# Patient Record
Sex: MALE | Race: WHITE | NOT HISPANIC OR LATINO | Employment: OTHER | URBAN - METROPOLITAN AREA
[De-identification: names, ages, dates, MRNs, and addresses within clinical notes are randomized per-mention and may not be internally consistent; named-entity substitution may affect disease eponyms.]

---

## 2017-01-05 ENCOUNTER — OFFICE VISIT - RIVER FALLS (OUTPATIENT)
Dept: FAMILY MEDICINE | Facility: CLINIC | Age: 77
End: 2017-01-05

## 2017-01-17 ENCOUNTER — OFFICE VISIT - RIVER FALLS (OUTPATIENT)
Dept: FAMILY MEDICINE | Facility: CLINIC | Age: 77
End: 2017-01-17

## 2017-01-17 ASSESSMENT — MIFFLIN-ST. JEOR: SCORE: 1717.31

## 2017-09-26 ENCOUNTER — AMBULATORY - RIVER FALLS (OUTPATIENT)
Dept: FAMILY MEDICINE | Facility: CLINIC | Age: 77
End: 2017-09-26

## 2017-11-02 ENCOUNTER — OFFICE VISIT - RIVER FALLS (OUTPATIENT)
Dept: FAMILY MEDICINE | Facility: CLINIC | Age: 77
End: 2017-11-02

## 2017-11-02 ASSESSMENT — MIFFLIN-ST. JEOR: SCORE: 1726.38

## 2017-11-03 LAB
CREAT SERPL-MCNC: 1.03 MG/DL (ref 0.7–1.18)
GLUCOSE BLD-MCNC: 114 MG/DL (ref 65–99)

## 2018-02-01 ENCOUNTER — AMBULATORY - RIVER FALLS (OUTPATIENT)
Dept: FAMILY MEDICINE | Facility: CLINIC | Age: 78
End: 2018-02-01

## 2018-02-02 LAB
CHOLEST SERPL-MCNC: 145 MG/DL
CHOLEST/HDLC SERPL: 3.4 {RATIO}
CREAT SERPL-MCNC: 0.88 MG/DL (ref 0.7–1.18)
GLUCOSE BLD-MCNC: 118 MG/DL (ref 65–99)
HBA1C MFR BLD: 5.7 %
HDLC SERPL-MCNC: 43 MG/DL
LDLC SERPL CALC-MCNC: 76 MG/DL
NONHDLC SERPL-MCNC: 102 MG/DL
PSA SERPL-MCNC: 3.1 NG/ML
TRIGL SERPL-MCNC: 158 MG/DL

## 2018-02-12 ENCOUNTER — OFFICE VISIT - RIVER FALLS (OUTPATIENT)
Dept: FAMILY MEDICINE | Facility: CLINIC | Age: 78
End: 2018-02-12

## 2018-02-12 ASSESSMENT — MIFFLIN-ST. JEOR: SCORE: 1690.09

## 2018-02-19 ENCOUNTER — AMBULATORY - RIVER FALLS (OUTPATIENT)
Dept: FAMILY MEDICINE | Facility: CLINIC | Age: 78
End: 2018-02-19

## 2018-02-22 ENCOUNTER — AMBULATORY - RIVER FALLS (OUTPATIENT)
Dept: FAMILY MEDICINE | Facility: CLINIC | Age: 78
End: 2018-02-22

## 2018-02-23 ENCOUNTER — AMBULATORY - RIVER FALLS (OUTPATIENT)
Dept: FAMILY MEDICINE | Facility: CLINIC | Age: 78
End: 2018-02-23

## 2018-06-11 ENCOUNTER — OFFICE VISIT - RIVER FALLS (OUTPATIENT)
Dept: FAMILY MEDICINE | Facility: CLINIC | Age: 78
End: 2018-06-11

## 2018-06-11 ASSESSMENT — MIFFLIN-ST. JEOR: SCORE: 1708.23

## 2018-06-22 ENCOUNTER — OFFICE VISIT - RIVER FALLS (OUTPATIENT)
Dept: FAMILY MEDICINE | Facility: CLINIC | Age: 78
End: 2018-06-22

## 2018-08-30 ENCOUNTER — OFFICE VISIT - RIVER FALLS (OUTPATIENT)
Dept: FAMILY MEDICINE | Facility: CLINIC | Age: 78
End: 2018-08-30

## 2018-08-30 ASSESSMENT — MIFFLIN-ST. JEOR: SCORE: 1726.38

## 2018-10-11 ENCOUNTER — AMBULATORY - RIVER FALLS (OUTPATIENT)
Dept: FAMILY MEDICINE | Facility: CLINIC | Age: 78
End: 2018-10-11

## 2019-03-06 ENCOUNTER — AMBULATORY - RIVER FALLS (OUTPATIENT)
Dept: FAMILY MEDICINE | Facility: CLINIC | Age: 79
End: 2019-03-06

## 2019-03-07 LAB
BUN SERPL-MCNC: 24 MG/DL (ref 7–25)
BUN/CREAT RATIO - HISTORICAL: ABNORMAL (ref 6–22)
CALCIUM SERPL-MCNC: 9.7 MG/DL (ref 8.6–10.3)
CHLORIDE BLD-SCNC: 103 MMOL/L (ref 98–110)
CHOLEST SERPL-MCNC: 145 MG/DL
CHOLEST/HDLC SERPL: 3.5 {RATIO}
CO2 SERPL-SCNC: 27 MMOL/L (ref 20–32)
CREAT SERPL-MCNC: 0.75 MG/DL (ref 0.7–1.11)
CREAT UR-MCNC: 109 MG/DL (ref 20–320)
EGFRCR SERPLBLD CKD-EPI 2021: 87 ML/MIN/1.73M2
ERYTHROCYTE [DISTWIDTH] IN BLOOD BY AUTOMATED COUNT: 12.3 % (ref 11–15)
GLUCOSE BLD-MCNC: 132 MG/DL (ref 65–99)
HBA1C MFR BLD: 6.1 %
HCT VFR BLD AUTO: 40.5 % (ref 38.5–50)
HDLC SERPL-MCNC: 42 MG/DL
HGB BLD-MCNC: 14.3 GM/DL (ref 13.2–17.1)
LDLC SERPL CALC-MCNC: 76 MG/DL
MCH RBC QN AUTO: 33.6 PG (ref 27–33)
MCHC RBC AUTO-ENTMCNC: 35.3 GM/DL (ref 32–36)
MCV RBC AUTO: 95.3 FL (ref 80–100)
MICROALBUMIN UR-MCNC: 73.9 MG/DL
MICROALBUMIN/CREAT UR: 678 MG/G{CREAT}
NONHDLC SERPL-MCNC: 103 MG/DL
PLATELET # BLD AUTO: 218 10*3/UL (ref 140–400)
PMV BLD: 10.6 FL (ref 7.5–12.5)
POTASSIUM BLD-SCNC: 4.4 MMOL/L (ref 3.5–5.3)
RBC # BLD AUTO: 4.25 10*6/UL (ref 4.2–5.8)
SODIUM SERPL-SCNC: 138 MMOL/L (ref 135–146)
TRIGL SERPL-MCNC: 167 MG/DL
WBC # BLD AUTO: 7 10*3/UL (ref 3.8–10.8)

## 2019-03-18 ENCOUNTER — OFFICE VISIT - RIVER FALLS (OUTPATIENT)
Dept: FAMILY MEDICINE | Facility: CLINIC | Age: 79
End: 2019-03-18

## 2019-03-18 ASSESSMENT — MIFFLIN-ST. JEOR: SCORE: 1739.99

## 2019-06-24 ENCOUNTER — COMMUNICATION - RIVER FALLS (OUTPATIENT)
Dept: FAMILY MEDICINE | Facility: CLINIC | Age: 79
End: 2019-06-24

## 2019-07-01 ENCOUNTER — OFFICE VISIT - RIVER FALLS (OUTPATIENT)
Dept: FAMILY MEDICINE | Facility: CLINIC | Age: 79
End: 2019-07-01

## 2019-07-01 ENCOUNTER — AMBULATORY - RIVER FALLS (OUTPATIENT)
Dept: FAMILY MEDICINE | Facility: CLINIC | Age: 79
End: 2019-07-01

## 2019-07-01 ASSESSMENT — MIFFLIN-ST. JEOR: SCORE: 1735.45

## 2019-07-02 ENCOUNTER — COMMUNICATION - RIVER FALLS (OUTPATIENT)
Dept: FAMILY MEDICINE | Facility: CLINIC | Age: 79
End: 2019-07-02

## 2019-07-02 LAB
CREAT UR-MCNC: 173 MG/DL (ref 20–320)
HBA1C MFR BLD: 5.9 %
MICROALBUMIN UR-MCNC: 63.3 MG/DL
MICROALBUMIN/CREAT UR: 366 MG/G{CREAT}

## 2019-07-09 ENCOUNTER — OFFICE VISIT - RIVER FALLS (OUTPATIENT)
Dept: FAMILY MEDICINE | Facility: CLINIC | Age: 79
End: 2019-07-09

## 2019-07-09 ASSESSMENT — MIFFLIN-ST. JEOR: SCORE: 1728.19

## 2019-07-10 ENCOUNTER — COMMUNICATION - RIVER FALLS (OUTPATIENT)
Dept: FAMILY MEDICINE | Facility: CLINIC | Age: 79
End: 2019-07-10

## 2019-07-10 LAB
BUN SERPL-MCNC: 36 MG/DL (ref 7–25)
BUN/CREAT RATIO - HISTORICAL: 39 (ref 6–22)
CALCIUM SERPL-MCNC: 9.6 MG/DL (ref 8.6–10.3)
CHLORIDE BLD-SCNC: 103 MMOL/L (ref 98–110)
CO2 SERPL-SCNC: 27 MMOL/L (ref 20–32)
CREAT SERPL-MCNC: 0.92 MG/DL (ref 0.7–1.11)
EGFRCR SERPLBLD CKD-EPI 2021: 78 ML/MIN/1.73M2
GLUCOSE BLD-MCNC: 95 MG/DL (ref 65–99)
POTASSIUM BLD-SCNC: 4.3 MMOL/L (ref 3.5–5.3)
SODIUM SERPL-SCNC: 138 MMOL/L (ref 135–146)

## 2019-07-24 ENCOUNTER — OFFICE VISIT - RIVER FALLS (OUTPATIENT)
Dept: FAMILY MEDICINE | Facility: CLINIC | Age: 79
End: 2019-07-24

## 2019-09-12 ENCOUNTER — AMBULATORY - RIVER FALLS (OUTPATIENT)
Dept: FAMILY MEDICINE | Facility: CLINIC | Age: 79
End: 2019-09-12

## 2020-03-10 ENCOUNTER — AMBULATORY - RIVER FALLS (OUTPATIENT)
Dept: FAMILY MEDICINE | Facility: CLINIC | Age: 80
End: 2020-03-10

## 2020-03-11 LAB
BUN SERPL-MCNC: 28 MG/DL (ref 7–25)
BUN/CREAT RATIO - HISTORICAL: 34 (ref 6–22)
CALCIUM SERPL-MCNC: 9.5 MG/DL (ref 8.6–10.3)
CHLORIDE BLD-SCNC: 102 MMOL/L (ref 98–110)
CHOLEST SERPL-MCNC: 153 MG/DL
CHOLEST/HDLC SERPL: 3.4 {RATIO}
CO2 SERPL-SCNC: 25 MMOL/L (ref 20–32)
CREAT SERPL-MCNC: 0.83 MG/DL (ref 0.7–1.11)
CREAT UR-MCNC: 48 MG/DL (ref 20–320)
EGFRCR SERPLBLD CKD-EPI 2021: 83 ML/MIN/1.73M2
GLUCOSE BLD-MCNC: 115 MG/DL (ref 65–99)
HBA1C MFR BLD: 6.1 %
HDLC SERPL-MCNC: 45 MG/DL
LDLC SERPL CALC-MCNC: 83 MG/DL
MICROALBUMIN UR-MCNC: 42 MG/DL
MICROALBUMIN/CREAT UR: 875 MG/G{CREAT}
NONHDLC SERPL-MCNC: 108 MG/DL
POTASSIUM BLD-SCNC: 4.2 MMOL/L (ref 3.5–5.3)
SODIUM SERPL-SCNC: 137 MMOL/L (ref 135–146)
TRIGL SERPL-MCNC: 157 MG/DL

## 2020-03-15 ENCOUNTER — COMMUNICATION - RIVER FALLS (OUTPATIENT)
Dept: FAMILY MEDICINE | Facility: CLINIC | Age: 80
End: 2020-03-15

## 2020-03-24 ENCOUNTER — COMMUNICATION - RIVER FALLS (OUTPATIENT)
Dept: FAMILY MEDICINE | Facility: CLINIC | Age: 80
End: 2020-03-24

## 2020-04-20 ENCOUNTER — COMMUNICATION - RIVER FALLS (OUTPATIENT)
Dept: FAMILY MEDICINE | Facility: CLINIC | Age: 80
End: 2020-04-20

## 2020-05-05 ENCOUNTER — OFFICE VISIT - RIVER FALLS (OUTPATIENT)
Dept: FAMILY MEDICINE | Facility: CLINIC | Age: 80
End: 2020-05-05

## 2020-05-06 ENCOUNTER — AMBULATORY - RIVER FALLS (OUTPATIENT)
Dept: FAMILY MEDICINE | Facility: CLINIC | Age: 80
End: 2020-05-06

## 2020-05-07 ENCOUNTER — AMBULATORY - RIVER FALLS (OUTPATIENT)
Dept: FAMILY MEDICINE | Facility: CLINIC | Age: 80
End: 2020-05-07

## 2020-05-12 ENCOUNTER — OFFICE VISIT - RIVER FALLS (OUTPATIENT)
Dept: FAMILY MEDICINE | Facility: CLINIC | Age: 80
End: 2020-05-12

## 2020-07-17 ENCOUNTER — OFFICE VISIT - RIVER FALLS (OUTPATIENT)
Dept: FAMILY MEDICINE | Facility: CLINIC | Age: 80
End: 2020-07-17

## 2020-07-17 ASSESSMENT — MIFFLIN-ST. JEOR: SCORE: 1694.63

## 2020-07-18 LAB
BUN SERPL-MCNC: 38 MG/DL (ref 7–25)
BUN/CREAT RATIO - HISTORICAL: 40 (ref 6–22)
CALCIUM SERPL-MCNC: 10 MG/DL (ref 8.6–10.3)
CHLORIDE BLD-SCNC: 98 MMOL/L (ref 98–110)
CO2 SERPL-SCNC: 26 MMOL/L (ref 20–32)
CREAT SERPL-MCNC: 0.96 MG/DL (ref 0.7–1.11)
EGFRCR SERPLBLD CKD-EPI 2021: 74 ML/MIN/1.73M2
GLUCOSE BLD-MCNC: 117 MG/DL (ref 65–99)
POTASSIUM BLD-SCNC: 4.6 MMOL/L (ref 3.5–5.3)
SODIUM SERPL-SCNC: 135 MMOL/L (ref 135–146)

## 2020-07-20 ENCOUNTER — COMMUNICATION - RIVER FALLS (OUTPATIENT)
Dept: FAMILY MEDICINE | Facility: CLINIC | Age: 80
End: 2020-07-20

## 2020-09-28 ENCOUNTER — OFFICE VISIT - RIVER FALLS (OUTPATIENT)
Dept: FAMILY MEDICINE | Facility: CLINIC | Age: 80
End: 2020-09-28

## 2020-11-02 ENCOUNTER — OFFICE VISIT - RIVER FALLS (OUTPATIENT)
Dept: FAMILY MEDICINE | Facility: CLINIC | Age: 80
End: 2020-11-02

## 2020-11-02 ASSESSMENT — MIFFLIN-ST. JEOR: SCORE: 1612.98

## 2020-12-14 ENCOUNTER — AMBULATORY - RIVER FALLS (OUTPATIENT)
Dept: FAMILY MEDICINE | Facility: CLINIC | Age: 80
End: 2020-12-14

## 2020-12-15 ENCOUNTER — COMMUNICATION - RIVER FALLS (OUTPATIENT)
Dept: FAMILY MEDICINE | Facility: CLINIC | Age: 80
End: 2020-12-15

## 2020-12-15 LAB
BUN SERPL-MCNC: 38 MG/DL (ref 7–25)
BUN/CREAT RATIO - HISTORICAL: 43 (ref 6–22)
CALCIUM SERPL-MCNC: 10.2 MG/DL (ref 8.6–10.3)
CHLORIDE BLD-SCNC: 102 MMOL/L (ref 98–110)
CO2 SERPL-SCNC: 29 MMOL/L (ref 20–32)
CREAT SERPL-MCNC: 0.89 MG/DL (ref 0.7–1.11)
CREAT UR-MCNC: 98 MG/DL (ref 20–320)
EGFRCR SERPLBLD CKD-EPI 2021: 80 ML/MIN/1.73M2
ERYTHROCYTE [DISTWIDTH] IN BLOOD BY AUTOMATED COUNT: 11.6 % (ref 11–15)
GLUCOSE BLD-MCNC: 114 MG/DL (ref 65–99)
HBA1C MFR BLD: 5.7 %
HCT VFR BLD AUTO: 39.7 % (ref 38.5–50)
HGB BLD-MCNC: 13.5 GM/DL (ref 13.2–17.1)
MCH RBC QN AUTO: 33.6 PG (ref 27–33)
MCHC RBC AUTO-ENTMCNC: 34 GM/DL (ref 32–36)
MCV RBC AUTO: 98.8 FL (ref 80–100)
MICROALBUMIN UR-MCNC: 14.2 MG/DL
MICROALBUMIN/CREAT UR: 145 MG/G{CREAT}
PLATELET # BLD AUTO: 244 10*3/UL (ref 140–400)
PMV BLD: 10.7 FL (ref 7.5–12.5)
POTASSIUM BLD-SCNC: 4.9 MMOL/L (ref 3.5–5.3)
RBC # BLD AUTO: 4.02 10*6/UL (ref 4.2–5.8)
SODIUM SERPL-SCNC: 141 MMOL/L (ref 135–146)
TSH SERPL DL<=0.005 MIU/L-ACNC: 1.32 MIU/L (ref 0.4–4.5)
WBC # BLD AUTO: 7.1 10*3/UL (ref 3.8–10.8)

## 2020-12-21 ENCOUNTER — OFFICE VISIT - RIVER FALLS (OUTPATIENT)
Dept: FAMILY MEDICINE | Facility: CLINIC | Age: 80
End: 2020-12-21

## 2021-01-25 ENCOUNTER — COMMUNICATION - RIVER FALLS (OUTPATIENT)
Dept: FAMILY MEDICINE | Facility: CLINIC | Age: 81
End: 2021-01-25

## 2021-02-05 ENCOUNTER — COMMUNICATION - RIVER FALLS (OUTPATIENT)
Dept: FAMILY MEDICINE | Facility: CLINIC | Age: 81
End: 2021-02-05

## 2021-02-08 ENCOUNTER — AMBULATORY - RIVER FALLS (OUTPATIENT)
Dept: FAMILY MEDICINE | Facility: CLINIC | Age: 81
End: 2021-02-08

## 2022-02-11 VITALS
OXYGEN SATURATION: 86 % | HEART RATE: 58 BPM | SYSTOLIC BLOOD PRESSURE: 134 MMHG | HEIGHT: 72 IN | WEIGHT: 221 LBS | HEART RATE: 58 BPM | BODY MASS INDEX: 29.72 KG/M2 | DIASTOLIC BLOOD PRESSURE: 70 MMHG | WEIGHT: 219.4 LBS | SYSTOLIC BLOOD PRESSURE: 128 MMHG | TEMPERATURE: 97.8 F | HEIGHT: 72 IN | TEMPERATURE: 97.5 F | BODY MASS INDEX: 29.93 KG/M2 | DIASTOLIC BLOOD PRESSURE: 48 MMHG

## 2022-02-11 VITALS
BODY MASS INDEX: 29.39 KG/M2 | HEART RATE: 59 BPM | WEIGHT: 217 LBS | HEIGHT: 72 IN | SYSTOLIC BLOOD PRESSURE: 154 MMHG | DIASTOLIC BLOOD PRESSURE: 60 MMHG

## 2022-02-11 VITALS
DIASTOLIC BLOOD PRESSURE: 50 MMHG | SYSTOLIC BLOOD PRESSURE: 140 MMHG | BODY MASS INDEX: 29.12 KG/M2 | HEART RATE: 64 BPM | HEART RATE: 59 BPM | DIASTOLIC BLOOD PRESSURE: 69 MMHG | BODY MASS INDEX: 29.62 KG/M2 | HEIGHT: 72 IN | TEMPERATURE: 98.2 F | WEIGHT: 218.4 LBS | SYSTOLIC BLOOD PRESSURE: 153 MMHG | WEIGHT: 215 LBS

## 2022-02-11 VITALS
HEART RATE: 50 BPM | SYSTOLIC BLOOD PRESSURE: 144 MMHG | HEIGHT: 72 IN | BODY MASS INDEX: 29.66 KG/M2 | DIASTOLIC BLOOD PRESSURE: 50 MMHG | WEIGHT: 219 LBS

## 2022-02-11 VITALS — SYSTOLIC BLOOD PRESSURE: 102 MMHG | HEART RATE: 62 BPM | DIASTOLIC BLOOD PRESSURE: 58 MMHG

## 2022-02-11 VITALS
DIASTOLIC BLOOD PRESSURE: 75 MMHG | HEART RATE: 54 BPM | HEIGHT: 72 IN | TEMPERATURE: 97.8 F | DIASTOLIC BLOOD PRESSURE: 71 MMHG | SYSTOLIC BLOOD PRESSURE: 150 MMHG | SYSTOLIC BLOOD PRESSURE: 131 MMHG | HEART RATE: 62 BPM | BODY MASS INDEX: 28.58 KG/M2 | WEIGHT: 211 LBS

## 2022-02-11 VITALS
BODY MASS INDEX: 28.71 KG/M2 | SYSTOLIC BLOOD PRESSURE: 102 MMHG | TEMPERATURE: 97.6 F | DIASTOLIC BLOOD PRESSURE: 60 MMHG | HEART RATE: 66 BPM | WEIGHT: 212 LBS | HEIGHT: 72 IN

## 2022-02-11 VITALS
HEART RATE: 74 BPM | WEIGHT: 194 LBS | SYSTOLIC BLOOD PRESSURE: 112 MMHG | BODY MASS INDEX: 26.28 KG/M2 | HEIGHT: 72 IN | HEIGHT: 72 IN | BODY MASS INDEX: 28.75 KG/M2 | TEMPERATURE: 98 F | DIASTOLIC BLOOD PRESSURE: 52 MMHG | OXYGEN SATURATION: 98 %

## 2022-02-11 VITALS
WEIGHT: 222 LBS | TEMPERATURE: 98.3 F | DIASTOLIC BLOOD PRESSURE: 50 MMHG | HEART RATE: 65 BPM | DIASTOLIC BLOOD PRESSURE: 60 MMHG | SYSTOLIC BLOOD PRESSURE: 120 MMHG | BODY MASS INDEX: 30.07 KG/M2 | HEIGHT: 72 IN | SYSTOLIC BLOOD PRESSURE: 130 MMHG

## 2022-02-11 VITALS
WEIGHT: 219 LBS | SYSTOLIC BLOOD PRESSURE: 148 MMHG | DIASTOLIC BLOOD PRESSURE: 58 MMHG | HEART RATE: 56 BPM | BODY MASS INDEX: 29.66 KG/M2 | HEIGHT: 72 IN

## 2022-02-11 VITALS — SYSTOLIC BLOOD PRESSURE: 122 MMHG | DIASTOLIC BLOOD PRESSURE: 60 MMHG

## 2022-02-11 VITALS — HEART RATE: 57 BPM | DIASTOLIC BLOOD PRESSURE: 52 MMHG | SYSTOLIC BLOOD PRESSURE: 128 MMHG

## 2022-02-16 NOTE — NURSING NOTE
Quick Intake Entered On:  3/6/2019 12:04 PM CST    Performed On:  3/6/2019 12:03 PM CST by Dolly Parish RN               Summary   Chief Complaint :   BP   Systolic Blood Pressure :   148 mmHg (HI)    Diastolic Blood Pressure :   72 mmHg   Mean Arterial Pressure :   97 mmHg   BP Site :   Right arm   BP Method :   Manual   Dolly Parish RN - 3/6/2019 12:03 PM CST   More Vitals   Systolic Blood Pressure Repeat :   130 mmHg   Diastolic Blood Pressure Repeat :   50 mmHg   BP Site Repeat :   Right arm   Dolly Parish RN - 3/6/2019 12:03 PM CST  
[FreeTextEntry1] : - MRI brain with TBI protocol and SWI sequence to r/o structural changes\par - May use Motrin 200 mg PRN for headaches\par - Advised to call if headache frequency is increasing\par - Follow up in 2 months

## 2022-02-16 NOTE — PROGRESS NOTES
Patient:   SONIA WHITE            MRN: [id number]            FIN: [id number]               Age:   76 years     Sex:  Male     :  1940   Associated Diagnoses:   HTN (hypertension); Prediabetes; Obesity; Dyslipidemia; Lumbar spinal stenosis; First degree atrioventricular block; Mild asthma; Sinus bradycardia   Author:   Pj Peterson MD      Visit Information   Visit type:  Preoperative.    Accompanied by:  No one.    Source of history:  Self.    History limitation:  None.       Chief Complaint   2017 3:35 PM CDT    pre op - 11/10/17 back surgery - out patient procedure        History of Present Illness   Scheduled for noninvasive spine procedure with MAC for lumbar spinal stenosis.  Feels well. No complaints. No H/O cardiopulmonary disease, surgical bleeding or anesthetic complications. Excellent functional capacity. Occasional  snoring, but no daytime sleepiness, chronic snoring or H/O sleep apnea.       Review of Systems   Constitutional:  No weakness, No fatigue, No decreased activity.    Eye:  No recent visual problem.    Respiratory:  No shortness of breath, No cough.    Cardiovascular:  No chest pain, No palpitations, No peripheral edema.    Gastrointestinal:  No nausea, No vomiting, No diarrhea.    Genitourinary:  Erectile dysfunction, No dysuria, No urinary frequency, No urinary hesitancy, No urinary retention.    Hematology/Lymphatics:  No bruising tendency, No bleeding tendency.    Endocrine:  Negative.    Musculoskeletal:  Negative except as documented in history of present illness.    Integumentary:  No rash.    Neurologic:  Negative except as documented in history of present illness.       Health Status   Allergies:    Allergic Reactions (Selected)  Severity Not Documented  Iodine (Asthma)  Shellfish (No reactions were documented)   Medications:  (Selected)   Prescriptions  Prescribed  Lipitor 10 mg oral tablet: 1 tab(s), po, Daily, # 90 tab(s), 3 Refill(s), Type: Maintenance,  Pharmacy: [id number], 1 tab(s) po daily,x90 day(s)  albuterol 90 mcg/inh inhalation aerosol: 2 puff(s), inh, qid, PRN: as needed for wheezing, # 2 EA, 6 Refill(s), Type: Maintenance, Pharmacy: [facility], 2 puff(s) inh qid,PRN:as needed for wheezing  hydroCHLOROthiazide 12.5 mg oral capsule: 1 cap(s) ( 12.5 mg ), po, daily, # 90 cap(s), 3 Refill(s), Pharmacy: [facility], 1 cap(s) po daily  lisinopril 40 mg oral tablet: 1 tab(s) ( 40 mg ), PO, Daily, # 90 tab(s), 3 Refill(s), Type: Maintenance, Pharmacy: [facility], 1 tab(s) po daily  Documented Medications  Documented  Aspir 81: ( 81 mg ), PO, Daily, 0   Problem list:    All Problems  Lumbar spinal stenosis / SNOMED CT 15182790 / Confirmed  Microalbuminuria / SNOMED CT 38846240 / Confirmed  Prediabetes / SNOMED CT 3819476742 / Confirmed  Obesity / SNOMED CT 8909457872 / Confirmed  Mild asthma / SNOMED CT 8005835975 / Confirmed  HTN (hypertension) / SNOMED CT 3597710873 / Confirmed  Dyslipidemia / SNOMED CT 8749438113 / Confirmed  Carcinoma of colon, [facility] B2 / SNOMED CT 635320064 / Confirmed  BPPV (benign paroxysmal positional vertigo) / SNOMED CT 974876579 / Confirmed  Pulmonary asbestosis / SNOMED CT 09431704 / Confirmed  Resolved: ED (erectile dysfunction) / SNOMED CT 4948104026  Resolved: History of chicken pox / SNOMED CT 660981120  Canceled: Flu vaccine need / SNOMED CT 620882878  Canceled: Metabolic syndrome / SNOMED CT 8938780256  Canceled: H/O asbestosis / SNOMED CT 370312323      Histories   Past Medical History:    Active  Carcinoma of colon, [facility] B2 (297926977): Onset on 9/1/2000 at 59 years.  Mild asthma (9845467780)  BPPV (benign paroxysmal positional vertigo) (546325119)  HTN (hypertension) (8364725526)  Prediabetes (8629892537)  Obesity (4888046433)  Resolved  ED (erectile dysfunction) (1814521106):  Resolved.  History of chicken pox (872318688):  Resolved.   Family History:     Melanoma  Mother ()  CA - Carcinoma of prostate  Father ()  PUD - Peptic ulcer disease  Father ()     Procedure history:    Colonoscopy (45.23) on 2014 at 73 Years.  Comments:  2014 2:17 PM - Sophie Calles RN  Sedation: fentanyl, midazolam  Indication: personal history colon cancer  Normal  Repeat in 5 years  Colonoscopy (548938462) on 2009 at 68 Years.  Comments:  2010 9:19 AM - Giselle Gong  recheck 2014  Colonoscopy (247371060) on 6/3/2005 at 64 Years.  Colonoscopy (341302619) on 2002 at 61 Years.  Colonoscopy (046556951) on 2001 at 60 Years.  Comments:  2010 9:18 AM - Giselle Gong  normal  Right colon resection on 8/15/2000 at 59 Years.  Colonoscopy (787559124) on 2000 at 59 Years.  Polypectomy (948468586) on 2000 at 59 Years.  Tonsillectomy (564388340).   Social History:        Alcohol Assessment: Current            Current, 4-6 times per week, 2 drinks/episode average.  2 drinks/episode maximum.      Tobacco Assessment: Denies Tobacco Use            Never smoker      Substance Abuse Assessment: Denies Substance Abuse            Never      Employment and Education Assessment            Employed, Work/School description: sales.      Home and Environment Assessment            Marital status: .  Spouse/Partner name: Riya Galdamez.      Exercise and Physical Activity Assessment: Regular exercise            Exercise frequency: 2-3 times/week.        Physical Examination   Vital Signs   2017 3:55 PM CDT Systolic Blood Pressure 148 mmHg  HI    Diastolic Blood Pressure 58 mmHg  LOW    Mean Arterial Pressure 88 mmHg    BP Site Right arm    BP Method Manual   2017 3:35 PM CDT Peripheral Pulse Rate 56 bpm  LOW    Systolic Blood Pressure 171 mmHg  HI    Diastolic Blood Pressure 68 mmHg    Mean Arterial Pressure 102 mmHg    BP Site Right arm      Measurements from flowsheet : Measurements   2017 3:35 PM CDT Height Measured  - Standard 72 in    Weight Measured - Standard 219 lb    BSA 2.24 m2    Body Mass Index 29.7 kg/m2      General:  Alert and oriented, No acute distress.    Eye:  Normal conjunctiva.    Airway:       Mallampati classification: IV (hard palate only).    HENT:  Normocephalic, Normal hearing, Oral mucosa is moist, No pharyngeal erythema.    Neck:  Supple, Non-tender, No carotid bruit, No lymphadenopathy, No thyromegaly.    Respiratory:  Lungs are clear to auscultation, Respirations are non-labored.    Cardiovascular:  Normal rate, Regular rhythm, No murmur, No gallop, Normal peripheral perfusion, No edema.    Gastrointestinal:  Soft, Non-tender, No organomegaly, midline scar.         Abdomen: Obese.    Musculoskeletal:  Normal gait.    Integumentary:  No pallor.    Neurologic:  No focal deficits.    Cognition and Speech:  Speech clear and coherent, Functional cognition intact.    Psychiatric:  Cooperative, Appropriate mood & affect.       Review / Management   Results review   ECG interpretation:  Time  11/2/2017 4:06:00 PM, Rate  54  beats per minute, Within normal limits, First degree AVB.       Impression and Plan   Diagnosis     HTN (hypertension) (KKH27-GQ I10).     Prediabetes (NFY20-AU R73.09).     Obesity (DHY65-AP E66.9).     Dyslipidemia (TYT16-IU E78.5).     Lumbar spinal stenosis (JDW15-AW M48.06).     First degree atrioventricular block (YOU75-EL I44.0).     Mild asthma (SUP75-WW J45.998).     Sinus bradycardia (DWQ64-HX R00.1).     Course:  Unchanged.    Summary:  Stable for MAC.    Orders     Orders (Selected)   Outpatient Orders  Ordered (Dispatched)  Basic Metabolic Panel* ([facility]): Specimen Type: Serum, Collection Date: 11/02/17 15:52:00 CDT.

## 2022-02-16 NOTE — LETTER
(Inserted Image. Unable to display)     January 22, 2021      SONIA WHITE  [address] [place], [place] [id number]          Dear SONIA,      Thank you for selecting [facility] (previously [facility] for your healthcare needs.    Our records indicate you are due for the following services:    Medicare Annual Wellness Visit.    Fasting Lab Tests ~ Please do not eat or drink anything 10 hours prior to your scheduled appointment time.  (Water and any medications that you may need are allowed unless directed otherwise.)    If you had your labs done at another facility or with Direct Access Lab Testing at [facility], please bring in a copy of the results to your next visit, mail a copy, or drop off a copy of your results to your Healthcare Provider.    (FYI   Regarding office visits: In some instances, a video visit or telephone visit may be offered as an option.)    You are due for lab work and an office visit; please schedule the lab appointment 1 week before the office visit.  This will assure all results are available to discuss with your Healthcare Provider during your visit.    **It is very helpful if you bring your medication bottles to your appointment.  This assures we have all of your current medications, including strength and dosing information, documented accurately in your medical record.    To schedule an appointment or if you have further questions, please contact your clinic at [phone].      Powered by [employer]    Sincerely,    Pj Peterson MD, FACP

## 2022-02-16 NOTE — TELEPHONE ENCOUNTER
---------------------  From: Denita Landry (JDL Message Pool ([id number]))   To: Reji Chand MD;     Sent: 4/28/2020 2:53:22 PM CDT  Subject: General Message     Phone Message    PCP: Mark    Time of Call: 1447    Phone Number: [phone] [facility]    Note: Patient called and asked for me directly. Patient states that he has been using his home BP monitor and sometimes the BP monitor says he has a heart irregularity. Patient wondering if I saw anything in his chart that he has anything like this. Per chart, told pt the only thing I see listed is sinus bradycardia. Patient states that he has not been feeling different or off, he was just wondering why his BP machine says this. Patient wondering if JDL could give him a call back when he is back in clinic in regards to this.     Please advise if ok to wait until JDL is back or if pt should be seen.---------------------  From: Reji Chand MD   To: JDL Message Pool ([id number]_[place] [facility] [place]);     Sent: 4/28/2020 3:12:34 PM CDT  Subject: RE: General Message     ok for jdl---------------------  From: Denita Landry ([facility] Message Pool ([id number] [facility] [place]))   To: Pj Peterson MD;     Sent: 4/28/2020 3:33:25 PM CDT  Subject: FW: General Message

## 2022-02-16 NOTE — TELEPHONE ENCOUNTER
---------------------  From: Alyssa Armstrong CMA (Phone Messages Pool ([id number]))   To: JDL Message Pool ([id number]_[place] [facility] [place]);     Sent: 3/4/2020 11:59:36 AM CST  Subject: General Message-lab order     Phone Message    PCP:   RAHUL      Time of Call:  1155       Person Calling:  self  Phone number:  [phone]    Note:   Pt calling to set up for labs for JDL, he is due for AWV/chronic disease visit.  Please advise on labs needed and pt will schedule for both appt.      Last office visit and reason:  7/9 preop---------------------  From: Christina/Denita RICE (DL Message Pool ([id number]_[place] [facility] [place]))   To: Pj Peterson MD;     Sent: 3/4/2020 12:22:08 PM CST  Subject: FW: General Message-lab order---------------------  From: Pj Peterson MD   To: JDL Message Pool ([id number] [facility] [place]);     Sent: 3/5/2020 8:01:49 AM CST  Subject: RE: General Message-lab order     Lipids, KATYA, BMP, Hgb [id number].

## 2022-02-16 NOTE — LETTER
(Inserted Image. Unable to display)   December 15, 2020      SONIA WHITE      [address] [place], [place] [phone]        Dear SONIA,    Thank you for selecting [facility] ([facility] for your healthcare needs.  Below you will find the results of your recent tests done at our clinic.     Results are acceptable. Urine microalbumin improved.      Result Name Current Result Previous Result Reference Range   Sodium Level (mmol/L)  141 12/14/2020  135 7/17/2020 135 - 146   Potassium Level (mmol/L)  4.9 12/14/2020  4.6 7/17/2020 3.5 - 5.3   Chloride Level (mmol/L)  102 12/14/2020  98 7/17/2020 98 - 110   CO2 Level (mmol/L)  29 12/14/2020  26 7/17/2020 20 - 32   Glucose Level (mg/dL) ((H)) 114 12/14/2020 ((H)) 117 7/17/2020 65 - 99   BUN (mg/dL) ((H)) 38 12/14/2020  7 - 25   Creatinine Level (mg/dL)  0.89 12/14/2020  0.96 7/17/2020 0.70 - 1.11   eGFR (mL/min/1.73m2)  80 12/14/2020  74 7/17/2020 > OR = 60 -    Calcium Level (mg/dL)  10.2 12/14/2020  10.0 7/17/2020 8.6 - 10.3   Hgb A1c ((H)) 5.7 12/14/2020 ((H)) 6.1 3/10/2020  - <5.7   TSH (mIU/L)  1.32 12/14/2020  0.40 - 4.50   U Creatinine (mg/dL)  98 12/14/2020  20 - 320   WBC  7.1 12/14/2020  7.0 3/6/2019 3.8 - 10.8   Hgb (gm/dL)  13.5 12/14/2020  14.3 3/6/2019 13.2 - 17.1   Platelet  244 12/14/2020  140 - 400       Please contact me or my assistant at [phone] if you have any questions.     Sincerely,        Pj Peterson MD

## 2022-02-16 NOTE — NURSING NOTE
Comprehensive Intake Entered On:  11/2/2020 2:00 PM CST    Performed On:  11/2/2020 1:50 PM CST by Denita Landry               Summary   Chief Complaint :   c/o weight loss- wondering if weight loss could be due to metformin. c/o something on R elbow. Wanting some labs done.    Weight Measured :   194 lb(Converted to: 194 lb 0 oz, 87.997 kg)    Height Measured :   72 in(Converted to: 6 ft 0 in, 182.88 cm)    Body Mass Index :   26.31 kg/m2 (HI)    Body Surface Area :   2.11 m2   Systolic Blood Pressure :   112 mmHg   Diastolic Blood Pressure :   52 mmHg (LOW)    Mean Arterial Pressure :   72 mmHg   Peripheral Pulse Rate :   74 bpm   BP Site :   Right arm   BP Method :   Electronic   HR Method :   Electronic   Temperature Tympanic :   98.0 DegF(Converted to: 36.7 DegC)    Oxygen Saturation :   98 %   Denita Landry - 11/2/2020 1:50 PM CST   Health Status   Allergies Verified? :   Yes   Medication History Verified? :   Yes   Medical History Verified? :   Yes   Pre-Visit Planning Status :   Completed   Tobacco Use? :   Never smoker   Denita Landry - 11/2/2020 1:50 PM CST   Consents   Consent for Immunization Exchange :   Consent Granted   Consent for Immunizations to Providers :   Consent Granted   Denita Landry - 11/2/2020 1:50 PM CST   Meds / Allergies   (As Of: 11/2/2020 2:00:31 PM CST)   Allergies (Active)   iodine  Estimated Onset Date:   Unspecified ; Reactions:   Asthma ; Created By:   Giselle Gong; Reaction Status:   Active ; Category:   Drug ; Substance:   iodine ; Type:   Allergy ; Updated By:   Giselle Gong; Source:   Paper Chart ; Reviewed Date:   11/2/2020 1:57 PM CST      shellfish  Estimated Onset Date:   Unspecified ; Created By:   Giselle Gong; Reaction Status:   Active ; Category:   Food ; Substance:   shellfish ; Type:   Allergy ; Updated By:   Giselle Gong; Source:   Paper Chart ; Reviewed Date:   11/2/2020 1:57 PM CST        Medication List   (As Of: 11/2/2020  2:00:31 PM CST)   Prescription/Discharge Order    albuterol  :   albuterol ; Status:   Prescribed ; Ordered As Mnemonic:   albuterol 90 mcg/inh inhalation aerosol ; Simple Display Line:   2 puff(s), NEB, qid, PRN: AS NEEDED FOR WHEEZING, 9 gm, 2 Refill(s) ; Ordering Provider:   Pj Peterson MD; Catalog Code:   albuterol ; Order Dt/Tm:   3/26/2020 2:45:21 PM CDT          amLODIPine  :   amLODIPine ; Status:   Prescribed ; Ordered As Mnemonic:   amLODIPine 5 mg oral tablet ; Simple Display Line:   1 tab(s), Oral, daily, due for appt next month, 90 tab(s), 3 Refill(s) ; Ordering Provider:   Pj Peterson MD; Catalog Code:   amLODIPine ; Order Dt/Tm:   7/17/2020 3:17:39 PM CDT          atorvastatin  :   atorvastatin ; Status:   Prescribed ; Ordered As Mnemonic:   atorvastatin 10 mg oral tablet ; Simple Display Line:   1 tab(s), Oral, daily, due for appt next month, 90 tab(s), 3 Refill(s) ; Ordering Provider:   Pj Peterson MD; Catalog Code:   atorvastatin ; Order Dt/Tm:   7/17/2020 3:17:49 PM CDT          chlorthalidone  :   chlorthalidone ; Status:   Prescribed ; Ordered As Mnemonic:   chlorthalidone 25 mg oral tablet ; Simple Display Line:   25 mg, 1 tab(s), Oral, daily, 90 tab(s), 3 Refill(s) ; Ordering Provider:   Pj Peterson MD; Catalog Code:   chlorthalidone ; Order Dt/Tm:   7/17/2020 3:18:17 PM CDT          lisinopril  :   lisinopril ; Status:   Prescribed ; Ordered As Mnemonic:   lisinopril 40 mg oral tablet ; Simple Display Line:   1 tab(s), Oral, daily, due for appt next month, 90 tab(s), 3 Refill(s) ; Ordering Provider:   Pj Peterson MD; Catalog Code:   lisinopril ; Order Dt/Tm:   7/17/2020 3:17:59 PM CDT          metFORMIN  :   metFORMIN ; Status:   Prescribed ; Ordered As Mnemonic:   metFORMIN 500 mg oral tablet, extended release ; Simple Display Line:   2,000 mg, 4 tab(s), PO, Daily, One daily with supper. Increase by one tablet each week to four tabs daily, 360 tab(s), 3 Refill(s) ;  Ordering Provider:   Pj Peterson MD; Catalog Code:   metFORMIN ; Order Dt/Tm:   7/17/2020 3:18:09 PM CDT            ID Risk Screen   Recent Travel History :   No recent travel   Family Member Travel History :   No recent travel   Other Exposure to Infectious Disease :   Unknown   Denita Landry M - 11/2/2020 1:50 PM CST

## 2022-02-16 NOTE — NURSING NOTE
Comprehensive Intake Entered On:  7/17/2020 3:02 PM CDT    Performed On:  7/17/2020 2:53 PM CDT by Inessa Andino CMA               Summary   Chief Complaint :   Follow up DM/HTN medications   Weight Measured :   212 lb(Converted to: 212 lb 0 oz, 96.16 kg)    Height Measured :   72 in(Converted to: 6 ft 0 in, 182.88 cm)    Body Mass Index :   28.75 kg/m2 (HI)    Body Surface Area :   2.21 m2   Systolic Blood Pressure :   102 mmHg   Diastolic Blood Pressure :   60 mmHg   Mean Arterial Pressure :   74 mmHg   Peripheral Pulse Rate :   66 bpm   BP Site :   Right arm   Pulse Site :   Radial artery   BP Method :   Manual   HR Method :   Manual   Temperature Tympanic :   97.6 DegF(Converted to: 36.4 DegC)  (LOW)    Inessa Andino CMA - 7/17/2020 2:53 PM CDT   Health Status   Allergies Verified? :   Yes   Medication History Verified? :   Yes   Medical History Verified? :   No   Pre-Visit Planning Status :   Completed   Tobacco Use? :   Never smoker   Inessa Andino CMA - 7/17/2020 2:53 PM CDT   Consents   Consent for Immunization Exchange :   Consent Granted   Consent for Immunizations to Providers :   Consent Granted   Inessa Andino CMA - 7/17/2020 2:53 PM CDT   Meds / Allergies   (As Of: 7/17/2020 3:02:57 PM CDT)   Allergies (Active)   iodine  Estimated Onset Date:   Unspecified ; Reactions:   Asthma ; Created By:   Giselle Gong; Reaction Status:   Active ; Category:   Drug ; Substance:   iodine ; Type:   Allergy ; Updated By:   Giselle Gong; Source:   Paper Chart ; Reviewed Date:   7/17/2020 2:57 PM CDT      shellfish  Estimated Onset Date:   Unspecified ; Created By:   Giselle Gong; Reaction Status:   Active ; Category:   Food ; Substance:   shellfish ; Type:   Allergy ; Updated By:   Giselle Gong; Source:   Paper Chart ; Reviewed Date:   7/17/2020 2:57 PM CDT        Medication List   (As Of: 7/17/2020 3:02:57 PM CDT)   Prescription/Discharge Order    albuterol  :   albuterol ; Status:   Prescribed ;  Ordered As Mnemonic:   albuterol 90 mcg/inh inhalation aerosol ; Simple Display Line:   2 puff(s), NEB, qid, PRN: AS NEEDED FOR WHEEZING, 9 gm, 2 Refill(s) ; Ordering Provider:   Pj Peterson MD; Catalog Code:   albuterol ; Order Dt/Tm:   3/26/2020 2:45:21 PM CDT          amLODIPine  :   amLODIPine ; Status:   Prescribed ; Ordered As Mnemonic:   amLODIPine 5 mg oral tablet ; Simple Display Line:   1 tab(s), Oral, daily, due for appt next month, 30 tab(s), 0 Refill(s) ; Ordering Provider:   Pj Peterson MD; Catalog Code:   amLODIPine ; Order Dt/Tm:   6/19/2020 12:27:15 PM CDT          atorvastatin  :   atorvastatin ; Status:   Prescribed ; Ordered As Mnemonic:   atorvastatin 10 mg oral tablet ; Simple Display Line:   1 tab(s), Oral, daily, due for appt next month, 30 tab(s), 0 Refill(s) ; Ordering Provider:   Pj Peterson MD; Catalog Code:   atorvastatin ; Order Dt/Tm:   6/19/2020 12:27:41 PM CDT          chlorthalidone  :   chlorthalidone ; Status:   Prescribed ; Ordered As Mnemonic:   chlorthalidone 25 mg oral tablet ; Simple Display Line:   25 mg, 1 tab(s), Oral, daily, 90 tab(s), 3 Refill(s) ; Ordering Provider:   Pj Peterson MD; Catalog Code:   chlorthalidone ; Order Dt/Tm:   5/12/2020 3:10:24 PM CDT          lisinopril  :   lisinopril ; Status:   Prescribed ; Ordered As Mnemonic:   lisinopril 40 mg oral tablet ; Simple Display Line:   1 tab(s), Oral, daily, due for appt next month, 30 tab(s), 0 Refill(s) ; Ordering Provider:   Pj Peterson MD; Catalog Code:   lisinopril ; Order Dt/Tm:   6/19/2020 12:28:03 PM CDT          metFORMIN  :   metFORMIN ; Status:   Prescribed ; Ordered As Mnemonic:   metFORMIN 500 mg oral tablet, extended release ; Simple Display Line:   2,000 mg, 4 tab(s), PO, Daily, One daily with supper. Increase by one tablet each week to four tabs daily, 360 tab(s), 3 Refill(s) ; Ordering Provider:   Pj Peterson MD; Catalog Code:   metFORMIN ; Order Dt/Tm:   3/18/2019 3:28:39  PM CDT            Home Meds    gabapentin  :   gabapentin ; Status:   Documented ; Ordered As Mnemonic:   gabapentin 300 mg oral capsule ; Simple Display Line:   300 mg, 1 cap(s), Oral, tid, 90 cap(s), 0 Refill(s) ; Catalog Code:   gabapentin ; Order Dt/Tm:   5/5/2020 3:24:02 PM CDT            ID Risk Screen   Recent Travel History :   No recent travel   Family Member Travel History :   No recent travel   Other Exposure to Infectious Disease :   Unknown   Inessa Andino CMA - 7/17/2020 2:53 PM CDT

## 2022-02-16 NOTE — NURSING NOTE
Quick Intake Entered On:  3/10/2020 11:16 AM CDT    Performed On:  3/10/2020 11:11 AM CDT by Dolly Parish RN               Summary   Chief Complaint :   BP   Systolic Blood Pressure :   122 mmHg   Diastolic Blood Pressure :   60 mmHg   Mean Arterial Pressure :   81 mmHg   BP Site :   Right arm   BP Method :   Manual   Dolly Parish RN - 3/10/2020 11:16 AM CDT

## 2022-02-16 NOTE — NURSING NOTE
Quick Intake Entered On:  3/18/2019 3:17 PM CDT    Performed On:  3/18/2019 3:17 PM CDT by Arina Joe MA               More Vitals   Systolic Blood Pressure Repeat :   120 mmHg   Diastolic Blood Pressure Repeat :   60 mmHg   Heath RICE, Arina - 3/18/2019 3:17 PM CDT

## 2022-02-16 NOTE — TELEPHONE ENCOUNTER
SONIA WHITEArmando : 1940 MRN: [id number]  PHONE NOTE: The patient is an 80-year-old with history of colon cancer a number of years ago, prediabetes, and hypertension.  He called with complaint of 25 pound weight loss over five months.  When I last saw him we decreased metformin because of diarrhea and that resolved.  He made significant dietary changes due to his prediabetes as well.  He is worried about cancer.    P: We will get CT scan and will stop metformin for now.     D:  2021  T:  2021 Pj Peterson MD, FACP/sq

## 2022-02-16 NOTE — NURSING NOTE
Comprehensive Intake Entered On:  7/1/2019 3:10 PM CDT    Performed On:  7/1/2019 2:57 PM CDT by Inessa Andino CMA               Summary   Chief Complaint :   c/o lower back pain, past couple of days worse on left side   Weight Measured :   221 lb(Converted to: 221 lb 0 oz, 100.24 kg)    Height Measured :   72 in(Converted to: 6 ft 0 in, 182.88 cm)    Body Mass Index :   29.97 kg/m2 (HI)    Body Surface Area :   2.25 m2   Systolic Blood Pressure :   128 mmHg   Diastolic Blood Pressure :   70 mmHg   Mean Arterial Pressure :   89 mmHg   Peripheral Pulse Rate :   58 bpm (LOW)    BP Site :   Right arm   Pulse Site :   Radial artery   BP Method :   Manual   HR Method :   Manual   Temperature Tympanic :   97.8 DegF(Converted to: 36.6 DegC)  (LOW)    Inessa Andino CMA - 7/1/2019 2:57 PM CDT   Health Status   Allergies Verified? :   Yes   Medication History Verified? :   Yes   Medical History Verified? :   No   Pre-Visit Planning Status :   Not completed   Tobacco Use? :   Never smoker   Inessa Andino CMA - 7/1/2019 2:57 PM CDT   Problems   (As Of: 7/1/2019 3:10:32 PM CDT)   Problems(Active)    BPPV (benign paroxysmal positional vertigo) (SNOMED CT  :668063057 )  Name of Problem:   BPPV (benign paroxysmal positional vertigo) ; Recorder:   Giselle Gong; Confirmation:   Confirmed ; Classification:   Medical ; Code:   [id number] ; Contributor System:   Pain[employer] ; Last Updated:   9/2/2014 1:46 PM CDT ; Life Cycle Date:   8/11/2010 ; Life Cycle Status:   Active ; Vocabulary:   SNOMED CT        Dyslipidemia (SNOMED CT  :1518155840 )  Name of Problem:   Dyslipidemia ; Recorder:   Pj Peterson MD; Confirmation:   Confirmed ; Classification:   Medical ; Code:   [id number] ; Contributor System:   PowerChart ; Last Updated:   11/2/2017 3:51 PM CDT ; Life Cycle Date:   11/2/2017 ; Life Cycle Status:   Active ; Responsible Provider:   Pj Peterson MD; Vocabulary:   SNOMED CT        First degree atrioventricular block  (SNOMED CT  :457553448 )  Name of Problem:   First degree atrioventricular block ; Recorder:   Pj Peterson MD; Confirmation:   Confirmed ; Classification:   Medical ; Code:   [id number] ; Contributor System:   PowerChart ; Last Updated:   11/2/2017 4:19 PM CDT ; Life Cycle Date:   11/2/2017 ; Life Cycle Status:   Active ; Responsible Provider:   Pj Peterson MD; Vocabulary:   SNOMED CT        History of colon cancer (SNOMED CT  :5441872356 )  Name of Problem:   History of colon cancer ; Recorder:   Love Staples; Confirmation:   Confirmed ; Classification:   Medical ; Code:   [id number] ; Contributor System:   PowerChart ; Last Updated:   9/5/2018 10:24 AM CDT ; Life Cycle Date:   9/5/2018 ; Life Cycle Status:   Active ; Vocabulary:   SNOMED CT        Lumbar spinal stenosis (SNOMED CT  :66143535 )  Name of Problem:   Lumbar spinal stenosis ; Recorder:   Pj Peterson MD; Confirmation:   Confirmed ; Classification:   Medical ; Code:   [id number] ; Contributor System:   PowerChart ; Last Updated:   11/2/2017 3:51 PM CDT ; Life Cycle Date:   11/2/2017 ; Life Cycle Status:   Active ; Responsible Provider:   Pj Peterson MD; Vocabulary:   SNOMED CT        Microalbuminuria (SNOMED CT  :85572680 )  Name of Problem:   Microalbuminuria ; Recorder:   Pj Peterson MD; Confirmation:   Confirmed ; Classification:   Medical ; Code:   07704072 ; Contributor System:   PowerChart ; Last Updated:   9/2/2014 1:41 PM CDT ; Life Cycle Date:   7/1/2014 ; Life Cycle Status:   Active ; Responsible Provider:   Pj Peterson MD; Vocabulary:   SNOMED CT        Mild asthma (SNOMED CT  :3896834368 )  Name of Problem:   Mild asthma ; Recorder:   Giselle Gong; Confirmation:   Confirmed ; Classification:   Medical ; Code:   [id number] ; Contributor System:   PowerChart ; Last Updated:   1/18/2017 12:08 PM CST ; Life Cycle Date:   8/11/2010 ; Life Cycle Status:   Active ; Vocabulary:   SNOMED CT        Obesity (SNOMED  CT  :0095304434 )  Name of Problem:   Obesity ; Recorder:   Pj Peterson MD; Confirmation:   Confirmed ; Classification:   Medical ; Code:   5848703550 ; Contributor System:   PowerChart ; Last Updated:   1/18/2017 12:08 PM CST ; Life Cycle Date:   8/12/2010 ; Life Cycle Status:   Active ; Responsible Provider:   Pj Peterson MD; Vocabulary:   SNOMED CT        Prediabetes (SNOMED CT  :7622581571 )  Name of Problem:   Prediabetes ; Recorder:   Giselle Gong; Confirmation:   Confirmed ; Classification:   Medical ; Code:   7788599434 ; Contributor System:   PowerChart ; Last Updated:   1/18/2017 12:11 PM CST ; Life Cycle Date:   8/11/2010 ; Life Cycle Status:   Active ; Vocabulary:   SNOMED CT        Pulmonary asbestosis (SNOMED CT  :18030923 )  Name of Problem:   Pulmonary asbestosis ; Onset Date:   2000 ; Recorder:   Pj Peterson MD; Confirmation:   Confirmed ; Classification:   Medical ; Code:   34313820 ; Contributor System:   PowerChart ; Last Updated:   1/8/2015 4:06 PM CST ; Life Cycle Date:   1/8/2015 ; Life Cycle Status:   Active ; Responsible Provider:   Pj Peterson MD; Vocabulary:   SNOMED CT   ; Comments:        1/8/2015 4:05 PM - Pj Peterson MD  Pleural plaques incidentally on CT      Sinus bradycardia (SNOMED CT  :41978979 )  Name of Problem:   Sinus bradycardia ; Recorder:   Pj Peterson MD; Confirmation:   Confirmed ; Classification:   Medical ; Code:   53332858 ; Contributor System:   PowerChart ; Last Updated:   11/2/2017 4:19 PM CDT ; Life Cycle Date:   11/2/2017 ; Life Cycle Status:   Active ; Responsible Provider:   Pj Peterson MD; Vocabulary:   SNOMED CT        White coat syndrome with hypertension (SNOMED CT  :5874880724 )  Name of Problem:   White coat syndrome with hypertension ; Recorder:   Giselle Gong; Confirmation:   Confirmed ; Classification:   Medical ; Code:   1309515050 ; Contributor System:   PowerChart ; Last Updated:   8/30/2018 2:44 PM CDT ; Life  Cycle Status:   Active ; Vocabulary:   SNOMED CT          Meds / Allergies   (As Of: 7/1/2019 3:10:32 PM CDT)   Allergies (Active)   iodine  Estimated Onset Date:   Unspecified ; Reactions:   Asthma ; Created By:   Giselle Gong; Reaction Status:   Active ; Category:   Drug ; Substance:   iodine ; Type:   Allergy ; Updated By:   Giselle Gong; Source:   Paper Chart ; Reviewed Date:   7/1/2019 3:02 PM CDT      shellfish  Estimated Onset Date:   Unspecified ; Created By:   Giselle Gong; Reaction Status:   Active ; Category:   Food ; Substance:   shellfish ; Type:   Allergy ; Updated By:   Giselle Gong; Source:   Paper Chart ; Reviewed Date:   7/1/2019 3:02 PM CDT        Medication List   (As Of: 7/1/2019 3:10:32 PM CDT)   Prescription/Discharge Order    albuterol  :   albuterol ; Status:   Prescribed ; Ordered As Mnemonic:   albuterol 90 mcg/inh inhalation aerosol ; Simple Display Line:   2 puff(s), inh, qid, PRN: as needed for wheezing, 1 EA, 11 Refill(s) ; Ordering Provider:   Pj Peterson MD; Catalog Code:   albuterol ; Order Dt/Tm:   3/18/2019 3:27:58 PM          amLODIPine  :   amLODIPine ; Status:   Prescribed ; Ordered As Mnemonic:   amLODIPine 5 mg oral tablet ; Simple Display Line:   5 mg, 1 tab(s), po, daily, 90 tab(s), 3 Refill(s) ; Ordering Provider:   Pj Peterson MD; Catalog Code:   amLODIPine ; Order Dt/Tm:   3/18/2019 3:27:48 PM          atorvastatin  :   atorvastatin ; Status:   Prescribed ; Ordered As Mnemonic:   atorvastatin 10 mg oral tablet ; Simple Display Line:   1 tab(s), Oral, daily, 90 tab(s), 3 Refill(s) ; Ordering Provider:   Pj Peterson MD; Catalog Code:   atorvastatin ; Order Dt/Tm:   3/18/2019 3:27:22 PM          hydroCHLOROthiazide  :   hydroCHLOROthiazide ; Status:   Prescribed ; Ordered As Mnemonic:   hydroCHLOROthiazide 12.5 mg oral capsule ; Simple Display Line:   1 cap(s), Oral, daily, 90 cap(s), 3 Refill(s) ; Ordering Provider:   Pj Peterson MD; Catalog  Code:   hydroCHLOROthiazide ; Order Dt/Tm:   3/18/2019 3:27:11 PM          lisinopril  :   lisinopril ; Status:   Prescribed ; Ordered As Mnemonic:   lisinopril 40 mg oral tablet ; Simple Display Line:   1 tab(s), Oral, daily, 90 tab(s), 3 Refill(s) ; Ordering Provider:   Pj Peterson MD; Catalog Code:   lisinopril ; Order Dt/Tm:   3/18/2019 3:26:52 PM          metFORMIN  :   metFORMIN ; Status:   Prescribed ; Ordered As Mnemonic:   metFORMIN 500 mg oral tablet, extended release ; Simple Display Line:   2,000 mg, 4 tab(s), PO, Daily, One daily with supper. Increase by one tablet each week to four tabs daily, 360 tab(s), 3 Refill(s) ; Ordering Provider:   Pj Peterson MD; Catalog Code:   metFORMIN ; Order Dt/Tm:   3/18/2019 3:28:39 PM            Home Meds    aspirin  :   aspirin ; Status:   Documented ; Ordered As Mnemonic:   aspirin 325 mg oral delayed release tablet ; Simple Display Line:   325 mg, 1 tab(s), Oral, daily, 0 Refill(s) ; Catalog Code:   aspirin ; Order Dt/Tm:   8/30/2018 2:10:26 PM

## 2022-02-16 NOTE — LETTER
(Inserted Image. Unable to display)   September 10, 2019        SONIA WHITE  [address] [place], [place] [id number]        Dear SONIA,      Thank you for [facility] [place].    Our records indicate you are due for the following services:     Annual Wellness Visit    To schedule an appointment or if you have further questions, please contact your primary clinic:   [facility]       [phone]   [facility]       [phone]              [facility]     [phone]      Powered by [employer]    Sincerely,    Pj Peterson M.D., FACP

## 2022-02-16 NOTE — TELEPHONE ENCOUNTER
---------------------  From: Christina/Denita RICE (Phone Mess[employer] ([id number] [facility] [employer])   To: Pj Peterson MD;     Sent: 10/6/2020 2:28:32 PM CDT  Subject: Med question      Phone Message    PCP: RAHUL     Time of Call: 1422    Phone Number: [phone]    Returned call at: _    Note: Patient called stating that he went to the pharmacy to  Rx's and a new one was there for him- HCTZ. Patient was wondering if there is a reason why he got prescribed this as he has not had this medication. Please advise.     Pharmacy: [facility]     Last office visit and reason: 9/28/20 review dx     Transferred to: RAHUL---------------------  From: Pj Peterson MD   To: [employer] ([id number]);     Sent: 10/6/2020 2:58:52 PM CDT  Subject: RE: Med question    Actions: Notify the pharmacy with prescription(s)      He is on Chlorthalidone, not Hydrochlorothiazide. Does not need hydrochlorothiazidePatient notified.

## 2022-02-16 NOTE — NURSING NOTE
Comprehensive Intake Entered On:  2020 2:44 PM CST    Performed On:  2020 2:38 PM CST by Ina Wadsworth   Chief Complaint :   Verbal consent granted for telephone visit. Follow up on labs.   Anatnh Ina - 2020 2:38 PM CST   Health Status   Allergies Verified? :   Yes   Medication History Verified? :   Yes   Tobacco Use? :   Never smoker   Cherrie Wadsworthzabeth - 2020 2:38 PM CST   Demographics   Last Name :   Rudolph   Address :   [address]   First Name :   Cyril Mendoza Initial :   R   Responsible Party Date of Birth () :   1940 CST   City :   [place]   State :   [place]   Zip Code :   [address]   Contact Relationship to Patient (other) :   Patient   Ananth Ina - 2020 2:38 PM CST   Providers Grid   Provider Name :    [facility]   [facility]           Comments :    New Viral BALDWIN                Ina Wadsworth - 2020 2:38 PM CST  Ina Wadsworth - 2020 2:38 PM CST        Consents   Consent for Immunization Exchange :   Consent Granted   Consent for Immunizations to Providers :   Consent Granted   Ina Wadsworth - 2020 2:38 PM CST   Meds / Allergies   (As Of: 2020 2:44:42 PM CST)   Allergies (Active)   iodine  Estimated Onset Date:   Unspecified ; Reactions:   Asthma ; Created By:   Giselle Gong; Reaction Status:   Active ; Category:   Drug ; Substance:   iodine ; Type:   Allergy ; Updated By:   Giselle Gong; Source:   Paper Chart ; Reviewed Date:   2020 2:40 PM CST      shellfish  Estimated Onset Date:   Unspecified ; Created By:   Giselle Gong; Reaction Status:   Active ; Category:   Food ; Substance:   shellfish ; Type:   Allergy ; Updated By:   Giselle Gong; Source:   Paper Chart ; Reviewed Date:   2020 2:40 PM CST        Medication List   (As Of: 2020 2:44:42 PM CST)   Prescription/Discharge Order    albuterol  :   albuterol ; Status:   Prescribed ; Ordered As  Mnemonic:   albuterol 90 mcg/inh inhalation aerosol ; Simple Display Line:   2 puff(s), NEB, qid, PRN: AS NEEDED FOR WHEEZING, 9 gm, 2 Refill(s) ; Ordering Provider:   Pj Peterson MD; Catalog Code:   albuterol ; Order Dt/Tm:   3/26/2020 2:45:21 PM CDT          amLODIPine  :   amLODIPine ; Status:   Prescribed ; Ordered As Mnemonic:   amLODIPine 5 mg oral tablet ; Simple Display Line:   1 tab(s), Oral, daily, due for appt next month, 90 tab(s), 3 Refill(s) ; Ordering Provider:   Pj Peterson MD; Catalog Code:   amLODIPine ; Order Dt/Tm:   7/17/2020 3:17:39 PM CDT          atorvastatin  :   atorvastatin ; Status:   Prescribed ; Ordered As Mnemonic:   atorvastatin 10 mg oral tablet ; Simple Display Line:   1 tab(s), Oral, daily, due for appt next month, 90 tab(s), 3 Refill(s) ; Ordering Provider:   Pj Peterson MD; Catalog Code:   atorvastatin ; Order Dt/Tm:   7/17/2020 3:17:49 PM CDT          chlorthalidone  :   chlorthalidone ; Status:   Prescribed ; Ordered As Mnemonic:   chlorthalidone 25 mg oral tablet ; Simple Display Line:   25 mg, 1 tab(s), Oral, daily, 90 tab(s), 3 Refill(s) ; Ordering Provider:   Pj Peterson MD; Catalog Code:   chlorthalidone ; Order Dt/Tm:   7/17/2020 3:18:17 PM CDT          lisinopril  :   lisinopril ; Status:   Prescribed ; Ordered As Mnemonic:   lisinopril 40 mg oral tablet ; Simple Display Line:   1 tab(s), Oral, daily, due for appt next month, 90 tab(s), 3 Refill(s) ; Ordering Provider:   Pj Peterson MD; Catalog Code:   lisinopril ; Order Dt/Tm:   7/17/2020 3:17:59 PM CDT          metFORMIN  :   metFORMIN ; Status:   Prescribed ; Ordered As Mnemonic:   metFORMIN 500 mg oral tablet, extended release ; Simple Display Line:   2,000 mg, 4 tab(s), PO, Daily, One daily with supper. Increase by one tablet each week to four tabs daily, 360 tab(s), 3 Refill(s) ; Ordering Provider:   Pj Peterson MD; Catalog Code:   metFORMIN ; Order Dt/Tm:   7/17/2020 3:18:09 PM CDT             ID Risk Screen   Recent Travel History :   No recent travel   Family Member Travel History :   No recent travel   Other Exposure to Infectious Disease :   Unknown   Ina Wadsworth - 12/21/2020 2:38 PM CST   Social History   Social History   (As Of: 12/21/2020 2:44:43 PM CST)   Alcohol:  Current      Current, 4-6 times per week, 2 drinks/episode average.  2 drinks/episode maximum.   (Last Updated: 1/18/2017 12:19:31 PM CST by Anastacia Guzman)          Tobacco:  Denies Tobacco Use      Never smoker   (Last Updated: 1/18/2017 12:18:21 PM CST by Anastacia Guzman)   Never (less than 100 in lifetime)   (Last Updated: 12/21/2020 2:39:18 PM CST by Ina Wadsworth)          Electronic Cigarette/Vaping:        Electronic Cigarette Use: Never.   (Last Updated: 12/21/2020 2:39:22 PM CST by Ina Wadsworth)          Substance Abuse:  Denies Substance Abuse      Never   (Last Updated: 1/18/2017 12:19:43 PM CST by Anastacia Guzman)          Employment/School:        Employed, Work/School description: sales.   (Last Updated: 8/12/2010 3:31:34 PM CDT by Pj Peterson MD)          Home/Environment:        Marital status: .  Spouse/Partner name: Riya Galdamez.   (Last Updated: 1/18/2017 12:15:52 PM CST by Anastacia Guzman)          Exercise:  Regular exercise      Exercise frequency: 2-3 times/week.   (Last Updated: 1/18/2017 12:20:42 PM CST by Anastacia Guzman)

## 2022-02-16 NOTE — PROGRESS NOTES
Chief Complaint    Follow up DM/HTN medications  History of Present Illness      Patient notices a significant improvement in blood pressure readings since changing from hydrochlorothiazide to chlorthalidone.  No complaints today.  Tolerating metformin.  Review of Systems      No visual complaints, headache, chest pain, dyspnea, edema, lightheadedness, falls, diarrhea, nausea or vomiting.  Physical Exam   Vitals & Measurements    T: 97.6   F (Tympanic)  HR: 66(Peripheral)  BP: 102/60     HT: 72 in  WT: 212 lb  BMI: 28.75       Patient appears comfortable and in no acute distress.  Alert and oriented.  Chest clear.  Cardiac exam is regular no edema.  Assessment/Plan       Dyslipidemia (E78.5)         On statin.         Ordered:          52465 office outpatient visit 25 minutes (Charge), Quantity: 1, Prediabetes  White coat syndrome with hypertension  History of colon cancer  Microalbuminuria  Dyslipidemia                History of colon cancer (Z85.038)         We have elected to stop screening after colonoscopy in last year         Ordered:          92035 office outpatient visit 25 minutes (Charge), Quantity: 1, Prediabetes  White coat syndrome with hypertension  History of colon cancer  Microalbuminuria  Dyslipidemia                Microalbuminuria (R80.9)         Repeat in 6 months.         Ordered:          67868 office outpatient visit 25 minutes (Charge), Quantity: 1, Prediabetes  White coat syndrome with hypertension  History of colon cancer  Microalbuminuria  Dyslipidemia                Prediabetes (R73.09)         A1c in 6 months.  Tolerating metformin.         Ordered:          60423 office outpatient visit 25 minutes (Charge), Quantity: 1, Prediabetes  White coat syndrome with hypertension  History of colon cancer  Microalbuminuria  Dyslipidemia                White coat syndrome with hypertension (I10)         Well-controlled.  Repeat BMP today.         Ordered:          12189 office  outpatient visit 25 minutes (Charge), Quantity: 1, Prediabetes  White coat syndrome with hypertension  History of colon cancer  Microalbuminuria  Dyslipidemia          Ambulatory BP monitor (Request), White coat syndrome with hypertension          Ambulatory BP monitor (Request), White coat syndrome with hypertension          Basic Metabolic Panel* ([facility]), Specimen Type: Serum, Collection Date: 07/17/20 15:17:00 CDT                Orders:         amLODIPine, = 1 tab(s), Oral, daily, Instructions: due for appt next month, # 90 tab(s), 3 Refill(s), Type: Maintenance, Pharmacy: [facility], 1 tab(s) Oral daily,Instr:due for appt next month, 72, in, 07/17/20 14:53:00 CDT, Height Measured, Weight..., (Ordered)         atorvastatin, = 1 tab(s), Oral, daily, Instructions: due for appt next month, # 90 tab(s), 3 Refill(s), Type: Maintenance, Pharmacy: [facility], 1 tab(s) Oral daily,Instr:due for appt next month, 72, in, 07/17/20 14:53:00 CDT, Height Measured, Weight..., (Ordered)         chlorthalidone, = 1 tab(s) ( 25 mg ), Oral, daily, # 90 tab(s), 3 Refill(s), Type: Maintenance, Pharmacy: [facility], D/C HCTZ, 1 tab(s) Oral daily, 72, in, 07/17/20 14:53:00 CDT, Height Measured, 212, lb, 07/17/20 14:53:00 CDT, Weight Measured, (Ordered)         lisinopril, = 1 tab(s), Oral, daily, Instructions: due for appt next month, # 90 tab(s), 3 Refill(s), Type: Maintenance, Pharmacy: [facility], 1 tab(s) Oral daily,Instr:due for appt next month, 72, in, 07/17/20 14:53:00 CDT, Height Measured, Weight..., (Ordered)         metFORMIN, = 4 tab(s) ( 2,000 mg ), PO, Daily, Instructions: One daily with supper. Increase by one tablet each week to four tabs daily, # 360 tab(s), 3 Refill(s), Type: Maintenance, Pharmacy: [id number], 4 tab(s) Oral daily,Instr:One daily with coffey..., (Ordered)         Return to Clinic (Request), RFV: Lab only BMP Hgb a1c KATYA Lipids, Return  in Now         Return to Clinic (Request), RFV: In person for for HTN with BMP, Return in 8 weeks         Return to Clinic (Request), RFV: Hgb A1c, KATYA, BMP q12. Hgb A1c q6., Return in 6 months         Return to Clinic (Request), RFV: Annual Wellness, Return in 6 months, Instructions: lab before visit         Return to Office (Request), RFV: Colonoscopy in 2019  Patient Information     Name:SONIA WHITE      Address:      [address] [place] [id number]     Sex:Male     YOB: 1940     [phone]     Emergency Contact:JAKE WHITE     MRN:[id number]     FIN:[id number]     Location:[facility]     Date of Service:07/17/2020      Primary Care Physician:       Pj Peterson MD, [phone]      Attending Physician:       Pj Peterson MD, [phone]  Problem List/Past Medical History    Ongoing     BPPV (benign paroxysmal positional vertigo)     Dyslipidemia     First degree atrioventricular block     History of colon cancer     Lumbar spinal stenosis     Microalbuminuria     Mild asthma     Obesity     Prediabetes     Pulmonary asbestosis       Comments: Pleural plaques incidentally on CT     Sinus bradycardia     White coat syndrome with hypertension    Historical     Carcinoma of colon, [facility] B2     ED (erectile dysfunction)     History of chicken pox  Procedure/Surgical History     Colonoscopy (07/24/2019)      Comments: Indication: Colon cancer 2000      Sedation: MAC      Findings: Normal to ileocolonic anastamosis      Rec: No further colonoscopy.     Colonoscopy (07/18/2014)      Comments: Sedation: fentanyl, midazolam      Indication: personal history colon cancer      Normal      Repeat in 5 years.     Colonoscopy (08/05/2009)      Comments: recheck 2014.     Colonoscopy (06/03/2005)     Colonoscopy (05/31/2002)     Colonoscopy (05/11/2001)      Comments: normal.     Right colon resection (08/15/2000)     Colonoscopy (08/04/2000)      Polypectomy (08/04/2000)     Tonsillectomy  Medications    albuterol 90 mcg/inh inhalation aerosol, 2 puff(s), NEB, qid, PRN, 2 refills    amLODIPine 5 mg oral tablet, 1 tab(s), Oral, daily, 3 refills    atorvastatin 10 mg oral tablet, 1 tab(s), Oral, daily, 3 refills    chlorthalidone 25 mg oral tablet, 25 mg= 1 tab(s), Oral, daily, 3 refills    lisinopril 40 mg oral tablet, 1 tab(s), Oral, daily, 3 refills    metFORMIN 500 mg oral tablet, extended release, 2000 mg= 4 tab(s), Oral, daily, 3 refills  Allergies    iodine (Asthma)    shellfish  Social History    Smoking Status - 07/17/2020     Never smoker     Alcohol - Current, 08/11/2010      Current, 4-6 times per week, 2 drinks/episode average. 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 Spouse/Partner name: Riya Galdamez., 01/18/2017     Substance Abuse - Denies Substance Abuse, 01/18/2017      Never, 01/18/2017     Tobacco - Denies Tobacco Use, 08/11/2010      Never smoker, 01/18/2017  Family History    CA - Carcinoma of prostate: Father.    Melanoma: Mother.    PUD - Peptic ulcer disease: Father.    Sister: History is negative    Brother: History is negative    Daughter: History is negative    Son: History is negative  Immunizations      Vaccine Date Status          zoster vaccine, inactivated 10/15/2019 Recorded              Comments : [10/22/2019] Received at [place], [place]          influenza virus vaccine, inactivated 09/12/2019 Given          zoster vaccine, inactivated 08/07/2019 Recorded              Comments : [8/8/2019] Received at [facility] [place], [place]          influenza virus vaccine, inactivated 10/11/2018 Given          influenza virus vaccine, inactivated 09/26/2017 Given          influenza virus vaccine,  inactivated 10/12/2016 Given          influenza virus vaccine, inactivated 10/26/2015 Given          pneumococcal (PCV13) 07/07/2015 Given          influenza virus vaccine, inactivated 10/09/2014 Recorded              Comments : [10/13/2014] Received at [facility], [place], [place]          DTaP 12/01/2012 Recorded          influenza virus vaccine, inactivated 09/01/2012 Recorded          influenza virus vaccine, inactivated 09/16/2011 Given          pneumococcal (PPSV23) 11/19/2007 Recorded          influenza 11/01/2007 Recorded          Td 08/01/2000 Recorded

## 2022-02-16 NOTE — NURSING NOTE
Comprehensive Intake Entered On:  5/12/2020 2:55 PM CDT    Performed On:  5/12/2020 2:51 PM CDT by Denita Ladnry               Summary   Chief Complaint :   f/u abulatory BP monitor results. Question about Metoprolol. Verbal consent given for telephone visit.    Denita Landry - 5/12/2020 2:51 PM CDT   Health Status   Allergies Verified? :   Yes   Medication History Verified? :   Yes   Medical History Verified? :   Yes   Pre-Visit Planning Status :   N/A   Tobacco Use? :   Never smoker   Denita Landry - 5/12/2020 2:51 PM CDT   Consents   Consent for Immunization Exchange :   Consent Granted   Consent for Immunizations to Providers :   Consent Granted   Denita Landry - 5/12/2020 2:51 PM CDT   Meds / Allergies   (As Of: 5/12/2020 2:55:11 PM CDT)   Allergies (Active)   iodine  Estimated Onset Date:   Unspecified ; Reactions:   Asthma ; Created By:   Giselle Gong; Reaction Status:   Active ; Category:   Drug ; Substance:   iodine ; Type:   Allergy ; Updated By:   Giselle Gong; Source:   Paper Chart ; Reviewed Date:   5/5/2020 3:22 PM CDT      shellfish  Estimated Onset Date:   Unspecified ; Created By:   Giselle Gong; Reaction Status:   Active ; Category:   Food ; Substance:   shellfish ; Type:   Allergy ; Updated By:   Giselle Gong; Source:   Paper Chart ; Reviewed Date:   5/5/2020 3:22 PM CDT        Medication List   (As Of: 5/12/2020 2:55:11 PM CDT)   Prescription/Discharge Order    albuterol  :   albuterol ; Status:   Prescribed ; Ordered As Mnemonic:   albuterol 90 mcg/inh inhalation aerosol ; Simple Display Line:   2 puff(s), NEB, qid, PRN: AS NEEDED FOR WHEEZING, 9 gm, 2 Refill(s) ; Ordering Provider:   Pj Peterson MD; Catalog Code:   albuterol ; Order Dt/Tm:   3/26/2020 2:45:21 PM CDT          atorvastatin  :   atorvastatin ; Status:   Prescribed ; Ordered As Mnemonic:   atorvastatin 10 mg oral tablet ; Simple Display Line:   1 tab(s), Oral, daily, 90 tab(s), 0 Refill(s) ;  Ordering Provider:   Pj Peterson MD; Catalog Code:   atorvastatin ; Order Dt/Tm:   3/18/2020 3:33:06 PM CDT          hydroCHLOROthiazide  :   hydroCHLOROthiazide ; Status:   Prescribed ; Ordered As Mnemonic:   hydroCHLOROthiazide 12.5 mg oral capsule ; Simple Display Line:   1 cap(s), Oral, daily, 90 cap(s), 0 Refill(s) ; Ordering Provider:   Pj Peterson MD; Catalog Code:   hydroCHLOROthiazide ; Order Dt/Tm:   3/18/2020 3:33:26 PM CDT          lisinopril  :   lisinopril ; Status:   Prescribed ; Ordered As Mnemonic:   lisinopril 40 mg oral tablet ; Simple Display Line:   1 tab(s), Oral, daily, 90 tab(s), 0 Refill(s) ; Ordering Provider:   Pj Peterson MD; Catalog Code:   lisinopril ; Order Dt/Tm:   3/18/2020 3:33:55 PM CDT          amLODIPine  :   amLODIPine ; Status:   Prescribed ; Ordered As Mnemonic:   amLODIPine 5 mg oral tablet ; Simple Display Line:   5 mg, 1 tab(s), po, daily, 90 tab(s), 0 Refill(s) ; Ordering Provider:   Pj Peterson MD; Catalog Code:   amLODIPine ; Order Dt/Tm:   3/18/2020 3:29:50 PM CDT          metFORMIN  :   metFORMIN ; Status:   Prescribed ; Ordered As Mnemonic:   metFORMIN 500 mg oral tablet, extended release ; Simple Display Line:   2,000 mg, 4 tab(s), PO, Daily, One daily with supper. Increase by one tablet each week to four tabs daily, 360 tab(s), 3 Refill(s) ; Ordering Provider:   Pj Peterson MD; Catalog Code:   metFORMIN ; Order Dt/Tm:   3/18/2019 3:28:39 PM CDT            Home Meds    gabapentin  :   gabapentin ; Status:   Documented ; Ordered As Mnemonic:   gabapentin 300 mg oral capsule ; Simple Display Line:   300 mg, 1 cap(s), Oral, tid, 90 cap(s), 0 Refill(s) ; Catalog Code:   gabapentin ; Order Dt/Tm:   5/5/2020 3:24:02 PM CDT            ID Risk Screen   Recent Travel History :   No recent travel   Family Member Travel History :   No recent travel   Other Exposure to Infectious Disease :   Unknown   Christina/Denita RICE - 5/12/2020 2:51 PM CDT

## 2022-02-16 NOTE — PROGRESS NOTES
Chief Complaint  f/u labs, refill meds.  has been keeping track of BP.  History of Present Illness  Patient has been monitoring his blood pressure at home and it has been controlled.  We discussed the need to lose weight.  He needs his medications refilled.  Review of Systems  No headache, chest pain, dyspnea, edema.  Physical Exam     Vitals & Measurements    T: 98.3   F (Tympanic)  HR: 65(Peripheral)  BP: 132/75     HT: 72 in  WT: 222 lb  BMI: 30.11   Patient is a healthy-appearing man in no distress.  Alert and oriented.  H&T exam unremarkable.  Neck supple no adenopathy or thyromegaly.  Chest clear.  Heart exam regular.  No edema.  Assessment/Plan  Dyslipidemia (E78.5)     Triglycerides slightly high but LDL 76.  Discussed the need for weight loss and a low hydrate diet.  History of colon cancer (Z85.038)     Due for colonoscopy.  Microalbuminuria (R80.9)    Blood pressure appears to be well controlled and is on an ACE.  Hemoglobin A1c improved.  Recheck in 3-6 months.   Prediabetes (R73.09)     A1c is risen a little bit.  Recheck in 3-6 months.  White coat syndrome with hypertension (I10)     Controlled.  Patient Information  Name:  SONIA WHITE  Address:   [address] [place] [id number]  Sex: Male  YOB: 1940  Phone: [phone]  Emergency Contact: JAKE WHITE  MRN: [id number]  FIN: [id number]  Location: [facility]  Date of Service: 03/18/2019  Primary Care Physician:   Pj Peterson MD, [phone]  Attending Physician:   Pj Peterson MD, [phone]  Problem List/Past Medical History  Ongoing   BPPV (benign paroxysmal positional vertigo)   Dyslipidemia   First degree atrioventricular block   History of colon cancer   Lumbar spinal stenosis   Microalbuminuria   Mild asthma   Obesity   Prediabetes   Pulmonary asbestosis    Comments: Pleural plaques incidentally on CT   Sinus bradycardia   White coat syndrome with hypertension  Historical     Carcinoma of colon, [facility] B2    ED (erectile dysfunction)    History of chicken pox  Procedure/Surgical History    Colonoscopy (07/18/2014)      Comments: Sedation: fentanyl, midazolam    Indication: personal history colon cancer    Normal    Repeat in 5 years.    Colonoscopy (08/05/2009)      Comments: recheck 2014.    Colonoscopy (06/03/2005)          Colonoscopy (05/31/2002)          Colonoscopy (05/11/2001)      Comments: normal.    Right colon resection (08/15/2000)          Colonoscopy (08/04/2000)          Polypectomy (08/04/2000)          Tonsillectomy        Medications    aspirin 325 mg oral delayed release tablet: 325 mg, 1 tab(s), Oral, daily, 0 Refill(s).    lisinopril 40 mg oral tablet: 1 tab(s), Oral, daily, 90 tab(s), 3 Refill(s).    albuterol 90 mcg/inh inhalation aerosol: 2 puff(s), inh, qid, PRN: as needed for wheezing, 1 EA, 11 Refill(s).    metFORMIN 500 mg oral tablet, extended release: 2,000 mg, 4 tab(s), PO, Daily, One daily with supper. Increase by one tablet each week to four tabs daily, 360 tab(s), 3 Refill(s).    hydroCHLOROthiazide 12.5 mg oral capsule: 1 cap(s), Oral, daily, 90 cap(s), 3 Refill(s).    atorvastatin 10 mg oral tablet: 1 tab(s), Oral, daily, 90 tab(s), 3 Refill(s).    amLODIPine 5 mg oral tablet: 5 mg, 1 tab(s), po, daily, 90 tab(s), 3 Refill(s).     Allergies  iodine (Asthma)  shellfish  Social History   Smoking Status - 03/18/2019    Never smoker  Alcohol - Current, 08/11/2010   Current, 4-6 times per week, 2 drinks/episode average. 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 Spouse/Partner name: Riya Galdamez., 01/18/2017  Substance Abuse - Denies Substance Abuse, 01/18/2017   Never, 01/18/2017  Tobacco - Denies Tobacco Use,  08/11/2010   Never smoker, 01/18/2017  Family History   CA - Carcinoma of prostate: Father.   Melanoma: Mother.   PUD - Peptic ulcer disease: Father.    Sister: History is negative    Brother: History is negative    Daughter: History is negative    Son: History is negative  Immunizations       Vaccine       Date       Status       Comments       influenza virus vaccine, inactivated      10/11/2018      Given       influenza virus vaccine, inactivated      09/26/2017      Given       influenza virus vaccine, inactivated      10/12/2016      Given       influenza virus vaccine, inactivated      10/26/2015      Given       pneumococcal (PCV13)      07/07/2015      Given       influenza virus vaccine, inactivated      10/09/2014      Recorded      [10/13/2014] Received at [place], [place]       DTaP      12/01/2012      Recorded       influenza virus vaccine, inactivated      09/01/2012      Recorded       influenza virus vaccine, inactivated      09/16/2011      Given       pneumococcal (PPSV23)      11/19/2007      Recorded       influenza      11/01/2007      Recorded       Td      08/01/2000      Recorded  Lab Results      Lab Results (Last 4 results within 90 days)       Sodium Level: 138 mmol/L (03/06/19 11:22:00)      Potassium Level: 4.4 mmol/L (03/06/19 11:22:00)      Chloride Level: 103 mmol/L (03/06/19 11:22:00)      CO2 Level: 27 mmol/L (03/06/19 11:22:00)      Glucose Level: 132 mg/dL High (03/06/19 11:22:00)      BUN: 24 mg/dL (03/06/19 11:22:00)      Creatinine Level: 0.75 mg/dL (03/06/19 11:22:00)      BUN/Creat Ratio: NOT APPLICABLE (03/06/19 11:22:00)      eGFR: 87 mL/min/1.73m2 (03/06/19 11:22:00)      eGFR African American: 100 mL/min/1.73m2 (03/06/19 11:22:00)      Calcium Level: 9.7 mg/dL (03/06/19 11:22:00)      Hgb A1c: 6.1 High (03/06/19 11:22:00)      Cholesterol: 145 mg/dL (03/06/19 11:22:00)      Non-HDL Cholesterol: 103 (03/06/19 11:22:00)      HDL: 42 mg/dL (03/06/19  11:22:00)      Cholesterol/HDL Ratio: 3.5 (03/06/19 11:22:00)      LDL: 76 (03/06/19 11:22:00)      Triglyceride: 167 mg/dL High (03/06/19 11:22:00)      U Microalbumin: 73.9 mg/dL (03/06/19 11:22:00)      Ur Creatinine: 109 mg/dL (03/06/19 11:22:00)      Ur Microalbumin/Creatinine Ratio: 678 High (03/06/19 11:22:00)      WBC: 7 (03/06/19 11:22:00)      RBC: 4.25 (03/06/19 11:22:00)      Hgb: 14.3 gm/dL (03/06/19 11:22:00)      Hct: 40.5 % (03/06/19 11:22:00)      MCV: 95.3 fL (03/06/19 11:22:00)      MCH: 33.6 pg High (03/06/19 11:22:00)      MCHC: 35.3 gm/dL (03/06/19 11:22:00)      RDW: 12.3 % (03/06/19 11:22:00)      Platelet: 218 (03/06/19 11:22:00)      MPV: 10.6 fL (03/06/19 11:22:00)

## 2022-02-16 NOTE — TELEPHONE ENCOUNTER
---------------------  From: Alyson Wills LPN (Phone Messages Pool ([id number]_[facility]))   To: [facility] Message Pool ([id number]_[place] [facility] [place]);     Sent: 4/20/2020 1:31:07 PM CDT  Subject: General Message     FYI      Phone Message Template      PCP:   RAHUL      Time of Call:  1315       Person Calling:  pt  Phone number:  [phone]    Returned call at: _    Note:  Patient given updates on blood pressures have been 120s and having pulse in the 60's.     Last office visit and reason:  pre-op for colonoscopy, 07/09/2019---------------------  From: Ghazala Rios CMA ([facility] [employer] Message Pool ([id number]_[place] [facility] [place]))   To: Pj Peterson MD;     Sent: 4/20/2020 3:39:27 PM CDT  Subject: FW: General Message---------------------  From: Pj Peterson MD   To: [facility] Message Pool ([id number]_[place] [facility] [place]);     Sent: 4/20/2020 3:59:40 PM CDT  Subject: RE: General Message     great  
---------------------  From: Kathe Partida RN   Sent: 5/5/2020 2:56:02 PM CDT  Subject: Phone Message     Phone Message    PCP:   RAHUL      Time of Call:  1413       Person Calling:  Pt  Phone number:  [phone]    Returned call at: [phone]    Note:   Pt called with questions regarding COVID testing and places to have this done and what to do if symptoms develop. Returned call to patient and discuss testing process. He expressed understanding. He also stated that he had several questions regarding labs and appointment that he was supposed to have 03/2020. Advised follow up telemed appointment with RAHUL to discuss questions. He agreed and transferred to schedule.     Last office visit and reason:  _  
---------------------  From: Pj Peterson MD   To: Appointment Pool ([id number]_[place] [facility] [place]);     Sent: 5/11/2020 5:00:01 PM CDT  Subject: General Message   Actions: Notify the patient for appointment      Please schedule Televisit to reviow Ambulatory BP monitor data.left voicemail for pt to call back  
scoliosis

## 2022-02-16 NOTE — RESULTS
Patient:   SONIA WHITE            MRN: [id number]            FIN: [id number]               Age:   78 years     Sex:  Male     :  1940   Associated Diagnoses:   First degree atrioventricular block; Sinus bradycardia   Author:   Pj Peterson MD      Procedure   EKG procedure   Date/ Time:  2019 4:23:00 PM.     Supervised by: Pj Peterson MD.     Indication: Preop.     Position: supine.     EKG findings   Interpretation: Pj Peterson MD.     Rhythm: heart rate  53  beats/min, sinus bradycardia.     Axis: normal axis, normal configuration.     Intervals: OR prolonged unchanged since  2016, QRS normal, QT normal, RR normal.     P waves: normal.     QRS complex: normal.     ST-T-U complex: normal.     Interpretation: borderline.        Impression and Plan   Diagnosis     First degree atrioventricular block (GPJ79-DG I44.0).     Sinus bradycardia (JJH57-UM R00.1).

## 2022-02-16 NOTE — LETTER
(Inserted Image. Unable to display)     December 01, 2020      SONIA WHITE  [address] [place], [place] [id number]          Dear SONIA,      Thank you for selecting [facility] ([facility]viously [facility]) for your healthcare needs.    Our records indicate you are due for the following services:    Urine Labs ~ Please be prepared to leave a urine specimen for evaluation.  Non-Fasting Labs.    If you had your labs done at another facility or with [facility] at [facility], please bring in a copy of the results to your next visit, mail a copy, or drop off a copy of your results to your Healthcare Provider.    (FYI   Regarding office visits: In some instances, a video visit or telephone visit may be offered as an option.)      To schedule an appointment or if you have further questions, please contact your primary clinic:   [facility]       [phone]   [facility]       [phone]              [facility]     [phone]      Powered by [employer]    Sincerely,    Pj Peterson MD, FACP

## 2022-02-16 NOTE — TELEPHONE ENCOUNTER
---------------------  From: Timothy REEVES Ana   To: [facility] Message [facility] ([id number] - [place]);     Sent: 10/20/2020 3:10:15 PM CDT  Subject: Lab clarification     PCP:   RAHUL      Time of Call:  1425       Person Calling:  Patient  Phone number:  [phone]    Note:   Patient called asking about labs. What is he due for and should be fasting? Please clarify if RAHUL wants all labs done in Jan as RTC requests or if patient should wait until March to hit one year layne on most? Due for A1C and visit in Jan. Also states his weight is #194    Last office visit and reason:  9/28/20 Dx w/ RAHUL---------------------  From: Denita Landry ([facility] Message [facility] ([id number] - [place]))   To: Pj Peterson MD;     Sent: 10/22/2020 8:34:52 AM CDT  Subject: FW: Lab clarification---------------------  From: Pj Peterson MD   To: [facility] Message Pool ([id number] - [place]);     Sent: 10/23/2020 8:17:11 AM CDT  Subject: RE: Lab clarification     everything can wait until March.Patient notified. Patient stated that he is concerned about his weight. Last time he was in it was 212. Patient stated that at home he now weighs 194. Patient concerned about this- wondering if metformin could be why hes loosing weight. . Patient also wanting to get BMP done as he does not want to wait until March. Patient wanting to make sure kidneys are working like they should---------------------  From: Denita Landry ([facility] Message [facility] ([id number]))   To: Pj Peterson MD;     Sent: 10/23/2020 11:36:58 AM CDT  Subject: FW: Lab clarification---------------------  From: Pj Peterson MD   To: [facility] Message Pool ([id number]);     Sent: 10/25/2020 3:23:47 PM CDT  Subject: RE: Lab clarification     BMP, In person visit for weight loss.LVMTCB.

## 2022-02-16 NOTE — TELEPHONE ENCOUNTER
---------------------  From: Denita Landry ([facility] ([address] [place] [address]))   To: Pj Peterson MD;     Sent: 2/11/2021 2:35:39 PM CST  Subject: General Message     Pt was suppose to have appt with [facility] @ 2:20pm today, however, [facility] does not accept pt's insurance anymore. I spoke to pt and he has some concerns with his medications. Patient stated over the last 5 months he has lost about 30 pounds. Patient was wondering if [facility] could call him to discuss his questions until pt can figure out what to do.---------------------  From: Pj Peterson MD   To: [facility] ([address] [place] [address]);     Cc: [facility] ([address] [place] [address]);      Sent: 2/11/2021 4:33:12 PM CST  Subject: RE: General Message   Actions: Notify the patient for appointment      Please schedule CT Abdomen, chest, pelvis with contrast with [facility] or someone who takes [employer] with me as ordering physician.---------------------  From: Denita Landry ([facility] ([address] [place] [address]))   To: Pj Peterson MD;     Sent: 2/12/2021 7:13:45 AM CST  Subject: FW: General Message     Dx?---------------------  From: Bianca Tesfaye ([facility] ([address] [place] [address]))   To: [facility] ([address] [place] [address]) (Denita Vasquez MA); Pj Peterson MD;     Sent: 2/12/2021 7:38:52 AM CST  Subject: RE: General Message     We can send order to [facility] but patient will not be able to follow up with you in clinic.---------------------  From: Pj Peterson MD   To: [facility] ([address] [place] [address]);     Sent: 2/12/2021 7:46:54 AM CST  Subject: RE: General Message     Understood

## 2022-02-16 NOTE — PROGRESS NOTES
Chief Complaint    c/o lower back pain, past couple of days worse on left side  History of Present Illness      Has Colonoscopy in 3-4 weeks for follow up. Has lumbar spinal stenosis. Causes pain but able to stand up and walk with a cane. Had pain across lower back last week but now in past couple of days on left lower back. Bending over more now because of the pain. Has been to orthopedics and pain clinics in the past. Has had MRI of spine at other facilities.      Had Vertiflex procedure 2017  Review of Systems      No fevers      No vomiting      No leg weakness      No numbness or tingling      No incontinence  Physical Exam   Vitals & Measurements    T: 97.8   F (Tympanic)  HR: 58(Peripheral)  BP: 128/70     HT: 72 in  WT: 221 lb  BMI: 29.97       General: No acute distress      Musculoskeletal: Gets up and moves slowly.  Able to stand fully extended but prefers to be slightly flexed.  Good strength and range of motion both lower extremities. No tenderness to palpation      Neurologic: Deep tendon reflexes symmetric.  Straight leg raise is negative.      Skin: No bruising or swelling. Scars over lumbar spine      No alarm symptoms or signs  Assessment/Plan      Back pain: Most likely musculoskeletal.  For now recommend focus on Tylenol and Aleve.  Warned of side effects.  Follow-up if not improving.  Patient Information     Name:SONIA WHITE      Address:      [address] [place] [id number]     Sex:Male     YOB: 1940     [phone]     Emergency Contact:JAKE WHITE     MRN:[id number]     FIN:[id number]     Location:[facility]     Date of Service:07/01/2019      Primary Care Physician:       Pj Peterson MD, [phone]      Attending Physician:       Tavon Sanchez MD, [phone]  Problem List/Past Medical History    Ongoing     BPPV (benign paroxysmal positional vertigo)     Dyslipidemia     First degree atrioventricular block      History of colon cancer     Lumbar spinal stenosis     Microalbuminuria     Mild asthma     Obesity     Prediabetes     Pulmonary asbestosis       Comments: Pleural plaques incidentally on CT     Sinus bradycardia     White coat syndrome with hypertension    Historical     Carcinoma of colon, [facility] B2     ED (erectile dysfunction)     History of chicken pox  Medications        aspirin 325 mg oral delayed release tablet: 325 mg, 1 tab(s), Oral, daily, 0 Refill(s).        lisinopril 40 mg oral tablet: 1 tab(s), Oral, daily, 90 tab(s), 3 Refill(s).        albuterol 90 mcg/inh inhalation aerosol: 2 puff(s), inh, qid, PRN: as needed for wheezing, 1 EA, 11 Refill(s).        metFORMIN 500 mg oral tablet, extended release: 2,000 mg, 4 tab(s), PO, Daily, One daily with supper. Increase by one tablet each week to four tabs daily, 360 tab(s), 3 Refill(s).        hydroCHLOROthiazide 12.5 mg oral capsule: 1 cap(s), Oral, daily, 90 cap(s), 3 Refill(s).        atorvastatin 10 mg oral tablet: 1 tab(s), Oral, daily, 90 tab(s), 3 Refill(s).        amLODIPine 5 mg oral tablet: 5 mg, 1 tab(s), po, daily, 90 tab(s), 3 Refill(s).         Allergies    iodine (Asthma)    shellfish

## 2022-02-16 NOTE — PROGRESS NOTES
Chief Complaint    review labs from 2/1/18, annual check up. also discuss procedure done on back in November. has been going to be PT, which is now done. would like more PT on back.  History of Present Illness      Patient has some paperwork from physical therapist that he like to have signed and faxed back.  He has been working with physical therapy since spinal procedure in May 2017.  Feels like he is improved.  He is concerned about his elevated urine microalbumin creatinine ratio.  He is lost a little bit of weight.  He has blood pressure readings from home appear to show good control.  Review of Systems      No leg weakness, bowel or bladder complaints.  No chest pain, dyspnea, edema, headache.  Physical Exam   Vitals & Measurements    T: 97.8(Tympanic)  HR: 62(Peripheral)  BP: 150/75     HT: 72 in  WT: 211 lb  BMI: 28.61       Patient appears comfortable and in no distress.  Alert and oriented.  HEENT exam is unremarkable.  Neck supple and at the thyromegaly.  Chest clear to auscultation percussion no edema.  Gait is normal.  Assessment/Plan       Dyslipidemia         Triglycerides bit elevated otherwise at goal.  Discussed appropriate diet weight loss and exercise.         Ordered:          [id number] office outpatient visit 25 minutes (Charge), Quantity: 1, HTN (hypertension)  Microalbuminuria  Prediabetes  Lumbar spinal stenosis  Dyslipidemia                HTN (hypertension), White coat syndrome with hypertension         Home readings appear to be controlled but in clinic readings high.  Has microalbuminuria.  We will get a 24-hour ambulatory blood pressure monitor.         Ordered:          [id number] office outpatient visit 25 minutes (Charge), Quantity: 1, HTN (hypertension)  Microalbuminuria  Prediabetes  Lumbar spinal stenosis  Dyslipidemia          Ambulatory BP monitor (Request), HTN (hypertension)                Lumbar spinal stenosis         PT orders signed.         Ordered:          [id number] office  outpatient visit 25 minutes (Charge), Quantity: 1, HTN (hypertension)  Microalbuminuria  Prediabetes  Lumbar spinal stenosis  Dyslipidemia                Microalbuminuria         See the above discussion.         Ordered:          [id number] office outpatient visit 25 minutes (Charge), Quantity: 1, HTN (hypertension)  Microalbuminuria  Prediabetes  Lumbar spinal stenosis  Dyslipidemia                Prediabetes         Stable.         Ordered:          [id number] office outpatient visit 25 minutes (Charge), Quantity: 1, HTN (hypertension)  Microalbuminuria  Prediabetes  Lumbar spinal stenosis  Dyslipidemia                Orders:         albuterol, 2 puff(s), inh, qid, PRN: as needed for wheezing, # 2 EA, 6 Refill(s), Type: Maintenance, Pharmacy: [facility], 2 puff(s) inh qid,PRN:as needed for wheezing         albuterol, 2 puff(s), inh, qid, PRN: as needed for wheezing, # 2 EA, 6 Refill(s), Type: Hard Stop, Pharmacy: [facility]         atorvastatin, 1 tab(s), po, Daily, # 90 tab(s), 3 Refill(s), Type: Maintenance, Pharmacy: [facility], 1 tab(s) po daily,x90 day(s)         atorvastatin, 1 tab(s), po, Daily, x 90 day(s), # 90 tab(s), 3 Refill(s), Type: Hard Stop, Pharmacy: [facility]         hydroCHLOROthiazide, 1 cap(s) ( 12.5 mg ), po, daily, # 90 cap(s), 3 Refill(s), Type: Hard Stop, Pharmacy: [facility]         hydroCHLOROthiazide, 1 cap(s) ( 12.5 mg ), po, daily, # 90 cap(s), 3 Refill(s), Pharmacy: [facility], 1 cap(s) po daily         lisinopril, 1 tab(s) ( 40 mg ), PO, Daily, # 90 tab(s), 3 Refill(s), Type: Hard Stop, Pharmacy: [facility]         lisinopril, 1 tab(s) ( 40 mg ), PO, Daily, # 90 tab(s), 3 Refill(s), Type: Maintenance, Pharmacy: [id number], 1 tab(s) po daily  Patient Information     Name:SONIA WHITE      Address:      [address] [place] [id number]      Sex:Male     YOB: 1940     [phone]     Emergency Contact:JAKE WHITE     MRN:[id number]     FIN:[id number]     Location:[facility]     Date of Service:02/12/2018      Primary Care Physician:       Pj Peterson MD, [phone]  Problem List/Past Medical History    Ongoing     BPPV (benign paroxysmal positional vertigo)     Carcinoma of colon, [facility] B2     Dyslipidemia     First degree atrioventricular block     HTN (hypertension)     Lumbar spinal stenosis     Microalbuminuria     Mild asthma     Obesity     Prediabetes     Pulmonary asbestosis      Comments: Pleural plaques incidentally on CT     Sinus bradycardia    Historical     ED (erectile dysfunction)     History of chicken pox  Procedure/Surgical History     Colonoscopy (07/18/2014)     Colonoscopy (08/05/2009)     Colonoscopy (06/03/2005)     Colonoscopy (05/31/2002)     Colonoscopy (05/11/2001)     Right colon resection (08/15/2000)     Colonoscopy (08/04/2000)     Polypectomy (08/04/2000)     Tonsillectomy  Medications        albuterol 90 mcg/inh inhalation aerosol: 2 puff(s), inh, qid, PRN: as needed for wheezing, 2 EA, 6 Refill(s).        Aspir 81: 81 mg, PO, Daily.        Lipitor 10 mg oral tablet: 1 tab(s), po, Daily, for 90 day(s), 90 tab(s), 3 Refill(s).        hydroCHLOROthiazide 12.5 mg oral capsule: 12.5 mg, 1 cap(s), po, daily, 90 cap(s), 3 Refill(s).        lisinopril 40 mg oral tablet: 40 mg, 1 tab(s), PO, Daily, 90 tab(s), 3 Refill(s).                Allergies    iodine (Asthma)    shellfish  Social History    Smoking Status - 02/12/2018     Never smoker     Alcohol - Current, 01/18/2017      Current, 4-6 times per week, 2 drinks/episode average. 2 drinks/episode maximum.     Employment and Education - 01/18/2017      Employed, Work/School description: sales.     Exercise and Physical Activity - Regular exercise, 01/18/2017      Exercise frequency: 2-3 times/week.     Home and Environment -  01/18/2017      Marital status: . Spouse/Partner name: Riya Galdamez.     Substance Abuse - Denies Substance Abuse, 01/18/2017      Never     Tobacco - Denies Tobacco Use, 01/18/2017      Never smoker  Family History    CA - Carcinoma of prostate: Father.    Melanoma: Mother.    PUD - Peptic ulcer disease: Father.  Immunizations      Vaccine Date Status Comments      influenza virus vaccine, inactivated 09/26/2017 Given      influenza virus vaccine, inactivated 10/12/2016 Given      influenza virus vaccine, inactivated 10/26/2015 Given      pneumococcal (PCV13) 07/07/2015 Given      influenza virus vaccine, inactivated 10/09/2014 Recorded [10/13/2014] Received at [place], [place]      DTaP 12/01/2012 Recorded      influenza virus vaccine, inactivated 09/01/2012 Recorded      influenza virus vaccine, inactivated 09/16/2011 Given      pneumococcal (PPSV23) 11/19/2007 Recorded      influenza 11/01/2007 Recorded      Td 08/01/2000 Recorded  Lab Results      Results (Last 90 days)                Laboratory                     Chemistry                          General Chemistry                               BUN:      H 26 mg/dL (Range 7 - 25)  (02/01/18 11:03 AM CST)                                                                                                                                          BUN/Creat Ratio:      H 30  (Range 6 - 22)  (02/01/18 11:03 AM CST)                                                                                                                                          Basic Metabolic Profile:         (02/01/18 11:03 AM CST)                                                                                                                                          CO2 Level:      29 mmol/L  (02/01/18 11:03 AM CST)                                                                                                                                          Calcium Level:       10.0 mg/dL  (02/01/18 11:03 AM CST)                                                                                                                                          Chloride Level:      101 mmol/L  (02/01/18 11:03 AM CST)                                                                                                                                          Creatinine Level:      0.88 mg/dL  (02/01/18 11:03 AM CST)                                                                                                                                          Glucose Level:      H 118 mg/dL (Range 65 - 99)  (02/01/18 11:03 AM CST)                                                                                                                                          Hgb A1c                                        (02/01/18 11:03 AM CST)                                                                                                                                                H 5.7  (Range  - <5.7)  (02/01/18 11:03 AM CST)                                                                                                                                          Potassium Level:      4.9 mmol/L  (02/01/18 11:03 AM CST)                                                                                                                                          Sodium Level:      138 mmol/L  (02/01/18 11:03 AM CST)                                                                                                                                          eGFR:      82 mL/min/1.73m2  (02/01/18 11:03 AM CST)                                                                                                                                          eGFR African American:      95 mL/min/1.73m2  (02/01/18 11:03 AM CST)                                                                                                                                      Lipids                               Cholesterol:      145 mg/dL  (02/01/18 11:03 AM CST)                                                                                                                                          Cholesterol/HDL Ratio:      3.4   (02/01/18 11:03 AM CST)                                                                                                                                          HDL:      43 mg/dL  (02/01/18 11:03 AM CST)                                                                                                                                          LDL:      76   (02/01/18 11:03 AM CST)                                                                                                                                          Lipid Profile:         (02/01/18 11:03 AM CST)                                                                                                                                          Non-HDL Cholesterol:      102   (02/01/18 11:03 AM CST)                                                                                                                                          Triglyceride:      H 158 mg/dL (Range  - <150)  (02/01/18 11:03 AM CST)                                                                                                                                     Other Chemistry                               PSA                                        (02/01/18 11:03 AM CST)                                                                                                                                                3.1 ng/mL  (02/01/18 11:03 AM CST)                                                                                                                                     Random Urine Chem                               U Microalbumin:      18.1 mg/dL  (02/01/18 11:03 AM CST)                                                                                                                                           U Microalbumin/Creatinine:         (02/01/18 11:03 AM CST)                                                                                                                                     Timed Urine Chem                               Ur Creatinine:      115 mg/dL  (02/01/18 11:03 AM CST)                                                                                                                                          Ur Microalbumin/Creatinine Ratio:      H 157  (Range  - <30)  (02/01/18 11:03 AM CST)

## 2022-02-16 NOTE — PROGRESS NOTES
Patient:   SONIA WHITE            MRN: [id number]            FIN: [id number]               Age:   77 years     Sex:  Male     :  1940   Associated Diagnoses:   Infected sebaceous cyst   Author:   Paco Thorpe MD      Visit Information      Date of Service: 2018 02:54 pm  Performing Location: [facility]  Encounter#: [id number]      Primary Care Provider (PCP):  Pj Peterson MD    NPI# [id number]      Referring Provider:  Paco Thorpe MD    NPI# [id number]      Chief Complaint            Additional Information:No additional information recorded during visit.   Chief complaint and symptoms as noted above and confirmed with patient.  Recent lab and diagnostic studies reviewed with patient      History of Present Illness   2018: Presents with one-week history of swelling and bulge left inguinal region.  No associated pain.  No drainage.  No fever chills.         Review of Systems   Constitutional:  No fever, No chills.    Eye:  Negative except as documented in history of present illness.    Ear/Nose/Mouth/Throat:  Negative except as documented in history of present illness.    Respiratory   Cardiovascular:  No palpitations, No peripheral edema.    Gastrointestinal:  No nausea, No vomiting, No abdominal pain.    Genitourinary:  No dysuria, No hematuria.    Hematology/Lymphatics:  Swollen lymph glands.    Endocrine:  No excessive thirst, No polyuria.    Immunologic:  No recurrent fevers.    Musculoskeletal:  No joint pain, No muscle pain.    Neurologic:  Alert and oriented X4.       Health Status   Allergies:    Allergic Reactions (Selected)  Severity Not Documented  Iodine (Asthma)  Shellfish (No reactions were documented)   Medications:  (Selected)   Prescriptions  Prescribed  Lipitor 10 mg oral tablet: 1 tab(s), po, Daily, # 90 tab(s), 3 Refill(s), Type: Maintenance, Pharmacy: [id number], 1 tab(s) po daily,x90 day(s)  albuterol 90 mcg/inh inhalation aerosol: 2  puff(s), inh, qid, PRN: as needed for wheezing, # 2 EA, 6 Refill(s), Type: Maintenance, Pharmacy: [facility], 2 puff(s) inh qid,PRN:as needed for wheezing  amLODIPine 5 mg oral tablet: 1 tab(s) ( 5 mg ), po, daily, # 60 tab(s), 0 Refill(s), Type: Maintenance, Pharmacy: [facility], needs appt for futher refills, 1 tab(s) po daily  hydroCHLOROthiazide 12.5 mg oral capsule: 1 cap(s) ( 12.5 mg ), po, daily, # 90 cap(s), 3 Refill(s), Pharmacy: [facility], 1 cap(s) po daily  lisinopril 40 mg oral tablet: 1 tab(s) ( 40 mg ), PO, Daily, # 90 tab(s), 3 Refill(s), Type: Maintenance, Pharmacy: [facility], 1 tab(s) po daily  Documented Medications  Documented  Aspir 81: ( 81 mg ), PO, Daily, 0   Problem list:    All Problems  Pulmonary asbestosis / SNOMED CT 49400970 / Confirmed  BPPV (benign paroxysmal positional vertigo) / SNOMED CT 178745904 / Confirmed  Carcinoma of colon, [facility]'s B2 / SNOMED CT 170136321 / Confirmed  Dyslipidemia / SNOMED CT 9711575407 / Confirmed  First degree atrioventricular block / SNOMED CT 802842386 / Confirmed  HTN (hypertension) / SNOMED CT 2081679937 / Confirmed  Mild asthma / SNOMED CT 2638407559 / Confirmed  Obesity / SNOMED CT 7888242808 / Confirmed  Prediabetes / SNOMED CT 5234400589 / Confirmed  Microalbuminuria / SNOMED CT 78910126 / Confirmed  Sinus bradycardia / SNOMED CT 10469813 / Confirmed  Lumbar spinal stenosis / SNOMED CT 24319407 / Confirmed  Resolved: History of chicken pox / SNOMED CT 601349619  Resolved: ED (erectile dysfunction) / SNOMED CT 3202035537  Canceled: H/O asbestosis / SNOMED CT 847345107  Canceled: Metabolic syndrome / SNOMED CT 2634287899  Canceled: Flu vaccine need / SNOMED CT 702082843      Histories   Past Medical History:    Active  Carcinoma of colon, [facility]'s B2 (257691454): Onset on 9/1/2000 at 59 years.  Mild asthma (3024420585)  BPPV (benign paroxysmal positional vertigo) (676149011)  HTN (hypertension)  (5300087124)  Prediabetes (8303765533)  Obesity (6512467033)  Resolved  ED (erectile dysfunction) (2068363975):  Resolved.  History of chicken pox (034579676):  Resolved.   Family History:    Melanoma  Mother ()  CA - Carcinoma of prostate  Father ()  PUD - Peptic ulcer disease  Father ()     Procedure history:    Colonoscopy (45.23) on 2014 at 73 Years.  Comments:  2014 2:17 PM - Sophie Calles RN  Sedation: fentanyl, midazolam  Indication: personal history colon cancer  Normal  Repeat in 5 years  Colonoscopy (512563369) on 2009 at 68 Years.  Comments:  2010 9:19 AM - Giselle Gong   Colonoscopy (216802835) on 6/3/2005 at 64 Years.  Colonoscopy (090303533) on 2002 at 61 Years.  Colonoscopy (463606021) on 2001 at 60 Years.  Comments:  2010 9:18 AM - Giselle Gong  normal  Right colon resection on 8/15/2000 at 59 Years.  Colonoscopy (405005081) on 2000 at 59 Years.  Polypectomy (476715326) on 2000 at 59 Years.  Tonsillectomy (659277552).   Social History:        Alcohol Assessment: Current            Current, 4-6 times per week, 2 drinks/episode average.  2 drinks/episode maximum.      Tobacco Assessment: Denies Tobacco Use            Never smoker      Substance Abuse Assessment: Denies Substance Abuse            Never      Employment and Education Assessment            Employed, Work/School description: sales.      Home and Environment Assessment            Marital status: .  Spouse/Partner name: Riya Galdamez.      Exercise and Physical Activity Assessment: Regular exercise            Exercise frequency: 2-3 times/week.        Physical Examination   vital signs stable, as noted above   General:  Alert and oriented, No acute distress.    Respiratory:  Respirations are non-labored.    Cardiovascular:  Normal rate, No murmur, No edema.    Gastrointestinal:  Soft, Non-tender, Non-distended.    Genitourinary:  left inguinal  adenopathy with bulging mass with fluctuance in inguinal fold.    Musculoskeletal:  Normal range of motion.    Neurologic:  Alert, Oriented.    Cognition and Speech:  Oriented, Speech clear and coherent.    Psychiatric:  Appropriate mood & affect.       Impression and Plan   Diagnosis     Infected sebaceous cyst (IJD57-ST L72.3).       Procedure: incision and drainage of infected inguinal sebaceous cyst  Performer: Dr. Thorpe  Indications: suspected abscess  Anesthesia: 2% lidocaine, subcutaneous  EBL: minimal    Pt was prepped and draped in sterile fashion.   Purulent fluid expressed prior to procedure. Local anesthesia achived with 3cc of lidocaine with epinephrine.  Using a scalpal type, a small 2cm incision made at center of  fluctuance.  ~5-10cc of purulent, proteinaceous fluid drained.  Area soft with no tension after procedure.  Patient tolerated the procedure well    Well contained appearing infection.  No need for antibiotics.  Advised 2x/day Sitz baths with antibacterial soap for next ~5 days  - monitor for now.  If reaccumulation of fluid/swelling, may need further drainage.

## 2022-02-16 NOTE — TELEPHONE ENCOUNTER
---------------------  From: Pj Peterson MD   To: Idania Lal CMA;     Sent: 3/22/2019 10:58:19 AM CDT  Subject: FW: General Message     Help!      ---------------------  From: Yessica Perez   To: Pj Peterson MD;     Sent: 3/22/2019 9:08:29 AM CDT  Subject: General Message       I know you stated that the dictation for 03/18/2019 was corrected but I still see that it is under the encounter date of 3/21/19. Could you please fix this so that it is from the correct date of service?    thanks---------------------  From: Idania Lal CMA   To: Pj Peterson MD; Yessica Perez;     Sent: 3/25/2019 8:36:18 AM CDT  Subject: RE: General Message     fixed---------------------  From: Yessica Perez   To: Idania Lal CMA;     Sent: 3/25/2019 10:38:48 AM CDT  Subject: RE: General Message     thanks

## 2022-02-16 NOTE — LETTER
(Inserted Image. Unable to display)   February 13, 2019        SONIA WHITE  [address], [place] [id number]        Dear SONIA,      Thank you for selecting [facility] (previously [place]) for your healthcare needs.    Our records indicate you are due for the following services:    Urine labs ~ Please be prepared to leave a urine specimen for evaluation  Fasting Lab Tests ~ Please do not eat or drink anything 10 hours prior to your scheduled appointment time.  (Water and any medications that you may need are allowed unless directed otherwise.)    If you had your labs done at another facility or with Direct Access Lab Testing at [facility], please bring in a copy of the results to your next visit, mail a copy, or drop off a copy of your results to your Healthcare Provider.  Hypertension check ~ please remember to bring your at-home blood pressure readings with you to your appointment.     You are due for lab work and an office visit, please schedule the lab appointment 1 week before the office visit.  This will assure all results are available to discuss with your provider during your visit.    **It is very helpful if you bring your medication bottles to your appointment.  This assures we have all of your current medications, including strength and dosing information, documented accurately in your medical record.    To schedule an appointment or if you have further questions, please contact your primary clinic:   [facility]       [phone]   [facility]       [phone]              [facility]     [phone]      Powered by [employer]    Sincerely,    Pj Peterson M.D., FACP

## 2022-02-16 NOTE — NURSING NOTE
Quick Intake Entered On:  7/9/2019 4:03 PM CDT    Performed On:  7/9/2019 4:02 PM CDT by Pj Peterson MD               Summary   Systolic Blood Pressure :   134 mmHg (HI)    Diastolic Blood Pressure :   48 mmHg (LOW)    Mean Arterial Pressure :   77 mmHg   BP Site :   Right arm   BP Method :   Manual   Pj Peterson MD - 7/9/2019 4:02 PM CDT

## 2022-02-16 NOTE — NURSING NOTE
Vital Signs Entered On:  12/14/2020 1:20 PM CST    Performed On:  12/14/2020 1:20 PM CST by Kathe Partida RN               Vital Signs   Systolic Blood Pressure :   102 mmHg   Diastolic Blood Pressure :   58 mmHg (LOW)    Mean Arterial Pressure :   73 mmHg   BP Site :   Right arm   Peripheral Pulse Rate :   62 bpm   Kathe Partida RN - 12/14/2020 1:20 PM CST

## 2022-02-16 NOTE — LETTER
(Inserted Image. Unable to display)   June 19, 2019        SONIA WHITE  [address], [place] [id number]        Dear SONIA,      Thank you for selecting [facility] ([facility] [place] for [place] [facility] needs.    Our records indicate you are due for the following services:    Annual Physical  Urine labs ~ Please be prepared to leave a urine specimen for evaluation  Non-Fasting Labs    If you had your labs done at another facility or with Direct Access Lab Testing at [facility], please bring in a copy of the results to your next visit, mail a copy, or drop off a copy of your results to your Healthcare Provider.    You are due for lab work and an office visit, please schedule the lab appointment 1 week before the office visit.  This will assure all results are available to discuss with your provider during your visit.    **It is very helpful if you bring your medication bottles to your appointment.  This assures we have all of your current medications, including strength and dosing information, documented accurately in your medical record.    To schedule an appointment or if you have further questions, please contact your primary clinic:   [facility]       [phone]   [facility]       [phone]              [facility]     [phone]      Powered by [employer]    Sincerely,    Pj Peterson M.D., [facility]

## 2022-02-16 NOTE — TELEPHONE ENCOUNTER
Entered by Alyssa Armstrong CMA on June 19, 2020 12:28:21 PM CDT  ---------------------  From: Alyssa Armstrong CMA   To: [facility]    Sent: 6/19/2020 12:28:20 PM CDT  Subject: Medication Management     ** Submitted: **  Order:lisinopril (lisinopril 40 mg oral tablet)  1 tab(s)  Oral  daily  due for appt next month  Qty:  30 tab(s)        Refills:  0          Substitutions Allowed     Route To Pharmacy - [facility]    Signed by Alyssa Armstrong CMA  6/19/2020 5:28:00 PM    ** Submitted: **  Complete:lisinopril (lisinopril 40 mg oral tablet)   Signed by Alyssa Armstrong CMA  6/19/2020 5:28:00 PM    ** Not Approved:  **  lisinopril (Lisinopril 40 MG Oral Tablet)  TAKE 1 TABLET BY MOUTH ONCE DAILY ,  PATIENT  IS  DUE  FOR  APPOINTMENT  FOR  FURTHER  REFILLS.  Qty:  90 tab(s)        Refills:  0          Substitutions Allowed     Details:  90 tab(s), TAKE 1 TABLET BY MOUTH ONCE DAILY ,  PATIENT  IS  DUE  FOR  APPOINTMENT  FOR  FURTHER  REFILLS., Route to Pharmacy Electronically [facility], 3/18/2020, 3/19/2020, 90, Pj Peterson MD      Route To Pharmacy - [facility]   Signed by Alyssa Armstrong CMA            ** Submitted: **  Order:atorvastatin (atorvastatin 10 mg oral tablet)  1 tab(s)  Oral  daily  due for appt next month  Qty:  30 tab(s)        Refills:  0          Substitutions Allowed     Route To Pharmacy - [facility]    Signed by Alyssa Armstrong CMA  6/19/2020 5:27:00 PM    ** Submitted: **  Complete:atorvastatin (atorvastatin 10 mg oral tablet)   Signed by Alyssa Armstrong CMA  6/19/2020 5:27:00 PM    ** Not Approved:  **  atorvastatin (Atorvastatin Calcium 10 MG Oral Tablet)  TAKE 1 TABLET BY MOUTH ONCE DAILY .  DUE  FOR  AN  APPT  PRIOR  TO  FURTHER  REFILLS.  Qty:  90 tab(s)        Refills:  0          Substitutions Allowed     Details:  90 tab(s), TAKE 1 TABLET BY MOUTH ONCE DAILY .  DUE  FOR  AN  APPT  PRIOR  TO  FURTHER  REFILLS., Route to Pharmacy  Electronically [facility], 3/18/2020, 3/19/2020, 90, Pj Peterson MD      Route To Pharmacy - [place] [address]   Signed by Alyssa Armstrong CMA            ** Submitted: **  Order:amLODIPine (amLODIPine 5 mg oral tablet)  1 tab(s)  Oral  daily  due for appt next month  Qty:  30 tab(s)        Refills:  0          Substitutions Allowed     Route To Pharmacy - [place] [address]    Signed by Alyssa Armstrong CMA  6/19/2020 5:27:00 PM    ** Submitted: **  Complete:amLODIPine (amLODIPine 5 mg oral tablet)   Signed by Alyssa Armstrong CMA  6/19/2020 5:27:00 PM    ** Not Approved:  **  amLODIPine (amLODIPine Besylate 5 MG Oral Tablet)  TAKE 1 TABLET BY MOUTH ONCE DAILY . APPOINTMENT REQUIRED FOR FUTURE REFILLS  Qty:  90 tab(s)        Refills:  0          Substitutions Allowed     Details:  90 tab(s), TAKE 1 TABLET BY MOUTH ONCE DAILY . APPOINTMENT REQUIRED FOR FUTURE REFILLS, Route to Pharmacy Electronically [place] [address], 3/18/2020, 3/18/2020, 90, Pj Peterson MD      Route To [facility] [place] [address]   Signed by Alyssa Armstrong CMA          had a telemed visit 5/12 and advised to f/u in 8wks in clinic (due next month  refilling x 1mo      ------------------------------------------  From: [place] [id number]  To: Pj Peterson MD  Sent: June 18, 2020 3:25:22 PM CDT  Subject: Medication Management  Due: June 17, 2020 4:18:41 PM CDT     ** On Hold Pending Signature **     Drug: amLODIPine (amLODIPine 5 mg oral tablet), TAKE 1 TABLET BY MOUTH ONCE DAILY . APPOINTMENT REQUIRED FOR FUTURE REFILLS  Quantity: 90 EA  Days Supply: 90  Refills: 0  Substitutions Allowed  Notes from Pharmacy:     Dispensed Drug: amLODIPine (amLODIPine 5 mg oral tablet), TAKE 1 TABLET BY MOUTH ONCE DAILY . APPOINTMENT REQUIRED FOR FUTURE REFILLS  Quantity: 90 tab(s)  Days Supply: 90  Refills: 0  Substitutions Allowed  Notes from Pharmacy:     ** On Hold Pending Signature **     Drug: lisinopril  (lisinopril 40 mg oral tablet), TAKE 1 TABLET BY MOUTH ONCE DAILY , PATIENT IS DUE FOR APPOINTMENT FOR FURTHER REFILLS.  Quantity: 90 EA  Days Supply: 90  Refills: 0  Substitutions Allowed  Notes from Pharmacy:     Dispensed Drug: lisinopril (lisinopril 40 mg oral tablet), TAKE 1 TABLET BY MOUTH ONCE DAILY , PATIENT IS DUE FOR APPOINTMENT FOR FURTHER REFILLS.  Quantity: 90 tab(s)  Days Supply: 90  Refills: 0  Substitutions Allowed  Notes from Pharmacy:     ** On Hold Pending Signature **     Drug: atorvastatin (atorvastatin 10 mg oral tablet), TAKE 1 TABLET BY MOUTH ONCE DAILY . DUE FOR AN APPT PRIOR TO FURTHER REFILLS.  Quantity: 90 EA  Days Supply: 90  Refills: 0  Substitutions Allowed  Notes from Pharmacy:     Dispensed Drug: atorvastatin (atorvastatin 10 mg oral tablet), TAKE 1 TABLET BY MOUTH ONCE DAILY . DUE FOR AN APPT PRIOR TO FURTHER REFILLS.  Quantity: 90 tab(s)  Days Supply: 90  Refills: 0  Substitutions Allowed  Notes from Pharmacy:  ------------------------------------------

## 2022-02-16 NOTE — TELEPHONE ENCOUNTER
---------------------  From: Dolly Parish RN   Sent: 11/30/2020 12:30:03 PM CST  Subject: What if...     Pt LVM at 1121 asking what hospital he would go to IF he ever got COVID.    I called pt at 1226 and let him know it depends on who would be covered under his insurance.

## 2022-02-16 NOTE — PROGRESS NOTES
Chief Complaint    past week has had a little swelling behind toes and around ankle  History of Present Illness      Patient complains of ankle and foot edema.  He was started on amlodipine 5 mg daily a few months ago.  No PND, orthopnea, dyspnea, angina, leg pain, or claudication.  Home blood pressure readings are in the 1 teens systolic over 60s.  Review of Systems      No headache, myalgia, change in weight.  Physical Exam   Vitals & Measurements    HR: 59(Peripheral)  BP: 153/69       Appears comfortable in no distress.  Alert and oriented.  Chest is clear.  Cardiac exam is regular no JVD.  1+ edema at the top of his sock.  Pulses palpable in the feet.  Sensation intact in the feet.  Assessment/Plan       Ankle edema(M25.473)        Due to amlodipine.  Is not bothersome to him several we will monitor it for now.         Orders:          [id number] office outpatient visit 25 minutes (Charge), Quantity: 1, Ankle edema  HTN (hypertension)  Microalbuminuria       HTN (hypertension)(I10)        Well-controlled by home readings.         Orders:          [id number] office outpatient visit 25 minutes (Charge), Quantity: 1, Ankle edema  HTN (hypertension)  Microalbuminuria          Return to Clinic (Request), RFV: CSS only BP Check, Return in now       Microalbuminuria(R80.9)        Hopefully will improve with controlled blood pressure.         Orders:          [id number] office outpatient visit 25 minutes (Charge), Quantity: 1, Ankle edema  HTN (hypertension)  Microalbuminuria       Orders:         Return to Clinic (Request), Return in 3 months  Patient Information     Name:SONIA WHITE      Address:      [address] [place] [address]      Sex:Male      YOB: 1940      [phone]      Emergency Contact:JAKE WHITE     MRN:[id number]     FIN:[id number]     Location:[facility]     Date of Service:06/11/2018      Primary Care Physician:       Pj Peterson MD, [phone]      Attending  Physician:       Pj Peterson MD, [phone]  Problem List/Past Medical History    Ongoing     BPPV (benign paroxysmal positional vertigo)     Carcinoma of colon, [facility] B2     Dyslipidemia     First degree atrioventricular block     HTN (hypertension)     Lumbar spinal stenosis     Microalbuminuria     Mild asthma     Obesity     Prediabetes     Pulmonary asbestosis       Comments: Pleural plaques incidentally on CT     Sinus bradycardia    Historical     ED (erectile dysfunction)     History of chicken pox  Procedure/Surgical History     Colonoscopy (07/18/2014)     Colonoscopy (08/05/2009)     Colonoscopy (06/03/2005)     Colonoscopy (05/31/2002)     Colonoscopy (05/11/2001)     Right colon resection (08/15/2000)     Colonoscopy (08/04/2000)     Polypectomy (08/04/2000)     Tonsillectomy  Medications        Aspir 81: 81 mg, PO, Daily.        albuterol 90 mcg/inh inhalation aerosol: 2 puff(s), inh, qid, PRN: as needed for wheezing, 2 EA, 6 Refill(s).        Lipitor 10 mg oral tablet: 1 tab(s), po, Daily, for 90 day(s), 90 tab(s), 3 Refill(s).        lisinopril 40 mg oral tablet: 40 mg, 1 tab(s), PO, Daily, 90 tab(s), 3 Refill(s).        hydroCHLOROthiazide 12.5 mg oral capsule: 12.5 mg, 1 cap(s), po, daily, 90 cap(s), 3 Refill(s).        amLODIPine 5 mg oral tablet: 5 mg, 1 tab(s), po, daily, 30 tab(s), 0 Refill(s).                Allergies    iodine (Asthma)    shellfish  Social History    Smoking Status - 06/11/2018     Never smoker     Alcohol - Current, 08/11/2010      Current, 4-6 times per week, 2 drinks/episode average. 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 Spouse/Partner name: Riya Galdamez., 01/18/2017     Substance Abuse - Denies Substance  Abuse, 01/18/2017      Never, 01/18/2017     Tobacco - Denies Tobacco Use, 08/11/2010      Never smoker, 01/18/2017  Family History    CA - Carcinoma of prostate: Father.    Melanoma: Mother.    PUD - Peptic ulcer disease: Father.    Sister: History is negative    Daughter: History is negative    Brother: History is negative    Son: History is negative  Immunizations      Vaccine Date Status Comments      influenza virus vaccine, inactivated 09/26/2017 Given      influenza virus vaccine, inactivated 10/12/2016 Given      influenza virus vaccine, inactivated 10/26/2015 Given      pneumococcal (PCV13) 07/07/2015 Given      influenza virus vaccine, inactivated 10/09/2014 Recorded [10/13/2014] Received at [place], [place]      DTaP 12/01/2012 Recorded      influenza virus vaccine, inactivated 09/01/2012 Recorded      influenza virus vaccine, inactivated 09/16/2011 Given      pneumococcal (PPSV23) 11/19/2007 Recorded      influenza 11/01/2007 Recorded      Td 08/01/2000 Recorded

## 2022-02-16 NOTE — PROGRESS NOTES
Chief Complaint    Verbal consent granted for telephone visit. Follow up on labs.  History of Present Illness       Today's visit was conducted via telephone due to the COVID-19 pandemic.  Patient's consent to telephone visit was obtained and documented.       Call Start Time:  1300       Call End Time:   1305       Patient has been well wanted to discuss his lab work.  Has a history of prediabetes, hypertension, microalbuminuria, dyslipidemia.  Review of Systems       No fever, chills, cough, dyspnea, headache, myalgia, chest pain, edema.  Assessment/Plan       Dyslipidemia (E78.5)          Previously well controlled.         Ordered:          34336 physician telephone evaluation 11-20 min (Charge), Quantity: 1, Prediabetes  White coat syndrome with hypertension  Dyslipidemia  Microalbuminuria                Microalbuminuria (R80.9)          Improved with better blood sugar and blood pressure control.         Ordered:          40907 physician telephone evaluation 11-20 min (Charge), Quantity: 1, Prediabetes  White coat syndrome with hypertension  Dyslipidemia  Microalbuminuria                Prediabetes (R73.09)          Improved with Metformin and weight loss.         Ordered:          11832 physician telephone evaluation 11-20 min (Charge), Quantity: 1, Prediabetes  White coat syndrome with hypertension  Dyslipidemia  Microalbuminuria                White coat syndrome with hypertension (I10)          Well-controlled.         Ordered:          63177 physician telephone evaluation 11-20 min (Charge), Quantity: 1, Prediabetes  White coat syndrome with hypertension  Dyslipidemia  Microalbuminuria           Patient Information     Name:SONIA WHITE      Address:      [address] [place] [id number]     Sex:Male     YOB: 1940     [phone]     Emergency Contact:JAKE WHITE     MRN:[id number]     FIN:[id number]     Location:[facility]      Date of Service:12/21/2020      Primary Care Physician:       Pj Peterson MD, [phone]      Attending Physician:       Pj Peterson MD, [phone]  Problem List/Past Medical History    Ongoing     BPPV (benign paroxysmal positional vertigo)     Dyslipidemia     First degree atrioventricular block     History of colon cancer     Lumbar spinal stenosis     Microalbuminuria     Mild asthma     Prediabetes     Pulmonary asbestosis       Comments: Pleural plaques incidentally on CT     Sinus bradycardia     White coat syndrome with hypertension    Historical     Carcinoma of colon, [facility] B2     ED (erectile dysfunction)     History of chicken pox     Obesity  Procedure/Surgical History     Colonoscopy (07/24/2019)      Comments: Indication: Colon cancer 2000      Sedation: MAC      Findings: Normal to ileocolonic anastamosis      Rec: No further colonoscopy.     Colonoscopy (07/18/2014)      Comments: Sedation: fentanyl, midazolam      Indication: personal history colon cancer      Normal      Repeat in 5 years.     Colonoscopy (08/05/2009)      Comments: recheck 2014.     Colonoscopy (06/03/2005)     Colonoscopy (05/31/2002)     Colonoscopy (05/11/2001)      Comments: normal.     Right colon resection (08/15/2000)     Colonoscopy (08/04/2000)     Polypectomy (08/04/2000)     Tonsillectomy  Medications    albuterol 90 mcg/inh inhalation aerosol, 2 puff(s), NEB, qid, PRN, 2 refills    amLODIPine 5 mg oral tablet, 1 tab(s), Oral, daily, 3 refills    atorvastatin 10 mg oral tablet, 1 tab(s), Oral, daily, 3 refills    chlorthalidone 25 mg oral tablet, 25 mg= 1 tab(s), Oral, daily, 3 refills    lisinopril 40 mg oral tablet, 1 tab(s), Oral, daily, 3 refills    metFORMIN 500 mg oral tablet, extended release, 2000 mg= 4 tab(s), Oral, daily, 3 refills  Allergies    iodine (Asthma)    shellfish  Social History    Smoking Status     Never smoker     Alcohol - Current      Current, 4-6 times per week, 2  drinks/episode average. 2 drinks/episode maximum.     Electronic Cigarette/Vaping      Electronic Cigarette Use: Never.     Employment/School      Employed, Work/School description: sales.     Exercise - Regular exercise      Exercise frequency: 2-3 times/week.     Home/Environment      Marital status: . Spouse/Partner name: Riya Galdamez.     Substance Abuse - Denies Substance Abuse      Never     Tobacco - Denies Tobacco Use      Never (less than 100 in lifetime)  Family History    CA - Carcinoma of prostate: Father.    Melanoma: Mother.    PUD - Peptic ulcer disease: Father.    Sister: History is negative    Brother: History is negative    Daughter: History is negative    Son: History is negative  Immunizations      Vaccine Date Status          influenza virus vaccine, inactivated 09/15/2020 Recorded          zoster vaccine, inactivated 10/15/2019 Recorded              Comments : [10/22/2019] Received at [place], [place]          influenza virus vaccine, inactivated 09/12/2019 Given          zoster vaccine, inactivated 08/07/2019 Recorded              Comments : [8/8/2019] Received at [facility] [place], [place]          influenza virus vaccine, inactivated 10/11/2018 Given          influenza virus vaccine, inactivated 09/26/2017 Given          influenza virus vaccine, inactivated 10/12/2016 Given          influenza virus vaccine, inactivated 10/26/2015 Given          pneumococcal (PCV13) 07/07/2015 Given          influenza virus vaccine, inactivated 10/09/2014 Recorded              Comments : [10/13/2014] Received at [place], [place]          DTaP 12/01/2012 Recorded          influenza virus vaccine, inactivated 09/01/2012 Recorded          influenza virus vaccine, inactivated 09/16/2011 Given          pneumococcal (PPSV23) 11/19/2007 Recorded          influenza 11/01/2007 Recorded          Td 08/01/2000 Recorded  Lab Results          Lab Results (Last 4 results within 90 days)            Sodium Level: 141 mmol/L [135 mmol/L - 146 mmol/L] (12/14/20 11:10:00)          Potassium Level: 4.9 mmol/L [3.5 mmol/L - 5.3 mmol/L] (12/14/20 11:10:00)          Chloride Level: 102 mmol/L [98 mmol/L - 110 mmol/L] (12/14/20 11:10:00)          CO2 Level: 29 mmol/L [20 mmol/L - 32 mmol/L] (12/14/20 11:10:00)          Glucose Level: 114 mg/dL High [65 mg/dL - 99 mg/dL] (12/14/20 11:10:00)          BUN: 38 mg/dL High [7 mg/dL - 25 mg/dL] (12/14/20 11:10:00)          Creatinine Level: 0.89 mg/dL [0.7 mg/dL - 1.11 mg/dL] (12/14/20 11:10:00)          BUN/Creat Ratio: 43 High [6  - 22] (12/14/20 11:10:00)          eGFR: 80 mL/min/1.73m2 (12/14/20 11:10:00)          eGFR African American: 92 mL/min/1.73m2 (12/14/20 11:10:00)          Calcium Level: 10.2 mg/dL [8.6 mg/dL - 10.3 mg/dL] (12/14/20 11:10:00)          Hgb A1c: 5.7 High (12/14/20 11:10:00)          TSH: 1.32 mIU/L [0.4 mIU/L - 4.5 mIU/L] (12/14/20 11:10:00)          U Creatinine: 98 mg/dL [20 mg/dL - 320 mg/dL] (12/14/20 11:10:00)          U Microalbumin: 14.2 mg/dL (12/14/20 11:10:00)          Ur Microalbumin/Creatinine Ratio: 145 High (12/14/20 11:10:00)          WBC: 7.1 [3.8  - 10.8] (12/14/20 11:10:00)          RBC: 4.02 Low [4.2  - 5.8] (12/14/20 11:10:00)          Hgb: 13.5 gm/dL [13.2 gm/dL - 17.1 gm/dL] (12/14/20 11:10:00)          Hct: 39.7 % [38.5 % - 50 %] (12/14/20 11:10:00)          MCV: 98.8 fL [80 fL - 100 fL] (12/14/20 11:10:00)          MCH: 33.6 pg High [27 pg - 33 pg] (12/14/20 11:10:00)          MCHC: 34 gm/dL [32 gm/dL - 36 gm/dL] (12/14/20 11:10:00)          RDW: 11.6 % [11 % - 15 %] (12/14/20 11:10:00)          Platelet: 244 [140  - 400] (12/14/20 11:10:00)          MPV: 10.7 fL [7.5 fL - 12.5 fL] (12/14/20 11:10:00)

## 2022-02-16 NOTE — TELEPHONE ENCOUNTER
---------------------  From: Christina/Denita RICE (Phone Messages Pool ([id number] [facility] [place]))   To: Pj Peterson MD;     Sent: 9/24/2020 11:06:16 AM CDT  Subject: FYI     Phone Message    PCP: RAHUL    Time of Call: 1041    Phone Number: [phone]    Returned call at:     Note: Patient called stating that he is wanting to go over the last visit summary he got. He stated that there are some Dx's that he was unsure of and unaware of certain health issues.    Spoke to pt and he stated he would like to set up a visit with JDL. Patient stated he is confused as to why he has some of the Dx's and is wondering where they came from. Patient transferred to scheduling.

## 2022-02-16 NOTE — NURSING NOTE
Comprehensive Intake Entered On:  3/18/2019 2:57 PM CDT    Performed On:  3/18/2019 2:50 PM CDT by Heath RICE, Arina               Summary   Chief Complaint :   f/u labs, refill meds.  has been keeping track of BP.   Weight Measured :   222 lb(Converted to: 222 lb 0 oz, 100.70 kg)    Height Measured :   72 in(Converted to: 6 ft 0 in, 182.88 cm)    Body Mass Index :   30.11 kg/m2 (HI)    Body Surface Area :   2.26 m2   Systolic Blood Pressure :   132 mmHg (HI)    Diastolic Blood Pressure :   75 mmHg   Mean Arterial Pressure :   94 mmHg   Peripheral Pulse Rate :   65 bpm   BP Site :   Right arm   Pulse Site :   Radial artery   BP Method :   Manual   HR Method :   Manual   Temperature Tympanic :   98.3 DegF(Converted to: 36.8 DegC)    Arina Joe MA - 3/18/2019 2:50 PM CDT   Health Status   Allergies Verified? :   Yes   Medication History Verified? :   Yes   Medical History Verified? :   Yes   Pre-Visit Planning Status :   Not completed   Tobacco Use? :   Never smoker   Arina Joe MA - 3/18/2019 2:50 PM CDT   Consents   Consent for Immunization Exchange :   Consent Granted   Consent for Immunizations to Providers :   Consent Granted   Arina Joe MA - 3/18/2019 2:50 PM CDT   Meds / Allergies   (As Of: 3/18/2019 2:57:41 PM CDT)   Allergies (Active)   iodine  Estimated Onset Date:   Unspecified ; Reactions:   Asthma ; Created By:   Giselle Gong; Reaction Status:   Active ; Category:   Drug ; Substance:   iodine ; Type:   Allergy ; Updated By:   Giselle Gong; Source:   Paper Chart ; Reviewed Date:   1/8/2015 3:02 PM CST      shellfish  Estimated Onset Date:   Unspecified ; Created By:   Giselle Gong; Reaction Status:   Active ; Category:   Food ; Substance:   shellfish ; Type:   Allergy ; Updated By:   Giselle Gong; Source:   Paper Chart ; Reviewed Date:   1/8/2015 3:02 PM CST        Medication List   (As Of: 3/18/2019 2:57:41 PM CDT)   Prescription/Discharge Order    albuterol  :   albuterol  ; Status:   Prescribed ; Ordered As Mnemonic:   albuterol 90 mcg/inh inhalation aerosol ; Simple Display Line:   2 puff(s), inh, qid, PRN: as needed for wheezing, 2 EA, 6 Refill(s) ; Ordering Provider:   Pj Peterson MD; Catalog Code:   albuterol ; Order Dt/Tm:   2/12/2018 3:51:06 PM          amLODIPine  :   amLODIPine ; Status:   Prescribed ; Ordered As Mnemonic:   amLODIPine 5 mg oral tablet ; Simple Display Line:   5 mg, 1 tab(s), po, daily, 90 tab(s), 1 Refill(s) ; Ordering Provider:   Pj Peterson MD; Catalog Code:   amLODIPine ; Order Dt/Tm:   9/20/2018 11:49:22 AM          atorvastatin  :   atorvastatin ; Status:   Prescribed ; Ordered As Mnemonic:   atorvastatin 10 mg oral tablet ; Simple Display Line:   1 tab(s), Oral, daily, 14 tab(s), 0 Refill(s) ; Ordering Provider:   Pj Peterson MD; Catalog Code:   atorvastatin ; Order Dt/Tm:   3/6/2019 11:03:02 AM          hydroCHLOROthiazide  :   hydroCHLOROthiazide ; Status:   Prescribed ; Ordered As Mnemonic:   hydroCHLOROthiazide 12.5 mg oral capsule ; Simple Display Line:   1 cap(s), Oral, daily, for 30 day(s), 30 cap(s), 0 Refill(s) ; Ordering Provider:   Pj Peterson MD; Catalog Code:   hydroCHLOROthiazide ; Order Dt/Tm:   2/5/2019 11:13:39 AM          lisinopril  :   lisinopril ; Status:   Prescribed ; Ordered As Mnemonic:   lisinopril 40 mg oral tablet ; Simple Display Line:   1 tab(s), Oral, daily, for 30 day(s), 30 tab(s), 0 Refill(s) ; Ordering Provider:   Pj Peterson MD; Catalog Code:   lisinopril ; Order Dt/Tm:   2/5/2019 11:13:13 AM            Home Meds    aspirin  :   aspirin ; Status:   Documented ; Ordered As Mnemonic:   aspirin 325 mg oral delayed release tablet ; Simple Display Line:   325 mg, 1 tab(s), Oral, daily, 0 Refill(s) ; Catalog Code:   aspirin ; Order Dt/Tm:   8/30/2018 2:10:26 PM            Social History   Social History   (As Of: 3/18/2019 2:57:41 PM CDT)   Alcohol:  Current      Current, 4-6 times per week, 2  drinks/episode average.  2 drinks/episode maximum.   (Last Updated: 1/18/2017 12:19:31 PM CST by Anastacia Guzman)          Tobacco:  Denies Tobacco Use      Never smoker   (Last Updated: 1/18/2017 12:18:21 PM CST by Anastacia Guzman)          Substance Abuse:  Denies Substance Abuse      Never   (Last Updated: 1/18/2017 12:19:43 PM CST by Anastacia Guzman)          Employment and Education:        Employed, Work/School description: sales.   (Last Updated: 8/12/2010 3:31:34 PM CDT by Pj Peterson MD)          Home and Environment:        Marital status: .  Spouse/Partner name: Riya Galdamez.   (Last Updated: 1/18/2017 12:15:52 PM CST by Anastacia Guzman)          Exercise and Physical Activity:  Regular exercise      Exercise frequency: 2-3 times/week.   (Last Updated: 1/18/2017 12:20:42 PM CST by Anastacia Guzman)

## 2022-02-16 NOTE — TELEPHONE ENCOUNTER
---------------------  From: Cecile Alcala (Phone Messages Pool ([id number]))   To: [employer] Message Pool ([address] [place] [address]);     Sent: 1/25/2021 4:02:45 PM CST  Subject: General Message       PCP:   Kristen      Time of Call:  1415       Person Calling:  pt  Phone number:  [phone]    Returned call at: _    Note:   Pt called asking about the covid vaccine. 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 Please advise???    Cecile RN    Last office visit and reason:  _---------------------  From: Rani oRsen CMA ([employer] Message Pool ([facility]))   To: Pj Peterson MD;     Sent: 1/25/2021 4:04:59 PM CST  Subject: FW: General Message---------------------  From: Pj Peterson MD   To: [employer] Message Pool ([id number]);     Sent: 1/26/2021 8:04:46 AM CST  Subject: RE: General Message     Shellfish allergy is not a contraindication. He should have it.LM on pt's VM that shellfish allergy is not a contradiction and he should get the vaccine.

## 2022-02-16 NOTE — PROGRESS NOTES
Chief Complaint    f/u labs, c/o BP machine saying that he has an irregular heart beat. Bp's have been running 130/80. Verbal consent given for telephone visit.  History of Present Illness      Patient wants to review his labs which we did.  Generally is feeling well.  Blood pressure by his home monitor is generally well controlled.  Occasionally the monitor notes an irregular pulse.  Patient has no palpitations.  Review of Systems      No chest pain, dyspnea, edema, visual complaints.  No headache, cough or myalgia.  Assessment/Plan       Dyslipidemia (E78.5)         Controlled. Discussed the need to lose weight given the mild triglyceride elevation.         Ordered:          [id number] physician telephone evaluation 21-30 min (Charge), Quantity: 1, White coat syndrome with hypertension  Microalbuminuria  Prediabetes  Dyslipidemia                Microalbuminuria (R80.9)         Discussed the need for improved control of prediabetes and hypertension.         Ordered:          16197 physician telephone evaluation 21-30 min (Charge), Quantity: 1, White coat syndrome with hypertension  Microalbuminuria  Prediabetes  Dyslipidemia                Prediabetes (R73.09)         Encouraged weight loss, low-carb diet, and regular physical activity.         Ordered:          98763 physician telephone evaluation 21-30 min (Charge), Quantity: 1, White coat syndrome with hypertension  Microalbuminuria  Prediabetes  Dyslipidemia                White coat syndrome with hypertension (I10)         Appears to be controlled.  We will get a 24-hour ambulatory blood pressure monitor.  Consider Holter monitoring for a irregularity noted on his monitor.         Ordered:          13728 physician telephone evaluation 21-30 min (Charge), Quantity: 1, White coat syndrome with hypertension  Microalbuminuria  Prediabetes  Dyslipidemia          Ambulatory BP monitor (Request), White coat syndrome with hypertension           Patient  Information     Name:SONIA WHITE      Address:      [address] [place] [id number]     Sex:Male     YOB: 1940     [phone]     Emergency Contact:JAKE WHITE     MRN:[id number]     FIN:[id number]     Location:[facility]     Date of Service:05/05/2020      Primary Care Physician:       Pj Peterson MD, [phone]      Attending Physician:       Pj Peterson MD, [phone]  Problem List/Past Medical History    Ongoing     BPPV (benign paroxysmal positional vertigo)     Dyslipidemia     First degree atrioventricular block     History of colon cancer     Lumbar spinal stenosis     Microalbuminuria     Mild asthma     Obesity     Prediabetes     Pulmonary asbestosis       Comments: Pleural plaques incidentally on CT     Sinus bradycardia     White coat syndrome with hypertension    Historical     Carcinoma of colon, [facility] B2     ED (erectile dysfunction)     History of chicken pox  Procedure/Surgical History     Colonoscopy (07/24/2019)      Comments: Indication: Colon cancer 2000      Sedation: MAC      Findings: Normal to ileocolonic anastamosis      Rec: No further colonoscopy.     Colonoscopy (07/18/2014)      Comments: Sedation: fentanyl, midazolam      Indication: personal history colon cancer      Normal      Repeat in 5 years.     Colonoscopy (08/05/2009)      Comments: recheck 2014.     Colonoscopy (06/03/2005)     Colonoscopy (05/31/2002)     Colonoscopy (05/11/2001)      Comments: normal.     Right colon resection (08/15/2000)     Colonoscopy (08/04/2000)     Polypectomy (08/04/2000)     Tonsillectomy  Medications    albuterol 90 mcg/inh inhalation aerosol, 2 puff(s), NEB, qid, PRN, 2 refills    amLODIPine 5 mg oral tablet, 5 mg= 1 tab(s), Oral, daily    atorvastatin 10 mg oral tablet, 1 tab(s), Oral, daily    gabapentin 300 mg oral capsule, 300 mg= 1 cap(s), Oral, tid    hydroCHLOROthiazide 12.5 mg oral capsule, 1 cap(s),  Oral, daily    lisinopril 40 mg oral tablet, 1 tab(s), Oral, daily    metFORMIN 500 mg oral tablet, extended release, 2000 mg= 4 tab(s), Oral, daily, 3 refills  Allergies    iodine (Asthma)    shellfish  Social History    Smoking Status - 05/05/2020     Never smoker     Alcohol - Current, 08/11/2010      Current, 4-6 times per week, 2 drinks/episode average. 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 Spouse/Partner name: Riya Galdamez., 01/18/2017     Substance Abuse - Denies Substance Abuse, 01/18/2017      Never, 01/18/2017     Tobacco - Denies Tobacco Use, 08/11/2010      Never smoker, 01/18/2017  Family History    CA - Carcinoma of prostate: Father.    Melanoma: Mother.    PUD - Peptic ulcer disease: Father.    Sister: History is negative    Brother: History is negative    Daughter: History is negative    Son: History is negative  Immunizations      Vaccine Date Status          zoster vaccine, inactivated 10/15/2019 Recorded              Comments : [10/22/2019] Received at [place], [place]          influenza virus vaccine, inactivated 09/12/2019 Given          zoster vaccine, inactivated 08/07/2019 Recorded              Comments : [8/8/2019] Received at [facility] [place], [place]          influenza virus vaccine, inactivated 10/11/2018 Given          influenza virus vaccine, inactivated 09/26/2017 Given          influenza virus vaccine, inactivated 10/12/2016 Given          influenza virus vaccine, inactivated 10/26/2015 Given          pneumococcal (PCV13) 07/07/2015 Given          influenza virus vaccine, inactivated 10/09/2014 Recorded              Comments : [10/13/2014] Received at [place], [place]          DTaP 12/01/2012 Recorded          influenza virus  vaccine, inactivated 09/01/2012 Recorded          influenza virus vaccine, inactivated 09/16/2011 Given          pneumococcal (PPSV23) 11/19/2007 Recorded          influenza 11/01/2007 Recorded          Td 08/01/2000 Recorded  Lab Results          Lab Results (Last 4 results within 90 days)           Sodium Level: 137 mmol/L [135 mmol/L - 146 mmol/L] (03/10/20 10:49:00)          Potassium Level: 4.2 mmol/L [3.5 mmol/L - 5.3 mmol/L] (03/10/20 10:49:00)          Chloride Level: 102 mmol/L [98 mmol/L - 110 mmol/L] (03/10/20 10:49:00)          CO2 Level: 25 mmol/L [20 mmol/L - 32 mmol/L] (03/10/20 10:49:00)          Glucose Level: 115 mg/dL High [65 mg/dL - 99 mg/dL] (03/10/20 10:49:00)          BUN: 28 mg/dL High [7 mg/dL - 25 mg/dL] (03/10/20 10:49:00)          Creatinine Level: 0.83 mg/dL [0.7 mg/dL - 1.11 mg/dL] (03/10/20 10:49:00)          BUN/Creat Ratio: 34 High [6  - 22] (03/10/20 10:49:00)          eGFR: 83 mL/min/1.73m2 (03/10/20 10:49:00)          eGFR : 96 mL/min/1.73m2 (03/10/20 10:49:00)          Calcium Level: 9.5 mg/dL [8.6 mg/dL - 10.3 mg/dL] (03/10/20 10:49:00)          Hgb A1c: 6.1 High (03/10/20 10:49:00)          Cholesterol: 153 mg/dL (03/10/20 10:49:00)          Non-HDL Cholesterol: 108 (03/10/20 10:49:00)          HDL: 45 mg/dL (03/10/20 10:49:00)          Cholesterol/HDL Ratio: 3.4 (03/10/20 10:49:00)          LDL: 83 (03/10/20 10:49:00)          Triglyceride: 157 mg/dL High (03/10/20 10:49:00)          U Microalbumin: 42 mg/dL (03/10/20 10:49:00)          Ur Creatinine: 48 mg/dL [20 mg/dL - 320 mg/dL] (03/10/20 10:49:00)          Ur Microalbumin/Creatinine Ratio: 875 High (03/10/20 10:49:00)  Today's visit was conducted via telephone due to the COVID-19 pandemic.  Patient's consent to telephone visit was obtained and documented.  Call Start Time:  1540  Call End Time:   1605

## 2022-02-16 NOTE — LETTER
(Inserted Image. Unable to display)   March 15, 2020      SONIA WHITE      [address] [place], [place] [id number]        Dear SONIA,    Thank you for selecting [facility] ([facility] for your healthcare needs.  Below you will find the results of your recent tests done at our clinic.     Urine microalbumin high. Please schedule an appointment.      Result Name Current Result Previous Result Reference Range   Sodium Level (mmol/L)  137 3/10/2020  138 7/9/2019 135 - 146   Potassium Level (mmol/L)  4.2 3/10/2020  4.3 7/9/2019 3.5 - 5.3   Chloride Level (mmol/L)  102 3/10/2020  103 7/9/2019 98 - 110   CO2 Level (mmol/L)  25 3/10/2020  27 7/9/2019 20 - 32   Glucose Level (mg/dL) ((H)) 115 3/10/2020  95 7/9/2019 65 - 99   Creatinine Level (mg/dL)  0.83 3/10/2020  0.92 7/9/2019 0.70 - 1.11   eGFR (mL/min/1.73m2)  83 3/10/2020  78 7/9/2019 > OR = 60 -    eGFR  (mL/min/1.73m2)  96 3/10/2020  91 7/9/2019 > OR = 60 -    Calcium Level (mg/dL)  9.5 3/10/2020  9.6 7/9/2019 8.6 - 10.3   Hgb A1c ((H)) 6.1 3/10/2020 ((H)) 5.9 7/1/2019  - <5.7   Cholesterol (mg/dL)  153 3/10/2020  145 3/6/2019  - <200   Non-HDL Cholesterol  108 3/10/2020  103 3/6/2019  - <130   HDL (mg/dL)  45 3/10/2020  42 3/6/2019 > OR = 40 -    Cholesterol/HDL Ratio  3.4 3/10/2020  3.5 3/6/2019  - <5.0   LDL  83 3/10/2020  76 3/6/2019    Triglyceride (mg/dL) ((H)) 157 3/10/2020 ((H)) 167 3/6/2019  - <150   Ur Microalbumin/Creatinine Ratio ((H)) 875 3/10/2020 ((H)) 366 7/1/2019  - <30       Please contact me or my assistant at [phone] if you have any questions.     Sincerely,        Pj Peterson MD

## 2022-02-16 NOTE — TELEPHONE ENCOUNTER
Entered by Serena Paez MA on February 05, 2019 11:14:31 AM CST  ---------------------  From: Serena Paez MA   To: [place] [address]    Sent: 2/5/2019 11:14:31 AM CST  Subject: Medication Management     ** Submitted: **  Order:atorvastatin (atorvastatin 10 mg oral tablet)  1 tab(s)  Oral  daily  Qty:  30 tab(s)        Duration:  30 day(s)        Refills:  0          Substitutions Allowed     Route To [place] [address]    Signed by Serena Paez MA  2/5/2019 11:14:00 AM    ** Submitted: **  Complete:atorvastatin (Lipitor 10 mg oral tablet)   Signed by Serena Paez MA  2/5/2019 11:14:00 AM    ** Not Approved:  **  atorvastatin (ATORVASTATIN 10MG   TAB)  TAKE 1 TABLET BY MOUTH ONCE DAILY  Qty:  90 tab(s)        Days Supply:  90        Refills:  3          JULIAN     Route To [place] [address]   Signed by Serena Paez MA            ** Submitted: **  Order:hydroCHLOROthiazide (hydroCHLOROthiazide 12.5 mg oral capsule)  1 cap(s)  Oral  daily  Qty:  30 cap(s)        Duration:  30 day(s)        Refills:  0          Substitutions Allowed     Route To [place] [address]    Signed by Serena Paez MA  2/5/2019 11:13:00 AM    ** Submitted: **  Complete:hydroCHLOROthiazide (hydroCHLOROthiazide 12.5 mg oral capsule)   Signed by Serena Paez MA  2/5/2019 11:14:00 AM    ** Not Approved:  **  hydroCHLOROthiazide (HYDROCHLOROTHIAZIDE 12.5MG CAP)  TAKE 1 CAPSULE BY MOUTH ONCE DAILY  Qty:  90 cap(s)        Days Supply:  90        Refills:  3          JULIAN     Route To [place] [address]   Signed by Serena Paez MA            ** Submitted: **  Order:lisinopril (lisinopril 40 mg oral tablet)  1 tab(s)  Oral  daily  Qty:  30 tab(s)        Duration:  30 day(s)        Refills:  0          Substitutions Allowed     Route To [facility]    Signed by Serena Paez MA  2/5/2019  11:13:00 AM    ** Submitted: **  Complete:lisinopril (lisinopril 40 mg oral tablet)   Signed by Serena Paez MA  2/5/2019 11:13:00 AM    ** Not Approved:  **  lisinopril (LISINOPRIL 40MG     TAB)  TAKE 1 TABLET BY MOUTH ONCE DAILY  Qty:  90 tab(s)        Days Supply:  90        Refills:  3          JULIAN     Route To Pharmacy - [facility]   Signed by Serena Paez MA            ------------------------------------------  From: [facility]  To: Pj Peterson MD  Sent: February 4, 2019 2:04:19 PM CST  Subject: Medication Management  Due: February 5, 2019 2:04:19 PM CST    ** On Hold Pending Signature **  Drug: lisinopril (lisinopril 40 mg oral tablet)  TAKE 1 TABLET BY MOUTH ONCE DAILY  Quantity: 90 tab(s)     Days Supply: 0         Refills: 0  Substitutions Allowed  Notes from Pharmacy:     Dispensed Drug: lisinopril (lisinopril 40 mg oral tablet)  TAKE 1 TABLET BY MOUTH ONCE DAILY  Quantity: 90 tab(s)     Days Supply: 90        Refills: 3  Substitutions Allowed  Notes from Pharmacy:     ** On Hold Pending Signature **  Drug: hydroCHLOROthiazide (hydroCHLOROthiazide 12.5 mg oral capsule)  TAKE 1 CAPSULE BY MOUTH ONCE DAILY  Quantity: 90 cap(s)     Days Supply: 0         Refills: 0  Substitutions Allowed  Notes from Pharmacy:     Dispensed Drug: hydroCHLOROthiazide (hydroCHLOROthiazide 12.5 mg oral capsule)  TAKE 1 CAPSULE BY MOUTH ONCE DAILY  Quantity: 90 cap(s)     Days Supply: 90        Refills: 3  Substitutions Allowed  Notes from Pharmacy:     ** On Hold Pending Signature **  Drug: atorvastatin (atorvastatin 10 mg oral tablet)  TAKE 1 TABLET BY MOUTH ONCE DAILY  Quantity: 90 tab(s)     Days Supply: 0         Refills: 0  Substitutions Allowed  Notes from Pharmacy:     Dispensed Drug: atorvastatin (atorvastatin 10 mg oral tablet)  TAKE 1 TABLET BY MOUTH ONCE DAILY  Quantity: 90 tab(s)     Days Supply: 90        Refills: 3  Substitutions Allowed  Notes from Pharmacy:    ------------------------------------------All 3 meds LF: 2-12-18 with 90 tabs and 3 refills     LV: 8-30-18 for HTN    He is due for AWV this month.     Sent in 30 day supply to get him through until he comes in for a visit, placed note to pharm that he needs to be seen prior to further refills. Also RTC has been placed.

## 2022-02-16 NOTE — TELEPHONE ENCOUNTER
---------------------  From: Charmaine Frias CMA (Phone Messages Pool ([id number]))   To: [facility] Message Pool ([address] [place] [address]);     Sent: 3/24/2020 1:47:29 PM CDT  Subject: General Message     PCP:   RAHUL      Time of Call:  1345       Person Calling:  Cyril  Phone number:  [phone]    Returned call at: n/a    Note:   Patient called stating he recently had labs done and had a high kidney function. He is wondering if taking Aleve or Tylenol could affect this reading? He does take this occasionally to monitor his lower back pain. Please advise.---------------------  From: Denita Landry ([facility] Message Pool ([id number]))   To: Pj Peterson MD;     Sent: 3/24/2020 1:53:33 PM CDT  Subject: FW: General Message---------------------  From: Pj Peterson MD   To: [facility] Message Pool ([id number]);     Sent: 3/24/2020 1:56:38 PM CDT  Subject: RE: General Message     Aleve can increase, Tylenol not.Patient notified and verbalized understanding.

## 2022-02-16 NOTE — TELEPHONE ENCOUNTER
---------------------  From: Lacie Nascimento LPN (Phone Messages Pool ([id number]_[place] [facility] [place]   To: Pj Peterson MD;     Sent: 3/18/2020 2:55:02 PM CDT  Subject: amlodipine/appt     Phone Message    PCP:   RAHUL      Time of Call:  2:12pm       Person Calling:  pt  Phone number:  [phone] OK to LM     Returned call at: 2:45pm    Note:   Pt LM stating he will be needing a refill of amlodipine- only has 6 days left. Pt says he is suppose to have an appt with JDL next Thursday but is unsure if it will be cancelled.    Returned call- pt wondering about lab results. Told him JDL wanted to see him due to high microalbumin.   Do you want pt to keep appt on 3/26 or just refill meds for 3 months and schedule then? Telemed visit?    Last office visit and reason:  7/9/19 precolonoscopy---------------------  From: Pj Peterson MD   To: Phone Messages Pool ([id number] [facility] [place]     Sent: 3/18/2020 3:19:55 PM CDT  Subject: RE: amlodipine/appt     Can refill med and wait 3 months. BP results if monitoring, if not he should start.Pt informed. He says he does not monitor BP regularly- he says he will start and report back with readings.   Pt asked that amlodipine, atorvastatin, hydrochlorothiazide and lisinopril be sent in for 90 days.---------------------  From: Lacie Nascimento LPN ([facility]   To: Appointment Pool ([id number] [facility] [place]);     Sent: 3/18/2020 3:35:52 PM CDT  Subject: FW: amlodipine/appt     Please cancel pt's appt with JDL on 3/26---------------------  From: Brianna Okeefe (Appointment Pool ([id number]_[facility]))   [facility]     Sent: 3/18/2020 5:11:04 PM CDT  Subject: RE: amlodipine/appt     This has been cancelled.

## 2022-02-16 NOTE — TELEPHONE ENCOUNTER
---------------------  From: Lacie Nascimento LPN (Phone Messages Pool ([address] [place] [address]))   To: [facility] Message Pool ([address] [place] [address]);     Sent: 4/10/2020 1:39:34 PM CDT  Subject: lab result question     Phone Message    PCP:   RAHUL      Time of Call:  12:36pm       Person Calling:  pt  Phone number:  [phone] OK to LM     Returned call at: 1:35pm    Note:   Pt LM calling regarding his elevated Ur Microalbumin/Creatinine Ratio. Pt wondering what he should do about it- he asks if there are any foods/beverages he should be avoiding. Pt says he has been checking his BP. Readings have been between 125-130/58-63.    Returned call and informed pt RAHUL out of clinic until Tuesday and message will be forwarded.    Last office visit and reason:  _---------------------  From: Denita Landry ([employer] Message Pool ([address] [place] [address]))   To: Pj Peterson MD;     Sent: 4/13/2020 8:28:16 AM CDT  Subject: FW: lab result question---------------------  From: Pj Peterson MD   To: [employer] [facility] Message Pool ([address] [place] [address]);     Sent: 4/14/2020 10:44:24 AM CDT  Subject: RE: lab result question     Check BP at home 3 readings daily on 5 successive days as follows. Sit in a quiet room for 5 minutes then check BP. Repeat 2 more times at 1-2 minute intervals for a total of 3 reading.Should pt do anything about elevated Ur Microalbumin/Creatinine Ratio? Please advise.---------------------  From: Denita Landry ([employer] Message Pool ([address] [place] [address]))   To: Pj Peterson MD;     Sent: 4/14/2020 1:01:16 PM CDT  Subject: FW: lab result question---------------------  From: Pj Peterson MD   To: RAHUL Message Pool ([id number]_[place] [facility] [place]);     Sent: 4/14/2020 2:49:18 PM CDT  Subject: RE: lab result question     Weight loss and DASH diet.Patient notified.

## 2022-02-16 NOTE — NURSING NOTE
Comprehensive Intake Entered On:  7/9/2019 3:52 PM CDT    Performed On:  7/9/2019 3:42 PM CDT by Denita Landry M               Summary   Chief Complaint :   Pre Op- colonoscopy DOS- 7/24/19 @ [facility] by JDL   Weight Measured :   219.4 lb(Converted to: 219 lb 6 oz, 99.52 kg)    Height Measured :   72 in(Converted to: 6 ft 0 in, 182.88 cm)    Body Mass Index :   29.75 kg/m2 (HI)    Body Surface Area :   2.25 m2   Systolic Blood Pressure :   142 mmHg (HI)    Diastolic Blood Pressure :   60 mmHg   Mean Arterial Pressure :   87 mmHg   Peripheral Pulse Rate :   58 bpm (LOW)    BP Site :   Right arm   BP Method :   Manual   HR Method :   Electronic   Temperature Tympanic :   97.5 DegF(Converted to: 36.4 DegC)  (LOW)    Oxygen Saturation :   86 % (LOW)    Denita Landry - 7/9/2019 3:42 PM CDT   Health Status   Allergies Verified? :   Yes   Medication History Verified? :   Yes   Medical History Verified? :   Yes   Pre-Visit Planning Status :   Completed   Tobacco Use? :   Never smoker   Denita Landry - 7/9/2019 3:42 PM CDT   Consents   Consent for Immunization Exchange :   Consent Granted   Consent for Immunizations to Providers :   Consent Granted   Denita Landry - 7/9/2019 3:42 PM CDT   Meds / Allergies   (As Of: 7/9/2019 3:52:03 PM CDT)   Allergies (Active)   iodine  Estimated Onset Date:   Unspecified ; Reactions:   Asthma ; Created By:   Giselle Gong; Reaction Status:   Active ; Category:   Drug ; Substance:   iodine ; Type:   Allergy ; Updated By:   Giselle Gong; Source:   Paper Chart ; Reviewed Date:   7/9/2019 3:44 PM CDT      shellfish  Estimated Onset Date:   Unspecified ; Created By:   Giselle Gong; Reaction Status:   Active ; Category:   Food ; Substance:   shellfish ; Type:   Allergy ; Updated By:   Giselle Gong; Source:   Paper Chart ; Reviewed Date:   7/9/2019 3:44 PM CDT        Medication List   (As Of: 7/9/2019 3:52:03 PM CDT)   Prescription/Discharge Order    albuterol  :    albuterol ; Status:   Prescribed ; Ordered As Mnemonic:   albuterol 90 mcg/inh inhalation aerosol ; Simple Display Line:   2 puff(s), inh, qid, PRN: as needed for wheezing, 1 EA, 11 Refill(s) ; Ordering Provider:   Pj Peterson MD; Catalog Code:   albuterol ; Order Dt/Tm:   3/18/2019 3:27:58 PM          amLODIPine  :   amLODIPine ; Status:   Prescribed ; Ordered As Mnemonic:   amLODIPine 5 mg oral tablet ; Simple Display Line:   5 mg, 1 tab(s), po, daily, 90 tab(s), 3 Refill(s) ; Ordering Provider:   Pj Peterson MD; Catalog Code:   amLODIPine ; Order Dt/Tm:   3/18/2019 3:27:48 PM          atorvastatin  :   atorvastatin ; Status:   Prescribed ; Ordered As Mnemonic:   atorvastatin 10 mg oral tablet ; Simple Display Line:   1 tab(s), Oral, daily, 90 tab(s), 3 Refill(s) ; Ordering Provider:   Pj Peterson MD; Catalog Code:   atorvastatin ; Order Dt/Tm:   3/18/2019 3:27:22 PM          hydroCHLOROthiazide  :   hydroCHLOROthiazide ; Status:   Prescribed ; Ordered As Mnemonic:   hydroCHLOROthiazide 12.5 mg oral capsule ; Simple Display Line:   1 cap(s), Oral, daily, 90 cap(s), 3 Refill(s) ; Ordering Provider:   Pj Peterson MD; Catalog Code:   hydroCHLOROthiazide ; Order Dt/Tm:   3/18/2019 3:27:11 PM          lisinopril  :   lisinopril ; Status:   Prescribed ; Ordered As Mnemonic:   lisinopril 40 mg oral tablet ; Simple Display Line:   1 tab(s), Oral, daily, 90 tab(s), 3 Refill(s) ; Ordering Provider:   Pj Peterson MD; Catalog Code:   lisinopril ; Order Dt/Tm:   3/18/2019 3:26:52 PM          metFORMIN  :   metFORMIN ; Status:   Prescribed ; Ordered As Mnemonic:   metFORMIN 500 mg oral tablet, extended release ; Simple Display Line:   2,000 mg, 4 tab(s), PO, Daily, One daily with supper. Increase by one tablet each week to four tabs daily, 360 tab(s), 3 Refill(s) ; Ordering Provider:   Pj Peterson MD; Catalog Code:   metFORMIN ; Order Dt/Tm:   3/18/2019 3:28:39 PM            Home Meds    aspirin  :    aspirin ; Status:   Documented ; Ordered As Mnemonic:   aspirin 325 mg oral delayed release tablet ; Simple Display Line:   325 mg, 1 tab(s), Oral, daily, 0 Refill(s) ; Catalog Code:   aspirin ; Order Dt/Tm:   8/30/2018 2:10:26 PM

## 2022-02-16 NOTE — NURSING NOTE
Comprehensive Intake Entered On:  9/28/2020 10:20 AM CDT    Performed On:  9/28/2020 10:15 AM CDT by Denita Landry               Summary   Chief Complaint :   wanitng to go over dx's on visit summary, patient is confused about what they are. Verbal consent given for telephone visit    Height Measured :   72 in(Converted to: 6 ft 0 in, 182.88 cm)    Denita Landry - 9/28/2020 10:15 AM CDT   Health Status   Allergies Verified? :   Yes   Medication History Verified? :   Yes   Medical History Verified? :   Yes   Pre-Visit Planning Status :   Completed   Tobacco Use? :   Never smoker   Denita Landry - 9/28/2020 10:15 AM CDT   Consents   Consent for Immunization Exchange :   Consent Granted   Consent for Immunizations to Providers :   Consent Granted   Denita Landry - 9/28/2020 10:15 AM CDT   Problems   (As Of: 9/28/2020 10:20:38 AM CDT)   Problems(Active)    BPPV (benign paroxysmal positional vertigo) (SNOMED CT  :810016871 )  Name of Problem:   BPPV (benign paroxysmal positional vertigo) ; Recorder:   Giselle Gong; Confirmation:   Confirmed ; Classification:   Medical ; Code:   238006591 ; Contributor System:   PowerChart ; Last Updated:   9/2/2014 1:46 PM CDT ; Life Cycle Date:   8/11/2010 ; Life Cycle Status:   Active ; Vocabulary:   SNOMED CT        Dyslipidemia (SNOMED CT  :7726143744 )  Name of Problem:   Dyslipidemia ; Recorder:   Pj Peterson MD; Confirmation:   Confirmed ; Classification:   Medical ; Code:   [id number] ; Contributor System:   PowerChart ; Last Updated:   11/2/2017 3:51 PM CDT ; Life Cycle Date:   11/2/2017 ; Life Cycle Status:   Active ; Responsible Provider:   Pj Peterson MD; Vocabulary:   SNOMED CT        First degree atrioventricular block (SNOMED CT  :830164960 )  Name of Problem:   First degree atrioventricular block ; Recorder:   Pj Peterson MD; Confirmation:   Confirmed ; Classification:   Medical ; Code:   786925816 ; Contributor System:   PowerChart ;  Last Updated:   11/2/2017 4:19 PM CDT ; Life Cycle Date:   11/2/2017 ; Life Cycle Status:   Active ; Responsible Provider:   Pj Peterson MD; Vocabulary:   SNOMED CT        History of colon cancer (SNOMED CT  :2568394730 )  Name of Problem:   History of colon cancer ; Recorder:   [employer]; Confirmation:   Confirmed ; Classification:   Medical ; Code:   [id number] ; Contributor System:   PowerChart ; Last Updated:   9/5/2018 10:24 AM CDT ; Life Cycle Date:   9/5/2018 ; Life Cycle Status:   Active ; Vocabulary:   SNOMED CT        Lumbar spinal stenosis (SNOMED CT  :42853763 )  Name of Problem:   Lumbar spinal stenosis ; Recorder:   Pj Peterson MD; Confirmation:   Confirmed ; Classification:   Medical ; Code:   93145055 ; Contributor System:   PowerChart ; Last Updated:   11/2/2017 3:51 PM CDT ; Life Cycle Date:   11/2/2017 ; Life Cycle Status:   Active ; Responsible Provider:   Pj Peterson MD; Vocabulary:   SNOMED CT        Microalbuminuria (SNOMED CT  :86399031 )  Name of Problem:   Microalbuminuria ; Recorder:   Pj Peterson MD; Confirmation:   Confirmed ; Classification:   Medical ; Code:   38306142 ; Contributor System:   PowerChart ; Last Updated:   9/2/2014 1:41 PM CDT ; Life Cycle Date:   7/1/2014 ; Life Cycle Status:   Active ; Responsible Provider:   Pj Peterson MD; Vocabulary:   SNOMED CT        Mild asthma (SNOMED CT  :2143762527 )  Name of Problem:   Mild asthma ; Recorder:   Giselle Gong; Confirmation:   Confirmed ; Classification:   Medical ; Code:   9659462362 ; Contributor System:   PowerChart ; Last Updated:   1/18/2017 12:08 PM CST ; Life Cycle Date:   8/11/2010 ; Life Cycle Status:   Active ; Vocabulary:   SNOMED CT        Obesity (SNOMED CT  :7278037538 )  Name of Problem:   Obesity ; Recorder:   Pj Peterson MD; Confirmation:   Confirmed ; Classification:   Medical ; Code:   4472361545 ; Contributor System:   PowerChart ; Last Updated:   1/18/2017 12:08 PM CST ;  Life Cycle Date:   8/12/2010 ; Life Cycle Status:   Active ; Responsible Provider:   Pj Peterson MD; Vocabulary:   SNOMED CT        Prediabetes (SNOMED CT  :2563515095 )  Name of Problem:   Prediabetes ; Recorder:   Giselle Gong; Confirmation:   Confirmed ; Classification:   Medical ; Code:   4686844862 ; Contributor System:   PowerChart ; Last Updated:   1/18/2017 12:11 PM CST ; Life Cycle Date:   8/11/2010 ; Life Cycle Status:   Active ; Vocabulary:   SNOMED CT        Pulmonary asbestosis (SNOMED CT  :71637002 )  Name of Problem:   Pulmonary asbestosis ; Onset Date:   2000 ; Recorder:   Pj Peterson MD; Confirmation:   Confirmed ; Classification:   Medical ; Code:   69575732 ; Contributor System:   [employer] ; Last Updated:   1/8/2015 4:06 PM CST ; Life Cycle Date:   1/8/2015 ; Life Cycle Status:   Active ; Responsible Provider:   Pj Peterson MD; Vocabulary:   SNOMED CT   ; Comments:        1/8/2015 4:05 PM - Pj Peterson MD  Pleural plaques incidentally on CT      Sinus bradycardia (SNOMED CT  :17379574 )  Name of Problem:   Sinus bradycardia ; Recorder:   Pj Peterson MD; Confirmation:   Confirmed ; Classification:   Medical ; Code:   41785375 ; Contributor System:   [employer] ; Last Updated:   11/2/2017 4:19 PM CDT ; Life Cycle Date:   11/2/2017 ; Life Cycle Status:   Active ; Responsible Provider:   Pj Peterson MD; Vocabulary:   SNOMED CT        White coat syndrome with hypertension (SNOMED CT  :6925028150 )  Name of Problem:   White coat syndrome with hypertension ; Recorder:   Giselle Gong; Confirmation:   Confirmed ; Classification:   Medical ; Code:   6416714458 ; Contributor System:   PowerChart ; Last Updated:   8/30/2018 2:44 PM CDT ; Life Cycle Status:   Active ; Vocabulary:   SNOMED CT          Meds / Allergies   (As Of: 9/28/2020 10:20:38 AM CDT)   Allergies (Active)   iodine  Estimated Onset Date:   Unspecified ; Reactions:   Asthma ; Created By:   Giselle Gong;  Reaction Status:   Active ; Category:   Drug ; Substance:   iodine ; Type:   Allergy ; Updated By:   Giselle Gong; Source:   Paper Chart ; Reviewed Date:   9/28/2020 10:18 AM CDT      shellfish  Estimated Onset Date:   Unspecified ; Created By:   Giselle Gong; Reaction Status:   Active ; Category:   Food ; Substance:   shellfish ; Type:   Allergy ; Updated By:   Giselle Gong; Source:   Paper Chart ; Reviewed Date:   9/28/2020 10:18 AM CDT        Medication List   (As Of: 9/28/2020 10:20:38 AM CDT)   Prescription/Discharge Order    chlorthalidone  :   chlorthalidone ; Status:   Prescribed ; Ordered As Mnemonic:   chlorthalidone 25 mg oral tablet ; Simple Display Line:   25 mg, 1 tab(s), Oral, daily, 90 tab(s), 3 Refill(s) ; Ordering Provider:   Pj Peterson MD; Catalog Code:   chlorthalidone ; Order Dt/Tm:   7/17/2020 3:18:17 PM CDT          metFORMIN  :   metFORMIN ; Status:   Prescribed ; Ordered As Mnemonic:   metFORMIN 500 mg oral tablet, extended release ; Simple Display Line:   2,000 mg, 4 tab(s), PO, Daily, One daily with supper. Increase by one tablet each week to four tabs daily, 360 tab(s), 3 Refill(s) ; Ordering Provider:   Pj Peterson MD; Catalog Code:   metFORMIN ; Order Dt/Tm:   7/17/2020 3:18:09 PM CDT          amLODIPine  :   amLODIPine ; Status:   Prescribed ; Ordered As Mnemonic:   amLODIPine 5 mg oral tablet ; Simple Display Line:   1 tab(s), Oral, daily, due for appt next month, 90 tab(s), 3 Refill(s) ; Ordering Provider:   Pj Peterson MD; Catalog Code:   amLODIPine ; Order Dt/Tm:   7/17/2020 3:17:39 PM CDT          atorvastatin  :   atorvastatin ; Status:   Prescribed ; Ordered As Mnemonic:   atorvastatin 10 mg oral tablet ; Simple Display Line:   1 tab(s), Oral, daily, due for appt next month, 90 tab(s), 3 Refill(s) ; Ordering Provider:   jP Peterson MD; Catalog Code:   atorvastatin ; Order Dt/Tm:   7/17/2020 3:17:49 PM CDT          lisinopril  :   lisinopril ; Status:    Prescribed ; Ordered As Mnemonic:   lisinopril 40 mg oral tablet ; Simple Display Line:   1 tab(s), Oral, daily, due for appt next month, 90 tab(s), 3 Refill(s) ; Ordering Provider:   Pj Peterson MD; Catalog Code:   lisinopril ; Order Dt/Tm:   7/17/2020 3:17:59 PM CDT          albuterol  :   albuterol ; Status:   Prescribed ; Ordered As Mnemonic:   albuterol 90 mcg/inh inhalation aerosol ; Simple Display Line:   2 puff(s), NEB, qid, PRN: AS NEEDED FOR WHEEZING, 9 gm, 2 Refill(s) ; Ordering Provider:   Pj Peterson MD; Catalog Code:   albuterol ; Order Dt/Tm:   3/26/2020 2:45:21 PM CDT            ID Risk Screen   Recent Travel History :   No recent travel   Family Member Travel History :   No recent travel   Other Exposure to Infectious Disease :   Unknown   Christina/Denita RICE - 9/28/2020 10:15 AM CDT

## 2022-02-16 NOTE — NURSING NOTE
Quick Intake Entered On:  3/18/2019 3:16 PM CDT    Performed On:  3/18/2019 3:16 PM CDT by Arina Joe MA               More Vitals   Systolic Blood Pressure Repeat :   124 mmHg   Diastolic Blood Pressure Repeat :   51 mmHg   BP Site Repeat :   Right arm   Arina Joe MA - 3/18/2019 3:16 PM CDT

## 2022-02-16 NOTE — TELEPHONE ENCOUNTER
---------------------  From: Patti REEVES Alyssa   Sent: 3/6/2020 4:06:14 PM CST  Subject: General Message-med refills     Phone Message    PCP:   MIGDALIA      Time of Call:  1555       Person Calling:  [facility]  Phone number:  [phone]    Returned call at: 1604    Note:   pharm calling to get refills of amlodipine, atorvastatin, HCTZ, lisinopril  called and discussed with pt and he is not needing the amlodipine.  Has labs scheduled for 3/10 and will f/u with JDL afterwards.  He doesn't think he's taking the metformin.  okay for 30 days to [facility] [employer].    Scripts sent

## 2022-02-16 NOTE — TELEPHONE ENCOUNTER
---------------------  From: Ana Aguirre CMA (eRx Pool ([id number]_[facility]))   To: [facility] Message Pool ([id number]_[place] [facility] [place]);     Sent: 3/18/2020 11:48:24 AM CDT  Subject: FW: Medication Management   Due Date/Time: 3/19/2020 11:21:00 AM CDT     Appt tc/ RAHUL 3/26/20. Fill at that time to avoid protocol        ------------------------------------------  From: [id number]  To: Pj Peterson MD  Sent: March 18, 2020 11:21:12 AM CDT  Subject: Medication Management  Due: March 19, 2020 11:21:12 AM CDT    ** On Hold Pending Signature **  Drug: amLODIPine (amLODIPine 5 mg oral tablet)  TAKE 1 TABLET BY MOUTH ONCE DAILY  Quantity: 90 EA  Days Supply: 90  Refills: 0  Substitutions Allowed  Notes from Pharmacy:     Dispensed Drug: amLODIPine (amLODIPine 5 mg oral tablet)  Take 1 tablet by mouth once daily  Quantity: 90 EA  Days Supply: 90  Refills: 0  Substitutions Allowed  Notes from Pharmacy:   ------------------------------------------

## 2022-02-16 NOTE — PROGRESS NOTES
Chief Complaint    c/o weight loss- wondering if weight loss could be due to metformin. c/o something on R elbow. Wanting some labs done.  History of Present Illness       Patient complains of diarrhea and weight loss since starting metformin in June.  He is made changes to his diet with regard to his prediabetes and is eliminated sugar and decrease carbs.  He tells me that the diarrhea has now resolved has been careful about taking the Metformin with his largest meal.  No polyuria, polydipsia, cough, fever, chest pain, dyspnea, headache, or myalgia.  Review of Systems       No night sweats, headache, rash, joint pain.  He does complain of swelling of the right elbow which began about 3 months after an injury.  Physical Exam   Vitals & Measurements    T: 98.0  F (Tympanic)  HR: 74 (Peripheral)  BP: 112/52  SpO2: 98%     HT: 72 in  WT: 194 lb  BMI: 26.31        Patient is a healthy-appearing man in no distress.  Alert and oriented.  Eyes normal.  HEENT exam is masked.  Neck is supple no nodes or thyromegaly trachea pulsations normal.  Chest clear to auscultation and percussion.  Cardiac exam is regular no murmur.  No edema.  Abdomen nontender.  He has an enlarged right olecranon bursa without tenderness warmth erythema.  Assessment/Plan       Microalbuminuria (R80.9)         Hypertension controlled.  On ACE inhibitor.  Metformin started for prediabetes.  Lab work in a month.         Ordered:          95532 office outpatient visit 25 minutes (Charge), Quantity: 1, Prediabetes  Weight loss  Olecranon bursitis of right elbow  Microalbuminuria  White coat syndrome with hypertension                Olecranon bursitis of right elbow (M70.21)          Not not septic.  Avoid pressure return if not improving.         Ordered:          64408 office outpatient visit 25 minutes (Charge), Quantity: 1, Prediabetes  Weight loss  Olecranon bursitis of right elbow  Microalbuminuria  White coat syndrome with hypertension                 Prediabetes (R73.09)          Discussed that he should take the maximum tolerable dose of Metformin was without 1-3 or 4 tablets daily.  Repeat lab work in a month.         Ordered:          32761 office outpatient visit 25 minutes (Charge), Quantity: 1, Prediabetes  Weight loss  Olecranon bursitis of right elbow  Microalbuminuria  White coat syndrome with hypertension                Weight loss (R63.4)          Appears to be due to dietary changes and Metformin associated diarrhea.  No symptoms to suggest chronic infection or metastatic cancer.  Patient will call if he continues to lose weight.         Ordered:          19861 office outpatient visit 25 minutes (Charge), Quantity: 1, Prediabetes  Weight loss  Olecranon bursitis of right elbow  Microalbuminuria  White coat syndrome with hypertension                White coat syndrome with hypertension (I10)          Controlled.         Ordered:          15352 office outpatient visit 25 minutes (Charge), Quantity: 1, Prediabetes  Weight loss  Olecranon bursitis of right elbow  Microalbuminuria  White coat syndrome with hypertension                Orders:         Return to Clinic (Request), RFV: BMP, KATYA, Hgb A1c, CBC, TSH, Return in 4 weeks  Patient Information     Name:SONIA WHITE      Address:      [address] [place] [id number]     Sex:Male     YOB: 1940     [phone]     Emergency Contact:JAKE WHITE     MRN:[id number]     FIN:[id number]     Location:[facility]     Date of Service:11/02/2020      Primary Care Physician:       Pj Peterson MD, [phone]      Attending Physician:       Pj Peterson MD, [phone]  Problem List/Past Medical History    Ongoing     BPPV (benign paroxysmal positional vertigo)     Dyslipidemia     First degree atrioventricular block     History of colon cancer     Lumbar spinal stenosis     Microalbuminuria     Mild asthma      Prediabetes     Pulmonary asbestosis       Comments: Pleural plaques incidentally on CT     Sinus bradycardia     White coat syndrome with hypertension    Historical     Carcinoma of colon, [facility] B2     ED (erectile dysfunction)     History of chicken pox     Obesity  Procedure/Surgical History     Colonoscopy (07/24/2019)      Comments: Indication: Colon cancer 2000      Sedation: MAC      Findings: Normal to ileocolonic anastamosis      Rec: No further colonoscopy.     Colonoscopy (07/18/2014)      Comments: Sedation: fentanyl, midazolam      Indication: personal history colon cancer      Normal      Repeat in 5 years.     Colonoscopy (08/05/2009)      Comments: recheck 2014.     Colonoscopy (06/03/2005)     Colonoscopy (05/31/2002)     Colonoscopy (05/11/2001)      Comments: normal.     Right colon resection (08/15/2000)     Colonoscopy (08/04/2000)     Polypectomy (08/04/2000)     Tonsillectomy  Medications    albuterol 90 mcg/inh inhalation aerosol, 2 puff(s), NEB, qid, PRN, 2 refills    amLODIPine 5 mg oral tablet, 1 tab(s), Oral, daily, 3 refills    atorvastatin 10 mg oral tablet, 1 tab(s), Oral, daily, 3 refills    chlorthalidone 25 mg oral tablet, 25 mg= 1 tab(s), Oral, daily, 3 refills    lisinopril 40 mg oral tablet, 1 tab(s), Oral, daily, 3 refills    metFORMIN 500 mg oral tablet, extended release, 2000 mg= 4 tab(s), Oral, daily, 3 refills  Allergies    iodine (Asthma)    shellfish  Social History    Smoking Status     Never smoker     Alcohol - Current      Current, 4-6 times per week, 2 drinks/episode average. 2 drinks/episode maximum.     Employment/School      Employed, Work/School description: sales.     Exercise - Regular exercise      Exercise frequency: 2-3 times/week.     Home/Environment      Marital status: . Spouse/Partner name: Riya Galdamez.     Substance Abuse - Denies Substance Abuse      Never     Tobacco - Denies Tobacco Use      Never smoker  Family History    CA - Carcinoma  of prostate: Father.    Melanoma: Mother.    PUD - Peptic ulcer disease: Father.    Sister: History is negative    Brother: History is negative    Daughter: History is negative    Son: History is negative  Immunizations      Vaccine Date Status          influenza virus vaccine, inactivated 09/15/2020 Recorded          zoster vaccine, inactivated 10/15/2019 Recorded              Comments : [10/22/2019] Received at [place], [place]          influenza virus vaccine, inactivated 09/12/2019 Given          zoster vaccine, inactivated 08/07/2019 Recorded              Comments : [8/8/2019] Received at [place], [place]          influenza virus vaccine, inactivated 10/11/2018 Given          influenza virus vaccine, inactivated 09/26/2017 Given          influenza virus vaccine, inactivated 10/12/2016 Given          influenza virus vaccine, inactivated 10/26/2015 Given          pneumococcal (PCV13) 07/07/2015 Given          influenza virus vaccine, inactivated 10/09/2014 Recorded              Comments : [10/13/2014] Received at [place], [place]          DTaP 12/01/2012 Recorded          influenza virus vaccine, inactivated 09/01/2012 Recorded          influenza virus vaccine, inactivated 09/16/2011 Given          pneumococcal (PPSV23) 11/19/2007 Recorded          influenza 11/01/2007 Recorded          Td 08/01/2000 Recorded

## 2022-02-16 NOTE — TELEPHONE ENCOUNTER
---------------------  From: Марина BREAUX, Dolly LO (Phone Messages Pool ([id number]_[facility]))   Sent: 6/24/2019 3:29:28 PM CDT  Subject: Labs/Pre Op/Annual Physical     Phone message    PCP:   RAHUL      Time of Call:  1501       Person Calling:  Pt  Phone number:  [phone]    Returned call at: [phone]    Note:   Patient states he got a reminder letter in the mail saying he needs nonfasting labs and annual physical. He already has a pre op scheduled for 7/9/19 and is wondering how he can get all of this done with as few visits as possible.    I called pt and informed him to make a nonfasting lab appointment and then we can check to see if his appt on 7/9/19 can be extended so his pre op and annual physicals can be done at the same time. Patient agreed with this and was transferred to scheduling.    Last office visit and reason:  Medical f/u, 3/18/19

## 2022-02-16 NOTE — TELEPHONE ENCOUNTER
Entered by Kelli Coppola on March 26, 2020 2:45:44 PM CDT  ---------------------  From: Kelli Coppola   To: [facility]    Sent: 3/26/2020 2:45:44 PM CDT  Subject: Medication Management     ** Submitted: **  Order:albuterol (albuterol 90 mcg/inh inhalation aerosol)  2 puff(s)  NEB  qid  Qty:  9 gm        Days Supply:  25        Refills:  2          Substitutions Allowed     PRN  AS NEEDED FOR WHEEZING      Route To Pharmacy - [facility]    Signed by Kelli Coppola  3/26/2020 7:45:00 PM    ** Submitted: **  Complete:albuterol (albuterol 90 mcg/inh inhalation aerosol)   Signed by Kelli Coppola  3/26/2020 7:45:00 PM    ** Not Approved:  **  albuterol (Albuterol Sulfate  (90 Base) MCG/ACT Inhalation Aerosol Solution)  INHALE 2 PUFFS 4 TIMES DAILY AS NEEDED FOR WHEEZING  Qty:  9 gm        Days Supply:  25        Refills:  0          Substitutions Allowed     Route To Pharmacy - [facility]   Signed by Kelli Coppola          pt to follow up in 3 mo per JDL d/t COVID. will fill as last prescribed per protocol.      ------------------------------------------  From: [facility]  To: Pj Peterson MD  Sent: March 25, 2020 3:14:36 PM CDT  Subject: Medication Management  Due: March 26, 2020 3:14:36 PM CDT    ** On Hold Pending Signature **  Drug: albuterol (albuterol 90 mcg/inh inhalation aerosol)  INHALE 2 PUFFS 4 TIMES DAILY AS NEEDED FOR WHEEZING  Quantity: 9 gm  Days Supply: 25  Refills: 0  Substitutions Allowed  Notes from Pharmacy:     Dispensed Drug: albuterol (albuterol 90 mcg/inh inhalation aerosol)  INHALE 2 PUFFS 4 TIMES DAILY AS NEEDED FOR WHEEZING  Quantity: 9 gm  Days Supply: 25  Refills: 0  Substitutions Allowed  Notes from Pharmacy:   ------------------------------------------

## 2022-02-16 NOTE — PROGRESS NOTES
(Inserted Image. Unable to display)          (Inserted Image. Unable to display)     [address], [place] [address]  Phone [phone]  Fax [phone]      AMBULATORY BLOOD PRESSURE MONITOR    Patient Name:  SONIA WHITE : 1940          MRN #: [id number]  Date of Exam:   2020   Ordering HCP:  Pj Peterson MD, FACP      INDICATION:  Hypertension with white-coat hypertension.     BLOOD PRESSURE FINDINGS:  Average 24-hour blood pressure is 127/62 with heart rate of 58.  Dipping is not preserved.  Nocturnal average of 123/57 and heart rate of 50.      IMPRESSION:  Borderline controlled hypertension.                                                                             Pj Peterson MD, FACP   Interpreting HCP     RAHUL/vinh                  D:  2020                                                                          T:  2020      AMBULATORY BLOOD PRESSURE MONITOR REPORT

## 2022-02-16 NOTE — NURSING NOTE
Comprehensive Intake Entered On:  5/5/2020 3:24 PM CDT    Performed On:  5/5/2020 3:18 PM CDT by Denita Landry               Summary   Chief Complaint :   f/u labs, c/o BP machine saying that he has an irregular heart beat. Bp's have been running 130/80. Verbal consent given for telephone visit.   Denita Landry - 5/5/2020 3:18 PM CDT   Health Status   Allergies Verified? :   Yes   Medication History Verified? :   Yes   Medical History Verified? :   Yes   Pre-Visit Planning Status :   N/A   Tobacco Use? :   Never smoker   Denita Landry - 5/5/2020 3:18 PM CDT   Consents   Consent for Immunization Exchange :   Consent Granted   Consent for Immunizations to Providers :   Consent Granted   Denita Landry - 5/5/2020 3:18 PM CDT   Meds / Allergies   (As Of: 5/5/2020 3:24:53 PM CDT)   Allergies (Active)   iodine  Estimated Onset Date:   Unspecified ; Reactions:   Asthma ; Created By:   Giselle Gong; Reaction Status:   Active ; Category:   Drug ; Substance:   iodine ; Type:   Allergy ; Updated By:   Giselle Gong; Source:   Paper Chart ; Reviewed Date:   5/5/2020 3:22 PM CDT      shellfish  Estimated Onset Date:   Unspecified ; Created By:   Giselle Gong; Reaction Status:   Active ; Category:   Food ; Substance:   shellfish ; Type:   Allergy ; Updated By:   Giselle Gong; Source:   Paper Chart ; Reviewed Date:   5/5/2020 3:22 PM CDT        Medication List   (As Of: 5/5/2020 3:24:53 PM CDT)   Prescription/Discharge Order    albuterol  :   albuterol ; Status:   Prescribed ; Ordered As Mnemonic:   albuterol 90 mcg/inh inhalation aerosol ; Simple Display Line:   2 puff(s), NEB, qid, PRN: AS NEEDED FOR WHEEZING, 9 gm, 2 Refill(s) ; Ordering Provider:   Pj Peterson MD; Catalog Code:   albuterol ; Order Dt/Tm:   3/26/2020 2:45:21 PM CDT          atorvastatin  :   atorvastatin ; Status:   Prescribed ; Ordered As Mnemonic:   atorvastatin 10 mg oral tablet ; Simple Display Line:   1 tab(s), Oral,  daily, 90 tab(s), 0 Refill(s) ; Ordering Provider:   Pj Peterson MD; Catalog Code:   atorvastatin ; Order Dt/Tm:   3/18/2020 3:33:06 PM CDT          hydroCHLOROthiazide  :   hydroCHLOROthiazide ; Status:   Prescribed ; Ordered As Mnemonic:   hydroCHLOROthiazide 12.5 mg oral capsule ; Simple Display Line:   1 cap(s), Oral, daily, 90 cap(s), 0 Refill(s) ; Ordering Provider:   Pj Peterson MD; Catalog Code:   hydroCHLOROthiazide ; Order Dt/Tm:   3/18/2020 3:33:26 PM CDT          lisinopril  :   lisinopril ; Status:   Prescribed ; Ordered As Mnemonic:   lisinopril 40 mg oral tablet ; Simple Display Line:   1 tab(s), Oral, daily, 90 tab(s), 0 Refill(s) ; Ordering Provider:   Pj Peterson MD; Catalog Code:   lisinopril ; Order Dt/Tm:   3/18/2020 3:33:55 PM CDT          amLODIPine  :   amLODIPine ; Status:   Prescribed ; Ordered As Mnemonic:   amLODIPine 5 mg oral tablet ; Simple Display Line:   5 mg, 1 tab(s), po, daily, 90 tab(s), 0 Refill(s) ; Ordering Provider:   Pj Peterson MD; Catalog Code:   amLODIPine ; Order Dt/Tm:   3/18/2020 3:29:50 PM CDT          metFORMIN  :   metFORMIN ; Status:   Prescribed ; Ordered As Mnemonic:   metFORMIN 500 mg oral tablet, extended release ; Simple Display Line:   2,000 mg, 4 tab(s), PO, Daily, One daily with supper. Increase by one tablet each week to four tabs daily, 360 tab(s), 3 Refill(s) ; Ordering Provider:   Pj Peterson MD; Catalog Code:   metFORMIN ; Order Dt/Tm:   3/18/2019 3:28:39 PM CDT            Home Meds    gabapentin  :   gabapentin ; Status:   Documented ; Ordered As Mnemonic:   gabapentin 300 mg oral capsule ; Simple Display Line:   300 mg, 1 cap(s), Oral, tid, 90 cap(s), 0 Refill(s) ; Catalog Code:   gabapentin ; Order Dt/Tm:   5/5/2020 3:24:02 PM CDT          aspirin  :   aspirin ; Status:   Completed ; Ordered As Mnemonic:   aspirin 325 mg oral delayed release tablet ; Simple Display Line:   325 mg, 1 tab(s), Oral, daily, 0 Refill(s) ; Catalog  Code:   aspirin ; Order Dt/Tm:   8/30/2018 2:10:26 PM CDT            ID Risk Screen   Recent Travel History :   No recent travel   Family Member Travel History :   No recent travel   Other Exposure to Infectious Disease :   Unknown   Denita Landry - 5/5/2020 3:18 PM CDT

## 2022-02-16 NOTE — TELEPHONE ENCOUNTER
"---------------------  From: Kathe Partida RN (Phone Messages Pool ([id number]))   To: J Message Pool ([address] [place] [address]);     Sent: 2/5/2021 2:12:11 PM CST  Subject: Phone Message     Phone Message    PCP:   RAHUL      Time of Call:  1338       Person Calling:  Pt  Phone number:  [phone]    Returned call at: 1407    Note:   Patient called regarding feeling hot in his face from time to time. He wonders if his medications could contribute to this. Discussed that there are many things that can cause your body temperature to rise. He wanted to make JSAMPSON aware of this.  States he has taken Tylenol from time to time and this improves the \"warm feeling\".     Last office visit and reason:  _---------------------  From: Ina Wadsworth (J Message Pool ([id number]))   To: Pj Peterson MD;     Sent: 2/5/2021 3:13:39 PM CST  Subject: FW: Phone Message---------------------  From: Pj Peterson MD   To: J Message Pool ([id number]);     Sent: 2/5/2021 3:28:06 PM CST  Subject: RE: Phone Message     Most likely from Amlodipine. Nothing too concerning, If it is bothersome he should have a visit.Called patient and gave him information below. Pt stated he has a visit with RAHUL next week, so will bring it up at his appt if it continues to bother him.  "

## 2022-02-16 NOTE — PROGRESS NOTES
Chief Complaint    wanitng to go over dx's on visit summary, patient is confused about what they are. Verbal consent given for telephone visit  History of Present Illness      Today's visit was conducted via telephone due to the COVID-19 pandemic.  Patient's consent to telephone visit was obtained and documented.      Call Start Time:  1030      Call End Time:   1045      Patient wanted to review several of his medical diagnoses after reviewing the visit summary from recent visit.  We reviewed the medical diagnoses below and their implications.  He has been feeling well.  His blood pressure readings have been well controlled he read off a number of them to me.  Review of Systems      No headache, chest pain, dyspnea, edema, visual symptoms, nausea, vomiting, GI distress.  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     Date of Service:09/28/2020      Primary Care Physician:       Pj Peterson MD, [phone]      Attending Physician:       Pj Peterson MD, [phone]  Problem List/Past Medical History    Ongoing     BPPV (benign paroxysmal positional vertigo)     Dyslipidemia     First degree atrioventricular block     History of colon cancer     Lumbar spinal stenosis     Microalbuminuria     Mild asthma      Obesity     Prediabetes     Pulmonary asbestosis       Comments: Pleural plaques incidentally on CT     Sinus bradycardia     White coat syndrome with hypertension    Historical     Carcinoma of colon, [facility] B2     ED (erectile dysfunction)     History of chicken pox  Procedure/Surgical History     Colonoscopy (07/24/2019)      Comments: Indication: Colon cancer 2000      Sedation: MAC      Findings: Normal to ileocolonic anastamosis      Rec: No further colonoscopy.     Colonoscopy (07/18/2014)      Comments: Sedation: fentanyl, midazolam      Indication: personal history colon cancer      Normal      Repeat in 5 years.     Colonoscopy (08/05/2009)      Comments: recheck 2014.     Colonoscopy (06/03/2005)     Colonoscopy (05/31/2002)     Colonoscopy (05/11/2001)      Comments: normal.     Right colon resection (08/15/2000)     Colonoscopy (08/04/2000)     Polypectomy (08/04/2000)     Tonsillectomy  Medications    albuterol 90 mcg/inh inhalation aerosol, 2 puff(s), NEB, qid, PRN, 2 refills    amLODIPine 5 mg oral tablet, 1 tab(s), Oral, daily, 3 refills    atorvastatin 10 mg oral tablet, 1 tab(s), Oral, daily, 3 refills    chlorthalidone 25 mg oral tablet, 25 mg= 1 tab(s), Oral, daily, 3 refills    lisinopril 40 mg oral tablet, 1 tab(s), Oral, daily, 3 refills    metFORMIN 500 mg oral tablet, extended release, 2000 mg= 4 tab(s), Oral, daily, 3 refills  Allergies    iodine (Asthma)    shellfish  Social History    Smoking Status     Never smoker     Alcohol - Current      Current, 4-6 times per week, 2 drinks/episode average. 2 drinks/episode maximum.     Employment/School      Employed, Work/School description: sales.     Exercise - Regular exercise      Exercise frequency: 2-3 times/week.     Home/Environment      Marital status: . Spouse/Partner name: Riya Galdamez.     Substance Abuse - Denies Substance Abuse      Never     Tobacco - Denies Tobacco Use      Never smoker  Family History    CA - Carcinoma  of prostate: Father.    Melanoma: Mother.    PUD - Peptic ulcer disease: Father.    Sister: History is negative    Brother: History is negative    Daughter: History is negative    Son: History is negative  Immunizations      Vaccine Date Status          influenza virus vaccine, inactivated 09/15/2020 Recorded          zoster vaccine, inactivated 10/15/2019 Recorded              Comments : [10/22/2019] Received at [place], [place]          influenza virus vaccine, inactivated 09/12/2019 Given          zoster vaccine, inactivated 08/07/2019 Recorded              Comments : [8/8/2019] Received at [place], [place]          influenza virus vaccine, inactivated 10/11/2018 Given          influenza virus vaccine, inactivated 09/26/2017 Given          influenza virus vaccine, inactivated 10/12/2016 Given          influenza virus vaccine, inactivated 10/26/2015 Given          pneumococcal (PCV13) 07/07/2015 Given          influenza virus vaccine, inactivated 10/09/2014 Recorded              Comments : [10/13/2014] Received at [place], [place]          DTaP 12/01/2012 Recorded          influenza virus vaccine, inactivated 09/01/2012 Recorded          influenza virus vaccine, inactivated 09/16/2011 Given          pneumococcal (PPSV23) 11/19/2007 Recorded          influenza 11/01/2007 Recorded          Td 08/01/2000 Recorded  Lab Results          Lab Results (Last 4 results within 90 days)           Sodium Level: 135 mmol/L [135 mmol/L - 146 mmol/L] (07/17/20 15:35:00)          Potassium Level: 4.6 mmol/L [3.5 mmol/L - 5.3 mmol/L] (07/17/20 15:35:00)          Chloride Level: 98 mmol/L [98 mmol/L - 110 mmol/L] (07/17/20 15:35:00)          CO2 Level: 26 mmol/L [20 mmol/L - 32 mmol/L] (07/17/20 15:35:00)          Glucose Level: 117 mg/dL High [65 mg/dL - 99 mg/dL] (07/17/20 15:35:00)          BUN: 38 mg/dL High [7 mg/dL - 25 mg/dL] (07/17/20 15:35:00)          Creatinine Level: 0.96 mg/dL [0.7  mg/dL - 1.11 mg/dL] (07/17/20 15:35:00)          BUN/Creat Ratio: 40 High [6  - 22] (07/17/20 15:35:00)          eGFR: 74 mL/min/1.73m2 (07/17/20 15:35:00)          eGFR African American: 86 mL/min/1.73m2 (07/17/20 15:35:00)          Calcium Level: 10 mg/dL [8.6 mg/dL - 10.3 mg/dL] (07/17/20 15:35:00)

## 2022-02-16 NOTE — RESULTS
(Inserted Image. Unable to display)          (Inserted Image. Unable to display)     [address] [place], [place] [address]  Phone [phone]  Fax [phone]      AMBULATORY BLOOD PRESSURE MONITOR    SONIA WHITE   : 1940  Date of Exam:  2018   MRN: [id number]  Ordering HCP: Pj Peterson MD      INDICATION: White coat hypertension.    BLOOD PRESSURE FINDINGS:  24 hour average blood pressure was 151/69.  Dipping was preserved.    IMPRESSION:  Stage 2 hypertension.                                                                           Pj Peterson MD  Interpreting HCP                     RAHUL/deloris  D:  2018                                                                          T:  2018    AMBULATORY BLOOD PRESSURE MONITOR REPORT

## 2022-02-16 NOTE — LETTER
(Inserted Image. Unable to display)   July 02, 2019      SONIA WHITE      [address] [place], [place] [id number]        Dear SONIA,    Thank you for selecting [facility] (previously [facility]) for your healthcare needs.  Below you will find the results of your recent tests done at our clinic.     Diabetes controlled and protein in urine improved.      Result Name Current Result Previous Result Reference Range   Hgb A1c ((H)) 5.9 7/1/2019 ((H)) 6.1 3/6/2019  - <5.7   Ur Microalbumin/Creatinine Ratio ((H)) 366 7/1/2019 ((H)) 678 3/6/2019  - <30       Please contact me or my assistant at [phone] if you have any questions.     Sincerely,        Pj Peterson MD

## 2022-02-16 NOTE — NURSING NOTE
Quick Intake Entered On:  3/18/2019 3:17 PM CDT    Performed On:  3/18/2019 3:16 PM CDT by Arina Joe MA               More Vitals   Systolic Blood Pressure Repeat :   125 mmHg   Diastolic Blood Pressure Repeat :   63 mmHg   Heath RICE, Arina - 3/18/2019 3:16 PM CDT

## 2022-02-16 NOTE — TELEPHONE ENCOUNTER
Referral, notes and demographic info brought to [facility], they will contact patient to schedule.

## 2022-02-16 NOTE — PROGRESS NOTES
Patient:   SONIA WHITE            MRN: [id number]            FIN: [id number]               Age:   76 years     Sex:  Male     :  1940   Associated Diagnoses:   Ed (Erectile Dysfunction); HTN (Hypertension); Mild Asthma; Obesity; Prediabetes; Microalbuminuria; Metabolic Syndrome; Medicare annual wellness visit, subsequent; Actinic keratoses   Author:   Pj Peterson MD      Visit Information   Visit type:  Annual exam.    Accompanied by:  No one.    Source of history:  Self.    History limitation:  None.       Chief Complaint   2017 1:49 PM CST    AWV      History of Present Illness    The patient is a 76-year-old here for a wellness visit.  He is now retiring.  When he monitors his blood pressure at home it has been good but he has not checked it just lately.     He has hypertension, dyslipidemia, erectile dysfunction, mild intermittent asthma, glucose intolerance, and proteinuria.     On the Health Risk Assessment form there are some home safety issues, which we reviewed.  He gets some hip and low back pain and is followed by orthopedics.     On complete review of systems he feels like he has lost a little weight though the chart does not confirm this.  He has asthma, has had chickenpox disease and vaccination.  He gets back and muscle pain at times, but not chronically.    PHQ-9 score is 0.  He drinks two alcoholic beverages four to six times per week.  CAGE and PHQ-9 reviewed with the patient.           Review of Systems   See HPI       Health Status   Allergies:    Allergic Reactions (Selected)  Severity Not Documented  Iodine (Asthma)  Shellfish (No reactions were documented)   Medications:  (Selected)   Prescriptions  Prescribed  Lipitor 10 mg oral tablet: 1 tab(s), po, Daily, # 90 tab(s), 3 Refill(s), Type: Maintenance, Pharmacy: [facility], 1 tab(s) po daily,x90 day(s)  albuterol 90 mcg/inh inhalation aerosol: 2 puff(s), inh, qid, PRN: as needed for wheezing, # 2 EA, 6  Refill(s), Type: Maintenance, Pharmacy: [facility], 2 puff(s) inh qid,PRN:as needed for wheezing  hydroCHLOROthiazide 12.5 mg oral capsule: 1 cap(s) ( 12.5 mg ), po, daily, # 90 cap(s), 3 Refill(s), Pharmacy: [id number], 1 cap(s) po daily  lisinopril 40 mg oral tablet: 1 tab(s) ( 40 mg ), PO, Daily, # 90 tab(s), 3 Refill(s), Type: Maintenance, Pharmacy: [id number], 1 tab(s) po daily  Documented Medications  Documented  Aspir 81: ( 81 mg ), PO, Daily, 0   Problem list:    All Problems  Pulmonary asbestosis / SNOMED CT 31499858 / Confirmed  B2 Colon cancer / Confirmed  BPPV (benign paroxysmal positional vertigo) / SNOMED CT 524080816 / Confirmed  Ed (Erectile Dysfunction) / ICD-9-.84 / Confirmed  H/O asbestosis / SNOMED CT 929611332 / Confirmed  HTN (Hypertension) / ICD-9-.9 / Confirmed  Metabolic Syndrome / ICD-9-.7 / Confirmed  Mild Asthma / ICD-9-.90 / Confirmed  Obesity / ICD-9-.00 / Confirmed  Prediabetes / ICD-9-.29 / Confirmed  Microalbuminuria / SNOMED CT 71895390 / Confirmed      Histories   Past Medical History:    Active  B2 Colon cancer: Onset on 2000 at 59 years.  H/O asbestosis (511737666)  Mild Asthma (493.90)  BPPV (benign paroxysmal positional vertigo) (403050517)  Metabolic Syndrome (277.7)  HTN (Hypertension) (401.9)  Ed (Erectile Dysfunction) (607.84)  Prediabetes (790.29)  Obesity (278.00)   Family History:    Melanoma  Mother ()  CA - Carcinoma of prostate  Father ()  PUD - Peptic ulcer disease  Father ()     Procedure history:    Colonoscopy (45.23) on 2014 at 73 Years.  Comments:  2014 2:17 PM - Sophie Calles RN  Sedation: fentanyl, midazolam  Indication: personal history colon cancer  Normal  Repeat in 5 years  Colonoscopy ([id number]) on 2009 at 68 Years.  Comments:  2010 9:19 AM - Giselle Gong 2014  Colonoscopy ([id number]) on 6/3/2005 at 64  Years.  Colonoscopy ([id number]) on 5/31/2002 at 61 Years.  Colonoscopy ([id number]) on 5/11/2001 at 60 Years.  Comments:  8/11/2010 9:18 AM - Giselle Gong  normal  Right colon resection on 8/15/2000 at 59 Years.  Colonoscopy ([id number]) on 8/4/2000 at 59 Years.  Polypectomy (404244508) on 8/4/2000 at 59 Years.  Tonsillectomy (613360340).   Social History:        Alcohol Assessment: Current                     Comments:                      01/22/2013 - So Thornton                     occasional      Tobacco Assessment: Denies Tobacco Use      Employment and Education Assessment            Employed, Work/School description: sales.      Exercise and Physical Activity Assessment                     Comments:                      01/22/2013 - So Thornton                     walks        Physical Examination   Vital Signs   1/17/2017 2:06 PM CST Systolic Blood Pressure 154 mmHg  HI    Diastolic Blood Pressure 60 mmHg    Mean Arterial Pressure 91 mmHg    BP Site Right arm    BP Method Manual   1/17/2017 1:49 PM CST Peripheral Pulse Rate 59 bpm  LOW    Systolic Blood Pressure 168 mmHg  HI    Diastolic Blood Pressure 54 mmHg  LOW    Mean Arterial Pressure 92 mmHg    BP Site Right arm      Measurements from flowsheet : Measurements   1/17/2017 1:49 PM CST Height Measured - Standard 72 in    Weight Measured - Standard 217 lb    BSA 2.23 m2    Body Mass Index 29.43 kg/m2      General:  Alert and oriented, No acute distress.    Eye:  Normal conjunctiva.    HENT:  Normocephalic, Normal hearing, Oral mucosa is moist, No pharyngeal erythema.    Neck:  Supple, Non-tender, No carotid bruit, No lymphadenopathy, No thyromegaly.    Respiratory:  Lungs are clear to auscultation, Respirations are non-labored.    Cardiovascular:  Normal rate, Regular rhythm, No murmur, No gallop, Good pulses equal in all extremities, Normal peripheral perfusion, No edema.    Gastrointestinal:  Soft, Non-tender, Non-distended, No organomegaly.     Genitourinary:       Prostate: Symmetric, Enlarged  3 /4, Not nodular.    Lymphatics:  No lymphadenopathy neck, axilla, groin.    Musculoskeletal:  No deformity, Normal gait.    Integumentary:  6 asks on scalp. Lesions treated with liquid nitrogen freeze-thaw-freeze technique 2 mm borders after a discussion of treatment options. .    Neurologic:  Normal motor function, No focal deficits, Cranial Nerves II-XII are grossly intact.    Cognition and Speech:  Speech clear and coherent, Functional cognition intact.    Psychiatric:  Cooperative, Appropriate mood & affect.       Review / Management   Results review:  Lab results   1/5/2017 1:37 PM CST Sodium Level 140 mmol/L    Potassium Level 5.2 mmol/L    Chloride Level 103 mmol/L    CO2 Level 28 mmol/L    Glucose Level 118 mg/dL  HI    BUN 29 mg/dL  HI    Creatinine Level 0.90 mg/dL    BUN/Creat Ratio 32  HI    eGFR 82 mL/min/1.73m2    eGFR African American 94 mL/min/1.73m2    Calcium Level 10.0 mg/dL    Hgb A1c 5.9  HI    Cholesterol 154 mg/dL    Non-HDL Cholesterol 109    HDL 45 mg/dL    Cholesterol/HDL Ratio 3.4    LDL 75    Triglyceride 170 mg/dL  HI    PSA 2.0 ng/mL    U Microalbumin 17.4 mg/dL    Ur Creatinine 135 mg/dL    Ur Microalbumin/Creatinine Ratio 129  HI   .       Impression and Plan   Diagnosis     Ed (Erectile Dysfunction) (ADA28-JA N52.9).     HTN (Hypertension) (VCR43-EY I10).     Mild Asthma (QDJ96-FH J45.998).     Obesity (OLB43-PK E66.9).     Prediabetes (COP51-EO R73.09).     Microalbuminuria (RXA30-CE R80.9).     Metabolic Syndrome (BVS80-VC E88.81).     Medicare annual wellness visit, subsequent (OKN03-WY Z00.00).     Patient Instructions:       Counseled: Patient, Diet, Activity, Verbalized understanding, weight loss.    Summary:  BP not at goal today. Increased microalbumin suggests need for intensification of therapy.    Orders     Orders (Selected)   Outpatient Orders  Ordered  Diabetic Educator Consult (Request): Referred to: Lyubov Mccabe,  Reason: Pre-DM, Prediabetes  Return to Clinic (Request): RFV: Annual Wellness exam, Return in 12 months  Return to Office (Request): RFV: Colonoscopy in 2019  Return to Office (Request): RFV: annual Hgb A1c, BMP, lipids, PSA, KATYA  Prescriptions  Prescribed  Lipitor 10 mg oral tablet: 1 tab(s), po, Daily, # 90 tab(s), 3 Refill(s), Type: Maintenance, Pharmacy: [facility], 1 tab(s) po daily,x90 day(s)  albuterol 90 mcg/inh inhalation aerosol: 2 puff(s), inh, qid, PRN: as needed for wheezing, # 2 EA, 6 Refill(s), Type: Maintenance, Pharmacy: [facility], 2 puff(s) inh qid,PRN:as needed for wheezing  hydroCHLOROthiazide 12.5 mg oral capsule: 1 cap(s) ( 12.5 mg ), po, daily, # 90 cap(s), 3 Refill(s), Pharmacy: [facility], 1 cap(s) po daily  lisinopril 40 mg oral tablet: 1 tab(s) ( 40 mg ), PO, Daily, # 90 tab(s), 3 Refill(s), Type: Maintenance, Pharmacy: [facility], 1 tab(s) po daily.     Home BP readings.     Diagnosis     Actinic keratoses (PVY00-JB L57.0).     Course:  Treated with cryo.    Orders     Return if not better.

## 2022-02-16 NOTE — TELEPHONE ENCOUNTER
---------------------  From: Lacie Nascimento LPN (Phone Messages Pool ([id number]))   To: [employer] Message Pool ([address] [place] [address]);     Sent: 7/19/2019 12:00:33 PM CDT  Subject: colonoscopy question     Phone Message    PCP:   RAHUL      Time of Call:  11:57am       Person Calling:  pt  Phone number:  [phone]    Note:   Pt LM stating he has a colonoscopy scheduled for next Wednesday. Pt says he knows he will be getting a phone call 2 days before.    Pt says he takes tylenol and ibuprofen and wonders if that has to be stopped more than 2 days before procedure.    Last office visit and reason:  7/9/19 Precolonoscopy---------------------  From: Christina/Denita RICE ([facility] Message Pool ([id number]))   To: Pj Peterson MD;     Sent: 7/19/2019 12:03:25 PM CDT  Subject: FW: colonoscopy question---------------------  From: Pj Peterson MD   To: [facility] Message Pool ([id number]);     Sent: 7/19/2019 12:49:50 PM CDT  Subject: RE: colonoscopy question     Stop Ibuprofen but not Tylenol.Noted.

## 2022-02-16 NOTE — NURSING NOTE
Ambulatory Blood pressure monitor applied per [facility] for a diagnosis of White coat HTN . Patient is given verbal instructions and written instructions for all aspects of monitor use. Patient has all questions answered and will return to clinic in 24 hours for removal of unit.  Over 15 minutes was spent with patient for application and instruction .Monitor removed and diary obtained at 1500.  Printed and put in Unit 2 outbox to route to [facility]

## 2022-02-16 NOTE — PROGRESS NOTES
Chief Complaint    bp check, questions about labs  History of Present Illness      Patient brings home blood pressure readings with him which revealed good control of hypertension reviewed the lab work from last visit.  He is feeling well.  Review of Systems      No headache, chest pain, dyspnea.  Edema at the end of the day which is gone in the morning.  Physical Exam   Vitals & Measurements    HR: 50(Peripheral)  BP: 144/50       Patient appears comfortable.  Alert and oriented notes and in no distress.  Chest clear.  Heart exam regular.  1+ edema at the top of the sock.  Assessment/Plan       Carcinoma of colon, [facility] B2(C18.9)        Stable.         Orders:          [id number] office outpatient visit 25 minutes (Charge), Quantity: 1, HTN (hypertension)  Microalbuminuria  Dyslipidemia  Prediabetes  Obesity  Carcinoma of colon, [facility] B2       Dyslipidemia(E78.5)        Doing well on treatment.         Orders:          [id number] office outpatient visit 25 minutes (Charge), Quantity: 1, HTN (hypertension)  Microalbuminuria  Dyslipidemia  Prediabetes  Obesity  Carcinoma of colon, [facility] B2       Microalbuminuria(R80.9)        Hopefully will have been improved that we have his blood pressure under control.         Orders:          [id number] office outpatient visit 25 minutes (Charge), Quantity: 1, HTN (hypertension)  Microalbuminuria  Dyslipidemia  Prediabetes  Obesity  Carcinoma of colon, [facility] B2       Obesity(E66.9)        Encouraged weight loss ideally of 40 pounds.         Orders:          [id number] office outpatient visit 25 minutes (Charge), Quantity: 1, HTN (hypertension)  Microalbuminuria  Dyslipidemia  Prediabetes  Obesity  Carcinoma of colon, [facility] B2       Prediabetes(R73.09)        Stable.         Orders:          [id number] office outpatient visit 25 minutes (Charge), Quantity: 1, HTN (hypertension)  Microalbuminuria  Dyslipidemia  Prediabetes  Obesity  Carcinoma of colon, [facility] B2       White  coat syndrome with hypertension(I10)        Blood pressure controlled by home readings.         Orders:          [id number] office outpatient visit 25 minutes (Charge), Quantity: 1, HTN (hypertension)  Microalbuminuria  Dyslipidemia  Prediabetes  Obesity  Carcinoma of colon, [facility] B2       Orders:         Return to Clinic (Request), Return in 3 months  Patient Information     Name:SONIA WHITE      Address:      [address] [place] [address]      Sex:Male      YOB: 1940      [phone]      Emergency Contact:JAKE WHITE     MRN:[id number]     FIN:[id number]     Location:[facility]     Date of Service:08/30/2018      Primary Care Physician:       Pj Peterson MD, [phone]      Attending Physician:       Pj Peterson MD, [phone]  Problem List/Past Medical History    Ongoing     BPPV (benign paroxysmal positional vertigo)     Carcinoma of colon, [facility] B2     Dyslipidemia     First degree atrioventricular block     Lumbar spinal stenosis     Microalbuminuria     Mild asthma     Obesity     Prediabetes     Pulmonary asbestosis       Comments: Pleural plaques incidentally on CT     Sinus bradycardia     White coat syndrome with hypertension    Historical     ED (erectile dysfunction)     History of chicken pox  Procedure/Surgical History     Colonoscopy (07/18/2014)     Colonoscopy (08/05/2009)     Colonoscopy (06/03/2005)     Colonoscopy (05/31/2002)     Colonoscopy (05/11/2001)     Right colon resection (08/15/2000)     Colonoscopy (08/04/2000)     Polypectomy (08/04/2000)     Tonsillectomy  Medications        albuterol 90 mcg/inh inhalation aerosol: 2 puff(s), inh, qid, PRN: as needed for wheezing, 2 EA, 6 Refill(s).        Lipitor 10 mg oral tablet: 1 tab(s), po, Daily, for 90 day(s), 90 tab(s), 3 Refill(s).        lisinopril 40 mg oral tablet: 40 mg, 1 tab(s), PO, Daily, 90 tab(s), 3 Refill(s).        hydroCHLOROthiazide 12.5 mg oral capsule: 12.5 mg, 1  cap(s), po, daily, 90 cap(s), 3 Refill(s).        amLODIPine 5 mg oral tablet: 5 mg, 1 tab(s), po, daily, 60 tab(s), 0 Refill(s).        aspirin 325 mg oral delayed release tablet: 325 mg, 1 tab(s), Oral, daily, 0 Refill(s).                Allergies    iodine (Asthma)    shellfish  Social History    Smoking Status - 08/30/2018     Never smoker     Alcohol - Current, 08/11/2010      Current, 4-6 times per week, 2 drinks/episode average. 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 Spouse/Partner name: Riya Galdamez., 01/18/2017     Substance Abuse - Denies Substance Abuse, 01/18/2017      Never, 01/18/2017     Tobacco - Denies Tobacco Use, 08/11/2010      Never smoker, 01/18/2017  Family History    CA - Carcinoma of prostate: Father.    Melanoma: Mother.    PUD - Peptic ulcer disease: Father.    Sister: History is negative    Brother: History is negative    Daughter: History is negative    Son: History is negative  Immunizations      Vaccine Date Status Comments      influenza virus vaccine, inactivated 09/26/2017 Given      influenza virus vaccine, inactivated 10/12/2016 Given      influenza virus vaccine, inactivated 10/26/2015 Given      pneumococcal (PCV13) 07/07/2015 Given      influenza virus vaccine, inactivated 10/09/2014 Recorded [10/13/2014] Received at [place], [place]      DTaP 12/01/2012 Recorded      influenza virus vaccine, inactivated 09/01/2012 Recorded      influenza virus vaccine, inactivated 09/16/2011 Given      pneumococcal (PPSV23) 11/19/2007 Recorded      influenza 11/01/2007 Recorded      Td 08/01/2000 Recorded

## 2022-02-16 NOTE — LETTER
(Inserted Image. Unable to display)   March 07, 2019      SONIA WHITE      [address] [place], [place] [id number]        Dear SONIA,    Thank you for selecting [facility] (previously [facility]) for your healthcare needs.  Below you will find the results of your recent tests done at our clinic.    Microalbumin elevated. Please schedule an appointment.      Result Name Current Result Previous Result Reference Range   Sodium Level (mmol/L)  138 3/6/2019  138 2/1/2018 135 - 146   Potassium Level (mmol/L)  4.4 3/6/2019  4.9 2/1/2018 3.5 - 5.3   Chloride Level (mmol/L)  103 3/6/2019  101 2/1/2018 98 - 110   CO2 Level (mmol/L)  27 3/6/2019  29 2/1/2018 20 - 32   Glucose Level (mg/dL) ((H)) 132 3/6/2019 ((H)) 118 2/1/2018 65 - 99   BUN (mg/dL)  24 3/6/2019 ((H)) 26 2/1/2018 7 - 25   Creatinine Level (mg/dL)  0.75 3/6/2019  0.88 2/1/2018 0.70 - 1.11   eGFR (mL/min/1.73m2)  87 3/6/2019  82 2/1/2018 > OR = 60 -    Calcium Level (mg/dL)  9.7 3/6/2019  10.0 2/1/2018 8.6 - 10.3   Hgb A1c ((H)) 6.1 3/6/2019 ((H)) 5.7 2/1/2018  - <5.7   Cholesterol (mg/dL)  145 3/6/2019  145 2/1/2018  - <200   Non-HDL Cholesterol  103 3/6/2019  102 2/1/2018  - <130   HDL (mg/dL)  42 3/6/2019  43 2/1/2018 >40 -    Cholesterol/HDL Ratio  3.5 3/6/2019  3.4 2/1/2018  - <5.0   LDL  76 3/6/2019  76 2/1/2018    Triglyceride (mg/dL) ((H)) 167 3/6/2019 ((H)) 158 2/1/2018  - <150   Ur Microalbumin/Creatinine Ratio ((H)) 678 3/6/2019 ((H)) 157 2/1/2018  - <30   WBC  7.0 3/6/2019  3.8 - 10.8   Hgb (gm/dL)  14.3 3/6/2019  13.2 - 17.1   Platelet  218 3/6/2019  140 - 400       Please contact me or my assistant at [phone] if you have any questions.     Sincerely,        Pj Peterson MD

## 2022-02-16 NOTE — NURSING NOTE
Removed ABPM @ 1522. Received completed diary. Downloaded, printed, and gave report to JSAMPSON. Patient was informed of process and to call if they do not hear from provider within a few business days.

## 2022-02-16 NOTE — LETTER
(Inserted Image. Unable to display)   July 20, 2020      SONIA WHITE      [address] [place], [place] [phone]        Dear SONIA,    Thank you for selecting [facility] ([facility]viously [facility]) for your healthcare needs.  Below you will find the results of your recent tests done at our clinic.     Results are acceptable.      Result Name Current Result Previous Result Reference Range   Sodium Level (mmol/L)  135 7/17/2020  137 3/10/2020 135 - 146   Potassium Level (mmol/L)  4.6 7/17/2020  4.2 3/10/2020 3.5 - 5.3   Chloride Level (mmol/L)  98 7/17/2020  102 3/10/2020 98 - 110   CO2 Level (mmol/L)  26 7/17/2020  25 3/10/2020 20 - 32   Glucose Level (mg/dL) ((H)) 117 7/17/2020 ((H)) 115 3/10/2020 65 - 99   BUN (mg/dL) ((H)) 38 7/17/2020 ((H)) 28 3/10/2020 7 - 25   Creatinine Level (mg/dL)  0.96 7/17/2020  0.83 3/10/2020 0.70 - 1.11   eGFR (mL/min/1.73m2)  74 7/17/2020  83 3/10/2020 > OR = 60 -    Calcium Level (mg/dL)  10.0 7/17/2020  9.5 3/10/2020 8.6 - 10.3       Please contact me or my assistant at [phone] if you have any questions.     Sincerely,        Pj Peterson MD

## 2022-02-16 NOTE — TELEPHONE ENCOUNTER
Entered by Lyric Escalante CMA on February 26, 2020 1:06:15 PM CST  ---------------------  From: Lyric Escalante CMA   To: [place] [id number]    Sent: 2/26/2020 1:06:15 PM CST  Subject: Medication Management     ** Not Approved: Patient has requested refill too soon, 1 year supply sent 3/18/19 **  lisinopril (Lisinopril 40 MG Oral Tablet)  TAKE 1 TABLET BY MOUTH ONCE DAILY  Qty:  90 EA        Days Supply:  90        Refills:  0          Substitutions Allowed     Route To [facility]   Signed by Lyric Escalante CMA            ** Not Approved: Patient has requested refill too soon, 1 year supply sent 3/18/19 **  hydroCHLOROthiazide (hydroCHLOROthiazide 12.5 MG Oral Capsule)  TAKE 1 CAPSULE BY MOUTH ONCE DAILY ,  DUE  FOR  FOLLOW  UP  PRIOR  TO  FURTHER  REFILLS  Qty:  90 EA        Days Supply:  90        Refills:  0          Substitutions Allowed     Route To [place] [address]   Signed by Lyric Escalante CMA            ** Not Approved: Patient has requested refill too soon, 1 year supply sent 3/18/19 **  atorvastatin (Atorvastatin Calcium 10 MG Oral Tablet)  TAKE 1 TABLET BY MOUTH ONCE DAILY  Qty:  90 EA        Days Supply:  90        Refills:  0          Substitutions Allowed     Route To [facility]   Signed by Lyric Escalante CMA            ** Not Approved: Patient has requested refill too soon, 1 year supply sent 3/18/19 **  amLODIPine (amLODIPine Besylate 5 MG Oral Tablet)  TAKE 1 TABLET BY MOUTH ONCE DAILY  Qty:  90 EA        Days Supply:  90        Refills:  0          Substitutions Allowed     Route To [facility]   Signed by Lyric Escalante CMA            ------------------------------------------  From: [place] [id number]  To: Pj Peterson MD  Sent: February 26, 2020 12:28:08 PM CST  Subject: Medication Management  Due: February 27, 2020 12:28:08 PM CST    ** On Hold Pending Signature **  Drug: hydroCHLOROthiazide  (hydroCHLOROthiazide 12.5 mg oral capsule)  TAKE 1 CAPSULE BY MOUTH ONCE DAILY ,  DUE  FOR  FOLLOW  UP  PRIOR  TO  FURTHER  REFILLS  Quantity: 90 EA  Days Supply: 90  Refills: 0  Substitutions Allowed  Notes from Pharmacy:     Dispensed Drug: hydroCHLOROthiazide (hydroCHLOROthiazide 12.5 mg oral capsule)  TAKE 1 CAPSULE BY MOUTH ONCE DAILY ,  DUE  FOR  FOLLOW  UP  PRIOR  TO  FURTHER  REFILLS  Quantity: 90 EA  Days Supply: 90  Refills: 0  Substitutions Allowed  Notes from Pharmacy:     ** On Hold Pending Signature **  Drug: lisinopril (lisinopril 40 mg oral tablet)  TAKE 1 TABLET BY MOUTH ONCE DAILY  Quantity: 90 EA  Days Supply: 90  Refills: 0  Substitutions Allowed  Notes from Pharmacy:     Dispensed Drug: lisinopril (lisinopril 40 mg oral tablet)  TAKE 1 TABLET BY MOUTH ONCE DAILY  Quantity: 90 EA  Days Supply: 90  Refills: 0  Substitutions Allowed  Notes from Pharmacy:     ** On Hold Pending Signature **  Drug: atorvastatin (atorvastatin 10 mg oral tablet)  TAKE 1 TABLET BY MOUTH ONCE DAILY  Quantity: 90 EA  Days Supply: 90  Refills: 0  Substitutions Allowed  Notes from Pharmacy:     Dispensed Drug: atorvastatin (atorvastatin 10 mg oral tablet)  TAKE 1 TABLET BY MOUTH ONCE DAILY  Quantity: 90 EA  Days Supply: 90  Refills: 0  Substitutions Allowed  Notes from Pharmacy:     ** On Hold Pending Signature **  Drug: amLODIPine (amLODIPine 5 mg oral tablet)  TAKE 1 TABLET BY MOUTH ONCE DAILY  Quantity: 90 EA  Days Supply: 90  Refills: 0  Substitutions Allowed  Notes from Pharmacy:     Dispensed Drug: amLODIPine (amLODIPine 5 mg oral tablet)  TAKE 1 TABLET BY MOUTH ONCE DAILY  Quantity: 90 EA  Days Supply: 90  Refills: 0  Substitutions Allowed  Notes from Pharmacy:   ------------------------------------------

## 2022-02-16 NOTE — LETTER
(Inserted Image. Unable to display)   July 10, 2019      SONIA CINDY      [id number] [address] [facility] [place] [id number]        Dear SONIA,    Thank you for selecting [facility] (previously [facility]) for your healthcare needs.  Below you will find the results of your recent tests done at our clinic.     Results are acceptable.      Result Name Current Result Previous Result Reference Range   Sodium Level (mmol/L)  138 7/9/2019  138 3/6/2019 135 - 146   Potassium Level (mmol/L)  4.3 7/9/2019  4.4 3/6/2019 3.5 - 5.3   Chloride Level (mmol/L)  103 7/9/2019  103 3/6/2019 98 - 110   CO2 Level (mmol/L)  27 7/9/2019  27 3/6/2019 20 - 32   Glucose Level (mg/dL)  95 7/9/2019 ((H)) 132 3/6/2019 65 - 99   BUN (mg/dL) ((H)) 36 7/9/2019  24 3/6/2019 7 - 25   Creatinine Level (mg/dL)  0.92 7/9/2019  0.75 3/6/2019 0.70 - 1.11   eGFR (mL/min/1.73m2)  78 7/9/2019  87 3/6/2019 > OR = 60 -    Calcium Level (mg/dL)  9.6 7/9/2019  9.7 3/6/2019 8.6 - 10.3       Please contact me or my assistant at [phone] if you have any questions.     Sincerely,        Pj Peterson MD

## 2022-02-16 NOTE — NURSING NOTE
Placed ABPM on Left arm @ 1553 per JDL for dx hypertension. Gave patient diary, written and verbal instructions, and told to return in 24 hrs. Patient indicated he understood.

## 2022-02-16 NOTE — TELEPHONE ENCOUNTER
---------------------  From: Denita Landry (Phone Messages Pool ([address]))   Sent: 7/30/2020 12:14:47 PM CDT  Subject: Vaccines      Phone Message    PCP: RAHUL    Time of Call: 1121    Phone Number: [phone]    Returned call at: 1212    Note: Patient called wondering if he needs any booster shots.    Per WIR, pt is UTD for immunizations. Pt will be due this winter for Flu shot and due in 2022 to Td.    LVMTCB.    Pharmacy: [facility]     Last office visit and reason: f/u Chlorthalidone     Transferred to: n/aPt calls back for given message. He expressed understanding.

## 2022-02-16 NOTE — PROGRESS NOTES
Patient:   SONIA WHITE            MRN: [id number]            FIN: [id number]               Age:   78 years     Sex:  Male     :  1940   Associated Diagnoses:   Dyslipidemia; First degree atrioventricular block; History of colon cancer; Prediabetes; Lumbar spinal stenosis; Sinus bradycardia; White coat syndrome with hypertension; Microalbuminuria   Author:   Pj Peterson MD      Visit Information   Visit type:  Preoperative.    Accompanied by:  No one.    Source of history:  Self.    History limitation:  None.       Chief Complaint   2019 3:42 PM CDT     Pre Op- colonoscopy DOS- 19 @ [facility] by RAHUL      History of Present Illness    The patient is an 78-year-old with a history of resected colon cancer, hypertension, microalbuminuria, prediabetes and dyslipidemia who is scheduled for colonoscopy.  He had the right colon resection in  and last colonoscopy in  was negative.     His back has been bothering him and [facility].  Otherwise, he has been feeling well.     He has no history of cardiopulmonary disease, surgical bleeding, or problems with sedation.  Excellent functional capacity limited only by his back.  He is not a chronic snorer and has no daytime sleepiness.           Review of Systems   Constitutional:  No weakness, No fatigue.    Eye:  No recent visual problem.    Respiratory:  No shortness of breath, No cough.    Cardiovascular:  No chest pain, No palpitations, No peripheral edema.    Gastrointestinal:  No nausea, No vomiting, No diarrhea.    Genitourinary:  Erectile dysfunction, No dysuria, No urinary frequency, No urinary hesitancy, No urinary retention.    Hematology/Lymphatics:  No bruising tendency, No bleeding tendency.    Endocrine:  Negative.    Musculoskeletal:  Negative except as documented in history of present illness.    Integumentary:  No rash.    Neurologic:  Negative.       Health Status   Allergies:    Allergic Reactions  (Selected)  Severity Not Documented  Iodine (Asthma)  Shellfish (No reactions were documented)   Medications:  (Selected)   Prescriptions  Prescribed  albuterol 90 mcg/inh inhalation aerosol: 2 puff(s), inh, qid, PRN: as needed for wheezing, # 1 EA, 11 Refill(s), Type: Maintenance, Pharmacy: [facility], 2 puff(s) Inhale qid,PRN:as needed for wheezing  amLODIPine 5 mg oral tablet: = 1 tab(s) ( 5 mg ), po, daily, # 90 tab(s), 3 Refill(s), Type: Maintenance, Pharmacy: [place] [address], needs appt for futher refills, 1 tab(s) Oral daily  atorvastatin 10 mg oral tablet: = 1 tab(s), Oral, daily, # 90 tab(s), 3 Refill(s), Type: Maintenance, Pharmacy: [place] [address], Due for an appt prior to further refills, 1 tab(s) Oral daily  hydroCHLOROthiazide 12.5 mg oral capsule: = 1 cap(s), Oral, daily, # 90 cap(s), 3 Refill(s), Type: Maintenance, Pharmacy: [place] [address], Due for a follow up appt prior to further refills, 1 cap(s) Oral daily  lisinopril 40 mg oral tablet: = 1 tab(s), Oral, daily, # 90 tab(s), 3 Refill(s), Type: Maintenance, Pharmacy: [place] [address], Due for an appt prior to further refills, 1 tab(s) Oral daily  metFORMIN 500 mg oral tablet, extended release: = 4 tab(s) ( 2,000 mg ), PO, Daily, Instructions: One daily with supper. Increase by one tablet each week to four tabs daily, # 360 tab(s), 3 Refill(s), Type: Maintenance, Pharmacy: [facility], 4 tab(s) Oral daily,Instr:One daily with coffey...  Documented Medications  Documented  aspirin 325 mg oral delayed release tablet: = 1 tab(s) ( 325 mg ), Oral, daily, 0 Refill(s), Type: Maintenance,    Medications          *denotes recorded medication          albuterol 90 mcg/inh inhalation aerosol: 2 puff(s), inh, qid, PRN: as needed for wheezing, 1 EA, 11 Refill(s).          amLODIPine 5 mg oral tablet: 5 mg, 1 tab(s), po, daily, 90 tab(s), 3 Refill(s).          *aspirin 325 mg oral delayed release tablet:  325 mg, 1 tab(s), Oral, daily, 0 Refill(s).          atorvastatin 10 mg oral tablet: 1 tab(s), Oral, daily, 90 tab(s), 3 Refill(s).          hydroCHLOROthiazide 12.5 mg oral capsule: 1 cap(s), Oral, daily, 90 cap(s), 3 Refill(s).          lisinopril 40 mg oral tablet: 1 tab(s), Oral, daily, 90 tab(s), 3 Refill(s).          metFORMIN 500 mg oral tablet, extended release: 2,000 mg, 4 tab(s), PO, Daily, One daily with supper. Increase by one tablet each week to four tabs daily, 360 tab(s), 3 Refill(s).     Problem list:    All Problems  Pulmonary asbestosis / SNOMED CT 85342931 / Confirmed  BPPV (benign paroxysmal positional vertigo) / SNOMED CT 974833574 / Confirmed  Dyslipidemia / SNOMED CT 0489185858 / Confirmed  First degree atrioventricular block / SNOMED CT 904586336 / Confirmed  History of colon cancer / SNOMED CT 0588039303 / Confirmed  White coat syndrome with hypertension / SNOMED CT 6778578700 / Confirmed  Mild asthma / SNOMED CT 4268765385 / Confirmed  Obesity / SNOMED CT 4153057894 / Confirmed  Prediabetes / SNOMED CT 4314914717 / Confirmed  Microalbuminuria / SNOMED CT 28639882 / Confirmed  Sinus bradycardia / SNOMED CT 97426407 / Confirmed  Lumbar spinal stenosis / SNOMED CT 53198172 / Confirmed  Resolved: Carcinoma of colon, [facility] B2 / SNOMED CT 166163202  Resolved: History of chicken pox / SNOMED CT 955565400  Resolved: ED (erectile dysfunction) / SNOMED CT 8938207122  Canceled: H/O asbestosis / SNOMED CT 390667712  Canceled: Metabolic syndrome / SNOMED CT 0071242244  Canceled: Flu vaccine need / SNOMED CT 368381421      Histories   Past Medical History:    Active  Mild asthma (3893794621)  BPPV (benign paroxysmal positional vertigo) (777405245)  White coat syndrome with hypertension (9644615931)  Prediabetes (0166685445)  Obesity (9176860224)  Resolved  Carcinoma of colon, [facility] B2 (433167749): Onset on 9/1/2000 at 59 years.  Resolved.  ED (erectile dysfunction) (4317054531):  Resolved.  History of  chicken pox (226619128):  Resolved.   Family History:    Melanoma  Mother ()  CA - Carcinoma of prostate  Father ()  PUD - Peptic ulcer disease  Father ()     Procedure history:    Colonoscopy (45.23) on 2014 at 73 Years.  Comments:  2014 2:17 PM CDT - Sophie Calles RN  Sedation: fentanyl, midazolam  Indication: personal history colon cancer  Normal  Repeat in 5 years  Colonoscopy (042968295) on 2009 at 68 Years.  Comments:  2010 9:19 AM CDT - Giselle Gong  recheck 2014  Colonoscopy (025188805) on 6/3/2005 at 64 Years.  Colonoscopy (310737099) on 2002 at 61 Years.  Colonoscopy (454653375) on 2001 at 60 Years.  Comments:  2010 9:18 AM LINDYT - Giselle Gong  normal  Right colon resection on 8/15/2000 at 59 Years.  Colonoscopy (825372278) on 2000 at 59 Years.  Polypectomy (648689016) on 2000 at 59 Years.  Tonsillectomy (354182625).   Social History:        Alcohol Assessment: Current            Current, 4-6 times per week, 2 drinks/episode average.  2 drinks/episode maximum.      Tobacco Assessment: Denies Tobacco Use            Never smoker      Substance Abuse Assessment: Denies Substance Abuse            Never      Employment and Education Assessment            Employed, Work/School description: sales.      Home and Environment Assessment            Marital status: .  Spouse/Partner name: Riya Galdamez.      Exercise and Physical Activity Assessment: Regular exercise            Exercise frequency: 2-3 times/week.      Physical Examination   Vital Signs   2019 4:02 PM CDT Systolic Blood Pressure 134 mmHg  HI    Diastolic Blood Pressure 48 mmHg  LOW    Mean Arterial Pressure 77 mmHg    BP Site Right arm    BP Method Manual   2019 3:42 PM CDT Temperature Tympanic 97.5 DegF  LOW    Peripheral Pulse Rate 58 bpm  LOW    HR Method Electronic    Systolic Blood Pressure 142 mmHg  HI    Diastolic Blood Pressure 60 mmHg    Mean Arterial  Pressure 87 mmHg    BP Site Right arm    BP Method Manual    Oxygen Saturation 86 %  LOW      Measurements from flowsheet : Measurements   7/9/2019 3:42 PM CDT Height Measured - Standard 72 in    Weight Measured - Standard 219.4 lb    BSA 2.25 m2    Body Mass Index 29.75 kg/m2  HI      General:  Alert and oriented, No acute distress.    Eye:  Normal conjunctiva.    Airway:       Mallampati classification: IV (hard palate only).    HENT:  Normocephalic, Normal hearing, Oral mucosa is moist, No pharyngeal erythema.    Neck:  Supple, Non-tender, No carotid bruit, No lymphadenopathy, No thyromegaly.    Respiratory:  Lungs are clear to auscultation, Respirations are non-labored.    Cardiovascular:  Normal rate, Regular rhythm, No murmur, No gallop, Normal peripheral perfusion, No edema.    Gastrointestinal:  Soft, Non-tender, No organomegaly, midline scar.         Abdomen: Obese.    Musculoskeletal:  Normal gait.    Integumentary:  No pallor.    Neurologic:  No focal deficits.    Cognition and Speech:  Speech clear and coherent, Functional cognition intact.    Psychiatric:  Cooperative, Appropriate mood & affect.       Review / Management   Results review   ECG interpretation:  Time  7/9/2019 4:06:00 PM, Rate  53  beats per minute, First degree AVB.       Impression and Plan   Diagnosis     Dyslipidemia (CWB22-OY E78.5).     First degree atrioventricular block (ZFE66-CX I44.0).     History of colon cancer (LBV60-QG Z85.038).     Prediabetes (ZOU09-NF R73.09).     Lumbar spinal stenosis (NXA15-WI M48.061).     Sinus bradycardia (ERE11-XG R00.1).     White coat syndrome with hypertension (WET66-SF I10).     Microalbuminuria (JAA97-NY R80.9).     Course:  Unchanged.    Summary:  Stable for colonoscopy MAC.    Orders     Orders (Selected)   Outpatient Orders  Ordered (In Transit)  Basic Metabolic Panel* ([facility]): Specimen Type: Serum, Collection Date: 07/09/19 15:59:00 CDT.

## 2022-02-16 NOTE — TELEPHONE ENCOUNTER
"---------------------  From: Soha Mendez   To: Merly Bullock Kaity;     Sent: 12/29/2020 10:10:20 AM CST  Subject: General Message     Good Morning!    I have a call from Cyril asking if we can provide him guidance regarding the upcoming COVID-19 vaccine.    He has a slight allergic reaction to shell fish.  He heard that there is shell fish bone in the vaccine by Abby (sp.?).  Is this a concern for him to get this vaccine?  Should he look for another provider of the vaccine.  Should he skip it all together?    Can you help him?    Thank you,  Soha Mendez---------------------  From: Merly Bullock Kaity   To: Soha Mendez;     Cc: Pj Peterson MD;      Sent: 12/29/2020 11:08:15 AM CST  Subject: RE: General Message     Per the CDC website, food allergies are not a precaution or contraindication to receive the vaccine (see info pasted below).    I am cc'ing Dr. Peterson - PCP.    AC Guidance is changing as we learn more about the vaccine. So, once the vaccine is available to the public (and this pt specifically), then I recommend he check back with his provider as recommendations may change as we learn more over time.    At this time, if/when he is vaccinated, the recommendation is for him to be monitored for 30 minutes post-vaccination for allergic reaction.    (https://www.cdc.gov/vaccines/covid-19/info-by-product/clinical-considerations.html?CDC_AA_refVal=https%3A%2F%2Fwww.cdc.gov%2Fvaccines%2Fcovid-19%2Finfo-by-product%2Fpfizer%2Fclinical-considerations.html)    \"In addition, allergic reactions (including severe allergic reactions) not related to vaccines or injectable therapies (e.g., food, pet, venom, or environmental allergies; allergies to oral medications [including the oral equivalents of injectable medications]) are not a contraindication or precaution to vaccination with either mRNA COVID-19 vaccine.\"---------------------  From: Merly Bullock , Yara   To: Soha Mendez; Phone Messages " Pool ([facility]24[facility]);     Cc: Pj Peterson MD;      Sent: 1/8/2021 4:04:18 PM CST  Subject: RE: General Message     not sure if anyone ever reached out to respond to this pt?  KB---------------------  From: Lacie Nascimento LPN (Phone Messages Pool ([address] [place] [address]))   To: [email or web address] Message Pool ([address] [place] [address]);     Sent: 1/8/2021 4:37:34 PM CST  Subject: FW: General Message     looks like waiting from response from [email or web address] to see if he recommends pt get it---------------------  From: Pj Peterson MD   To: [email or web address] Message Pool ([address] [place] [address]);     Sent: 1/10/2021 11:33:37 AM CST  Subject: RE: General Message     Should get vaccinated.LVM x1 for patient

## 2022-02-16 NOTE — PROGRESS NOTES
Chief Complaint    f/u abulatory BP monitor results. Question about Metoprolol. Verbal consent given for telephone visit.  History of Present Illness      Patient with hypertension, microalbuminuria, prediabetes, dyslipidemia, history of colon cancer for follow-up of ambulatory blood pressure monitor.  Patient feels well.  Review of Systems      No headache, chest pain, dyspnea, myalgia, arthralgia, fever, chills, cough.  He does have edema that tends to resolve overnight.  Assessment/Plan       Dyslipidemia (E78.5)        On statin.  Mild hypertriglyceridemia.  Reiterated the need for weight loss, reduced carbohydrate diet, and regular physical activity.          Ordered:          70823 physician telephone evaluation 21-30 min (Charge), Quantity: 1, White coat syndrome with hypertension  Dyslipidemia  Microalbuminuria  Prediabetes                Microalbuminuria (R80.9)         Worsened.  On ACE inhibitor.  Would like to see his blood pressure a little bit lower.         Ordered:          50414 physician telephone evaluation 21-30 min (Charge), Quantity: 1, White coat syndrome with hypertension  Dyslipidemia  Microalbuminuria  Prediabetes                Prediabetes (R73.09)         Patient has not been taking his metformin and is encouraged to resume it at the highest dose tolerated.         Ordered:          10317 physician telephone evaluation 21-30 min (Charge), Quantity: 1, White coat syndrome with hypertension  Dyslipidemia  Microalbuminuria  Prediabetes                White coat syndrome with hypertension (I10)         Blood pressure above goal on ambulatory blood pressure monitoring.  Will switch from hydrochlorothiazide 12.5 mg to chlorthalidone 25 mg daily.  Follow-up in 6 to 8 weeks with a basic metabolic profile at that time.  Encouraged DASH diet and weight loss as well as regular physical activity.         Ordered:          72071 physician telephone evaluation 21-30 min (Charge), Quantity: 1,  White coat syndrome with hypertension  Dyslipidemia  Microalbuminuria  Prediabetes                Orders:         chlorthalidone, = 1 tab(s) ( 25 mg ), Oral, daily, # 90 tab(s), 3 Refill(s), Type: Maintenance, Pharmacy: [id number], D/C HCTZ, 1 tab(s) Oral daily, (Ordered)         Return to Clinic (Request), RFV: In person for for HTN with BMP, Return in 8 weeks  Patient Information     Name:SONIA WHITE      Address:      [address] [place] [id number]     Sex:Male     YOB: 1940     [phone]     Emergency Contact:JAKE WHITE     MRN:[id number]     FIN:[id number]     Location:[facility]     Date of Service:05/12/2020      Primary Care Physician:       Pj Peterson MD, [phone]      Attending Physician:       Pj Peterson MD, [phone]  Problem List/Past Medical History    Ongoing     BPPV (benign paroxysmal positional vertigo)     Dyslipidemia     First degree atrioventricular block     History of colon cancer     Lumbar spinal stenosis     Microalbuminuria     Mild asthma     Obesity     Prediabetes     Pulmonary asbestosis       Comments: Pleural plaques incidentally on CT     Sinus bradycardia     White coat syndrome with hypertension    Historical     Carcinoma of colon, [facility] B2     ED (erectile dysfunction)     History of chicken pox  Procedure/Surgical History     Colonoscopy (07/24/2019)      Comments: Indication: Colon cancer 2000      Sedation: MAC      Findings: Normal to ileocolonic anastamosis      Rec: No further colonoscopy.     Colonoscopy (07/18/2014)      Comments: Sedation: fentanyl, midazolam      Indication: personal history colon cancer      Normal      Repeat in 5 years.     Colonoscopy (08/05/2009)      Comments: recheck 2014.     Colonoscopy (06/03/2005)     Colonoscopy (05/31/2002)     Colonoscopy (05/11/2001)      Comments: normal.     Right colon resection (08/15/2000)     Colonoscopy  (08/04/2000)     Polypectomy (08/04/2000)     Tonsillectomy  Medications    albuterol 90 mcg/inh inhalation aerosol, 2 puff(s), NEB, qid, PRN, 2 refills    amLODIPine 5 mg oral tablet, 5 mg= 1 tab(s), Oral, daily    atorvastatin 10 mg oral tablet, 1 tab(s), Oral, daily    chlorthalidone 25 mg oral tablet, 25 mg= 1 tab(s), Oral, daily, 3 refills    gabapentin 300 mg oral capsule, 300 mg= 1 cap(s), Oral, tid    lisinopril 40 mg oral tablet, 1 tab(s), Oral, daily    metFORMIN 500 mg oral tablet, extended release, 2000 mg= 4 tab(s), Oral, daily, 3 refills  Allergies    iodine (Asthma)    shellfish  Social History    Smoking Status - 05/12/2020     Never smoker     Alcohol - Current, 08/11/2010      Current, 4-6 times per week, 2 drinks/episode average. 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 Spouse/Partner name: Riya Galdamez., 01/18/2017     Substance Abuse - Denies Substance Abuse, 01/18/2017      Never, 01/18/2017     Tobacco - Denies Tobacco Use, 08/11/2010      Never smoker, 01/18/2017  Family History    CA - Carcinoma of prostate: Father.    Melanoma: Mother.    PUD - Peptic ulcer disease: Father.    Sister: History is negative    Brother: History is negative    Daughter: History is negative    Son: History is negative  Immunizations      Vaccine Date Status          zoster vaccine, inactivated 10/15/2019 Recorded              Comments : [10/22/2019] Received at [place], [place]          influenza virus vaccine, inactivated 09/12/2019 Given          zoster vaccine, inactivated 08/07/2019 Recorded              Comments : [8/8/2019] Received at [facility] [place] [place]          influenza virus vaccine, inactivated 10/11/2018 Given          influenza virus vaccine, inactivated  09/26/2017 Given          influenza virus vaccine, inactivated 10/12/2016 Given          influenza virus vaccine, inactivated 10/26/2015 Given          pneumococcal (PCV13) 07/07/2015 Given          influenza virus vaccine, inactivated 10/09/2014 Recorded              Comments : [10/13/2014] Received at [place], [place]          DTaP 12/01/2012 Recorded          influenza virus vaccine, inactivated 09/01/2012 Recorded          influenza virus vaccine, inactivated 09/16/2011 Given          pneumococcal (PPSV23) 11/19/2007 Recorded          influenza 11/01/2007 Recorded          Td 08/01/2000 Recorded  Lab Results          Lab Results (Last 4 results within 90 days)           Sodium Level: 137 mmol/L [135 mmol/L - 146 mmol/L] (03/10/20 10:49:00)          Potassium Level: 4.2 mmol/L [3.5 mmol/L - 5.3 mmol/L] (03/10/20 10:49:00)          Chloride Level: 102 mmol/L [98 mmol/L - 110 mmol/L] (03/10/20 10:49:00)          CO2 Level: 25 mmol/L [20 mmol/L - 32 mmol/L] (03/10/20 10:49:00)          Glucose Level: 115 mg/dL High [65 mg/dL - 99 mg/dL] (03/10/20 10:49:00)          BUN: 28 mg/dL High [7 mg/dL - 25 mg/dL] (03/10/20 10:49:00)          Creatinine Level: 0.83 mg/dL [0.7 mg/dL - 1.11 mg/dL] (03/10/20 10:49:00)          BUN/Creat Ratio: 34 High [6  - 22] (03/10/20 10:49:00)          eGFR: 83 mL/min/1.73m2 (03/10/20 10:49:00)          eGFR : 96 mL/min/1.73m2 (03/10/20 10:49:00)          Calcium Level: 9.5 mg/dL [8.6 mg/dL - 10.3 mg/dL] (03/10/20 10:49:00)          Hgb A1c: 6.1 High (03/10/20 10:49:00)          Cholesterol: 153 mg/dL (03/10/20 10:49:00)          Non-HDL Cholesterol: 108 (03/10/20 10:49:00)          HDL: 45 mg/dL (03/10/20 10:49:00)          Cholesterol/HDL Ratio: 3.4 (03/10/20 10:49:00)          LDL: 83 (03/10/20 10:49:00)          Triglyceride: 157 mg/dL High (03/10/20 10:49:00)          U Microalbumin: 42 mg/dL (03/10/20 10:49:00)          Ur Creatinine: 48 mg/dL [20 mg/dL -  320 mg/dL] (03/10/20 10:49:00)          Ur Microalbumin/Creatinine Ratio: 875 High (03/10/20 10:49:00)  Diagnostic Results   Ambulatory blood pressure monitor reveals an average pressure of 127/58, dipping is not preserved.

## 2022-03-03 ENCOUNTER — OFFICE VISIT (OUTPATIENT)
Dept: FAMILY MEDICINE | Facility: CLINIC | Age: 82
End: 2022-03-03
Payer: COMMERCIAL

## 2022-03-03 VITALS
BODY MASS INDEX: 26.38 KG/M2 | SYSTOLIC BLOOD PRESSURE: 154 MMHG | HEART RATE: 56 BPM | DIASTOLIC BLOOD PRESSURE: 60 MMHG | WEIGHT: 194.5 LBS | TEMPERATURE: 97.7 F | OXYGEN SATURATION: 97 %

## 2022-03-03 DIAGNOSIS — H53.9 VISUAL DISTURBANCE: Primary | ICD-10-CM

## 2022-03-03 DIAGNOSIS — I10 PRIMARY HYPERTENSION: ICD-10-CM

## 2022-03-03 DIAGNOSIS — H81.10 BENIGN PAROXYSMAL POSITIONAL VERTIGO, UNSPECIFIED LATERALITY: ICD-10-CM

## 2022-03-03 DIAGNOSIS — M48.061 SPINAL STENOSIS OF LUMBAR REGION, UNSPECIFIED WHETHER NEUROGENIC CLAUDICATION PRESENT: ICD-10-CM

## 2022-03-03 DIAGNOSIS — M54.50 LUMBAR BACK PAIN: ICD-10-CM

## 2022-03-03 PROBLEM — R42 DIZZINESS: Status: ACTIVE | Noted: 2021-03-31

## 2022-03-03 PROBLEM — D64.9 ANEMIA: Status: ACTIVE | Noted: 2021-03-31

## 2022-03-03 PROCEDURE — 99204 OFFICE O/P NEW MOD 45 MIN: CPT | Performed by: NURSE PRACTITIONER

## 2022-03-03 RX ORDER — LISINOPRIL 40 MG/1
40 TABLET ORAL DAILY
COMMUNITY
Start: 2021-08-04 | End: 2022-04-04

## 2022-03-03 RX ORDER — HYDROCODONE BITARTRATE AND ACETAMINOPHEN 5; 325 MG/1; MG/1
1 TABLET ORAL
COMMUNITY
Start: 2021-12-22 | End: 2022-03-16

## 2022-03-03 RX ORDER — ATORVASTATIN CALCIUM 10 MG/1
10 TABLET, FILM COATED ORAL DAILY
COMMUNITY
Start: 2020-07-17 | End: 2022-04-04

## 2022-03-03 RX ORDER — CHLORTHALIDONE 25 MG/1
1 TABLET ORAL DAILY
COMMUNITY
Start: 2020-07-17 | End: 2022-04-04

## 2022-03-03 RX ORDER — ALBUTEROL SULFATE 90 UG/1
1 AEROSOL, METERED RESPIRATORY (INHALATION)
COMMUNITY
Start: 2022-01-04 | End: 2023-08-11

## 2022-03-03 RX ORDER — AMLODIPINE BESYLATE 5 MG/1
1 TABLET ORAL DAILY
COMMUNITY
Start: 2020-07-17 | End: 2022-04-04

## 2022-03-03 NOTE — PROGRESS NOTES
Assessment & Plan     Visual disturbance  He presented to [facility] urgent care recently with a vision disturbance and was told that he should see an ophthalmologist.  He had a hard time facilitating that appointment but is open to seeing the [facility] down the road in [place]  - Adult Eye Referral; Future    Lumbar back pain  Significant spinal stenosis of the lumbar region, established patient at [facility].  Have been receiving hydrocodone from his orthopedic clinic but was told that he would need to see his PCP for further refills.    We had an honest discussion about whether it would be feasible to continue on narcotics long-term.  He agreed it is probably not in his best interest but he is open to medical cannabis and other potential options.  For this reason, I think he would be a good candidate for one-time consultation at the pain clinic  - Pain Management Referral; Future    Benign paroxysmal positional vertigo, unspecified laterality  He sees great results from the Epley maneuver and is required PT/OT in the future but says that this has been stable recently    Primary hypertension  Blood pressure was mildly elevated today but it sounds like he has an established history of whitecoat hypertension.  Has been taking his blood pressure medications as prescribed.  He does have a small amount of swelling in his lower extremities, likely due to the amlodipine he is taking    Patient Instructions   I placed a referral to the pain clinic today.  Please call [phone] to schedule an appointment.    I placed an order for you to see an ophthalmologist at the [facility] for your vision issues.  Please call [phone].    Follow-up with Dr. Sung as originally planned.    You can always schedule an appointment with me as needed        Return in about 1 month (around 4/3/2022) for Follow up.    Sid Machado [facility]    Aftab Nam is a  81 year old who presents for the following health issues  HPI     Here because he wants to discuss pain management principles, as well as have his blood pressure checked.    He was getting care at [facility] but wants to establish care at [facility] in [facility] due to proximity to the [facility].    He suffers from debilitating lumbar back pain and walks with a walker.  He has had multiple spinal injections, established patient at [facility].  Has a prescription for hydrocodone and uses this sparingly      Review of Systems   Constitutional, HEENT, cardiovascular, pulmonary, gi and gu systems are negative, except as otherwise noted.      Objective    BP (!) 154/60   Pulse 56   Temp 97.7  F (36.5  C) (Oral)   Wt 88.2 kg (194 lb 8 oz)   SpO2 97%   BMI 26.38 kg/m    Body mass index is 26.38 kg/m .  Physical Exam     Trace edema bilateral lower extremities.

## 2022-03-03 NOTE — PATIENT INSTRUCTIONS
I placed a referral to the pain clinic today.  Please call [phone] to schedule an appointment.    I placed an order for you to see an ophthalmologist at the [facility] for your vision issues.  Please call [phone].    Follow-up with Dr. Sung as originally planned.    You can always schedule an appointment with me as needed

## 2022-03-04 ENCOUNTER — TRANSFERRED RECORDS (OUTPATIENT)
Dept: HEALTH INFORMATION MANAGEMENT | Facility: CLINIC | Age: 82
End: 2022-03-04
Payer: COMMERCIAL

## 2022-03-08 ENCOUNTER — TELEPHONE (OUTPATIENT)
Dept: FAMILY MEDICINE | Facility: CLINIC | Age: 82
End: 2022-03-08
Payer: COMMERCIAL

## 2022-03-16 ENCOUNTER — OFFICE VISIT (OUTPATIENT)
Dept: FAMILY MEDICINE | Facility: CLINIC | Age: 82
End: 2022-03-16
Payer: COMMERCIAL

## 2022-03-16 VITALS
HEART RATE: 76 BPM | SYSTOLIC BLOOD PRESSURE: 108 MMHG | OXYGEN SATURATION: 97 % | TEMPERATURE: 98.4 F | DIASTOLIC BLOOD PRESSURE: 50 MMHG | BODY MASS INDEX: 26.34 KG/M2 | WEIGHT: 194.2 LBS

## 2022-03-16 DIAGNOSIS — L98.9 SKIN LESION: Primary | ICD-10-CM

## 2022-03-16 DIAGNOSIS — H61.23 BILATERAL IMPACTED CERUMEN: ICD-10-CM

## 2022-03-16 PROCEDURE — 99213 OFFICE O/P EST LOW 20 MIN: CPT | Mod: 25 | Performed by: NURSE PRACTITIONER

## 2022-03-16 PROCEDURE — 69209 REMOVE IMPACTED EAR WAX UNI: CPT | Performed by: NURSE PRACTITIONER

## 2022-03-16 NOTE — PROGRESS NOTES
Assessment & Plan     1. Skin lesion  - Adult Dermatology Referral; Future    2. Bilateral impacted cerumen  Ear lavage successfully performed by the clinical assistant today without the use of instrumentation    Patient Instructions   Call dermatology consultants to schedule a skin exam.  [phone].    If we were unsuccessful in completely removing the earwax today, try an over-the-counter Debrox solution.  Use the medication as instructed on the label.      Sid Machado, CNP  [facility]    Aftab Nam is a 81 year old who presents for the following health issues   HPI     Here for ear lavage.  He had a different appointment recently and was told that he had a copious amount of earwax in his ear.  He is hoping that we can remove that for him today.    He is also like referral to dermatology for skin check.      Review of Systems   Constitutional, HEENT, cardiovascular, pulmonary, gi and gu systems are negative, except as otherwise noted.      Objective    /50 (BP Location: Right arm, Patient Position: Sitting, Cuff Size: Adult Regular)   Pulse 76   Temp 98.4  F (36.9  C) (Oral)   Wt 88.1 kg (194 lb 3.2 oz)   SpO2 97%   BMI 26.34 kg/m    Body mass index is 26.34 kg/m .  Physical Exam      Prelavage: Bilateral canals completely occluded with cerumen.  Post lavage: Bilateral ear exam normal

## 2022-03-16 NOTE — PATIENT INSTRUCTIONS
Call dermatology consultants to schedule a skin exam.  [phone].    If we were unsuccessful in completely removing the earwax today, try an over-the-counter Debrox solution.  Use the medication as instructed on the label.

## 2022-04-04 ENCOUNTER — OFFICE VISIT (OUTPATIENT)
Dept: FAMILY MEDICINE | Facility: CLINIC | Age: 82
End: 2022-04-04
Payer: COMMERCIAL

## 2022-04-04 VITALS
BODY MASS INDEX: 26.68 KG/M2 | HEART RATE: 82 BPM | OXYGEN SATURATION: 98 % | SYSTOLIC BLOOD PRESSURE: 110 MMHG | HEIGHT: 72 IN | WEIGHT: 197 LBS | DIASTOLIC BLOOD PRESSURE: 60 MMHG

## 2022-04-04 DIAGNOSIS — R80.9 MICROALBUMINURIA: ICD-10-CM

## 2022-04-04 DIAGNOSIS — R73.03 PREDIABETES: ICD-10-CM

## 2022-04-04 DIAGNOSIS — I10 PRIMARY HYPERTENSION: ICD-10-CM

## 2022-04-04 DIAGNOSIS — J61 ASBESTOSIS (H): ICD-10-CM

## 2022-04-04 DIAGNOSIS — D64.89 ANEMIA DUE TO OTHER CAUSE, NOT CLASSIFIED: ICD-10-CM

## 2022-04-04 DIAGNOSIS — Z00.00 ENCOUNTER FOR MEDICARE ANNUAL WELLNESS EXAM: Primary | ICD-10-CM

## 2022-04-04 DIAGNOSIS — J45.20 MILD INTERMITTENT ASTHMA, UNSPECIFIED WHETHER COMPLICATED: ICD-10-CM

## 2022-04-04 DIAGNOSIS — M48.061 SPINAL STENOSIS OF LUMBAR REGION, UNSPECIFIED WHETHER NEUROGENIC CLAUDICATION PRESENT: ICD-10-CM

## 2022-04-04 DIAGNOSIS — E78.5 DYSLIPIDEMIA: ICD-10-CM

## 2022-04-04 PROBLEM — C44.90 MALIGNANT NEOPLASM OF SKIN: Status: ACTIVE | Noted: 2021-05-27

## 2022-04-04 PROBLEM — I44.0 FIRST DEGREE ATRIOVENTRICULAR BLOCK: Status: ACTIVE | Noted: 2021-02-17

## 2022-04-04 PROBLEM — R00.1 SINUS BRADYCARDIA: Status: ACTIVE | Noted: 2019-07-09

## 2022-04-04 PROBLEM — C18.9 MALIGNANT TUMOR OF COLON (H): Status: RESOLVED | Noted: 2019-07-17 | Resolved: 2022-04-04

## 2022-04-04 PROBLEM — C18.9 MALIGNANT TUMOR OF COLON (H): Status: ACTIVE | Noted: 2019-07-17

## 2022-04-04 PROBLEM — Z85.038 HISTORY OF MALIGNANT NEOPLASM OF COLON: Status: ACTIVE | Noted: 2022-04-04

## 2022-04-04 PROCEDURE — 99214 OFFICE O/P EST MOD 30 MIN: CPT | Mod: 25 | Performed by: INTERNAL MEDICINE

## 2022-04-04 PROCEDURE — 99397 PER PM REEVAL EST PAT 65+ YR: CPT | Performed by: INTERNAL MEDICINE

## 2022-04-04 RX ORDER — LISINOPRIL 40 MG/1
40 TABLET ORAL DAILY
Qty: 90 TABLET | Refills: 11 | Status: SHIPPED | OUTPATIENT
Start: 2022-04-04 | End: 2023-05-04

## 2022-04-04 RX ORDER — DULOXETIN HYDROCHLORIDE 20 MG/1
20 CAPSULE, DELAYED RELEASE ORAL DAILY
Qty: 30 CAPSULE | Refills: 1 | Status: SHIPPED | OUTPATIENT
Start: 2022-04-04 | End: 2022-04-19

## 2022-04-04 RX ORDER — CHLORTHALIDONE 25 MG/1
25 TABLET ORAL DAILY
Qty: 90 TABLET | Refills: 11 | Status: SHIPPED | OUTPATIENT
Start: 2022-04-04 | End: 2023-05-27

## 2022-04-04 RX ORDER — LUTEIN 6 MG
TABLET ORAL
COMMUNITY
End: 2022-07-18

## 2022-04-04 RX ORDER — AMLODIPINE BESYLATE 5 MG/1
5 TABLET ORAL DAILY
Qty: 90 TABLET | Refills: 11 | Status: SHIPPED | OUTPATIENT
Start: 2022-04-04 | End: 2023-07-07

## 2022-04-04 RX ORDER — ATORVASTATIN CALCIUM 10 MG/1
10 TABLET, FILM COATED ORAL DAILY
Qty: 90 TABLET | Refills: 11 | Status: SHIPPED | OUTPATIENT
Start: 2022-04-04 | End: 2023-05-04

## 2022-04-04 ASSESSMENT — ASTHMA QUESTIONNAIRES
QUESTION_5 LAST FOUR WEEKS HOW WOULD YOU RATE YOUR ASTHMA CONTROL: COMPLETELY CONTROLLED
QUESTION_1 LAST FOUR WEEKS HOW MUCH OF THE TIME DID YOUR ASTHMA KEEP YOU FROM GETTING AS MUCH DONE AT WORK, SCHOOL OR AT HOME: NONE OF THE TIME
QUESTION_3 LAST FOUR WEEKS HOW OFTEN DID YOUR ASTHMA SYMPTOMS (WHEEZING, COUGHING, SHORTNESS OF BREATH, CHEST TIGHTNESS OR PAIN) WAKE YOU UP AT NIGHT OR EARLIER THAN USUAL IN THE MORNING: NOT AT ALL
ACT_TOTALSCORE: 23
QUESTION_4 LAST FOUR WEEKS HOW OFTEN HAVE YOU USED YOUR RESCUE INHALER OR NEBULIZER MEDICATION (SUCH AS ALBUTEROL): ONCE A WEEK OR LESS
ACT_TOTALSCORE: 23
QUESTION_2 LAST FOUR WEEKS HOW OFTEN HAVE YOU HAD SHORTNESS OF BREATH: ONCE OR TWICE A WEEK

## 2022-04-04 ASSESSMENT — ENCOUNTER SYMPTOMS: BACK PAIN: 1

## 2022-04-04 NOTE — PROGRESS NOTES
Assessment & Plan   Problem List Items Addressed This Visit     Anemia     Did not tolerate oral iron well. Repeat labs this summer. Hemoglobin 13 in January when checked. Colonoscopy last fall with some polyps- history of colon cancer s/p hemicolectomy          Relevant Orders    CBC with platelets    Hypertension     Well controlled today, no side effects noted, has been stable on combination therapy of amlodipine, lisinopril, chlorthalidone. Some slight leg swelling at times from amlodipine.         Relevant Medications    amLODIPine (NORVASC) 5 MG tablet    chlorthalidone (HYGROTON) 25 MG tablet    lisinopril (ZESTRIL) 40 MG tablet    Spinal stenosis of lumbar region     Significant burden for patient. Meets criteria for starting medical cannabis. Does not have an email and will call with an email that he can use for this. Would like to start trial of cymbalta to see if helps with ongoing symptoms. Discussed norco not a great ongoing treatment for his pain. Has 4 pills of norco left by report.          Relevant Medications    DULoxetine (CYMBALTA) 20 MG capsule    Asbestosis (H)     No acute flares- stable calcifications noted on imaging, History of insulation exposure with asbestos.          Mild asthma     Well controlled today, no acute concerns.          Dyslipidemia    Relevant Medications    atorvastatin (LIPITOR) 10 MG tablet    Other Relevant Orders    Comprehensive metabolic panel    Lipid panel reflex to direct LDL Fasting    Microalbuminuria     WIll be due for recheck this summer, on ACE, stable creatinine.          Relevant Orders    Albumin Random Urine Quantitative with Creat Ratio    Prediabetes     Last A1C in January was 6.0. Recheck this summer         Relevant Medications    lisinopril (ZESTRIL) 40 MG tablet    Other Relevant Orders    Hemoglobin A1c      Other Visit Diagnoses     Encounter for Medicare annual wellness exam    -  Primary        Patient Instructions   Due for labs this  summer. I have ordered these for you. You can come back for a lab only appointment about June.   - refilled your medications for you.    Start the cymbalta at 20 mg per day to help with pain in the lower back.  - call me with an email to complete the medical cannabis registration.   - use a topical lidocaine patch over the area to help with pain as well.  - can call if issues before then.       Recheck in about 3 months.      Angel Sung MD                      BMI:   Estimated body mass index is 26.72 kg/m  as calculated from the following:    Height as of this encounter: 1.829 m (6').    Weight as of this encounter: 89.4 kg (197 lb).   Weight management plan: Discussed healthy diet and exercise guidelines    See Patient Instructions    Return in about 3 months (around 7/4/2022).    Angel Sung MD  [facility] 81 [facility] who presents for the following health issues establish care and ongoing back pain. Notes concern for core strength and balance.     Back Pain          Last treatment on 12/7/21, had prior treatment before 5/11/21. Has been treated with lumbar medial branch radiofrequency neurotomy. Has appointment again on 6/7/22. Severe spinal stenosis in the lumbar area.     Was on norco- notes taking for the back pain. Noted that the back pain has gotten worse over the last several days and feels a flare again.     Was seen at Pain clinic before. Did intake for pain clinic at [facility].     Has been doing physical therapy before.     No fall or trauma to area- feels similar to prior episodes.     Prior medications tried- norco- noted some help with this, gabapentin- did not seem to help, prednisone no help, tizanidine, tried some patches in the past. Possibly IPC  therapy as well- not TENS therapy. Sleeping on side or using wedge due to being sore.     HYPERTENSION well controlled. Amlodipine causing some leg swelling.      Brief inventory of pain-  "score 10- would like to consider medical cannabis.     Prediabetes history.      Mild anemia- 13.1 = off iron therapy right now. History of colon cancer- right hemicolectomy history.     Asbestos exposure, doing well currently some calcifications      Annual Wellness Visit    Patient has been advised of split billing requirements and indicates understanding: Yes     Are you in the first 12 months of your Medicare Part B coverage?  No    Physical Health:    In general, how would you rate your overall physical health? good    Outside of work, how many days during the week do you exercise?2-3 days/week    Outside of work, approximately how many minutes a day do you exercise?less than 15 minutes- doing physical therapy for the back.     If you drink alcohol do you typically have >3 drinks per day or >7 drinks per week? No    Do you usually eat at least 4 servings of fruit and vegetables a day, include whole grains & fiber and avoid regularly eating high fat or \"junk\" foods? Yes    Do you have any problems taking medications regularly? No    Do you have any side effects from medications? none    Needs assistance for the following daily activities: no assistance needed    Which of the following safety concerns are present in your home?  none identified     Hearing impairment: No    In the past 6 months, have you been bothered by leaking of urine? no    Mental Health:    In general, how would you rate your overall mental or emotional health? good  PHQ-2 Score:  0    Do you feel safe in your environment? Yes    Have you ever done Advance Care Planning? (For example, a Health Directive, POLST, or a discussion with a medical provider or your loved ones about your wishes)? Yes, patient states has an Advance Care Planning document and will bring a copy to the clinic.    Fall risk:       Cognitive Screenin) Repeat 3 items (Leader, Season, Table)  normal  2) Clock draw: NORMAL  3) 3 item recall:  normal  Results: NORMAL " clock, 1-2 items recalled: COGNITIVE IMPAIRMENT LESS LIKELY    Mini-CogTM Copyright S Yue. Licensed by the author for use in [facility]; reprinted with permission (tawanna@[facility]). All rights reserved.      Do you have sleep apnea, excessive snoring or daytime drowsiness?: no    Current providers sharing in care for this patient include:   Angel Sung MD        Review of Systems   Musculoskeletal: Positive for back pain.      Constitutional, HEENT, cardiovascular, pulmonary, gi and gu systems are negative, except as otherwise noted.      Objective    /60 (BP Location: Right arm, Patient Position: Sitting, Cuff Size: Adult Regular)   Pulse 82   Ht 1.829 m (6')   Wt 89.4 kg (197 lb)   SpO2 98%   BMI 26.72 kg/m    Body mass index is 26.72 kg/m .  Physical Exam   General: alert, interactive, NAD  HEENT: sclerae clear  Neck: supple  CV: RRR, no m/r/c  Resp: clear, no wheezing, easy work of breathing  Abdomen: +bs, non-tender  Ext: warm and well perfused,  no edema  Psych: appropriate affect  Neuro: no focal deficits noted. Slow to rise from chair and walks with bent forward posture. No lower back rash noted, no focal tenderness on lower back exam.

## 2022-04-04 NOTE — ASSESSMENT & PLAN NOTE
Significant burden for patient. Meets criteria for starting medical cannabis. Does not have an email and will call with an email that he can use for this. Would like to start trial of cymbalta to see if helps with ongoing symptoms. Discussed norco not a great ongoing treatment for his pain. Has 4 pills of norco left by report.

## 2022-04-04 NOTE — ASSESSMENT & PLAN NOTE
No acute flares- stable calcifications noted on imaging, History of insulation exposure with asbestos.

## 2022-04-04 NOTE — LETTER
My Asthma Action Plan  Name: <Patient Name>  Date: <Date>   My Doctor or Clinic: [facility]  My Doctor or Clinic Phone: <Provider>, <Clinic Phone Number>  My Asthma Severity: <Moderate Persistent>  My Peak Flow Number: <550>  Avoid your asthma triggers: <Strong smells , Smoke, Colds/flu>        GO      I feel good    No cough or wheeze    Can work, sleep and play without  asthma symptoms  My peak flow number is  above <440>       Green Zone:  Asthma in good control    Take your asthma control medicine every day: <Medications>  If exercise triggers your asthma, take:   <Medication>, <Dose>  15 minutes before exercise or sports, and  During exercise if you have asthma symptoms  Spacer to use with inhaler: <Holding Chamber>       Slow      I have ANY of these:           I do not feel good    Cough or wheeze    Chest feels tight    Wake up at night   My peak flow number is  between <275> and <440>       Yellow Zone:  Asthma getting worse  Keep taking your Green Zone medications  Start taking your rescue medicine:   <Medication>, <Dose> every 20 minutes for up to 1 hour.  Then every 4 hours for 24-48 hours.  If you do not return to the Green Zone in 12-24 hours, or you get worse, start taking your oral steroid medicine:   <Medication>, <Dose and Duration>  If you stay in the Yellow Zone for more than 12-24 hours, you re your doctor. 1.

## 2022-04-04 NOTE — ASSESSMENT & PLAN NOTE
Well controlled today, no side effects noted, has been stable on combination therapy of amlodipine, lisinopril, chlorthalidone. Some slight leg swelling at times from amlodipine.

## 2022-04-04 NOTE — ASSESSMENT & PLAN NOTE
Did not tolerate oral iron well. Repeat labs this summer. Hemoglobin 13 in January when checked. Colonoscopy last fall with some polyps- history of colon cancer s/p hemicolectomy

## 2022-04-04 NOTE — PATIENT INSTRUCTIONS
Due for labs this summer. I have ordered these for you. You can come back for a lab only appointment about June.   - refilled your medications for you.    Start the cymbalta at 20 mg per day to help with pain in the lower back.  - call me with an email to complete the medical cannabis registration.   - use a topical lidocaine patch over the area to help with pain as well.  - can call if issues before then.       Recheck in about 3 months.      Angel Sung MD

## 2022-04-05 ENCOUNTER — TELEPHONE (OUTPATIENT)
Dept: FAMILY MEDICINE | Facility: CLINIC | Age: 82
End: 2022-04-05
Payer: COMMERCIAL

## 2022-04-05 NOTE — TELEPHONE ENCOUNTER
Patient called and stated PCP needed his eamil address:   [email or web address]       This has been added to his account.

## 2022-04-06 NOTE — TELEPHONE ENCOUNTER
Certification has been sent to the state. Please let patient know to check email for link to sign up for medical cannabis and check spam filter if does not see it in the next couple of days.

## 2022-04-08 ENCOUNTER — TELEPHONE (OUTPATIENT)
Dept: FAMILY MEDICINE | Facility: CLINIC | Age: 82
End: 2022-04-08
Payer: COMMERCIAL

## 2022-04-08 NOTE — TELEPHONE ENCOUNTER
"Reason for Call:  Other     Detailed comments: Patient called and wanted to leave a message with Sid Machado about questions he has on taking \"THC stuff \" until his certification goes through for medical cannabis. Please call patient to discuss his questions/concerns.    Phone Number Patient can be reached at: Home number on file [phone] (home)    Best Time: any    Can we leave a detailed message on this number? YES    Call taken on 4/8/2022 at 11:27 AM by Leah Hill      "

## 2022-04-19 ENCOUNTER — TELEPHONE (OUTPATIENT)
Dept: FAMILY MEDICINE | Facility: CLINIC | Age: 82
End: 2022-04-19
Payer: COMMERCIAL

## 2022-04-19 DIAGNOSIS — M48.061 SPINAL STENOSIS OF LUMBAR REGION, UNSPECIFIED WHETHER NEUROGENIC CLAUDICATION PRESENT: ICD-10-CM

## 2022-04-19 RX ORDER — DULOXETIN HYDROCHLORIDE 20 MG/1
20 CAPSULE, DELAYED RELEASE ORAL 2 TIMES DAILY
Qty: 60 CAPSULE | Refills: 1 | Status: ON HOLD | OUTPATIENT
Start: 2022-04-19 | End: 2022-07-18

## 2022-04-19 NOTE — TELEPHONE ENCOUNTER
4-19-22  Reason for Call: prescription    Detailed comments: pt called re:  DULoxetine (CYMBALTA) 20 MG capsule  He stated he's been taking it for 15 days now and not getting any relief? Pt wants to know he still has pain and getting no relief   Pt uses pharmacy:   [facility]    Phone Number Patient can be reached at: Cell number on file:    Telephone Information:   Mobile [phone]       Best Time: antime    Can we leave a detailed message on this number? YES    Call taken on 4/19/2022 at 1:30 PM by Lucrecia Suresh

## 2022-04-19 NOTE — TELEPHONE ENCOUNTER
I would have him taper up next to 20 mg twice per day of the cymbalta for next steps. This can take several weeks to start noting benefit.     Check to see if he has heard from medical cannabis set up yet- his certification number is [id number]   He can call [phone] if having issues with set up if wanting to do so.

## 2022-04-19 NOTE — TELEPHONE ENCOUNTER
Patient states Cymbalta has been making patient tired and depressed. Doesn't feel like 2x a day would benefit much since he has been taking it for 15 days with no progress. Patient is wondering if there's anything else he can take. Seemed a little discourage that all that can be done was taking more cymbalta.    I did give patient the number for medical cannabis. He will contact them.

## 2022-04-20 NOTE — TELEPHONE ENCOUNTER
Spoke with Cyril today and he stated that he will be calling the number we provided to him today. He also wanted to let  know that he is very appreciative of the care he has received from  and the care team. Cyril did mention that he is not interested in a pain clinic type referral and does not want to take opioids and is looking forward to trying the medical cannabis route.       Atif RINCON,CMA

## 2022-04-20 NOTE — TELEPHONE ENCOUNTER
We can have him see the pain team to see if they have any other thoughts on medications that may be beneficial. I do think that the medical cannabis is a good option to help.     Angel Sung MD

## 2022-05-06 ENCOUNTER — TELEPHONE (OUTPATIENT)
Dept: FAMILY MEDICINE | Facility: CLINIC | Age: 82
End: 2022-05-06
Payer: COMMERCIAL

## 2022-05-06 NOTE — TELEPHONE ENCOUNTER
Reason for Call:  Other call back    Detailed comments: Pt is requesting a call back about DULoxetine (CYMBALTA) 20 MG capsule. - he is wondering on how he can come down/off this medication. He is taking 120mg at night. Please advise.    Phone Number Patient can be reached at: Work number on file:  There is no work phone number on file. or Cell number on file:    Telephone Information:   Mobile [phone]       Best Time: any    Can we leave a detailed message on this number? Not Applicable    Call taken on 5/6/2022 at 2:37 PM by Rashard Shore

## 2022-05-06 NOTE — TELEPHONE ENCOUNTER
Called and spoke to patient. I relayed the message form PCP. Patient expressed understanding and will stop medication. If he has any more questions he will reach out to PCP

## 2022-05-06 NOTE — TELEPHONE ENCOUNTER
He is on a low dose at 20 mg per day. He can stop this at any time and should not have issues.     Angel Sung MD

## 2022-05-18 ENCOUNTER — TRANSFERRED RECORDS (OUTPATIENT)
Dept: HEALTH INFORMATION MANAGEMENT | Facility: CLINIC | Age: 82
End: 2022-05-18
Payer: COMMERCIAL

## 2022-06-28 ENCOUNTER — TELEPHONE (OUTPATIENT)
Dept: FAMILY MEDICINE | Facility: CLINIC | Age: 82
End: 2022-06-28

## 2022-06-28 NOTE — TELEPHONE ENCOUNTER
I would recommend patient call the pharmacist where he is getting the cannabis as they may be able to adjust what he is getting to minimize the fatigue- also would consider when he is taking medication to minimize fatigue (take at night etc).

## 2022-06-28 NOTE — TELEPHONE ENCOUNTER
Reason for Call:  Other     Detailed comments: Pt called and stated he is taking medical cannabis and it is making him extremely fatigued and tired. Pt is wondering if there is any medication he get be prescribed to boost his energy? Please call PT to discuss his questions/concerns.    Phone Number Patient can be reached at: Home number on file [phone] (home)    Best Time: any    Can we leave a detailed message on this number? YES    Call taken on 6/28/2022 at 3:49 PM by Leah Hill

## 2022-06-29 NOTE — TELEPHONE ENCOUNTER
6-29-22  Pt calling requesting a call back  (below msg doesn't indicate to relay msg or not to pt)  lona

## 2022-06-29 NOTE — TELEPHONE ENCOUNTER
Pt takes it many times through out the day- is going to talk to the pharmacy.   If the pharmacy says he needs to follow up with PCP any advise on what to do.

## 2022-07-10 ENCOUNTER — APPOINTMENT (OUTPATIENT)
Dept: RADIOLOGY | Facility: CLINIC | Age: 82
End: 2022-07-10
Attending: EMERGENCY MEDICINE
Payer: COMMERCIAL

## 2022-07-10 ENCOUNTER — HOSPITAL ENCOUNTER (EMERGENCY)
Facility: CLINIC | Age: 82
Discharge: HOME | End: 2022-07-10
Attending: EMERGENCY MEDICINE | Admitting: EMERGENCY MEDICINE
Payer: COMMERCIAL

## 2022-07-10 VITALS
TEMPERATURE: 98.6 F | BODY MASS INDEX: 24.68 KG/M2 | OXYGEN SATURATION: 98 % | WEIGHT: 182 LBS | RESPIRATION RATE: 16 BRPM | HEART RATE: 65 BPM | DIASTOLIC BLOOD PRESSURE: 60 MMHG | SYSTOLIC BLOOD PRESSURE: 128 MMHG

## 2022-07-10 DIAGNOSIS — M25.551 HIP PAIN, RIGHT: ICD-10-CM

## 2022-07-10 PROCEDURE — 250N000013 HC RX MED GY IP 250 OP 250 PS 637: Performed by: EMERGENCY MEDICINE

## 2022-07-10 PROCEDURE — 96372 THER/PROPH/DIAG INJ SC/IM: CPT | Performed by: EMERGENCY MEDICINE

## 2022-07-10 PROCEDURE — 99284 EMERGENCY DEPT VISIT MOD MDM: CPT | Mod: 25

## 2022-07-10 PROCEDURE — 73502 X-RAY EXAM HIP UNI 2-3 VIEWS: CPT

## 2022-07-10 PROCEDURE — 250N000011 HC RX IP 250 OP 636: Performed by: EMERGENCY MEDICINE

## 2022-07-10 RX ORDER — OXYCODONE HYDROCHLORIDE 5 MG/1
5 TABLET ORAL EVERY 4 HOURS PRN
Qty: 12 TABLET | Refills: 0 | Status: SHIPPED | OUTPATIENT
Start: 2022-07-10 | End: 2022-07-13

## 2022-07-10 RX ORDER — ACETAMINOPHEN 325 MG/1
975 TABLET ORAL ONCE
Status: COMPLETED | OUTPATIENT
Start: 2022-07-10 | End: 2022-07-10

## 2022-07-10 RX ADMIN — HYDROMORPHONE HYDROCHLORIDE 1 MG: 1 INJECTION, SOLUTION INTRAMUSCULAR; INTRAVENOUS; SUBCUTANEOUS at 17:54

## 2022-07-10 RX ADMIN — ACETAMINOPHEN 975 MG: 325 TABLET ORAL at 17:51

## 2022-07-10 ASSESSMENT — ENCOUNTER SYMPTOMS
NECK PAIN: 0
BACK PAIN: 1
NUMBNESS: 0
NAUSEA: 0
CHILLS: 0
CHEST TIGHTNESS: 0
FEVER: 0
SHORTNESS OF BREATH: 0
HEMATURIA: 0
WEAKNESS: 0
ABDOMINAL PAIN: 0
DIARRHEA: 0
MYALGIAS: 0
DIFFICULTY URINATING: 0
ARTHRALGIAS: 1
VOMITING: 0
JOINT SWELLING: 0

## 2022-07-10 NOTE — ED TRIAGE NOTES
Pt gets around using a walker.  This AM he bumped his right hip on the table and is in pain.  Pt is not on thinners     Triage Assessment     Row Name 07/10/22 3886       Triage Assessment (Adult)    Airway WDL WDL       Respiratory WDL    Respiratory WDL WDL       Skin Circulation/Temperature WDL    Skin Circulation/Temperature WDL WDL       Cardiac WDL    Cardiac WDL WDL       Peripheral/Neurovascular WDL    Peripheral Neurovascular WDL WDL       Cognitive/Neuro/Behavioral WDL    Cognitive/Neuro/Behavioral WDL WDL

## 2022-07-12 ENCOUNTER — TELEPHONE (OUTPATIENT)
Dept: FAMILY MEDICINE | Facility: CLINIC | Age: 82
End: 2022-07-12

## 2022-07-12 DIAGNOSIS — M25.551 HIP PAIN, RIGHT: Primary | ICD-10-CM

## 2022-07-12 NOTE — TELEPHONE ENCOUNTER
Reason for Call:  Other call back    Detailed comments: Pt is calling, he is going to be running out of oxyCODONE (ROXICODONE) 5 MG tablet soon - he is wondering, once he runs out of the medication, what should he be doing to help the hip pain? Tylenol doesn't really help him. Something that he can just have as a backup, doesn't need oxycodone but has had hydro in the past. Pt says its helpful as long as it lasts. The oxy - he takes it as needed. Please advise.     Phone Number Patient can be reached at: Home number on file [phone] (home)    Best Time: any    Can we leave a detailed message on this number? Not Applicable    Call taken on 7/12/2022 at 3:42 PM by Rashard Shore

## 2022-07-14 NOTE — TELEPHONE ENCOUNTER
Reason for Call:  Other call back    Detailed comments: Oxy is too harsh for him. Asking he has only taken 2 of those  There is 10 left, HYDROcodone-acetaminophen (NORCO) 5-325 MG tablet seems to help.     Phone Number Patient can be reached at: Home number on file [phone] (home)    Best Time: any    Can we leave a detailed message on this number? Not Applicable    Call taken on 7/14/2022 at 1:02 PM by Rashard Shore

## 2022-07-15 RX ORDER — CELECOXIB 100 MG/1
100 CAPSULE ORAL 2 TIMES DAILY PRN
Qty: 60 CAPSULE | Refills: 1 | Status: SHIPPED | OUTPATIENT
Start: 2022-07-15 | End: 2022-08-29

## 2022-07-15 NOTE — TELEPHONE ENCOUNTER
Attempted to contact patient to relay provider message below.     No answer, left message to return call to clinic to discuss.     If patient returns call, please send to RN team to discuss Dr. Sung's message.     Thank you,   Marcia Saavedra RN, BSN  [facility]

## 2022-07-15 NOTE — TELEPHONE ENCOUNTER
See message below - routed to Dr. Sung for advisement.     Marcia Saavedra RN, BSN  [facility]

## 2022-07-15 NOTE — TELEPHONE ENCOUNTER
I would recommend that we try a non-narcotic type medication that can help with inflammation called celebrex. I did not see that he has tried this before. It decreased inflammation and may help his back as well. He should take this with some food. I sent in a Prescription for him to try. He is due for his routine labs at any time and can make a lab only for this. It would likely be good to make an appointment to recheck how things are going as well.     Angel Sung MD

## 2022-07-15 NOTE — TELEPHONE ENCOUNTER
"Dr. Sung,    This patient called back - he is hesitant about starting the celebrex.   We discussed your message below; discussed trying a non-narcotic Rx to help with pain and inflammation. He reports \"well if it doesn't work we start all over again\" - we discussed that we won't know if it helps until we try it.     Pt states \"I understand that everyone gets treated like an abuser. But I do not abuse my medications. I'm requesting a couple hydrocodone to have on hand\"  Pt also started to talk about cannabis he is trying for the pain as well.    Pt states he will try the Celebrex but is also wanting Hydrocodone.  Tried to set up a visit for him to discuss with you and check in but he was not wanting to at this time. Pt reports he'd like to get organized first.     Please review/advise.     Thank you!  Marcia Saavedra RN, BSN  [facility]  "

## 2022-07-17 ENCOUNTER — APPOINTMENT (OUTPATIENT)
Dept: CT IMAGING | Facility: CLINIC | Age: 82
End: 2022-07-17
Attending: EMERGENCY MEDICINE
Payer: COMMERCIAL

## 2022-07-17 ENCOUNTER — HOSPITAL ENCOUNTER (OUTPATIENT)
Facility: CLINIC | Age: 82
Setting detail: OBSERVATION
Discharge: HOME | End: 2022-07-18
Attending: EMERGENCY MEDICINE | Admitting: FAMILY MEDICINE
Payer: COMMERCIAL

## 2022-07-17 ENCOUNTER — NURSE TRIAGE (OUTPATIENT)
Dept: NURSING | Facility: CLINIC | Age: 82
End: 2022-07-17

## 2022-07-17 DIAGNOSIS — M48.061 SPINAL STENOSIS OF LUMBAR REGION, UNSPECIFIED WHETHER NEUROGENIC CLAUDICATION PRESENT: Primary | ICD-10-CM

## 2022-07-17 DIAGNOSIS — R41.0 DELIRIUM: ICD-10-CM

## 2022-07-17 LAB
ALBUMIN SERPL-MCNC: 3.7 G/DL (ref 3.5–5)
ALP SERPL-CCNC: 64 U/L (ref 45–120)
ALT SERPL W P-5'-P-CCNC: 23 U/L (ref 0–45)
ANION GAP SERPL CALCULATED.3IONS-SCNC: 8 MMOL/L (ref 5–18)
AST SERPL W P-5'-P-CCNC: 20 U/L (ref 0–40)
BASOPHILS # BLD AUTO: 0 10E3/UL (ref 0–0.2)
BASOPHILS NFR BLD AUTO: 0 %
BILIRUB SERPL-MCNC: 0.6 MG/DL (ref 0–1)
BUN SERPL-MCNC: 26 MG/DL (ref 8–28)
CALCIUM SERPL-MCNC: 9.5 MG/DL (ref 8.5–10.5)
CHLORIDE BLD-SCNC: 103 MMOL/L (ref 98–107)
CO2 SERPL-SCNC: 23 MMOL/L (ref 22–31)
CREAT SERPL-MCNC: 0.89 MG/DL (ref 0.7–1.3)
EOSINOPHIL # BLD AUTO: 0.1 10E3/UL (ref 0–0.7)
EOSINOPHIL NFR BLD AUTO: 1 %
ERYTHROCYTE [DISTWIDTH] IN BLOOD BY AUTOMATED COUNT: 12.3 % (ref 10–15)
GFR SERPL CREATININE-BSD FRML MDRD: 85 ML/MIN/1.73M2
GLUCOSE BLD-MCNC: 113 MG/DL (ref 70–125)
HCT VFR BLD AUTO: 36.4 % (ref 40–53)
HGB BLD-MCNC: 12.7 G/DL (ref 13.3–17.7)
IMM GRANULOCYTES # BLD: 0 10E3/UL
IMM GRANULOCYTES NFR BLD: 0 %
LYMPHOCYTES # BLD AUTO: 1.9 10E3/UL (ref 0.8–5.3)
LYMPHOCYTES NFR BLD AUTO: 20 %
MCH RBC QN AUTO: 32.9 PG (ref 26.5–33)
MCHC RBC AUTO-ENTMCNC: 34.9 G/DL (ref 31.5–36.5)
MCV RBC AUTO: 94 FL (ref 78–100)
MONOCYTES # BLD AUTO: 0.8 10E3/UL (ref 0–1.3)
MONOCYTES NFR BLD AUTO: 9 %
NEUTROPHILS # BLD AUTO: 6.6 10E3/UL (ref 1.6–8.3)
NEUTROPHILS NFR BLD AUTO: 70 %
NRBC # BLD AUTO: 0 10E3/UL
NRBC BLD AUTO-RTO: 0 /100
PLATELET # BLD AUTO: 230 10E3/UL (ref 150–450)
POTASSIUM BLD-SCNC: 3.9 MMOL/L (ref 3.5–5)
PROT SERPL-MCNC: 6.6 G/DL (ref 6–8)
RBC # BLD AUTO: 3.86 10E6/UL (ref 4.4–5.9)
SODIUM SERPL-SCNC: 134 MMOL/L (ref 136–145)
TROPONIN I SERPL-MCNC: 0.01 NG/ML (ref 0–0.29)
WBC # BLD AUTO: 9.5 10E3/UL (ref 4–11)

## 2022-07-17 PROCEDURE — 36415 COLL VENOUS BLD VENIPUNCTURE: CPT | Performed by: EMERGENCY MEDICINE

## 2022-07-17 PROCEDURE — 85025 COMPLETE CBC W/AUTO DIFF WBC: CPT | Performed by: EMERGENCY MEDICINE

## 2022-07-17 PROCEDURE — 86140 C-REACTIVE PROTEIN: CPT | Performed by: INTERNAL MEDICINE

## 2022-07-17 PROCEDURE — 80053 COMPREHEN METABOLIC PANEL: CPT | Performed by: EMERGENCY MEDICINE

## 2022-07-17 PROCEDURE — 99285 EMERGENCY DEPT VISIT HI MDM: CPT | Mod: 25

## 2022-07-17 PROCEDURE — 84443 ASSAY THYROID STIM HORMONE: CPT | Performed by: EMERGENCY MEDICINE

## 2022-07-17 PROCEDURE — 96360 HYDRATION IV INFUSION INIT: CPT

## 2022-07-17 PROCEDURE — 70450 CT HEAD/BRAIN W/O DYE: CPT

## 2022-07-17 PROCEDURE — 84484 ASSAY OF TROPONIN QUANT: CPT | Performed by: EMERGENCY MEDICINE

## 2022-07-17 PROCEDURE — 93005 ELECTROCARDIOGRAM TRACING: CPT | Performed by: EMERGENCY MEDICINE

## 2022-07-17 PROCEDURE — 258N000003 HC RX IP 258 OP 636: Performed by: EMERGENCY MEDICINE

## 2022-07-17 PROCEDURE — 81001 URINALYSIS AUTO W/SCOPE: CPT | Performed by: EMERGENCY MEDICINE

## 2022-07-17 PROCEDURE — 83735 ASSAY OF MAGNESIUM: CPT | Performed by: INTERNAL MEDICINE

## 2022-07-17 RX ORDER — HYDROCODONE BITARTRATE AND ACETAMINOPHEN 5; 325 MG/1; MG/1
1 TABLET ORAL EVERY 6 HOURS PRN
Qty: 10 TABLET | Refills: 0 | Status: ON HOLD | OUTPATIENT
Start: 2022-07-17 | End: 2022-07-18

## 2022-07-17 RX ADMIN — SODIUM CHLORIDE 1000 ML: 9 INJECTION, SOLUTION INTRAVENOUS at 22:57

## 2022-07-17 NOTE — TELEPHONE ENCOUNTER
Sent in Prescription for 10 pills hydrocordone. Would like to see for recheck if not helping, would consider using the celebrex as well.

## 2022-07-18 ENCOUNTER — APPOINTMENT (OUTPATIENT)
Dept: OCCUPATIONAL THERAPY | Facility: CLINIC | Age: 82
End: 2022-07-18
Attending: FAMILY MEDICINE
Payer: COMMERCIAL

## 2022-07-18 ENCOUNTER — APPOINTMENT (OUTPATIENT)
Dept: PHYSICAL THERAPY | Facility: CLINIC | Age: 82
End: 2022-07-18
Attending: FAMILY MEDICINE
Payer: COMMERCIAL

## 2022-07-18 VITALS
WEIGHT: 180 LBS | HEIGHT: 71 IN | OXYGEN SATURATION: 98 % | TEMPERATURE: 97.4 F | HEART RATE: 55 BPM | BODY MASS INDEX: 25.2 KG/M2 | DIASTOLIC BLOOD PRESSURE: 66 MMHG | SYSTOLIC BLOOD PRESSURE: 146 MMHG | RESPIRATION RATE: 16 BRPM

## 2022-07-18 PROBLEM — R41.0 DELIRIUM: Status: ACTIVE | Noted: 2022-07-18

## 2022-07-18 LAB
ALBUMIN SERPL-MCNC: 3.4 G/DL (ref 3.5–5)
ALBUMIN UR-MCNC: NEGATIVE MG/DL
ALP SERPL-CCNC: 57 U/L (ref 45–120)
ALT SERPL W P-5'-P-CCNC: 22 U/L (ref 0–45)
AMMONIA PLAS-SCNC: 24 UMOL/L (ref 11–35)
ANION GAP SERPL CALCULATED.3IONS-SCNC: 6 MMOL/L (ref 5–18)
APPEARANCE UR: CLEAR
AST SERPL W P-5'-P-CCNC: 17 U/L (ref 0–40)
ATRIAL RATE - MUSE: 67 BPM
BASOPHILS # BLD AUTO: 0.1 10E3/UL (ref 0–0.2)
BASOPHILS NFR BLD AUTO: 1 %
BILIRUB SERPL-MCNC: 0.7 MG/DL (ref 0–1)
BILIRUB UR QL STRIP: NEGATIVE
BUN SERPL-MCNC: 23 MG/DL (ref 8–28)
C REACTIVE PROTEIN LHE: <0.1 MG/DL (ref 0–?)
CALCIUM SERPL-MCNC: 9.3 MG/DL (ref 8.5–10.5)
CHLORIDE BLD-SCNC: 104 MMOL/L (ref 98–107)
CO2 SERPL-SCNC: 27 MMOL/L (ref 22–31)
COLOR UR AUTO: ABNORMAL
CREAT SERPL-MCNC: 0.83 MG/DL (ref 0.7–1.3)
DIASTOLIC BLOOD PRESSURE - MUSE: 68 MMHG
EOSINOPHIL # BLD AUTO: 0.2 10E3/UL (ref 0–0.7)
EOSINOPHIL NFR BLD AUTO: 2 %
ERYTHROCYTE [DISTWIDTH] IN BLOOD BY AUTOMATED COUNT: 12.3 % (ref 10–15)
ERYTHROCYTE [SEDIMENTATION RATE] IN BLOOD BY WESTERGREN METHOD: 13 MM/HR (ref 0–15)
GFR SERPL CREATININE-BSD FRML MDRD: 87 ML/MIN/1.73M2
GLUCOSE BLD-MCNC: 103 MG/DL (ref 70–125)
GLUCOSE UR STRIP-MCNC: NEGATIVE MG/DL
HCT VFR BLD AUTO: 35.2 % (ref 40–53)
HGB BLD-MCNC: 12 G/DL (ref 13.3–17.7)
HGB UR QL STRIP: NEGATIVE
IMM GRANULOCYTES # BLD: 0 10E3/UL
IMM GRANULOCYTES NFR BLD: 0 %
INTERPRETATION ECG - MUSE: NORMAL
KETONES UR STRIP-MCNC: NEGATIVE MG/DL
LEUKOCYTE ESTERASE UR QL STRIP: NEGATIVE
LYMPHOCYTES # BLD AUTO: 2.4 10E3/UL (ref 0.8–5.3)
LYMPHOCYTES NFR BLD AUTO: 29 %
MAGNESIUM SERPL-MCNC: 1.7 MG/DL (ref 1.8–2.6)
MCH RBC QN AUTO: 33.2 PG (ref 26.5–33)
MCHC RBC AUTO-ENTMCNC: 34.1 G/DL (ref 31.5–36.5)
MCV RBC AUTO: 98 FL (ref 78–100)
MONOCYTES # BLD AUTO: 0.7 10E3/UL (ref 0–1.3)
MONOCYTES NFR BLD AUTO: 9 %
MUCOUS THREADS #/AREA URNS LPF: PRESENT /LPF
NEUTROPHILS # BLD AUTO: 5 10E3/UL (ref 1.6–8.3)
NEUTROPHILS NFR BLD AUTO: 59 %
NITRATE UR QL: NEGATIVE
NRBC # BLD AUTO: 0 10E3/UL
NRBC BLD AUTO-RTO: 0 /100
P AXIS - MUSE: 79 DEGREES
PH UR STRIP: 6.5 [PH] (ref 5–7)
PLATELET # BLD AUTO: 213 10E3/UL (ref 150–450)
POTASSIUM BLD-SCNC: 4.1 MMOL/L (ref 3.5–5)
PR INTERVAL - MUSE: 272 MS
PROT SERPL-MCNC: 6 G/DL (ref 6–8)
QRS DURATION - MUSE: 96 MS
QT - MUSE: 390 MS
QTC - MUSE: 412 MS
R AXIS - MUSE: 40 DEGREES
RBC # BLD AUTO: 3.61 10E6/UL (ref 4.4–5.9)
RBC URINE: <1 /HPF
SARS-COV-2 RNA RESP QL NAA+PROBE: NEGATIVE
SODIUM SERPL-SCNC: 137 MMOL/L (ref 136–145)
SP GR UR STRIP: 1.01 (ref 1–1.03)
SQUAMOUS EPITHELIAL: <1 /HPF
SYSTOLIC BLOOD PRESSURE - MUSE: 157 MMHG
T AXIS - MUSE: 46 DEGREES
TSH SERPL DL<=0.005 MIU/L-ACNC: 0.68 UIU/ML (ref 0.3–5)
UROBILINOGEN UR STRIP-MCNC: <2 MG/DL
VENTRICULAR RATE- MUSE: 67 BPM
WBC # BLD AUTO: 8.3 10E3/UL (ref 4–11)
WBC URINE: <1 /HPF

## 2022-07-18 PROCEDURE — G0378 HOSPITAL OBSERVATION PER HR: HCPCS

## 2022-07-18 PROCEDURE — 80053 COMPREHEN METABOLIC PANEL: CPT | Performed by: INTERNAL MEDICINE

## 2022-07-18 PROCEDURE — 97116 GAIT TRAINING THERAPY: CPT | Mod: GP

## 2022-07-18 PROCEDURE — 82140 ASSAY OF AMMONIA: CPT | Performed by: EMERGENCY MEDICINE

## 2022-07-18 PROCEDURE — 250N000013 HC RX MED GY IP 250 OP 250 PS 637: Performed by: PHYSICIAN ASSISTANT

## 2022-07-18 PROCEDURE — 82040 ASSAY OF SERUM ALBUMIN: CPT | Performed by: INTERNAL MEDICINE

## 2022-07-18 PROCEDURE — U0003 INFECTIOUS AGENT DETECTION BY NUCLEIC ACID (DNA OR RNA); SEVERE ACUTE RESPIRATORY SYNDROME CORONAVIRUS 2 (SARS-COV-2) (CORONAVIRUS DISEASE [COVID-19]), AMPLIFIED PROBE TECHNIQUE, MAKING USE OF HIGH THROUGHPUT TECHNOLOGIES AS DESCRIBED BY CMS-2020-01-R: HCPCS | Performed by: INTERNAL MEDICINE

## 2022-07-18 PROCEDURE — 36415 COLL VENOUS BLD VENIPUNCTURE: CPT | Performed by: EMERGENCY MEDICINE

## 2022-07-18 PROCEDURE — 97162 PT EVAL MOD COMPLEX 30 MIN: CPT | Mod: GP

## 2022-07-18 PROCEDURE — 85652 RBC SED RATE AUTOMATED: CPT | Performed by: INTERNAL MEDICINE

## 2022-07-18 PROCEDURE — 250N000013 HC RX MED GY IP 250 OP 250 PS 637: Performed by: INTERNAL MEDICINE

## 2022-07-18 PROCEDURE — 99220 PR INITIAL OBSERVATION CARE,LEVEL III: CPT | Performed by: INTERNAL MEDICINE

## 2022-07-18 PROCEDURE — 97530 THERAPEUTIC ACTIVITIES: CPT | Mod: GP

## 2022-07-18 PROCEDURE — 97166 OT EVAL MOD COMPLEX 45 MIN: CPT | Mod: GO

## 2022-07-18 PROCEDURE — 36415 COLL VENOUS BLD VENIPUNCTURE: CPT | Performed by: INTERNAL MEDICINE

## 2022-07-18 PROCEDURE — 97535 SELF CARE MNGMENT TRAINING: CPT | Mod: GO

## 2022-07-18 PROCEDURE — 85004 AUTOMATED DIFF WBC COUNT: CPT | Performed by: INTERNAL MEDICINE

## 2022-07-18 PROCEDURE — 250N000013 HC RX MED GY IP 250 OP 250 PS 637: Performed by: FAMILY MEDICINE

## 2022-07-18 PROCEDURE — 99217 PR OBSERVATION CARE DISCHARGE: CPT | Performed by: FAMILY MEDICINE

## 2022-07-18 RX ORDER — NALOXONE HYDROCHLORIDE 0.4 MG/ML
0.4 INJECTION, SOLUTION INTRAMUSCULAR; INTRAVENOUS; SUBCUTANEOUS
Status: DISCONTINUED | OUTPATIENT
Start: 2022-07-18 | End: 2022-07-18 | Stop reason: HOSPADM

## 2022-07-18 RX ORDER — LISINOPRIL 40 MG/1
40 TABLET ORAL DAILY
Status: DISCONTINUED | OUTPATIENT
Start: 2022-07-18 | End: 2022-07-18 | Stop reason: HOSPADM

## 2022-07-18 RX ORDER — NALOXONE HYDROCHLORIDE 0.4 MG/ML
0.2 INJECTION, SOLUTION INTRAMUSCULAR; INTRAVENOUS; SUBCUTANEOUS
Status: DISCONTINUED | OUTPATIENT
Start: 2022-07-18 | End: 2022-07-18 | Stop reason: HOSPADM

## 2022-07-18 RX ORDER — HYDROCODONE BITARTRATE AND ACETAMINOPHEN 5; 325 MG/1; MG/1
1 TABLET ORAL ONCE
Status: COMPLETED | OUTPATIENT
Start: 2022-07-18 | End: 2022-07-18

## 2022-07-18 RX ORDER — CHLORTHALIDONE 25 MG/1
25 TABLET ORAL DAILY
Status: DISCONTINUED | OUTPATIENT
Start: 2022-07-18 | End: 2022-07-18 | Stop reason: HOSPADM

## 2022-07-18 RX ORDER — AMLODIPINE BESYLATE 5 MG/1
5 TABLET ORAL DAILY
Status: DISCONTINUED | OUTPATIENT
Start: 2022-07-18 | End: 2022-07-18 | Stop reason: HOSPADM

## 2022-07-18 RX ORDER — LIDOCAINE 4 G/G
1 PATCH TOPICAL EVERY 24 HOURS
Qty: 15 PATCH | Refills: 0 | Status: SHIPPED | OUTPATIENT
Start: 2022-07-18 | End: 2022-08-02

## 2022-07-18 RX ORDER — TROLAMINE SALICYLATE 10 G/100G
CREAM TOPICAL 4 TIMES DAILY PRN
Status: DISCONTINUED | OUTPATIENT
Start: 2022-07-18 | End: 2022-07-18 | Stop reason: HOSPADM

## 2022-07-18 RX ORDER — ACETAMINOPHEN 325 MG/1
650 TABLET ORAL EVERY 4 HOURS PRN
Status: DISCONTINUED | OUTPATIENT
Start: 2022-07-18 | End: 2022-07-18 | Stop reason: HOSPADM

## 2022-07-18 RX ORDER — TIZANIDINE 2 MG/1
2 TABLET ORAL 3 TIMES DAILY PRN
Qty: 15 TABLET | Refills: 0 | Status: SHIPPED | OUTPATIENT
Start: 2022-07-18 | End: 2022-08-29

## 2022-07-18 RX ORDER — ONDANSETRON 4 MG/1
4 TABLET, ORALLY DISINTEGRATING ORAL EVERY 6 HOURS PRN
Status: DISCONTINUED | OUTPATIENT
Start: 2022-07-18 | End: 2022-07-18 | Stop reason: HOSPADM

## 2022-07-18 RX ORDER — OXYCODONE HYDROCHLORIDE 5 MG/1
5 TABLET ORAL EVERY 4 HOURS PRN
Status: ON HOLD | COMMUNITY
End: 2022-07-18

## 2022-07-18 RX ORDER — CELECOXIB 100 MG/1
100 CAPSULE ORAL 2 TIMES DAILY WITH MEALS
Status: DISCONTINUED | OUTPATIENT
Start: 2022-07-18 | End: 2022-07-18 | Stop reason: HOSPADM

## 2022-07-18 RX ORDER — HYDROCODONE BITARTRATE AND ACETAMINOPHEN 5; 325 MG/1; MG/1
1 TABLET ORAL EVERY 6 HOURS PRN
Status: DISCONTINUED | OUTPATIENT
Start: 2022-07-18 | End: 2022-07-18

## 2022-07-18 RX ORDER — ONDANSETRON 2 MG/ML
4 INJECTION INTRAMUSCULAR; INTRAVENOUS EVERY 6 HOURS PRN
Status: DISCONTINUED | OUTPATIENT
Start: 2022-07-18 | End: 2022-07-18 | Stop reason: HOSPADM

## 2022-07-18 RX ORDER — LIDOCAINE 4 G/G
1 PATCH TOPICAL
Status: DISCONTINUED | OUTPATIENT
Start: 2022-07-18 | End: 2022-07-18 | Stop reason: HOSPADM

## 2022-07-18 RX ORDER — ATORVASTATIN CALCIUM 10 MG/1
10 TABLET, FILM COATED ORAL DAILY
Status: DISCONTINUED | OUTPATIENT
Start: 2022-07-18 | End: 2022-07-18 | Stop reason: HOSPADM

## 2022-07-18 RX ORDER — ACETAMINOPHEN 650 MG/1
650 SUPPOSITORY RECTAL EVERY 6 HOURS PRN
Status: DISCONTINUED | OUTPATIENT
Start: 2022-07-18 | End: 2022-07-18 | Stop reason: HOSPADM

## 2022-07-18 RX ORDER — HYDROCODONE BITARTRATE AND ACETAMINOPHEN 5; 325 MG/1; MG/1
.5-1 TABLET ORAL EVERY 6 HOURS PRN
Qty: 20 TABLET | Refills: 0 | Status: SHIPPED | OUTPATIENT
Start: 2022-07-18 | End: 2022-07-26

## 2022-07-18 RX ORDER — METHOCARBAMOL 500 MG/1
500 TABLET, FILM COATED ORAL 2 TIMES DAILY PRN
Status: DISCONTINUED | OUTPATIENT
Start: 2022-07-18 | End: 2022-07-18 | Stop reason: HOSPADM

## 2022-07-18 RX ORDER — HYDROCODONE BITARTRATE AND ACETAMINOPHEN 5; 325 MG/1; MG/1
1 TABLET ORAL EVERY 4 HOURS PRN
Status: DISCONTINUED | OUTPATIENT
Start: 2022-07-18 | End: 2022-07-18 | Stop reason: HOSPADM

## 2022-07-18 RX ADMIN — CHLORTHALIDONE 25 MG: 25 TABLET ORAL at 14:24

## 2022-07-18 RX ADMIN — AMLODIPINE BESYLATE 5 MG: 5 TABLET ORAL at 14:23

## 2022-07-18 RX ADMIN — LISINOPRIL 40 MG: 40 TABLET ORAL at 14:23

## 2022-07-18 RX ADMIN — ATORVASTATIN CALCIUM 10 MG: 10 TABLET, FILM COATED ORAL at 14:23

## 2022-07-18 RX ADMIN — LIDOCAINE 1 PATCH: 246 PATCH TOPICAL at 11:31

## 2022-07-18 RX ADMIN — HYDROCODONE BITARTRATE AND ACETAMINOPHEN 1 TABLET: 5; 325 TABLET ORAL at 17:24

## 2022-07-18 RX ADMIN — METHOCARBAMOL TABLETS 500 MG: 500 TABLET, COATED ORAL at 11:29

## 2022-07-18 RX ADMIN — HYDROCODONE BITARTRATE AND ACETAMINOPHEN 1 TABLET: 5; 325 TABLET ORAL at 11:29

## 2022-07-18 RX ADMIN — HYDROCODONE BITARTRATE AND ACETAMINOPHEN 1 TABLET: 5; 325 TABLET ORAL at 02:17

## 2022-07-18 NOTE — PROGRESS NOTES
07/18/22 1347   Quick Adds   Type of Visit Initial PT Evaluation   Living Environment   People in Home spouse   Current Living Arrangements condominium   Home Accessibility stairs to enter home   Number of Stairs, Main Entrance greater than 10 stairs  (16 steps to condo)   Stair Railings, Main Entrance railing on left side (ascending)   Living Environment Comments Able to stay on one level once inside condo.   Self-Care   Usual Activity Tolerance good   Current Activity Tolerance moderate   Regular Exercise No   Equipment Currently Used at Home cane, straight;walker, rolling   Fall history within last six months yes   Number of times patient has fallen within last six months 1   Activity/Exercise/Self-Care Comment Spouse reports pt is independent with all mobility using FWW at baseline. Still drives.   General Information   Onset of Illness/Injury or Date of Surgery 07/17/22   Referring Physician Dr Kevin Lees   Patient/Family Therapy Goals Statement (PT) Go home   Pertinent History of Current Problem (include personal factors and/or comorbidities that impact the POC) Admit for delirium   Existing Precautions/Restrictions fall  (bed/chair alarm)   Pain Assessment   Patient Currently in Pain Yes, see Vital Sign flowsheet   Strength (Manual Muscle Testing)   Strength Comments BLE WFL   Bed Mobility   Assistive Device (Bed Mobility) bed rails   Comment, (Bed Mobility) Sit<>supine CGA.   Transfers   Impairments Contributing to Impaired Transfers decreased strength;pain   Comment, (Transfers) Sit<>stand edge of bed to FWW CGA and wheelchair to FWW CGA.   Gait/Stairs (Locomotion)   Otero Level (Gait) contact guard;verbal cues   Assistive Device (Gait) walker, front-wheeled   Distance in Feet (Required for LE Total Joints) 200'x1   Pattern (Gait) swing-through   Comment, (Gait/Stairs) Up/down 3 steps x 3, CGA with one rail and single point cane.   Clinical Impression   Criteria for Skilled Therapeutic  Intervention Yes, treatment indicated   PT Diagnosis (PT) Impaired functional mobility   Influenced by the following impairments weakness, pain   Functional limitations due to impairments transfers, gait, stairs   Clinical Presentation (PT Evaluation Complexity) Evolving/Changing   Clinical Presentation Rationale Presents as medically diagnosed   Clinical Decision Making (Complexity) moderate complexity   Planned Therapy Interventions (PT) gait training;patient/family education;stair training;transfer training   Anticipated Equipment Needs at Discharge (PT) walker, rolling   Risk & Benefits of therapy have been explained evaluation/treatment results reviewed;care plan/treatment goals reviewed;risks/benefits reviewed;participants voiced agreement with care plan;participants included;patient;spouse/significant other   PT Discharge Planning   PT Discharge Recommendation (DC Rec) (S)  home with assist   PT Rationale for DC Rec Assist from family as needed.

## 2022-07-18 NOTE — PROGRESS NOTES
[facility]      OUTPATIENT PHYSICAL THERAPY EVALUATION  PLAN OF TREATMENT FOR OUTPATIENT REHABILITATION  (COMPLETE FOR INITIAL CLAIMS ONLY)  Patient's Last Name, First Name, M.I.  YOB: 1940  Cyril Galdamez                        Provider's Name  [facility] Medical Record No.  [id number]                               Onset Date:  07/17/22   Start of Care Date:         Type:     _X_PT   ___OT   ___SLP Medical Diagnosis:                           PT Diagnosis:  (P) Impaired functional mobility   Visits from SOC:  1   _________________________________________________________________________________  Plan of Treatment/Functional Goals    Planned Interventions: (P) gait training, patient/family education, stair training, transfer training     Goals: See Physical Therapy Goals on Care Plan in [facility] electronic health record.    Therapy Frequency:    Predicted Duration of Therapy Intervention:    _________________________________________________________________________________    I CERTIFY THE NEED FOR THESE SERVICES FURNISHED UNDER        THIS PLAN OF TREATMENT AND WHILE UNDER MY CARE     (Physician co-signature of this document indicates review and certification of the therapy plan).              Certification date from: (P) 07/18/22,      Referring Physician: Dr Kevin Lees            Initial Assessment        See Physical Therapy evaluation dated   in Epic electronic health record.

## 2022-07-18 NOTE — TELEPHONE ENCOUNTER
Left message to return call. Please relay message from Dr Sung to patient. If he has any further questions, please send to RN queue.    WALLY LuongN, RN  [facility]

## 2022-07-18 NOTE — PHARMACY-ADMISSION MEDICATION HISTORY
"Pharmacy Note - Admission Medication History    Pertinent Provider Information:    -Oxycodone is new prescription written on 7/11/22. He had one dose and felt dizzy. He tolerates Vicodin 5/325mg better than the oxycodone but he run out of refills. He is requesting a refill for the Vicodin 5/325mg one tablets oral q6hprn for severe pain   -Cymbalta and Celebrex_he stopped taking them. He said, \"they are bad\"      ______________________________________________________________________    Prior To Admission (PTA) med list completed and updated in EMR.       PTA Med List   Medication Sig Last Dose     Acetaminophen 325 MG CAPS Take 2 tablets by mouth 7/17/2022 at Unknown time     albuterol (PROAIR HFA/PROVENTIL HFA/VENTOLIN HFA) 108 (90 Base) MCG/ACT inhaler Inhale 1 puff into the lungs Past Week at pt own supply     amLODIPine (NORVASC) 5 MG tablet Take 1 tablet (5 mg) by mouth daily 7/17/2022 at Unknown time     atorvastatin (LIPITOR) 10 MG tablet Take 1 tablet (10 mg) by mouth daily 7/17/2022 at Unknown time     celecoxib (CELEBREX) 100 MG capsule Take 1 capsule (100 mg) by mouth 2 times daily as needed for moderate pain New Rx at Unknown time     chlorthalidone (HYGROTON) 25 MG tablet Take 1 tablet (25 mg) by mouth daily 7/17/2022 at Unknown time     HEMP OIL OR EXTRACT OR OTHER CBD CANNABINOID, NOT MEDICAL CANNABIS, Topical 7/17/2022 at Unknown time     lisinopril (ZESTRIL) 40 MG tablet Take 1 tablet (40 mg) by mouth daily 7/17/2022 at Unknown time     medical cannabis (Patient's own supply) See Admin Instructions (The purpose of this order is to document that the patient reports taking medical cannabis.  This is not a prescription, and is not used to certify that the patient has a qualifying medical condition.) 7/17/2022 at Unknown time     oxyCODONE (ROXICODONE) 5 MG tablet Take 5 mg by mouth every 4 hours as needed for severe pain 7/17/2022       Information source(s): Patient  Method of interview communication: " in-person    Summary of Changes to PTA Med List  New: Cannabis medical tabs and topical   Discontinued: Vicodin_not taking because he needs refills, Cymbalta_doesn't like it, Celebrex new Rx_he doesn't like it. Lutein  Changed:     Patient was asked about OTC/herbal products specifically.  PTA med list reflects this.    In the past week, patient estimated taking medication this percent of the time:  greater than 90%.    Allergies were reviewed, assessed, and updated with the patient.      Patient does not use any multi-dose medications prior to admission.    The information provided in this note is only as accurate as the sources available at the time of the update(s).    Thank you for the opportunity to participate in the care of this patient.    Mel Santizo, PharmD  7/18/2022 8:56 AM

## 2022-07-18 NOTE — PROGRESS NOTES
"[facility]    Daily PAIN Progress Note    Assessment/Plan:  Cyril Galdamez is a 81 year old male who was admitted on 7/17/2022. Admitted for confusion. Pain team was asked to see the patient for pain management in setting of poor pain control. History of anemia, skin cancer, hypertension, dyslipidemia, and severe lumbar central stenosis. The patient does not smoke and but is prescribed and actively using medical cannabis (topical and oral pill) for chronic back pain. Patient denies nausea, vomiting, current confusion, chest pain, or shortness of breath.     Currently patient describes low back and right hip pain as 1-2/10, reporting that he just received a dose of 5-325mg Norco ~90minutes before consult visit. Patient states that prior to PRN medication, pain was 9-10 constant and aching in the right hip, and worsened with activity. Patient denies presence of muscle spasms and reports the pain does not radiate beyond low back and hips. Discussed patients home regimen for pain management and patient expressed frustration as he has \"failed lumbar shots\" in the past and reports that Vicodin has been the only thing to provide comfort in the past but that his [facility] provider will no longer prescribe. Patient reports this prompted him to seek approval for medical cannabis program for which is currently prescribed oral pilld and a topical formulation of cannabis. Patient reports that he recently switched oral agents last week, which we discussed may have contributed to the confusion which prompted admission. Patient manages cannabis independently and per wife in the room she was unaware of his dosing or when he takes so was unable to provide additional insight.     Of note: Patient was seen in the emergency department about a week ago (7/10) for right hip discomfort, acute on chronic. Imaging revealed no fracture or dislocation of the hip, but found to have some degenerative changes but " mechanism of injury was quite mild. He was discharged home with an order of Celebrex and oxycodone, however per patient he reports not taking either due to not understanding indication of Celebrex and patient not liking the way oxycodone made him feel.     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 No acute surgical intervention indicated at this time. Patient needs improved pain control and outpatient follow-up for possible epidural. History of long-standing severe lumbar central stenosis, lumbar facet pain, lumbar myofascial pain & lumbar spondylosis s/p Vertiflex procedure, s/p Left L3-5 RFA on 12/7/21 with 70% relief as of 1/2022.   IMPRESSION:  1. R>L lumbar back pain with history of long-standing lumbar central stenosis and lumbar spondylosis s/p Vertiflex procedure, s/p Left L3-5 RFA on 12/7/21 with 70% relief as of 1/2022  2. Right hip pain likely secondary to atypical L5 radiculopathy    Opioid Induced Respiratory Depression Risk Assessment: moderate r/t age >60 and opioid naive status    The patient's home MME was minimal. Patient has had Vicodin in the past and a recent prescription for oxycodone received last week, but patient reports not taking this due to how it makes him feel. Since admission, patient has utilized two doses of 5-325mg of PO Norco for an MME 10 mg.    PLAN:   1) Right hip pain is consistent with neuropathic pain likely due to severe central stenosis with atypical L5 radiculopathy to right hip.   2) Multimodal Medication Therapy  Topical: lidocaine patch 4% daily, Aspercreme 10% cream Q4H PRN  NSAID'S: Celebrex 100 mg po bid, CrCl 77.8mL/min   Muscle Relaxants: 500mg Robaxin BID PRN  Adjuvants: Tylenol 650mg Q4H PRN  Antidepressants/anxiolytics: none  Opioids: 5-325mg Norco Q4H PRN  IV Pain medication: none  3) Non-medication interventions: ice, heat, repositioning, OT/PT   Acupuncture consult -  offered and ordered  Integrative consult - offered and ordered  4) Constipation Prophylaxis: Scheduled and PRN  5) Discharge Recommendations - We recommend prescribing the following at the time of discharge: TBD. Do not recommend opioids for ongoing management, if warranted would limit to < 3 day supply at discharge, continue adjuvants/topicals if effective for acute pain.       -MN  pulled from system on 7/18/2022.   This indicates oxycodone prescribed last week after ED visit and previous Norco per PCP.    - 7/11/22: 5mg oxycodone #12   - 12/24/21: 5-325mg Norco #30      I was present with Karen Coyle the APRN student who participated in the service and in the documentation of the note. I have verified the history and personally performed the physical exam and medical decision making. I agree with the assessment and plan of care as documented in the note.    MICHELLE Lamb, DNP Student   7/18/2022       Meron Robbins [facility]  Acute Care Pain Management Program  [facility] ([place], [place], [place])  Monday-Friday 8a-4p   Page via online paging system or call [phone]

## 2022-07-18 NOTE — PROGRESS NOTES
"PRIMARY DIAGNOSIS: \"GENERIC\" NURSING  OUTPATIENT/OBSERVATION GOALS TO BE MET BEFORE DISCHARGE:  1. ADLs back to baseline: Yes.    2. Activity and level of assistance: Up with standby assistance.    3. Pain status: Improved-controlled with oral pain medications-Robaxin and Vicodin.    4. Return to near baseline physical activity: Yes.     Discharge Planner Nurse     Safe discharge environment identified: Yes, home with wife.    Barriers to discharge: No    Please review provider order for any additional goals.     Nurse to notify provider when observation goals have been met and patient is ready for discharge.  "

## 2022-07-18 NOTE — CONSULTS
ORTHOPEDIC CONSULTATION    Consultation  Cyril Vigil,  1940, MRN [id number]    [unfilled]  Delirium [R41.0]    PCP: Angel Sung, [phone]   Code status:  Full Code       Extended Emergency Contact Information  Primary Emergency Contact: miguel vigil  Mobile Phone: [phone]  Relation: Spouse         IMPRESSION:  1. R>L lumbar back pain with history of long-standing lumbar central stenosis and lumbar spondylosis s/p Vertiflex procedure, s/p Left L3-5 RFA on 21 with 70% relief as of 2022  2. Right hip pain likely secondary to atypical L5 radiculopathy      PLAN:  This patient was discussed with Cher, on-call surgeon for [facility] and they are in agreement with the following plan.     -Reviewed symptoms, recent hip xrays and MRI from  with patient. Patient's back pain is likely due to severe central stenosis with atypical L5 radiculopathy to Right hip. No acute surgical intervention indicated at this time. Patient needs improved pain control and outpatient follow-up for possible epidural.  -Pain consult pending. Continue Norco for now. Will add Robaxin 500 mg bid as needed and lidocaine patches. Could consider gabapentin but will defer for now given recent confusion.   -PT evaluation.    Thank you for including [facility] in the care of Cyril Vigil. It has been a pleasure participating in Mr. Vigil's care.    CHIEF COMPLAINT: low back pain, right hip pain    HISTORY OF PRESENT ILLNESS:  The patient is seen in orthopedic consultation at the request of Dr. Guerrero.  The patient is a 81 year old male with a past history of long-standing severe lumbar central stenosis, lumbar facet pain, lumbar myofascial pain & lumbar spondylosis s/p Vertiflex procedure, s/p Left L3-5 RFA on 21 with 70% relief as of 2022 who presents to the ED last evening with confusion. He is known to [facility] and is followed by our non-op Spine providers. He was last seen  in January 2022 for follow up of L3-5 RFA on 12/7/21 for L>R low back and Left hip pain. At that time, he was 70% improved. A Left L4 KIRSTIN was recommended but patient opted to hold off. He ended up continuing with pain medications and started medical cannabis. Today he states that the injections only last for 4-5 months at a time.    A week ago, he presented to the ER with Right hip pain after bumping into a table. Xrays showed moderate degenerative disease in Right hip but negative for fracture. He was sent home with oxycodone and Tylenol. He continued to have pain and his PCP also gave him a prescription for celebrex.     Last evening, he developed confusion and was brought to the ED. This am, he doesn't seem to be confused. He is frustrated with the lack of pain control. Pain in low back is 10/10 with activity and 4-5/10 at rest. He doesn't know if the pain radiates to the Right leg but does have Right hip and lateral thigh pain with the back pain. No right knee pain and no pain below right knee. Left hip and leg without any pain. He is also havnig Right leg weakness with hip flexion and usses his arms to lift his leg while in bed. He denies any weakness in his left leg. He denies any numbness or tingling in his bilateral lower extremities. Denies any bowel or bladder incontinence. Denies any saddle anesthesia. He has been using a walker for the past 6 months. He furthermore denies any recent fevers, chest pain, shortness of breath, nausea or vomiting.    PAST MEDICAL HISTORY:  History reviewed. No pertinent past medical history.    ALLERGIES:   Review of patient's allergies indicates   Allergies   Allergen Reactions     Shellfish Allergy Anaphylaxis     MEDICATIONS UPON ADMISSION:  Medications were reviewed.  They include:   (Not in a hospital admission)    SOCIAL HISTORY:   he  reports that he has never smoked. He has never used smokeless tobacco.    FAMILY HISTORY:  family history is not on file.    REVIEW OF  SYSTEMS:   Reviewed with patient. See HPI, otherwise negative    PHYSICAL EXAMINATION:  Vitals: Temp:  [98.7  F (37.1  C)] 98.7  F (37.1  C)  Pulse:  [58-76] 58  Resp:  [10-33] 14  BP: (131-162)/(65-82) 143/70  SpO2:  [96 %-98 %] 98 %  General: On examination, the patient is resting comfortably, NAD, awake and alert and oriented to person, place and time   SKIN: Unremarkable without bruising, rash or erythema overlying lumbar low back region.  Pulses: Palpable bilateral dorsalis pedis pulses  Sensation: Grossly intact to light touch in bilateral lower extremities   Tenderness: TTP over paralumbar region (R>L), no tenderness over midline lumbar regeion, tender right lateral thigh  ROM: moves all toes bilaterally  Motor: Right LE- unable to flex at hip, +4/5 knee ext, +5/5 ankle DF & EHL, +3/5 PF. Left LE- +5/5 hip flex, knee ext, ankle DF, PF & EHL.  Right lower extremity: No pain in Right hip with log roll or passive Right hip internal rotation or external rotation.    RADIOGRAPHIC EVALUATION:  Personally reviewed:    7/10/22 Xray pelvis & hip: No evidence of a right hip fracture. No dislocation. Moderate hip degenerative change. Postoperative changes lumbar spine from prior Coflex device placement.    PERTINENT LABS:  Lab Results: personally reviewed.   WBC 8.3, hgb 12.0.    Lyric Robison PA-C  Date: 7/18/2022  Time: 9:26 AM    CC1:   No att. providers found    CC2:   Angel Sung

## 2022-07-18 NOTE — PROGRESS NOTES
"[facility]    Medicine Progress Note - Hospitalist Service    Date of Admission:  7/17/2022    Assessment & Plan          Acute Toxic Encephalopathy  - Likely delirium due to oxycodone plus other pain meds including marijuana on top of possible mile cognitive impairment.  - had adequate pain control with vicodin.  Will order another now as his pain is worseing  - appreciate Ortho recs of lidoderm and prn methocarbamol for now.  - Patient's plan was to have pain team eval which we can complete today  - if pain controlled with no recurrence of delirium patient can discharge home.  - patient will call and update his wife.  I'm happy to answer questions.    Right hip and R>L Back paindue to long standing lumbar stenosis - rating pain as severe.  - meds as above, no indication for urgent procedure  - PT eval today  - denies bowel or bladder symptoms, no new paresthesia    HTN and HL   - PTA meds restarted from [facility]     Diet: Regular Diet Adult    DVT Prophylaxis: Pneumatic Compression Devices and ambulate.  Sheppard Catheter: Not present  Central Lines: None  Cardiac Monitoring: None  Code Status: Full Code      Disposition Plan      Expected Discharge Date: 07/18/2022,  6:00 PM  Discharge Delays: Specialist Consult (enter specialist & decision needed in comments)    Discharge Comments: May d/c after PT/Pain consults if no recurence of delerium.        The patient's care was discussed with the Bedside Nurse and Patient.    Kevin Lees MD  Hospitalist Service  [facility]  Securely message with the Vocera Web Console (learn more here)  Text page via [employer] Paging/Directory         Clinically Significant Risk Factors Present on Admission                 # Hypertension: home medication list includes antihypertensive(s)    # Overweight: Estimated body mass index is 25.1 kg/m  as calculated from the following:    Height as of this encounter: 1.803 m (5' 11\").    Weight " as of this encounter: 81.6 kg (180 lb).        ______________________________________________________________________    Interval History       Data reviewed today: I reviewed all medications, new labs and imaging results over the last 24 hours. I personally reviewed     Physical Exam   Vital Signs: Temp: 98.5  F (36.9  C) Temp src: Oral BP: (!) 144/76 Pulse: 62   Resp: 16 SpO2: 99 % O2 Device: None (Room air)    Weight: 180 lbs 0 oz  Constitutional: awake, alert, cooperative, mild distress, and appears stated age  Skin: normal skin color, texture, turgor and no jaundice  Neurologic: Awake, alert, oriented to name, place and time.  Cranial nerves II-XII are grossly intact.  Moves all extremities.    Data   Recent Labs   Lab 07/18/22  0609 07/17/22  2256   WBC 8.3 9.5   HGB 12.0* 12.7*   MCV 98 94    230    134*   POTASSIUM 4.1 3.9   CHLORIDE 104 103   CO2 27 23   BUN 23 26   CR 0.83 0.89   ANIONGAP 6 8   BABITA 9.3 9.5    113   ALBUMIN 3.4* 3.7   PROTTOTAL 6.0 6.6   BILITOTAL 0.7 0.6   ALKPHOS 57 64   ALT 22 23   AST 17 20     Recent Results (from the past 24 hour(s))   Head CT w/o contrast    Narrative    EXAM: CT HEAD W/O CONTRAST  LOCATION: [facility]  DATE/TIME: 7/17/2022 11:08 PM    INDICATION: Confusion, delirium.  COMPARISON: None.  TECHNIQUE: Routine CT Head without IV contrast. Multiplanar reformats. Dose reduction techniques were used.    FINDINGS:  INTRACRANIAL CONTENTS: No intracranial hemorrhage, extraaxial collection, or mass effect. No CT evidence of acute infarct. Mild volume loss and presumed chronic small vessel ischemia.    VISUALIZED ORBITS/SINUSES/MASTOIDS: No intraorbital abnormality. Small mucous retention cyst in the right maxillary sinus. No middle ear or mastoid effusion.    BONES/SOFT TISSUES: No acute abnormality.      Impression    IMPRESSION:  1.  No acute intracranial abnormality.    2.  Age-related change.     Medications       lidocaine  1  patch Transdermal Q24H     lidocaine   Transdermal Q8H JIAN

## 2022-07-18 NOTE — PLAN OF CARE
Problem: Plan of Care - These are the overarching goals to be used throughout the patient stay.    Goal: Plan of Care Review/Shift Note  Description: The Plan of Care Review/Shift note should be completed every shift.  The Outcome Evaluation is a brief statement about your assessment that the patient is improving, declining, or no change.  This information will be displayed automatically on your shift note.  Outcome: Ongoing, Progressing    Goal Outcome Evaluation:    Patient A & O x 4. Delirium resolved. Pain controlled with [place] and Robaxin. Hospital goals met. MD informed. Discharge ordered placed. Planned discharged @ 1800 to home via ride from wife.    Problem: Pain Acute  Goal: Acceptable Pain Control and Functional Ability  Outcome: Met     Problem: Pain Acute  Goal: Acceptable Pain Control and Functional Ability  Outcome: Met

## 2022-07-18 NOTE — ED PROVIDER NOTES
EMERGENCY DEPARTMENT ENCOUNTER      NAME: Cyril Galdamez  AGE: 81 year old male  YOB: 1940  MRN: [id number]  EVALUATION DATE & TIME: 2022 10:20 PM    PCP: Angel Sung    ED PROVIDER: Pj Wong M.D.      Chief Complaint   Patient presents with     Medication Reaction         FINAL IMPRESSION:  1. Delirium          ED COURSE & MEDICAL DECISION MAKIN year old male presents to the Emergency Department for evaluation of confusion.  Collateral history obtained from the patient's wife.  He was seen in the emergency department about a week ago for right hip discomfort, acute on chronic.  Found to have some degenerative changes but no fracture or significant injury, mechanism of injury was quite mild.  Since that time he has been using medical cannabis, intermittent oxycodone and Norco, as well as recent addition of Celebrex for pain.  He arrives to the emergency department with complaints of confusion.  He seems otherwise nonfocal on neurological exam.  For me initially he is actually fully awake and oriented.  He does have some repetitive questioning particularly with the RN who reports he does not seem to have a good grasp on what is going on today, the purpose of his visit, and at times is saying things that do not make any sense at all.  He underwent a lab evaluation as below.  This included stable metabolic profile and blood glucose.  Negative noncontrast head CT.  Again with an otherwise nonfocal neurological exam suspicion would be lower for acute infarction requiring immediate MRI imaging, certainly would not be a candidate for thrombolytics.  Urine analysis negative.  Afebrile with normal white blood cell count so far to suggest infection.  He actually has good range of motion in his right hip and I do not really suspect an occult fracture that would have been picked up on imaging from his last visit.  Polypharmacy may be contributing although family is not convinced  that he took any narcotic pain medication.  He will be admitted for delirium, continue monitoring, and work-up as needed if not clearing.  Discussed case with hospitalist.  Family is going to visit tomorrow morning to discuss with care team    10:23 PM I met with the patient to gather history and to perform my initial exam. I discussed the plan for care while in the Emergency Department. PPE (surgical mask) was worn during patient encounters.   10:35 PM I spoke with patient's wife via telephone regarding patients arrival.  11:50 PM I re-evaluated the patient.  11:52 PM I spoke with patient's wife via telephone regarding patients status.        MEDICATIONS GIVEN IN THE EMERGENCY:  Medications   melatonin tablet 1 mg (has no administration in time range)   ondansetron (ZOFRAN ODT) ODT tab 4 mg (has no administration in time range)     Or   ondansetron (ZOFRAN) injection 4 mg (has no administration in time range)   acetaminophen (TYLENOL) tablet 650 mg (has no administration in time range)     Or   acetaminophen (TYLENOL) Suppository 650 mg (has no administration in time range)   HYDROcodone-acetaminophen (NORCO) 5-325 MG per tablet 1 tablet (1 tablet Oral Given 7/18/22 0217)   trolamine salicylate (ASPERCREME) 10 % cream (has no administration in time range)   0.9% sodium chloride BOLUS (0 mLs Intravenous Stopped 7/17/22 3637)       NEW PRESCRIPTIONS STARTED AT TODAY'S ER VISIT  New Prescriptions    No medications on file          =================================================================    HPI    Patient information was obtained from: Patient    Use of : N/A        Cyril Galdamez is a 81 year old male with a pertinent history of anemia, skin cancer, hypertension, dyslipidemia who presents to this ED by EMS for evaluation of a medication reaction.    Per chart review, patient was seen on 7/10 (~1 week ago) at [facility][1] for right-sided hip pain. Patient states that he is unable to walk  "without a walker and this pain in his hip causes that to be more difficult. Patient received a 1 mg injection of Dilaudid in the ED. Imaging revealed no fracture or dislocation of the hip. Patient was prescribed Roxicodone for pain and discharged home in stable condition.    Per patient's wife, patient has been persistently confused today and complaining of lightheadedness. Wife notes that he started Celebrex today for his hip pain that was prescribed during last ED visit (~1 week ago). She notes that he does use a walker to get around but it has been more difficult with the hip pain. She states that patient has been concerned of dying due to this hip pian and urged her to call EMS which she did. Wife states that she does not think patient took prescribed Norco but does confirm that patient has medical cannabis. Wife denies any recent falls, fever, or other complaints from the patient.    Patient states that he took Celebrex at 0530 today (~17 hours ago) and felt it \"kick in\" in the afternoon where he began to act \"weird\". Patient states that he felt dizzy and has been dealing with the symptoms all day. Patient also notes that he has right-sided leg and hip pain that he describes as \"not getting better\". He rates this pain a 9/10.      REVIEW OF SYSTEMS   All systems reviewed and negative except as noted in HPI.    PAST MEDICAL HISTORY:  History reviewed. No pertinent past medical history.    PAST SURGICAL HISTORY:  History reviewed. No pertinent surgical history.        CURRENT MEDICATIONS:    Current Facility-Administered Medications   Medication     acetaminophen (TYLENOL) tablet 650 mg    Or     acetaminophen (TYLENOL) Suppository 650 mg     HYDROcodone-acetaminophen (NORCO) 5-325 MG per tablet 1 tablet     melatonin tablet 1 mg     ondansetron (ZOFRAN ODT) ODT tab 4 mg    Or     ondansetron (ZOFRAN) injection 4 mg     trolamine salicylate (ASPERCREME) 10 % cream     Current Outpatient Medications   Medication "     Acetaminophen 325 MG CAPS     albuterol (PROAIR HFA/PROVENTIL HFA/VENTOLIN HFA) 108 (90 Base) MCG/ACT inhaler     amLODIPine (NORVASC) 5 MG tablet     atorvastatin (LIPITOR) 10 MG tablet     celecoxib (CELEBREX) 100 MG capsule     chlorthalidone (HYGROTON) 25 MG tablet     DULoxetine (CYMBALTA) 20 MG capsule     HYDROcodone-acetaminophen (NORCO) 5-325 MG tablet     lisinopril (ZESTRIL) 40 MG tablet     Lutein 20 MG TABS     ramelteon (ROZEREM) 8 MG tablet         ALLERGIES:  Allergies   Allergen Reactions     Shellfish Allergy Anaphylaxis       FAMILY HISTORY:  History reviewed. No pertinent family history.    SOCIAL HISTORY:   Social History     Socioeconomic History     Marital status:    Tobacco Use     Smoking status: Never Smoker     Smokeless tobacco: Never Used       VITALS:  BP (!) 143/70   Pulse 58   Temp 98.7  F (37.1  C) (Oral)   Resp 14   SpO2 98%     PHYSICAL EXAM    Constitutional: Chronically ill-appearing elderly male patient, laying in bed, no distress  HENT: Normocephalic, Atraumatic. Neck Supple.  Eyes: EOMI, Conjunctiva normal.  Respiratory: Breathing comfortably on room air. Speaks full sentences easily. Lungs clear to ascultation.  Cardiovascular: Normal heart rate, Regular rhythm. No peripheral edema.  Abdomen: Soft, nontender  Musculoskeletal: Good range of motion in all major joints. No major deformities noted.  No point tenderness in the right hip.  Intact passive range of motion.  Integument: Warm, Dry.  Neurologic: Awake, alert, oriented x3 but slow to answer questions.  Does repeatedly ask for clarification on simple questions and responses are slow.  Face is symmetric.  Speech is otherwise intelligible and normal.  Strength is 5 out of 5 throughout bilateral upper and lower extremities.  Sensation light touch is intact x4.  Gross coordination is preserved  Psychiatric: Cooperative. Affect appropriate.     LAB:  All pertinent labs reviewed and interpreted.  Labs Ordered  and Resulted from Time of ED Arrival to Time of ED Departure   COMPREHENSIVE METABOLIC PANEL - Abnormal       Result Value    Sodium 134 (*)     Potassium 3.9      Chloride 103      Carbon Dioxide (CO2) 23      Anion Gap 8      Urea Nitrogen 26      Creatinine 0.89      Calcium 9.5      Glucose 113      Alkaline Phosphatase 64      AST 20      ALT 23      Protein Total 6.6      Albumin 3.7      Bilirubin Total 0.6      GFR Estimate 85     ROUTINE UA WITH MICROSCOPIC REFLEX TO CULTURE - Abnormal    Color Urine Light Yellow      Appearance Urine Clear      Glucose Urine Negative      Bilirubin Urine Negative      Ketones Urine Negative      Specific Gravity Urine 1.009      Blood Urine Negative      pH Urine 6.5      Protein Albumin Urine Negative      Urobilinogen Urine <2.0      Nitrite Urine Negative      Leukocyte Esterase Urine Negative      Mucus Urine Present (*)     RBC Urine <1      WBC Urine <1      Squamous Epithelials Urine <1     CBC WITH PLATELETS AND DIFFERENTIAL - Abnormal    WBC Count 9.5      RBC Count 3.86 (*)     Hemoglobin 12.7 (*)     Hematocrit 36.4 (*)     MCV 94      MCH 32.9      MCHC 34.9      RDW 12.3      Platelet Count 230      % Neutrophils 70      % Lymphocytes 20      % Monocytes 9      % Eosinophils 1      % Basophils 0      % Immature Granulocytes 0      NRBCs per 100 WBC 0      Absolute Neutrophils 6.6      Absolute Lymphocytes 1.9      Absolute Monocytes 0.8      Absolute Eosinophils 0.1      Absolute Basophils 0.0      Absolute Immature Granulocytes 0.0      Absolute NRBCs 0.0     COMPREHENSIVE METABOLIC PANEL - Abnormal    Sodium 137      Potassium 4.1      Chloride 104      Carbon Dioxide (CO2) 27      Anion Gap 6      Urea Nitrogen 23      Creatinine 0.83      Calcium 9.3      Glucose 103      Alkaline Phosphatase 57      AST 17      ALT 22      Protein Total 6.0      Albumin 3.4 (*)     Bilirubin Total 0.7      GFR Estimate 87     MAGNESIUM - Abnormal    Magnesium 1.7 (*)     CBC WITH PLATELETS AND DIFFERENTIAL - Abnormal    WBC Count 8.3      RBC Count 3.61 (*)     Hemoglobin 12.0 (*)     Hematocrit 35.2 (*)     MCV 98      MCH 33.2 (*)     MCHC 34.1      RDW 12.3      Platelet Count 213      % Neutrophils 59      % Lymphocytes 29      % Monocytes 9      % Eosinophils 2      % Basophils 1      % Immature Granulocytes 0      NRBCs per 100 WBC 0      Absolute Neutrophils 5.0      Absolute Lymphocytes 2.4      Absolute Monocytes 0.7      Absolute Eosinophils 0.2      Absolute Basophils 0.1      Absolute Immature Granulocytes 0.0      Absolute NRBCs 0.0     TROPONIN I - Normal    Troponin I 0.01     TSH WITH FREE T4 REFLEX - Normal    TSH 0.68     AMMONIA - Normal    Ammonia 24     COVID-19 VIRUS (CORONAVIRUS) BY PCR - Normal    SARS CoV2 PCR Negative     CRP INFLAMMATION - Normal    CRP <0.1     ERYTHROCYTE SEDIMENTATION RATE AUTO       RADIOLOGY:  Reviewed all pertinent imaging. Please see official radiology report.  Head CT w/o contrast   Final Result   IMPRESSION:   1.  No acute intracranial abnormality.      2.  Age-related change.          EKG:    Performed at: 22:47    Impression: sinus rhythm with 1st degree AV block and nonspecific T wave abnormality    Rate: 67  Rhythm: sinus rhythm with 1st degree AV block   Axis: 40  W[place] Interval: 272  QRS Interval: 96  QTc Interval: 412  ST Changes: nonspecific T wave abnormality  Comparison: no previous EKG available for comparison    I have independently reviewed and interpreted the EKG(s) documented above.      I, Diana Jauregui, am serving as a scribe to document services personally performed by Dr. Pj Wong, based on my observation and the provider's statements to me. I, Pj Wong MD attest that Diana Jauregui is acting in a scribe capacity, has observed my performance of the services and has documented them in accordance with my direction.    Pj Wong M.D.  Emergency Medicine  [facility]  [place] EMERGENCY ROOM  [address] [place] [id number]  [phone]  Dept: [phone]     Pj Wong MD  07/18/22 0808

## 2022-07-18 NOTE — ED TRIAGE NOTES
Pts wife called EMS with report of confusion and anxiety. Pt is concerned he's having a medication reaction to his first ever dose of Celebrex that he is taking for arthritis - afraid he was going to die. Wife reported to EMS that the patient was concerned he was going die due to the arthritic hip pain, pt urged her to call the ambulance. Wife reports this behavior is not his baseline.     Triage Assessment     Row Name 07/17/22 4869       Triage Assessment (Adult)    Airway WDL WDL       Respiratory WDL    Respiratory WDL WDL       Skin Circulation/Temperature WDL    Skin Circulation/Temperature WDL WDL       Cardiac WDL    Cardiac WDL WDL       Peripheral/Neurovascular WDL    Peripheral Neurovascular WDL WDL       Cognitive/Neuro/Behavioral WDL    Cognitive/Neuro/Behavioral WDL X;mood/behavior    Level of Consciousness confused       [place] Coma Scale    Best Eye Response 4-->(E4) spontaneous    Best Motor Response 6-->(M6) obeys commands    Best Verbal Response 4-->(V4) confused    [place] Coma Scale Score 14

## 2022-07-18 NOTE — H&P
[facility] MEDICINE ADMISSION HISTORY AND PHYSICAL       Assessment & Plan      1. Not hallucinating and not confused when I met him -- Just expressed his frustration that medications he is taking not helping for his back and right hip pain, making him confused or hallucinate.     Did not appear delirious during my encounter, but at high risk specially if pain is not well controlled     He looked comfortable while laying in bed -     I did see a lumbar MRI in 2017 -- and this showed facet arthropathy, degenerative findings, spondylolisthesis, stenosis. He denies bowel or bladder disturbances. No perineal numbness    I did see a hip XR July 10 -- hip XR -- Moderate hip degenerative change.     - for pain control - will consider pain service consult -- he was appreciative of it. He was concerned that oxycodone is making him confused, he did report OK with vicodin and will order this, plus some topicals     - will consult Spine ortho -- from [place] - he might need KIRSTIN, and/or RFA for his facets   - Neuro checks, Fall precs  - Consider lumbar and hip MR --- I am not sure if he had one done at outpatient recently --- pls check in AM       2. HTN and HL   - PTA meds       VTE prophylaxis: SCDs, If staying more than 24 hrs, pls order lovenox   Diet:  Regular   Code Status: Full,   COVID test result:  Pending   COVID vaccination: completed   Barriers to discharge: admitting clinical condition  Discharge Disposition and goals:  Unable to determine at this point, pending clinical progress and response to treatment. Patient may need transfer to SNF or [facility] if unsafe to go home and needed treatment inappropriate at home setting OR may need home health care evaluation if care can be delivered at home settings. Consider referral to care manager/    PPE - I was wearing PPE when I met the patient - N95 mask, Surgical mask, Isolation gown, Gloves, Safety glasses.        Care plan was created  based on available information provided, including patient's condition at the time of encounter.   This plan was discussed with patient and/or family members using layman's terms and have agreed to proceed.   At the end of night shift (9PM - 730A), this case will be presented to the AM Hospitalist.    It is recommended to revise care plan and review history if there is change in condition and/or new clinical information is not available during my encounter.     All or some of home medication/s were not resumed on admission due to safety reasons or contraindications. Dosing and frequency may also have been modified. Please resume/review them during your visit.     70 minutes of total visit duration and greater than 50% was spent in direct evaluation of patient and coordination of care including discussion of diagnostic test results and recommended treatment. .      Marcel Guerrero MD, MPH, FACP, [facility]  Internal Medicine - Hospitalist        Chief Complaint Hallucinations      HISTORY     - Met him in the ED-11. He was laying flat, and comfortable, not agitated and restless    - He came in because of low back pain and right hip pain. No falls. He was seen in the ED July 10, and had hip XR -- Moderate hip degenerative change.     - He was seen in the past at [facility] -- and was told nothing to do more for him. He was tearful while saying this and was told to see his PCP for pain medications.  He said, he had shots on his back and only lasted for 4 months.   - I did see a lumbar MRI in 2017 -- and this showed facet arthropathy, degenerative findings, spondylolisthesis, stenosis.  - He denies bowel or bladder disturbances. No perineal numbness    - He said, he was given oxycodone last ED visit and seems did not agree with him as he got confused. He was given celebrex. He has been taking cannabis and seems not working. He tried vicodin and helped him in the past.    - A careline call was made to RN --- he was taking  pain meds, have some hallucination, uses a walker to ambulate and feels dizzy when standing, Pt sounds disorganized, unsure of concern. Been taking cannabis, celebrex and pain medication  Wife states pt Was ok this afternoon but now does not seem to be himself. does not think he is over taking anything. Has difficulty falling asleep. Denies feeling sick to the stomach, headaches or difficulty breathing.     - In the ED, head CT was negative. Most labs OK.     - ROS --- No headache. No dizziness. No weakness. No CP or SOB. No palpitations. No abdominal pain. No nausea or vomiting. No urinary symptoms. No bleeding symptoms. No weight loss. Rest of 12 point ROS was reviewed and negative.         Past Medical History     Benign paroxysmal positional vertigo 01/04/2022   History of respiratory system disease 01/04/2022   Encounter for colonoscopy due to history of colon cancer 08/05/2021   Skin cancer 05/27/2021   Overview:     Formatting of this note might be different from the original.  05/2021: BCC, left lateral suprabrow, MOHs 7/6/21: Dr. Tellez   Bilateral acute serous otitis media 03/31/2021   Anemia 03/31/2021   Dizziness 03/31/2021   Asbestosis 02/17/2021   Overview:     Formatting of this note might be different from the original.  Pleural plaques incidentally on CT  Pleural plaques incidentally on CT   First degree atrioventricular block 02/17/2021   Impaired glucose tolerance 02/17/2021   Olecranon bursitis of right elbow 11/02/2020   Colon cancer 07/17/2019   Overview:     Formatting of this note might be different from the original.  Right hemicolectomy 2000   Hypertension 07/17/2019   Sinus bradycardia 07/09/2019   Spinal stenosis of lumbar region 07/09/2019   Dyslipidemia 03/18/2019   Microalbuminuria 03/18/2019   Asthma 06/07/2003   Overview:     Formatting of this note might be different from the original.  Overview:   Asthma NOS   Anemia     Overview:     Formatting of this note might be different  from the original.  1/22: declines starting iron due to abd pain         Surgical History     COLECTOMY 8/15/2000   colon cancer     TONSILLECTOMY          COLONOSCOPY     multiple     COLONOSCOPY 7/18/2014   no polyps     WRIST GANGLION EXCISION 1/18/2016 Left      colonoscopy 7/24/2019        BACK SURGERY 11/1/2017 - 11/30/2017        COLONOSCOPY 08/06/2021        MOHS SURGERY 7/1/2021 - 7/31/2021            Family History      History reviewed. No pertinent family history.      Social History      .  Social History     Socioeconomic History     Marital status:      Spouse name: Not on file     Number of children: Not on file     Years of education: Not on file     Highest education level: Not on file   Occupational History     Not on file   Tobacco Use     Smoking status: Never Smoker     Smokeless tobacco: Never Used   Substance and Sexual Activity     Alcohol use: Not on file     Drug use: Not on file     Sexual activity: Not on file   Other Topics Concern     Not on file   Social History Narrative     Not on file     Social Determinants of Health     Financial Resource Strain: Not on file   Food Insecurity: Not on file   Transportation Needs: Not on file   Physical Activity: Not on file   Stress: Not on file   Social Connections: Not on file   Intimate Partner Violence: Not on file   Housing Stability: Not on file          Allergies        Allergies   Allergen Reactions     Shellfish Allergy Anaphylaxis         Prior to Admission Medications      No current facility-administered medications on file prior to encounter.  Acetaminophen 325 MG CAPS, Take 2 tablets by mouth  albuterol (PROAIR HFA/PROVENTIL HFA/VENTOLIN HFA) 108 (90 Base) MCG/ACT inhaler, Inhale 1 puff into the lungs  amLODIPine (NORVASC) 5 MG tablet, Take 1 tablet (5 mg) by mouth daily  atorvastatin (LIPITOR) 10 MG tablet, Take 1 tablet (10 mg) by mouth daily  celecoxib (CELEBREX) 100 MG capsule, Take 1 capsule (100 mg) by mouth 2 times  daily as needed for moderate pain  chlorthalidone (HYGROTON) 25 MG tablet, Take 1 tablet (25 mg) by mouth daily  DULoxetine (CYMBALTA) 20 MG capsule, Take 1 capsule (20 mg) by mouth 2 times daily  HYDROcodone-acetaminophen (NORCO) 5-325 MG tablet, Take 1 tablet by mouth every 6 hours as needed for severe pain  lisinopril (ZESTRIL) 40 MG tablet, Take 1 tablet (40 mg) by mouth daily  Lutein 20 MG TABS,   ramelteon (ROZEREM) 8 MG tablet, Take 1 tablet (8 mg) by mouth At Bedtime            Review of Systems     A 12 point comprehensive review of systems was negative except as noted above in HPI.    PHYSICAL EXAMINATION       Vitals      Vitals: /65   Pulse 67   Temp 98.7  F (37.1  C) (Oral)   Resp 10   SpO2 97%   BMI= There is no height or weight on file to calculate BMI.      Examination     General Appearance:  Alert, cooperative, no distress  Head:    Normocephalic, without obvious abnormality, atraumatic  EENT:  PERRL, conjunctiva/corneas clear, EOM's intact.   Neck:   Supple, symmetrical, trachea midline, no adenopathy; no NVE  Back:  Symmetric, no curvature, no CVA tenderness  Chest/Lungs: Clear to auscultation bilaterally, respirations unlabored, No tenderness or deformity. No abdominal breathing or use of accessory muscles.   Heart:    Regular rate and rhythm, S1 and S2 normal, no murmur, rub   or gallop  Abdomen: Soft, non-tender, bowel sounds active all four quadrants, not peritoneal on palpation. Not distended  Extremities:  Extremities normal, atraumatic, no swelling   Skin:  Skin color, texture, turgor normal, no rashes or lesion  Neurologic:  Awake and moving extremities, he was able to lift left LE, and also lifted RLE< but limited by pain on his back           Pertinent Lab     Results for orders placed or performed during the hospital encounter of 07/17/22   Head CT w/o contrast    Impression    IMPRESSION:  1.  No acute intracranial abnormality.    2.  Age-related change.   Comprehensive  metabolic panel   Result Value Ref Range    Sodium 134 (L) 136 - 145 mmol/L    Potassium 3.9 3.5 - 5.0 mmol/L    Chloride 103 98 - 107 mmol/L    Carbon Dioxide (CO2) 23 22 - 31 mmol/L    Anion Gap 8 5 - 18 mmol/L    Urea Nitrogen 26 8 - 28 mg/dL    Creatinine 0.89 0.70 - 1.30 mg/dL    Calcium 9.5 8.5 - 10.5 mg/dL    Glucose 113 70 - 125 mg/dL    Alkaline Phosphatase 64 45 - 120 U/L    AST 20 0 - 40 U/L    ALT 23 0 - 45 U/L    Protein Total 6.6 6.0 - 8.0 g/dL    Albumin 3.7 3.5 - 5.0 g/dL    Bilirubin Total 0.6 0.0 - 1.0 mg/dL    GFR Estimate 85 >60 mL/min/1.73m2   UA with Microscopic reflex to Culture    Specimen: Urine, Clean Catch   Result Value Ref Range    Color Urine Light Yellow Colorless, Straw, Light Yellow, Yellow    Appearance Urine Clear Clear    Glucose Urine Negative Negative mg/dL    Bilirubin Urine Negative Negative    Ketones Urine Negative Negative mg/dL    Specific Gravity Urine 1.009 1.001 - 1.030    Blood Urine Negative Negative    pH Urine 6.5 5.0 - 7.0    Protein Albumin Urine Negative Negative mg/dL    Urobilinogen Urine <2.0 <2.0 mg/dL    Nitrite Urine Negative Negative    Leukocyte Esterase Urine Negative Negative    Mucus Urine Present (A) None Seen /LPF    RBC Urine <1 <=2 /HPF    WBC Urine <1 <=5 /HPF    Squamous Epithelials Urine <1 <=1 /HPF   Troponin I (now)   Result Value Ref Range    Troponin I 0.01 0.00 - 0.29 ng/mL   CBC with platelets and differential   Result Value Ref Range    WBC Count 9.5 4.0 - 11.0 10e3/uL    RBC Count 3.86 (L) 4.40 - 5.90 10e6/uL    Hemoglobin 12.7 (L) 13.3 - 17.7 g/dL    Hematocrit 36.4 (L) 40.0 - 53.0 %    MCV 94 78 - 100 fL    MCH 32.9 26.5 - 33.0 pg    MCHC 34.9 31.5 - 36.5 g/dL    RDW 12.3 10.0 - 15.0 %    Platelet Count 230 150 - 450 10e3/uL    % Neutrophils 70 %    % Lymphocytes 20 %    % Monocytes 9 %    % Eosinophils 1 %    % Basophils 0 %    % Immature Granulocytes 0 %    NRBCs per 100 WBC 0 <1 /100    Absolute Neutrophils 6.6 1.6 - 8.3 10e3/uL     Absolute Lymphocytes 1.9 0.8 - 5.3 10e3/uL    Absolute Monocytes 0.8 0.0 - 1.3 10e3/uL    Absolute Eosinophils 0.1 0.0 - 0.7 10e3/uL    Absolute Basophils 0.0 0.0 - 0.2 10e3/uL    Absolute Immature Granulocytes 0.0 <=0.4 10e3/uL    Absolute NRBCs 0.0 10e3/uL           Pertinent Radiology

## 2022-07-18 NOTE — PROGRESS NOTES
Patient has met discharge milestones.  His pain is controlled and his delirium has cleared.  He is tolerating single Vicodin tabs along with no opioid adjuncts.  He needs Ortho follow up for likely facet injection as an outpatient.    I re-evaluated him and as he was demanding to be discharged this morning he is now hesitant to go home.  He is concerned his wife is too exhausted to help him.  I told him we won't discharge him without a safe plan but that he had reached maximum hospital benefit from this visit.  I will presumptively enter discharge orders and help address any discharge barriers.  If he does not in fact have safe discharge then we will re-evaluate tomorrow.

## 2022-07-18 NOTE — TELEPHONE ENCOUNTER
Received verbal consent to communicate with Wife Riya, pt calling states he was taking pain meds, have some hallucination, uses a walker to ambulate and feels dizzy when standing, Pt sounds disorganized, unsure of concern.  Been taking cannabis, celebrex and pain medication   Wife states pt Was ok this afternoon but now does not seem to be himself. does not think he is over taking anything. Has difficulty falling asleep.  Denies feeling sick to the stomach, headaches or difficulty breathing.      Triage to call , Pt will call.     Moris Shore, RN, BSN  7/17/2022 at 9:33 PM  [facility]                 Reason for Disposition    [1] Difficult to awaken or acting confused (e.g., disoriented, slurred speech) AND [2] present now AND [3] new onset    Additional Information    Negative: [1] Difficult to awaken or acting confused (e.g., disoriented, slurred speech) AND [2] present now AND [3] has diabetes (diabetes mellitus)    Protocols used: CONFUSION - DELIRIUM-A-AH

## 2022-07-19 NOTE — DISCHARGE SUMMARY
[facility]  Hospitalist Discharge Summary      Date of Admission:  7/17/2022  Date of Discharge:  7/18/2022  6:18 PM  Discharging Provider: Kevin Lees MD  Discharge Service: Hospitalist Service    Discharge Diagnoses   Delirium due to medication for back pain.    Follow-ups Needed After Discharge   Follow-up Appointments     Follow-up and recommended labs and tests       Follow up with primary care provider, Angel Sung, within 7 days to   evaluate medication change and for hospital follow- up.  The following   labs/tests are recommended: Recheck Basic Metabolic Profile plus   magnesium.      Follow Up with [facility] clinic for further care.             Unresulted Labs Ordered in the Past 30 Days of this Admission     No orders found from 6/17/2022 to 7/18/2022.      These results will be followed up by N/A.    Discharge Disposition   Discharged to home  Outpatient PT ordered due to in hospital evaluation.    Condition at discharge: Satisfactory      Hospital Course            Acute Toxic Encephalopathy  - Likely delirium due to oxycodone plus other pain meds including marijuana on top of possible mild cognitive impairment.  - pain currently controlled with 1 vicodin tab q6h without delirium recurrence.   Will discharge on a small amt of these but patient understands long-term opioid is not an option.  - f/u with his marijuana dispensery to assess potential cognitive side effects of this treatment.  - based on his delirium risk factors recommend discharge as he has met maximal hospital benefit.    - ha adequate pain control with vicodin.  Will order another now as his pain is worseing  - appreciate Ortho recs of lidoderm and prn methocarbamol for now.  - Patient's plan was to have pain team eval which we can complete today  - if pain controlled with no recurrence of delirium patient can discharge home.  - patient will call and update his wife.  I'm happy to answer  questions.    Right hip and R>L Back paindue to long standing lumbar stenosis - rating pain as severe.  - PT eval today recommends ongoing therapy.  TCU not covered due to obs status.  Patient prefers home discharge has been able to function there.  - denies bowel or bladder symptoms, no new paresthesia.  - f/u with ortho for possible facet injection  - adjunctive pain meds as below.  D/W pain team and with patient.    HTN and HL   - continue home meds.    Consultations This Hospital Stay   PAIN MANAGEMENT ADULT IP CONSULT  ORTHOPEDIC SURGERY IP CONSULT  OCCUPATIONAL THERAPY ADULT IP CONSULT  PHYSICAL THERAPY ADULT IP CONSULT    Code Status   Prior    Time Spent on this Encounter   I, Kevin Lees MD, personally saw the patient today and spent greater than 30 minutes discharging this patient.       Kevin Lees MD  [address] [address]  Phone: [phone]  Fax: [phone]  ______________________________________________________________________    Physical Exam   Vital Signs: Temp: 97.4  F (36.3  C) Temp src: Oral BP: (!) 146/66 Pulse: 55   Resp: 16 SpO2: 98 % O2 Device: None (Room air)    Weight: 180 lbs 0 oz  Constitutional: awake, alert, cooperative, no apparent distress, and appears stated age. Knows president and remembers all details from our earlier interaction.  Respiratory: No increased work of breathing, good air exchange  Cardiovascular: regular rate and rhythm, normal S1 and S2  Musculoskeletal: There is no redness, warmth, or swelling of the joints.    Neurologic: Awake, alert, oriented to name, place and time.  Cranial nerves II-XII are grossly intact.  Moves all extremities.       Primary Care Physician   Angel Sung    Discharge Orders      Physical Therapy Referral      Reason for your hospital stay    Back Pain and Delirium.  Likely due to oxycodone.     Follow-up and recommended labs and tests      Follow up with primary care provider, Angel Sung, within 7 days to evaluate medication change and for hospital follow- up.  The following labs/tests are recommended: Recheck Basic Metabolic Profile plus magnesium.      Follow Up with [facility] for further care.     Activity    Your activity upon discharge: activity as tolerated     Diet    Follow this diet upon discharge: Orders Placed This Encounter      Regular Diet Adult       Significant Results and Procedures   Results for orders placed or performed during the hospital encounter of 07/17/22   Head CT w/o contrast    Narrative    EXAM: CT HEAD W/O CONTRAST  LOCATION: [facility]  DATE/TIME: 7/17/2022 11:08 PM    INDICATION: Confusion, delirium.  COMPARISON: None.  TECHNIQUE: Routine CT Head without IV contrast. Multiplanar reformats. Dose reduction techniques were used.    FINDINGS:  INTRACRANIAL CONTENTS: No intracranial hemorrhage, extraaxial collection, or mass effect. No CT evidence of acute infarct. Mild volume loss and presumed chronic small vessel ischemia.    VISUALIZED ORBITS/SINUSES/MASTOIDS: No intraorbital abnormality. Small mucous retention cyst in the right maxillary sinus. No middle ear or mastoid effusion.    BONES/SOFT TISSUES: No acute abnormality.      Impression    IMPRESSION:  1.  No acute intracranial abnormality.    2.  Age-related change.       Discharge Medications   Discharge Medication List as of 7/18/2022  5:26 PM      START taking these medications    Details   Lidocaine (LIDOCARE) 4 % Patch Place 1 patch onto the skin every 24 hours for 15 days To prevent lidocaine toxicity, patient should be patch free for 12 hrs daily.Disp-15 patch, [id number]      tiZANidine (ZANAFLEX) 2 MG tablet Take 1 tablet (2 mg) by mouth 3 times daily as needed for muscle spasms, Disp-15 tablet, R-0, E-Prescribe         CONTINUE these medications which have CHANGED    Details   HYDROcodone-acetaminophen (NORCO)  5-325 MG tablet Take 0.5-1 tablets by mouth every 6 hours as needed for moderate to severe pain, Disp-20 tablet, R-0, E-Prescribe         CONTINUE these medications which have NOT CHANGED    Details   albuterol (PROAIR HFA/PROVENTIL HFA/VENTOLIN HFA) 108 (90 Base) MCG/ACT inhaler Inhale 1 puff into the lungs, Historical      amLODIPine (NORVASC) 5 MG tablet Take 1 tablet (5 mg) by mouth daily, Disp-90 tablet, R-11, E-Prescribe      atorvastatin (LIPITOR) 10 MG tablet Take 1 tablet (10 mg) by mouth daily, Disp-90 tablet, R-11, E-Prescribe      celecoxib (CELEBREX) 100 MG capsule Take 1 capsule (100 mg) by mouth 2 times daily as needed for moderate pain, Disp-60 capsule, R-1, E-Prescribe      chlorthalidone (HYGROTON) 25 MG tablet Take 1 tablet (25 mg) by mouth daily, Disp-90 tablet, R-11, E-Prescribe      HEMP OIL OR EXTRACT OR OTHER CBD CANNABINOID, NOT MEDICAL CANNABIS, Topical, Historical      lisinopril (ZESTRIL) 40 MG tablet Take 1 tablet (40 mg) by mouth daily, Disp-90 tablet, R-11, E-Prescribe      medical cannabis (Patient's own supply) See Admin Instructions (The purpose of this order is to document that the patient reports taking medical cannabis.  This is not a prescription, and is not used to certify that the patient has a qualifying medical condition.), Historical         STOP taking these medications       Acetaminophen 325 MG CAPS Comments:   Reason for Stopping:         DULoxetine (CYMBALTA) 20 MG capsule Comments:   Reason for Stopping:         oxyCODONE (ROXICODONE) 5 MG tablet Comments:   Reason for Stopping:         ramelteon (ROZEREM) 8 MG tablet Comments:   Reason for Stopping:             Allergies   Allergies   Allergen Reactions     Shellfish Allergy Anaphylaxis

## 2022-07-19 NOTE — PROGRESS NOTES
Occupational Therapy Discharge Summary    Reason for therapy discharge:    Discharged to home.    Progress towards therapy goal(s). See goals on Care Plan in [facility] electronic health record for goal details.  Goals not met.  Barriers to achieving goals:   discharge from facility.    Therapy recommendation(s):    Initial recommendations were for TCU as Pt needed assist with ADLs and shows mild cognitive impairment.

## 2022-07-19 NOTE — PROGRESS NOTES
Physical Therapy Discharge Summary    Reason for therapy discharge:    Discharged to home.    Progress towards therapy goal(s). See goals on Care Plan in [facility] electronic health record for goal details.  Goals not met.  Barriers to achieving goals:   discharge from facility.    Therapy recommendation(s):    Ambulate as able for increased strength, activity tolerance.

## 2022-07-21 ENCOUNTER — PATIENT OUTREACH (OUTPATIENT)
Dept: FAMILY MEDICINE | Facility: CLINIC | Age: 82
End: 2022-07-21

## 2022-07-21 ENCOUNTER — TELEPHONE (OUTPATIENT)
Dept: FAMILY MEDICINE | Facility: CLINIC | Age: 82
End: 2022-07-21

## 2022-07-21 NOTE — TELEPHONE ENCOUNTER
"ED/Discharge Protocol    \"Hi, my name is Meredith Phelan RN, a registered nurse, and I am calling on behalf of Dr. Sung's office at [facility].  I am calling to follow up and see how things are going for you after your recent visit.\"    \"I see that you were in the (ER/UC/IP) on 7.17.22.    How are you doing now that you are home?\" \"Things going as best as they can\"    Is patient experiencing symptoms that may require a hospital visit?  \"I don't know about that..\"    Discharge Instructions    \"Let's review your discharge instructions.  What is/are the follow-up recommendations?  Pt. Response: yes  START taking:  Lidocaine (LIDOCARE)  tiZANidine (ZANAFLEX)  CHANGE how you take:  HYDROcodone-acetaminophen (NORCO)  STOP taking:  Acetaminophen 325 MG Caps  DULoxetine 20 MG capsule (CYMBALTA)  oxyCODONE 5 MG tablet (ROXICODONE)  ramelteon 8 MG tablet (ROZEREM)  Review your updated medication list below.    \"Were you instructed to make a follow-up appointment?\"  Pt. Response: Yes.  Has appointment been made?   Yes      \"When you see the provider, I would recommend that you bring your discharge instructions with you.    Medications    \"How many new medications are you on since your hospitalization/ED visit?\"    2 or more -   \"How many of your current medicines changed (dose, timing, name, etc.) while you were in the hospital/ED visit?\"   2 or more -   \"Do you have questions about your medications?\"   No  \"Were you newly diagnosed with heart failure, COPD, diabetes or did you have a heart attack?\"   No  For patients on insulin: \"Did you start on insulin in the hospital or did you have your insulin dose changed?\"   No  Post Discharge Medication Reconciliation Status: discharge medications reconciled, continue medications without change.    Was MTM referral placed (*Make sure to put transitions as reason for referral)?   No    Call Summary    \"Do you have any questions or concerns about your condition or care plan at the " "moment?\"    No  Triage nurse advice given: None    Patient was in ER 2 in the past year (assess appropriateness of ER visits.)      \"If you have questions or things don't continue to improve, we encourage you contact us through the main clinic number,  [phone].  Even if the clinic is not open, triage nurses are available 24/7 to help you.     We would like you to know that our clinic has extended hours (provide information).  We also have urgent care (provide details on closest location and hours/contact info)\"      \"Thank you for your time and take care!\"        "

## 2022-07-21 NOTE — TELEPHONE ENCOUNTER
Calling about controlled medications, and lidocaine patches. Discussed narcotics and use with patient. Patient confused about lidocaine patches.  Patient advised to contact pharmacist regarding placement of patches for clearer direction.    Patient states Acccupunture was mentioned at ED visit and is looking for recommendation from PCP.   Please advise patient.     Patient to follow up in 7 days per ED notes. Transferred patient  to scheduling for office visit next week.

## 2022-07-26 ENCOUNTER — OFFICE VISIT (OUTPATIENT)
Dept: INTERNAL MEDICINE | Facility: CLINIC | Age: 82
End: 2022-07-26
Payer: COMMERCIAL

## 2022-07-26 VITALS
WEIGHT: 175.5 LBS | OXYGEN SATURATION: 97 % | DIASTOLIC BLOOD PRESSURE: 76 MMHG | HEART RATE: 72 BPM | BODY MASS INDEX: 24.48 KG/M2 | SYSTOLIC BLOOD PRESSURE: 137 MMHG

## 2022-07-26 DIAGNOSIS — M48.061 SPINAL STENOSIS OF LUMBAR REGION, UNSPECIFIED WHETHER NEUROGENIC CLAUDICATION PRESENT: ICD-10-CM

## 2022-07-26 PROCEDURE — 99214 OFFICE O/P EST MOD 30 MIN: CPT | Performed by: NURSE PRACTITIONER

## 2022-07-26 RX ORDER — HYDROCODONE BITARTRATE AND ACETAMINOPHEN 5; 325 MG/1; MG/1
.5-1 TABLET ORAL EVERY 6 HOURS PRN
Qty: 20 TABLET | Refills: 0 | Status: SHIPPED | OUTPATIENT
Start: 2022-07-26 | End: 2022-08-02

## 2022-07-26 RX ORDER — DULOXETIN HYDROCHLORIDE 20 MG/1
20 CAPSULE, DELAYED RELEASE ORAL 2 TIMES DAILY
Qty: 60 CAPSULE | Refills: 1 | Status: SHIPPED | OUTPATIENT
Start: 2022-07-26 | End: 2022-08-02

## 2022-07-26 ASSESSMENT — PATIENT HEALTH QUESTIONNAIRE - PHQ9
SUM OF ALL RESPONSES TO PHQ QUESTIONS 1-9: 11
SUM OF ALL RESPONSES TO PHQ QUESTIONS 1-9: 11
10. IF YOU CHECKED OFF ANY PROBLEMS, HOW DIFFICULT HAVE THESE PROBLEMS MADE IT FOR YOU TO DO YOUR WORK, TAKE CARE OF THINGS AT HOME, OR GET ALONG WITH OTHER PEOPLE: VERY DIFFICULT

## 2022-07-26 NOTE — PATIENT INSTRUCTIONS
I refilled your hydrocodone for you.    I refilled the hydrocodone with the understanding that you would agree to try the Cymbalta (duloxetine).  Take 20 mg twice daily.  Okay to use with the Celebrex.    Continue with the lidocaine patches.    Continue following up with [facility] for spine care.    Regular walking and stretching with heat.    You need to follow-up with Dr. Sung to have a discussion about whether you meet criteria for chronic opioids.  However, I do not think that this is a viable long-term solution for you and is not in your best interest.

## 2022-07-26 NOTE — PROGRESS NOTES
Assessment & Plan     Spinal stenosis of lumbar region, unspecified whether neurogenic claudication present  His biggest issue is pain control.  He is tearful in the exam room as his pain is not particularly well controlled at the moment.  He would like a refill of his Norco today.    I reviewed my last note with him.  I explained to him that I do not feel like narcotics are a good long-term solution for him.  It sounds like he did not take the Cymbalta as prescribed by Dr. Sung.  However, he is using medical cannabis now.  On Celebrex.    I gave him a refill of his hydrocodone on the condition that he would start the Celebrex and follow-up with his PCP for medication check and discussion on whether he meets criteria for chronic continuous use of opioids    - HYDROcodone-acetaminophen (NORCO) 5-325 MG tablet; Take 0.5-1 tablets by mouth every 6 hours as needed for moderate to severe pain  - DULoxetine (CYMBALTA) 20 MG capsule; Take 1 capsule (20 mg) by mouth 2 times daily    Patient Instructions   I refilled your hydrocodone for you.    I refilled the hydrocodone with the understanding that you would agree to try the Cymbalta (duloxetine).  Take 20 mg twice daily.  Okay to use with the Celebrex.    Continue with the lidocaine patches.    Continue following up with [facility] for spine care.    Regular walking and stretching with heat.    You need to follow-up with Dr. Sung to have a discussion about whether you meet criteria for chronic opioids.  However, I do not think that this is a viable long-term solution for you and is not in your best interest.      Review of external notes as documented elsewhere in note  Prescription drug management  25 minutes spent on the date of the encounter doing chart review, history and exam, documentation and further activities per the note       Sid Machado, [facility]    Aftab Nam is a 81 year old, presenting for the  following health issues:  ED/Hosp F/U (ED/Hosp F/U)      HPI     Presented to the ED for confusion.  However, on exam he appeared fully awake and oriented.    His delirium problem eventually became a pain management issue during his hospitalization and the pharmacist was consulted told the pharmacist that Vicodin has been the only thing to provide comfort.  There is a discussion on how his cannabis formulation may have been switched to oral pills and how this could have contributed to his confusion.  Pain service pharmacist did say that if warranted, would limit to less than 3-day supply at discharge  {  Hospital Follow-up Visit:    Hospital/Nursing Home/IP Rehab Facility: [facility]  Date of Admission: 7/17/2022  Date of Discharge: 7/18/2022  Reason(s) for Admission: Delirium    Was your hospitalization related to COVID-19? No   Problems taking medications regularly:  None  Medication changes since discharge: None  Problems adhering to non-medication therapy:  None    Summary of hospitalization:  [facility] discharge summary reviewed  Diagnostic Tests/Treatments reviewed.  Follow up needed: none  Other Healthcare Providers Involved in Patient s Care:         None  Update since discharge: improved.       Post Medication Reconciliation Status:        Plan of care communicated with patient             Review of Systems   Constitutional, HEENT, cardiovascular, pulmonary, gi and gu systems are negative, except as otherwise noted.      Objective    /76   Pulse 72   Wt 79.6 kg (175 lb 8 oz)   SpO2 97%   BMI 24.48 kg/m    Body mass index is 24.48 kg/m .  Physical Exam     Tearful on exam room but otherwise in no acute distress      .  ..  Answers for HPI/ROS submitted by the patient on 7/26/2022  If you checked off any problems, how difficult have these problems made it for you to do your work, take care of things at home, or get along with other people?: Very  difficult  PHQ9 TOTAL SCORE: 11

## 2022-07-28 NOTE — PROGRESS NOTES
[facility]      OUTPATIENT OCCUPATIONAL THERAPY  EVALUATION  PLAN OF TREATMENT FOR OUTPATIENT REHABILITATION  (COMPLETE FOR INITIAL CLAIMS ONLY)  Patient's Last Name, First Name, M.I.  YOB: 1940  Cyril Galdamez                          Provider's Name  [facility] Medical Record No.  [id number]                               Onset Date:  07/17/22   Start of Care Date:  07/18/22     Type:     ___PT   _X_OT   ___SLP Medical Diagnosis:  Delirium                        OT Diagnosis:  decreased indep with ADLs due to decreased cog and back and R hip pain.   Visits from SOC:  1   _________________________________________________________________________________  Plan of Treatment/Functional Goals    Planned Interventions: ADL retraining   Goals: See Occupational Therapy Goals on Care Plan in [facility] electronic health record.    Therapy Frequency: Daily  Predicted Duration of Therapy Intervention: 07/22/22  _________________________________________________________________________________    I CERTIFY THE NEED FOR THESE SERVICES FURNISHED UNDER        THIS PLAN OF TREATMENT AND WHILE UNDER MY CARE     (Physician co-signature of this document indicates review and certification of the therapy plan).              Certification date from: 07/18/22, Certification date to: 08/18/22    Referring Physician: Dr. Kevin Lees            Initial Assessment        See Occupational Therapy evaluation dated 07/18/22 in Epic electronic health record.

## 2022-07-30 ENCOUNTER — HOSPITAL ENCOUNTER (EMERGENCY)
Facility: CLINIC | Age: 82
Discharge: HOME | End: 2022-07-30
Attending: EMERGENCY MEDICINE | Admitting: EMERGENCY MEDICINE
Payer: COMMERCIAL

## 2022-07-30 ENCOUNTER — NURSE TRIAGE (OUTPATIENT)
Dept: NURSING | Facility: CLINIC | Age: 82
End: 2022-07-30

## 2022-07-30 VITALS
HEIGHT: 71 IN | HEART RATE: 82 BPM | WEIGHT: 175 LBS | RESPIRATION RATE: 16 BRPM | SYSTOLIC BLOOD PRESSURE: 136 MMHG | TEMPERATURE: 98.6 F | DIASTOLIC BLOOD PRESSURE: 81 MMHG | BODY MASS INDEX: 24.5 KG/M2 | OXYGEN SATURATION: 99 %

## 2022-07-30 DIAGNOSIS — F32.A DEPRESSION, UNSPECIFIED DEPRESSION TYPE: ICD-10-CM

## 2022-07-30 DIAGNOSIS — M54.50 CHRONIC LOW BACK PAIN WITHOUT SCIATICA, UNSPECIFIED BACK PAIN LATERALITY: ICD-10-CM

## 2022-07-30 DIAGNOSIS — G89.29 CHRONIC LOW BACK PAIN WITHOUT SCIATICA, UNSPECIFIED BACK PAIN LATERALITY: ICD-10-CM

## 2022-07-30 PROCEDURE — 250N000013 HC RX MED GY IP 250 OP 250 PS 637: Performed by: EMERGENCY MEDICINE

## 2022-07-30 PROCEDURE — 99285 EMERGENCY DEPT VISIT HI MDM: CPT | Mod: 25

## 2022-07-30 PROCEDURE — 90791 PSYCH DIAGNOSTIC EVALUATION: CPT

## 2022-07-30 RX ORDER — HYDROCODONE BITARTRATE AND ACETAMINOPHEN 5; 325 MG/1; MG/1
1 TABLET ORAL ONCE
Status: COMPLETED | OUTPATIENT
Start: 2022-07-30 | End: 2022-07-30

## 2022-07-30 RX ADMIN — HYDROCODONE BITARTRATE AND ACETAMINOPHEN 1 TABLET: 5; 325 TABLET ORAL at 17:11

## 2022-07-30 ASSESSMENT — ENCOUNTER SYMPTOMS
BACK PAIN: 1
HALLUCINATIONS: 0

## 2022-07-30 NOTE — ED PROVIDER NOTES
EMERGENCY DEPARTMENT ENCOUNTER      NAME: Cyril Galdamez  AGE: 81 year old male  YOB: 1940  MRN: [id number]  EVALUATION DATE & TIME: 2022  2:02 PM    PCP: Angel Sung    ED PROVIDER: Jann Mitchell DO      Chief Complaint   Patient presents with     Suicidal         FINAL IMPRESSION:  1. Depression, unspecified depression type    2. Chronic low back pain without sciatica, unspecified back pain laterality          ED COURSE & MEDICAL DECISION MAKIN-year-old male with history of spinal stenosis who was recently started on Cymbalta few days ago for management of his chronic back pain presented to the ED with his members for evaluation of increasing suicidal ideation.  The patient did however state that the patient has felt depressed in the past secondary to the back pain.  However, both the patient and the family was denied any previous history of suicidal thoughts.  The patient denies any specific plan.  He states that these thoughts only started after he started the Cymbalta.  He also reports a decreased appetite since starting Cymbalta as well.  The patient denied hallucinations or homicidal ideation.  The patient had no complaints of worsening back pain, loss of bowel bladder control, or any numbness/ting/weakness in the lower extremities.  Here in the ED the patient was hemodynamic stable.  He did not appear to be in any obvious distress or discomfort at the time of his initial evaluation.  The patient's physical exam was unremarkable.     Following his initial evaluation the patient and his family members were informed that the increasing suicidal ideation and decreased appetite are likely due to the recent Cymbalta, especially given the fact that the patient denies any suicidal ideation prior to starting the Cymbalta.      The patient was evaluated by the DEC .  The DEC  agrees that the patient does not need inpatient psychiatric evaluation at this time.  She  also recommends stopping the Cymbalta to see if this helps relieve his suicidal thoughts.    The patient was reevaluated.  He was instructed to stop his Cymbalta.  He was given a dose of hydrocodone here in the ED to treat his back pain.  The patient was instructed to follow-up with his primary care provider to determine if there is another medication that can be used to help control his back pain.  In reviewing the patient's chart it was noted that he had been referred to [facility].  The patient was instructed to follow-up with orthopedics for further evaluation.  A referral was also been placed for him to follow-up in the [facility] in [place].      The patient was instructed to return back to ED sooner for any worsening suicidal ideation that persists despite stopping the Cymbalta, worsening back pain, or any other new or concerning symptoms.      Pertinent Labs & Imaging studies reviewed. (See chart for details)  2:14 PM I met with the patient to gather history and to perform my initial exam. We discussed plans for the ED course, including diagnostic testing and treatment.       At the conclusion of the encounter I discussed the results of all of the tests and the disposition. The questions were answered. The patient or family acknowledged understanding and was agreeable with the care plan.       PPE worn: n95 mask, goggles    MEDICATIONS GIVEN IN THE EMERGENCY:  Medications   HYDROcodone-acetaminophen (NORCO) 5-325 MG per tablet 1 tablet (1 tablet Oral Given 7/30/22 1711)       NEW PRESCRIPTIONS STARTED AT TODAY'S ER VISIT  Discharge Medication List as of 7/30/2022  6:03 PM          =================================================================    HPI    Patient information was obtained from: patient    Use of : N/A      Cyril Galdamez is a 81 year old male with a pertinent history of anemia, hypertension, asthma, and lumbar spinal stenosis, who presents to this  ED via walk in for evaluation of suicidal behavior.    Patient reports that he has had increasing depression over the past few days and endorses suicidal ideation. The patient denies any specific plan. This began after he started Cymbalta for his back pain. He states that he has felt somewhat depressed recently secondary to the chronic pain, but denies any history of depression or previous suicidal thoughts. He notes that the back pain this morning was slightly worse than usual. His primary care physician, Dr. Sung, is the one who manages this. Otherwise denies any auditory or visual hallucinations, homicidal ideation, self harm, or any other complaints.    REVIEW OF SYSTEMS   Review of Systems   Musculoskeletal: Positive for back pain (chronic).   Psychiatric/Behavioral: Positive for suicidal ideas. Negative for hallucinations and self-injury.   All other systems reviewed and are negative.    PAST MEDICAL HISTORY:  No past medical history on file.    PAST SURGICAL HISTORY:  No past surgical history on file.    CURRENT MEDICATIONS:    amLODIPine (NORVASC) 5 MG tablet  atorvastatin (LIPITOR) 10 MG tablet  Lidocaine (LIDOCARE) 4 % Patch  albuterol (PROAIR HFA/PROVENTIL HFA/VENTOLIN HFA) 108 (90 Base) MCG/ACT inhaler  celecoxib (CELEBREX) 100 MG capsule  chlorthalidone (HYGROTON) 25 MG tablet  DULoxetine (CYMBALTA) 20 MG capsule  HEMP OIL OR EXTRACT OR OTHER CBD CANNABINOID, NOT MEDICAL CANNABIS,  HYDROcodone-acetaminophen (NORCO) 5-325 MG tablet  lisinopril (ZESTRIL) 40 MG tablet  medical cannabis (Patient's own supply)  tiZANidine (ZANAFLEX) 2 MG tablet      ALLERGIES:  Allergies   Allergen Reactions     Shellfish Allergy Anaphylaxis     FAMILY HISTORY:  No family history on file.    SOCIAL HISTORY:   Social History     Socioeconomic History     Marital status:    Tobacco Use     Smoking status: Never Smoker     Smokeless tobacco: Never Used     VITALS:  /81   Pulse 82   Temp 98.6  F (37  C) (Oral)    "Resp 16   Ht 1.803 m (5' 11\")   Wt 79.4 kg (175 lb)   SpO2 99%   BMI 24.41 kg/m      PHYSICAL EXAM    General presentation: Alert, Vital signs reviewed. NAD  HENT: ENT inspection is normal. Oropharynx is moist and clear.   Eye: Pupils are equal and reactive to light. EOMI  Neck: The neck is supple, with full ROM, with no evidence of meningismus.  Pulmonary: Currently in no acute respiratory distress. Normal, non labored respirations, the lung sounds are normal with good equal air movement. Clear to auscultation bilaterally.   Circulatory: Regular rate and rhythm. Peripheral pulses are strong and equal. No murmurs, rubs, or gallops.   Abdominal: The abdomen is soft. Nontender. No rigidity, guarding, or rebound. Bowel sounds normal.   Neurologic: Alert, oriented to person, place, and time. Strength is 5/5 in the bilateral lower extremities.  No sensory deficits noted in the bilateral lower extremities.  Cranial nerves II through XII are intact.  Musculoskeletal: No extremity tenderness. Full range of motion in all extremities. No extremity edema.   Skin: Skin color is normal. No rash. Warm. Dry to touch.   Psychiatric: Endorses suicidal ideation. Denies homicidal ideation. Denies hallucinations.     LAB:  All pertinent labs reviewed and interpreted.       RADIOLOGY:  Reviewed all pertinent imaging. Please see official radiology report.  No orders to display         René GARCIA , am serving as a scribe to document services personally performed by Jann Mitchell DO based on my observation and the provider's statements to me. Jann GARCIA, attest that René Dickey is acting in a scribe capacity, has observed my performance of the services and has documented them in accordance with my direction.    Jann Mitchell DO  Emergency Medicine  [facility] EMERGENCY ROOM  [address] [id number] [phone]     Jann Mitchell DO  07/30/22 2006    "

## 2022-07-30 NOTE — ED TRIAGE NOTES
pt taking Cymbalta and Celebryx.  He just started the Cymbalta 3d ago.Now he is feeling suicidal.  He is taking the meds for pain, a stenosis in the low back. Pt also taking 1/2 of a hydrocodone.

## 2022-07-30 NOTE — TELEPHONE ENCOUNTER
"Nurse Triage SBAR    Is this a 2nd Level Triage? NO    Situation:   Patient calls today stating he has no appetite.    Background:   Patient just started Cymbalta and celebrex for back pain. He is alsoon Hydrocodone.    Assessment:   Patient states he \" just can't get comfortable\"  \"he wants to end it all\"  Patient states he can't stand the pain.  Wife got on the phone and stated that patient is feeling suicidal.  Patient does not have a plan and has not attempted any self harm.    Protocol Recommended Disposition:   Go to ED Now    Recommendation:   Patient advised to go to the ER. Patient agrees with the plan.    Leah Johnston RN on 7/30/2022 at 12:17 PM         Reason for Disposition    [1] Depression symptoms (sadness, hopelessness, decreased energy) AND [2] unable to do any normal activities (e.g., self care, school, work; in comparison to baseline).    Additional Information    Negative: Patient attempted suicide    Negative: Patient is threatening suicide now    Negative: Violent behavior, or threatening to physically hurt or kill someone    Negative: [1] Patient is very confused (disoriented, slurred speech) AND [2] no other adult (e.g., friend or family member) available    Negative: [1] Difficult to awaken or acting very confused (disoriented, slurred speech) AND [2] new onset    Negative: Sounds like a life-threatening emergency to the triager    Protocols used: SUICIDE [id number]      "

## 2022-07-30 NOTE — ED NOTES
Pt straining to urinate (urinal while sitting at bedside) with <100 mL output; pt states he may be dehydrated but okay to bladder scan per MD to make sure he is not retaining.

## 2022-07-30 NOTE — CONSULTS
Diagnostic Evaluation Consultation  Crisis Assessment    Patient was assessed:  remote  Patient location:  ED  Was a release of information signed: yes     Referral Data and Chief Complaint  Cyril is a 81 year old, who uses he/him pronouns, and presents to the ED with family/friends. Patient is referred to the ED by self. Patient is presenting to the ED for the following concerns: thoughts of suicide.      Informed Consent and Assessment Methods     Patient is his own guardian. Writer met with patient and explained the crisis assessment process, including applicable information disclosures and limits to confidentiality, assessed understanding of the process, and obtained consent to proceed with the assessment. Patient was observed to be able to participate in the assessment as evidenced by answering of questions. Assessment methods included conducting a formal interview with patient, review of medical records, collaboration with medical staff, and obtaining relevant collateral information from family and community providers when available..     Over the course of this crisis assessment provided reassurance, offered validation, engaged patient in problem solving and disposition planning and worked with patient on safety and aftercare planning. Patient's response to interventions was open, appreciative.     Summary of Patient Situation     Pt is a 80yo male who lives with chronic back and hip pain. He is followed by his PCP  for med mgmt of Hydrocodone, topical cannabis, Celebrex and duloxetine. The Celebrex and Duloxetine was just started on 7/27/22 - in response experiencing constipation and thoughts of suicide. Pt approached his wife today to disclose the distressing SI. Together they decided to present to the ED. Pt denies intent or plan to die by suicide. The thoughts are egodystonic. Pt denies a hx of suicide attempts. He denies ever experiencing SI until starting Duloxetine.     Brief Psychosocial  "History     Pt lives with his wife Riya. He is retired from the workforce. They enjoy time with family.    Significant Clinical History     No formal MH hx. Denies MH dx. This is first experience with SI.     Collateral Information    Pt wife Riya and their daughter are present for assessment. They report Pt was open re: onset of SI. Riya states \"I think its the drugs\". She denies concern for his safety.     Risk Assessment  ESS-6  1.a. Over the past 2 weeks, have you had thoughts of killing yourself? Yes  1.b. Have you ever attempted to kill yourself and, if yes, when did this last happen? No   2. Recent or current suicide plan? No   3. Recent or current intent to act on ideation? No  4. Lifetime psychiatric hospitalization? No  5. Pattern of excessive substance use? No  6. Current irritability, agitation, or aggression? No  Scoring note: BOTH 1a and 1b must be yes for it to score 1 point, if both are not yes it is zero. All others are 1 point per number. If all questions 1a/1b - 6 are no, risk is negligible. If one of 1a/1b is yes, then risk is mild. If either question 2 or 3, but not both, is yes, then risk is automatically moderate regardless of total score. If both 2 and 3 are yes, risk is automatically high regardless of total score.      Score: 0, mild risk      Does the patient have access to lethal means? No     Does the patient engage in non-suicidal self-injurious behavior (NSSI/SIB)? no     Does the patient have thoughts of harming others? No     Is the patient engaging in sexually inappropriate behavior?  no        Current Substance Abuse     Is there recent substance abuse? no     Was a urine drug screen or blood alcohol level obtained: No       Mental Status Exam     Affect: Appropriate   Appearance: Appropriate    Attention Span/Concentration: Attentive  Eye Contact: Engaged   Fund of Knowledge: Appropriate    Language /Speech Content: Fluent   Language /Speech Volume: Soft    Language /Speech " Rate/Productions: Normal    Recent Memory: Intact   Remote Memory: Intact   Mood: Normal    Orientation to Person: Yes    Orientation to Place: Yes   Orientation to Time of Day: Yes    Orientation to Date: Yes    Situation (Do they understand why they are here?): Yes    Psychomotor Behavior: Normal    Thought Content: Clear and Suicidal   Thought Form: Intact      History of commitment: No       Medication    Psychotropic medications: yes, Duloxetine started on 7/27/22 for pain  Medication changes made in the last two weeks: Yes: see above       Current Care Team    Primary Care Provider:  at   Psychiatrist: No  Therapist: No  : No     CTSS or ARMHS: No  ACT Team: No  Other: No      Diagnosis    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 No hx of MH concerns. This is first experience with SI, Pt and his wife suggest Duloxetine to have brought on the SI. SI is egodystonic - no intent or plan to act. Pt able to engage in safety planning with this writer. Pt wife denies concerns for Pt ability to maintain his safety. Significant involvement from family is noted.  In consultation with the ED provider, Pt to discontinue Duloxetine. He is encouraged to follow up with his PCP for ongoing care. No additional f/u with MH care at this time.     Disposition    Recommended disposition: Other: f/u with PCP       Reviewed case and recommendations with attending provider. Attending Name: Paula VERNON       Attending concurs with disposition: Yes       Patient concurs with disposition: Yes       Guardian concurs with disposition: NA      Final disposition: Other: f/u with PCP.    Outpatient Details (if applicable):   Aftercare plan and appointments placed in the AVS and provided to patient: yes by ED staff    Was lethal means counseling provided as a part of aftercare planning? No    Assessment  Details    Patient interview started at: 352p and completed at: 420p.     Total duration spent on the patient case in minutes: .50 hrs      CPT code(s) utilized: 47998 - Psychotherapy for Crisis - 60 (30-74*) min       Liz Fernandez [facility],   DEC - Triage & Transition Services

## 2022-07-30 NOTE — DISCHARGE INSTRUCTIONS
"Aftercare Plan    It is recommended that you stop taking your Cymbalta since it may be causing your increased thoughts of suicide and decreased appetite.  Continue taking your home pain medications as needed.  It is recommended to use a daily stool softener such as MiraLAX or Colace while taking the hydrocodone (Vicodin).  Please follow-up with the [place] [facility] for reevaluation.  A referral has been placed for you to follow-up with the [place] [facility] clinic as well.  You will likely be contacted for an appointment within the next week.  Return back to the ED sooner for any worsening pain, pression, or increased thoughts of suicide.        If I am feeling unsafe or I am in a crisis, I will:   Contact my established care providers   Call the National Suicide Prevention Lifeline: 988  Go to the nearest emergency room   Call 911    Crisis Lines  Crisis Text Line  Text 561866  You will be connected with a trained live crisis counselor to provide support.    Por espanol, texto  JESICA a 590086 o texto a 442-AYUDAME en WhatsApp    The [place] Project (LGBTQ Youth Crisis Line)  8.284.609.5656  text START to 867-525      Community Resources  Fast Tracker  Linking people to mental health and substance use disorder resources  fastPerfect Audiencen.org     [place] Mental Health Warm Line  Peer to peer support  Monday thru Saturday, 12 pm to 10 pm  650.333.9245 or 6.729.246.5581  Text \"Support\" to 62319    National [place] on Mental Illness (HALEY)  139.241.9460 or 1.888.HALEY.HELPS      Mental Health Apps  My3  https://my[place]-Asian. Incpp.org/    VirtualHopeBox  https://Medpricer.com.org/apps/virtual-hope-box/      Additional Information  Today you were seen by a licensed mental health professional through Triage and Transition services, [facility])  for a crisis assessment in the Emergency Department at [facility].  It is recommended that you follow up with your established providers " (psychiatrist, mental health therapist, and/or primary care doctor - as relevant) as soon as possible. Coordinators from [facility] will be calling you in the next 24-48 hours to ensure that you have the resources you need.  You can also contact [facility] coordinators directly at [phone]. You may have been scheduled for or offered an appointment with a mental health provider. [facility] maintains an extensive network of licensed behavioral health providers to connect patients with the services they need.  We do not charge providers a fee to participate in our referral network.  We match patients with providers based on a patient's specific needs, insurance coverage, and location.  Our first effort will be to refer you to a provider within your care system, and will utilize providers outside your care system as needed.

## 2022-08-02 ENCOUNTER — OFFICE VISIT (OUTPATIENT)
Dept: INTERNAL MEDICINE | Facility: CLINIC | Age: 82
End: 2022-08-02
Payer: COMMERCIAL

## 2022-08-02 VITALS
HEART RATE: 83 BPM | HEIGHT: 71 IN | DIASTOLIC BLOOD PRESSURE: 58 MMHG | OXYGEN SATURATION: 99 % | WEIGHT: 165 LBS | BODY MASS INDEX: 23.1 KG/M2 | SYSTOLIC BLOOD PRESSURE: 124 MMHG

## 2022-08-02 DIAGNOSIS — R45.851 SUICIDAL IDEATION: ICD-10-CM

## 2022-08-02 DIAGNOSIS — M48.061 SPINAL STENOSIS OF LUMBAR REGION, UNSPECIFIED WHETHER NEUROGENIC CLAUDICATION PRESENT: Primary | ICD-10-CM

## 2022-08-02 DIAGNOSIS — I10 PRIMARY HYPERTENSION: ICD-10-CM

## 2022-08-02 PROCEDURE — 99213 OFFICE O/P EST LOW 20 MIN: CPT | Performed by: NURSE PRACTITIONER

## 2022-08-02 RX ORDER — AMITRIPTYLINE HYDROCHLORIDE 10 MG/1
10 TABLET ORAL AT BEDTIME
Qty: 30 TABLET | Refills: 0 | Status: SHIPPED | OUTPATIENT
Start: 2022-08-02 | End: 2022-08-29

## 2022-08-02 RX ORDER — HYDROCODONE BITARTRATE AND ACETAMINOPHEN 5; 325 MG/1; MG/1
.5-1 TABLET ORAL EVERY 6 HOURS PRN
Qty: 20 TABLET | Refills: 0 | Status: SHIPPED | OUTPATIENT
Start: 2022-08-02 | End: 2022-08-29

## 2022-08-02 NOTE — PATIENT INSTRUCTIONS
Stop the duloxetine (Cymbalta).    Start amitriptyline (Elavil) 10 mg every evening.  This medication may make you feel a bit sleepy.    Continue on the Celebrex 100 mg twice daily.    Tylenol 1000 mg 3 times daily (every 8 hours).    Continue with topical agents like the Roll-on, and topical lidocaine.    Follow-up with your spine doctor later this week.  Find out if you are a candidate for spine injections.  It sounds like you have had relief from these in the past.    Follow-up with Dr. Sung later this month

## 2022-08-02 NOTE — PROGRESS NOTES
Assessment & Plan     Spinal stenosis of lumbar region, unspecified whether neurogenic claudication present  He had suicidal ideation while on his Cymbalta.  Plan is to stop his Cymbalta and start amitriptyline.  I gave him another refill of his Vicodin.  Hopefully the amitriptyline will reduce his dependency on the Vicodin by the time he follows up with his PCP later this month    - amitriptyline (ELAVIL) 10 MG tablet; Take 1 tablet (10 mg) by mouth At Bedtime  - HYDROcodone-acetaminophen (NORCO) 5-325 MG tablet; Take 0.5-1 tablets by mouth every 6 hours as needed for moderate to severe pain    Suicidal ideation  No longer having suicidal ideation after discontinuing the Cymbalta    Primary hypertension  Mildly elevated today but he is in a bit of pain.    Patient Instructions   Stop the duloxetine (Cymbalta).    Start amitriptyline (Elavil) 10 mg every evening.  This medication may make you feel a bit sleepy.    Continue on the Celebrex 100 mg twice daily.    Tylenol 1000 mg 3 times daily (every 8 hours).    Continue with topical agents like the Roll-on, and topical lidocaine.    Follow-up with your spine doctor later this week.  Find out if you are a candidate for spine injections.  It sounds like you have had relief from these in the past.    Follow-up with Dr. Sung later this month        Review of external notes as documented elsewhere in note  Prescription drug management  20 minutes spent on the date of the encounter doing chart review, history and exam, documentation and further activities per the note     See Patient Instructions    Return in about 1 month (around 9/2/2022).    Sid Machado, [facility]    Aftab Nam is a 81 year old, presenting for the following health issues:  Pain Management    Here for follow-up of his chronic back pain.    We have been trying to titrate down on his Vicodin but have been unsuccessful over the last few months.  When I  "last saw him we decided that we would make a concerted effort to initiate Cymbalta.  After starting the Cymbalta he had some suicidal ideation and so that medication was discontinued.  His mood is better now.  No more suicidal ideation.    He has a follow-up appointment with his spine surgeon later this week.  He has seen some improvement with spinal injections in the past.  Unclear if he would make for a viable surgical candidate, however.    He had issues with medical cannabis leading to delirium.    History of Present Illness       Reason for visit:  Pain Management    He eats 2-3 servings of fruits and vegetables daily.He consumes 0 sweetened beverage(s) daily.He exercises with enough effort to increase his heart rate 9 or less minutes per day.  He exercises with enough effort to increase his heart rate 3 or less days per week.   He is taking medications regularly.         Review of Systems   Constitutional, HEENT, cardiovascular, pulmonary, gi and gu systems are negative, except as otherwise noted.      Objective    BP (!) 152/52 (BP Location: Right arm, Patient Position: Sitting, Cuff Size: Adult Regular)   Pulse 83   Ht 1.803 m (5' 11\")   Wt 74.8 kg (165 lb)   SpO2 99%   BMI 23.01 kg/m    Body mass index is 23.01 kg/m .  Physical Exam   Patient sitting uncomfortably in the exam room with his grandson    .  ..  "

## 2022-08-16 ENCOUNTER — OFFICE VISIT (OUTPATIENT)
Dept: PHYSICAL MEDICINE AND REHAB | Facility: CLINIC | Age: 82
End: 2022-08-16
Attending: EMERGENCY MEDICINE
Payer: COMMERCIAL

## 2022-08-16 VITALS
SYSTOLIC BLOOD PRESSURE: 132 MMHG | HEIGHT: 71 IN | OXYGEN SATURATION: 98 % | BODY MASS INDEX: 23.1 KG/M2 | HEART RATE: 66 BPM | DIASTOLIC BLOOD PRESSURE: 60 MMHG | WEIGHT: 165 LBS

## 2022-08-16 DIAGNOSIS — G89.29 CHRONIC BILATERAL LOW BACK PAIN WITHOUT SCIATICA: Primary | ICD-10-CM

## 2022-08-16 DIAGNOSIS — R29.898 RIGHT LEG WEAKNESS: ICD-10-CM

## 2022-08-16 DIAGNOSIS — M54.50 CHRONIC BILATERAL LOW BACK PAIN WITHOUT SCIATICA: Primary | ICD-10-CM

## 2022-08-16 DIAGNOSIS — M47.816 LUMBAR FACET ARTHROPATHY: ICD-10-CM

## 2022-08-16 DIAGNOSIS — M43.16 SPONDYLOLISTHESIS OF LUMBAR REGION: ICD-10-CM

## 2022-08-16 DIAGNOSIS — M48.062 SPINAL STENOSIS OF LUMBAR REGION WITH NEUROGENIC CLAUDICATION: ICD-10-CM

## 2022-08-16 PROCEDURE — 99215 OFFICE O/P EST HI 40 MIN: CPT | Performed by: NURSE PRACTITIONER

## 2022-08-16 ASSESSMENT — PAIN SCALES - GENERAL: PAINLEVEL: EXTREME PAIN (8)

## 2022-08-16 NOTE — PROGRESS NOTES
ASSESSMENT: Cyril Galdamez is a 81 year old male who presents for consultation at the request of PCP Angel Sung, with a past medical history significant for dyslipidemia, hypertension, mild asthma, first-degree AV block, bradycardia, benign paroxysmal positional vertigo, anemia asbestos exposure, dizziness, lumbar stenosis who presents today for new patient evaluation of:    -Chronic generalized bilateral low back pain constant, worse with generalized activity.  MRI with L4-5 spondylolisthesis with severe spinal stenosis as well as facet arthropathy.  History of ablation at [facility] with short-term relief only.    -Progressive right lower extremity weakness with weakness on exam concern for active potential radiculopathy component.    Patient is neurologically intact on exam. No myelopathic or red flag symptoms.     OSWESTRY DISABILITY INDEX 8/16/2022   Count 10   Sum 27   Oswestry Score (%) 54   Some recent data might be hidden            Diagnoses and all orders for this visit:  Chronic bilateral low back pain without sciatica  -     Spine  Referral  -     MR Lumbar Spine w/o Contrast; Future  -     XR Lumbar Spine G/E 4 Views; Future  -     Physical Therapy Referral; Future  Right leg weakness  -     MR Lumbar Spine w/o Contrast; Future  -     XR Lumbar Spine G/E 4 Views; Future  -     Physical Therapy Referral; Future  Spinal stenosis of lumbar region with neurogenic claudication  -     MR Lumbar Spine w/o Contrast; Future  -     XR Lumbar Spine G/E 4 Views; Future  -     Physical Therapy Referral; Future  Lumbar facet arthropathy  -     MR Lumbar Spine w/o Contrast; Future  -     XR Lumbar Spine G/E 4 Views; Future  -     Physical Therapy Referral; Future  Spondylolisthesis of lumbar region  -     MR Lumbar Spine w/o Contrast; Future  -     XR Lumbar Spine G/E 4 Views; Future  -     Physical Therapy Referral; Future    PLAN:  Reviewed spine anatomy and disease process. Discussed  diagnosis and treatment options with the patient today. A shared decision making model was used.  The patient's values and choices were respected. The following represents what was discussed and decided upon by the provider and the patient.      -DIAGNOSTIC TESTS:  Images were personally reviewed and interpreted and explained to patient today using spine model.   --Consider right lower extremity EMG pending imaging review.  --Ordered lumbar spine MRI as well as flexion-extension x-ray to evaluate chronic LBP and progressive right lower extremity weakness.  -- Pelvic/right hip x-ray 7/10/2022 with moderate hip degenerative changes.  Postoperative lumbar spine Coflex device placement.  -- Lumbar spine MRI CDI 2017 with L4-5 grade 1 spondylolisthesis with severe facet arthropathy and significant central stenosis with subarticular recess stenosis and L5 impingement.  L3-4 minimal anterolisthesis with disc herniation with severe spinal stenosis subarticular recess stenosis and L4 impingement.    -PHYSICAL THERAPY: Referral to physical therapy placed to establish home exercises for core strengthening as well as to work on generalized strengthening due to generalized deconditioning.  Discussed the importance of core strengthening, ROM, stretching exercises with the patient and how each of these entities is important in decreasing pain.  Explained to the patient that the purpose of physical therapy is to teach the patient a home exercise program.  These exercises need to be performed every day in order to decrease pain and prevent future occurrences of pain.        -MEDICATIONS: Patient has trialed multiple medications in the past with either side effects or no effectiveness.  Did discuss that if medications are something he would like to discuss more in detail with providers I would recommend a pain clinic referral.  Patient wants to hold off on this at this time.  He is doing some type of CBD salve that gives him good  relief for 8 hours therefore did recommend continuing with this.  Discussed multiple medication options today with patient. Discussed risks, side effects, and proper use of medications. Patient verbalized understanding.    -INTERVENTIONS: Could consider further injections versus spinal cord stimulator pending imaging review as well.  Patient is hoping to avoid spine surgery otherwise.  Discussed risks and benefits of injections with patient today.    -PATIENT EDUCATION:  Total time of 52 minutes, on the day of service, spent with the patient, reviewing the chart, placing orders, and documenting.   -Today we also discussed the issues related to the current COVID-19 pandemic, the pros and cons of the current treatment plan, the CDC guidelines such as social distancing, washing hands, masking, and covering the cough.    -FOLLOW-UP:   Follow-up for MRI review preferable versus nurse navigator call which may be more challenging.  **40 minutes always**    Advised patient to call the Spine Center if symptoms worsen or you have problems controlling bladder and bowel function.   ______________________________________________________________________    SUBJECTIVE:  HPI:  Cyril Galdamez  Is a 81 year old male who presents today for new patient evaluation of low back pain lumbosacral junction for many years that is a constant 6/10 up to an 8 at its worst.  Generalized activity does make it worse but it is also constant.  He does have also ongoing muscle deconditioning for many years that he states is from a long car ride where he was inactive for many months, and has walked with a walker for many years.  He has noted worsening right lower extremity weakness however has a difficult time explaining as to how long that has been.  Patient currently denies any bowel or bladder loss control, denies saddle anesthesia.    Patient's daughter Meera was present today during entire visit and added to past medical/surgical/family/social  history and history of presenting illness.     -Treatment to Date:     Left L3-L5 RFA [facility] 9/11/2020 with 70-80% relief, relief x7 months  Left L3-L5 RFA [facility] 12/7/2021 with 70-90% relief, relief x4 months    -Medications: CBD salve with benefit    Topical cannabis with benefit  Tizanidine  Acetaminophen  Celebrex 100 mg twice daily  Amitriptyline    Prior failed medications:  Cymbalta with side effects  Gabapentin 300 mg 3 times daily with ineffectiveness  Cannabis pills with side effect    Current Outpatient Medications   Medication     amitriptyline (ELAVIL) 10 MG tablet     celecoxib (CELEBREX) 100 MG capsule     HEMP OIL OR EXTRACT OR OTHER CBD CANNABINOID, NOT MEDICAL CANNABIS,     HYDROcodone-acetaminophen (NORCO) 5-325 MG tablet     tiZANidine (ZANAFLEX) 2 MG tablet     albuterol (PROAIR HFA/PROVENTIL HFA/VENTOLIN HFA) 108 (90 Base) MCG/ACT inhaler     amLODIPine (NORVASC) 5 MG tablet     atorvastatin (LIPITOR) 10 MG tablet     chlorthalidone (HYGROTON) 25 MG tablet     lisinopril (ZESTRIL) 40 MG tablet     medical cannabis (Patient's own supply)     No current facility-administered medications for this visit.       Allergies   Allergen Reactions     Shellfish Allergy Anaphylaxis       No past medical history on file.     Patient Active Problem List   Diagnosis     Anemia     Benign paroxysmal positional vertigo     Dizziness     Hypertension     Spinal stenosis of lumbar region     Asbestosis (H)     Mild asthma     Dyslipidemia     First degree atrioventricular block     History of malignant neoplasm of colon     Malignant neoplasm of skin     Microalbuminuria     Prediabetes     Sinus bradycardia     Delirium       No past surgical history on file.    No family history on file.    Reviewed past medical, surgical, and family history with patient found on new patient intake packet located in EMR Media tab.     SOCIAL HX: Patient is .  Patient denies smoking/tobacco use, denies alcohol  "use, denies history being heavy drinker, denies recreational drug use.    ROS: Positive for muscle pain, imbalance, excessive tiredness, constipation.  Specifically negative for bowel/bladder dysfunction, balance changes, headache, dizziness, foot drop, fevers, chills, appetite changes, nausea/vomiting, unexplained weight loss. Otherwise 13 systems reviewed are negative. Please see the patient's intake questionnaire from today for details.    OBJECTIVE:  /60 (BP Location: Right arm, Patient Position: Sitting)   Pulse 66   Ht 5' 11\" (1.803 m)   Wt 165 lb (74.8 kg)   SpO2 98%   BMI 23.01 kg/m      PHYSICAL EXAMINATION:    --CONSTITUTIONAL:  Vital signs as above.  No acute distress.  The patient is well nourished and well groomed.  --PSYCHIATRIC:  Appropriate mood and affect. The patient is awake, alert, oriented to person, place, time and answering questions appropriately with clear speech.    --SKIN:  Skin over the face, bilateral lower extremities, and posterior torso is clean, dry, intact without rashes.    --RESPIRATORY: Normal rhythm and effort. No abnormal accessory muscle breathing patterns noted.   --ABDOMINAL:  Non-distended.  --STANDING EXAMINATION:  Normal lumbar lordosis noted, no lateral shift.  --MUSCULOSKELETAL: Lumbar spine inspection reveals no evidence of deformity. Range of motion is not limited in lumbar flexion, extension, lateral rotation. No tenderness to palpation lumbar spine. Straight leg raising in the seated position is negative for radicular component.  --GROSS MOTOR: Gait is antalgic.  Arises from seated position with difficulty.  --LOWER EXTREMITY MOTOR TESTING:  Plantar flexion left 5/5, right 4/5   Dorsiflexion left 5/5, right 4/5   Great toe MTP extension left 5/5, right 3/5  Knee flexion left 5/5, right 5/5  Knee extension left 5/5, right 5/5   Hip flexion left 5/5, right 5/5  Hip abduction left 5/5, right 5/5  Hip adduction left 5/5, right 5/5   --HIPS: Full range of " motion bilaterally.   --NEUROLOGICAL:  2/4 patellar, medial hamstring, and achilles reflexes bilaterally.  Sensation to light touch is intact in the bilateral L4, L5, and S1 dermatomes. Babinski is negative. No clonus.  Negative Deysi reflex bilaterally.  --VASCULAR:  2/4 dorsalis pedis and posterior tibialsi pulses bilaterally.  Bilateral lower extremities are warm.  There is no pitting edema of the bilateral lower extremities.    RESULTS: Prior medical records from [facility] and [facility] Everywhere were reviewed today.    Imaging: Spine imaging was personally reviewed and interpreted today. The images were shown to the patient and the findings were explained using a spine model.    2017 CDI/[facility] MRI reviewed

## 2022-08-16 NOTE — PATIENT INSTRUCTIONS
~Please call our [facility] Nurse Navigation line [phone] with any questions or concerns about your treatment plan, if symptoms worsen and you would like to be seen urgently, or if you have problems controlling bladder and bowel function.       ~You have been referred for Physical Therapy to [facility]. They will call you to schedule an appointment.      Scheduling phone number is [phone] for [facility], or [place] location.  If you have not heard from the scheduling office within 2 business days, please call [phone] for ALL other locations.    Discussed the importance of core strengthening, ROM, stretching exercises and how each of these entities is important in decreasing pain and improving long term spine health.  The purpose of physical therapy is to teach you an individualized home exercise program.  These exercises need to be performed every day in order to decrease pain and prevent future occurrences of pain.           Imaging has been ordered. Radiology will call you to schedule. Please call below if you do not hear from them in the next couple of days.     [facility] Radiology Scheduling    Please call [phone] to schedule your image(s) (select option #1). There are 3 different locations, see below.     [facility]  [address] [address]    [facility] [place]  [address]   [place] [facility] [facility] [address]    [facility]  [address] [address]

## 2022-08-16 NOTE — LETTER
8/16/2022         RE: Cyril Galdamez  [address] [address] [address]        Dear Colleague,    Thank you for referring your patient, Cyril Galdamez, to the [facility]. Please see a copy of my visit note below.    ASSESSMENT: Cyril Galdamez is a 81 year old male who presents for consultation at the request of PCP Pineda, Angel Pickering, with a past medical history significant for dyslipidemia, hypertension, mild asthma, first-degree AV block, bradycardia, benign paroxysmal positional vertigo, anemia asbestos exposure, dizziness, lumbar stenosis who presents today for new patient evaluation of:    -Chronic generalized bilateral low back pain constant, worse with generalized activity.  MRI with L4-5 spondylolisthesis with severe spinal stenosis as well as facet arthropathy.  History of ablation at [facility] with short-term relief only.    -Progressive right lower extremity weakness with weakness on exam concern for active potential radiculopathy component.    Patient is neurologically intact on exam. No myelopathic or red flag symptoms.     OSWESTRY DISABILITY INDEX 8/16/2022   Count 10   Sum 27   Oswestry Score (%) 54   Some recent data might be hidden            Diagnoses and all orders for this visit:  Chronic bilateral low back pain without sciatica  -     Spine  Referral  -     MR Lumbar Spine w/o Contrast; Future  -     XR Lumbar Spine G/E 4 Views; Future  -     Physical Therapy Referral; Future  Right leg weakness  -     MR Lumbar Spine w/o Contrast; Future  -     XR Lumbar Spine G/E 4 Views; Future  -     Physical Therapy Referral; Future  Spinal stenosis of lumbar region with neurogenic claudication  -     MR Lumbar Spine w/o Contrast; Future  -     XR Lumbar Spine G/E 4 Views; Future  -     Physical Therapy Referral; Future  Lumbar facet arthropathy  -     MR Lumbar Spine w/o Contrast; Future  -     XR Lumbar Spine G/E 4 Views; Future  -      Physical Therapy Referral; Future  Spondylolisthesis of lumbar region  -     MR Lumbar Spine w/o Contrast; Future  -     XR Lumbar Spine G/E 4 Views; Future  -     Physical Therapy Referral; Future    PLAN:  Reviewed spine anatomy and disease process. Discussed diagnosis and treatment options with the patient today. A shared decision making model was used.  The patient's values and choices were respected. The following represents what was discussed and decided upon by the provider and the patient.      -DIAGNOSTIC TESTS:  Images were personally reviewed and interpreted and explained to patient today using spine model.   --Consider right lower extremity EMG pending imaging review.  --Ordered lumbar spine MRI as well as flexion-extension x-ray to evaluate chronic LBP and progressive right lower extremity weakness.  -- Pelvic/right hip x-ray 7/10/2022 with moderate hip degenerative changes.  Postoperative lumbar spine Coflex device placement.  -- Lumbar spine MRI CDI 2017 with L4-5 grade 1 spondylolisthesis with severe facet arthropathy and significant central stenosis with subarticular recess stenosis and L5 impingement.  L3-4 minimal anterolisthesis with disc herniation with severe spinal stenosis subarticular recess stenosis and L4 impingement.    -PHYSICAL THERAPY: Referral to physical therapy placed to establish home exercises for core strengthening as well as to work on generalized strengthening due to generalized deconditioning.  Discussed the importance of core strengthening, ROM, stretching exercises with the patient and how each of these entities is important in decreasing pain.  Explained to the patient that the purpose of physical therapy is to teach the patient a home exercise program.  These exercises need to be performed every day in order to decrease pain and prevent future occurrences of pain.        -MEDICATIONS: Patient has trialed multiple medications in the past with either side effects or no  effectiveness.  Did discuss that if medications are something he would like to discuss more in detail with providers I would recommend a pain clinic referral.  Patient wants to hold off on this at this time.  He is doing some type of CBD salve that gives him good relief for 8 hours therefore did recommend continuing with this.  Discussed multiple medication options today with patient. Discussed risks, side effects, and proper use of medications. Patient verbalized understanding.    -INTERVENTIONS: Could consider further injections versus spinal cord stimulator pending imaging review as well.  Patient is hoping to avoid spine surgery otherwise.  Discussed risks and benefits of injections with patient today.    -PATIENT EDUCATION:  Total time of 52 minutes, on the day of service, spent with the patient, reviewing the chart, placing orders, and documenting.   -Today we also discussed the issues related to the current COVID-19 pandemic, the pros and cons of the current treatment plan, the CDC guidelines such as social distancing, washing hands, masking, and covering the cough.    -FOLLOW-UP:   Follow-up for MRI review preferable versus nurse navigator call which may be more challenging.  **40 minutes always**    Advised patient to call the Spine Center if symptoms worsen or you have problems controlling bladder and bowel function.   ______________________________________________________________________    SUBJECTIVE:  HPI:  Cyril Galdamez  Is a 81 year old male who presents today for new patient evaluation of low back pain lumbosacral junction for many years that is a constant 6/10 up to an 8 at its worst.  Generalized activity does make it worse but it is also constant.  He does have also ongoing muscle deconditioning for many years that he states is from a long car ride where he was inactive for many months, and has walked with a walker for many years.  He has noted worsening right lower extremity weakness however  has a difficult time explaining as to how long that has been.  Patient currently denies any bowel or bladder loss control, denies saddle anesthesia.    Patient's daughter Meera was present today during entire visit and added to past medical/surgical/family/social history and history of presenting illness.     -Treatment to Date:     Left L3-L5 RFA [facility] 9/11/2020 with 70-80% relief, relief x7 months  Left L3-L5 RFA [facility] 12/7/2021 with 70-90% relief, relief x4 months    -Medications: CBD salve with benefit    Topical cannabis with benefit  Tizanidine  Acetaminophen  Celebrex 100 mg twice daily  Amitriptyline    Prior failed medications:  Cymbalta with side effects  Gabapentin 300 mg 3 times daily with ineffectiveness  Cannabis pills with side effect    Current Outpatient Medications   Medication     amitriptyline (ELAVIL) 10 MG tablet     celecoxib (CELEBREX) 100 MG capsule     HEMP OIL OR EXTRACT OR OTHER CBD CANNABINOID, NOT MEDICAL CANNABIS,     HYDROcodone-acetaminophen (NORCO) 5-325 MG tablet     tiZANidine (ZANAFLEX) 2 MG tablet     albuterol (PROAIR HFA/PROVENTIL HFA/VENTOLIN HFA) 108 (90 Base) MCG/ACT inhaler     amLODIPine (NORVASC) 5 MG tablet     atorvastatin (LIPITOR) 10 MG tablet     chlorthalidone (HYGROTON) 25 MG tablet     lisinopril (ZESTRIL) 40 MG tablet     medical cannabis (Patient's own supply)     No current facility-administered medications for this visit.       Allergies   Allergen Reactions     Shellfish Allergy Anaphylaxis       No past medical history on file.     Patient Active Problem List   Diagnosis     Anemia     Benign paroxysmal positional vertigo     Dizziness     Hypertension     Spinal stenosis of lumbar region     Asbestosis (H)     Mild asthma     Dyslipidemia     First degree atrioventricular block     History of malignant neoplasm of colon     Malignant neoplasm of skin     Microalbuminuria     Prediabetes     Sinus bradycardia     Delirium       No past surgical  "history on file.    No family history on file.    Reviewed past medical, surgical, and family history with patient found on new patient intake packet located in EMR Media tab.     SOCIAL HX: Patient is .  Patient denies smoking/tobacco use, denies alcohol use, denies history being heavy drinker, denies recreational drug use.    ROS: Positive for muscle pain, imbalance, excessive tiredness, constipation.  Specifically negative for bowel/bladder dysfunction, balance changes, headache, dizziness, foot drop, fevers, chills, appetite changes, nausea/vomiting, unexplained weight loss. Otherwise 13 systems reviewed are negative. Please see the patient's intake questionnaire from today for details.    OBJECTIVE:  /60 (BP Location: Right arm, Patient Position: Sitting)   Pulse 66   Ht 5' 11\" (1.803 m)   Wt 165 lb (74.8 kg)   SpO2 98%   BMI 23.01 kg/m      PHYSICAL EXAMINATION:    --CONSTITUTIONAL:  Vital signs as above.  No acute distress.  The patient is well nourished and well groomed.  --PSYCHIATRIC:  Appropriate mood and affect. The patient is awake, alert, oriented to person, place, time and answering questions appropriately with clear speech.    --SKIN:  Skin over the face, bilateral lower extremities, and posterior torso is clean, dry, intact without rashes.    --RESPIRATORY: Normal rhythm and effort. No abnormal accessory muscle breathing patterns noted.   --ABDOMINAL:  Non-distended.  --STANDING EXAMINATION:  Normal lumbar lordosis noted, no lateral shift.  --MUSCULOSKELETAL: Lumbar spine inspection reveals no evidence of deformity. Range of motion is not limited in lumbar flexion, extension, lateral rotation. No tenderness to palpation lumbar spine. Straight leg raising in the seated position is negative for radicular component.  --GROSS MOTOR: Gait is antalgic.  Arises from seated position with difficulty.  --LOWER EXTREMITY MOTOR TESTING:  Plantar flexion left 5/5, right 4/5   Dorsiflexion left " 5/5, right 4/5   Great toe MTP extension left 5/5, right 3/5  Knee flexion left 5/5, right 5/5  Knee extension left 5/5, right 5/5   Hip flexion left 5/5, right 5/5  Hip abduction left 5/5, right 5/5  Hip adduction left 5/5, right 5/5   --HIPS: Full range of motion bilaterally.   --NEUROLOGICAL:  2/4 patellar, medial hamstring, and achilles reflexes bilaterally.  Sensation to light touch is intact in the bilateral L4, L5, and S1 dermatomes. Babinski is negative. No clonus.  Negative Deysi reflex bilaterally.  --VASCULAR:  2/4 dorsalis pedis and posterior tibialsi pulses bilaterally.  Bilateral lower extremities are warm.  There is no pitting edema of the bilateral lower extremities.    RESULTS: Prior medical records from [facility] and Care Everywhere were reviewed today.    Imaging: Spine imaging was personally reviewed and interpreted today. The images were shown to the patient and the findings were explained using a spine model.    2017 [facility] MRI reviewed                 Again, thank you for allowing me to participate in the care of your patient.        Sincerely,        Kavitha Jacinto, CNP

## 2022-08-24 ENCOUNTER — TELEPHONE (OUTPATIENT)
Dept: PHYSICAL MEDICINE AND REHAB | Facility: CLINIC | Age: 82
End: 2022-08-24

## 2022-08-24 NOTE — TELEPHONE ENCOUNTER
Phone call to patient to patient's daughter to discuss her questions. She reports patient had been scheduled to have his MRI this coming Friday, but he cancelled it. States she will call them back to get this rescheduled. Contact information provided.      Encouraged her to call the Spine Center nurse navigation line directly at [phone] once the MRI is rescheduled. At that time, they will be assisted in making a follow up with Kavitha Jacinto CNP for 2 days post MRI to review images and discuss treatment options. Stated understanding and appreciation for call back.

## 2022-08-24 NOTE — TELEPHONE ENCOUNTER
[facility] Call Center    Phone Message    May a detailed message be left on voicemail: no     Reason for Call: Other: Patient's daughter calling. They would like to know if they should be setting up an appt w/Kavitha Jacinto for after patient's MRI or if she or someone else w/just call with the results

## 2022-08-29 ENCOUNTER — OFFICE VISIT (OUTPATIENT)
Dept: FAMILY MEDICINE | Facility: CLINIC | Age: 82
End: 2022-08-29
Payer: COMMERCIAL

## 2022-08-29 VITALS
WEIGHT: 174 LBS | OXYGEN SATURATION: 97 % | HEART RATE: 83 BPM | DIASTOLIC BLOOD PRESSURE: 70 MMHG | SYSTOLIC BLOOD PRESSURE: 127 MMHG | BODY MASS INDEX: 24.27 KG/M2

## 2022-08-29 DIAGNOSIS — M48.061 SPINAL STENOSIS OF LUMBAR REGION, UNSPECIFIED WHETHER NEUROGENIC CLAUDICATION PRESENT: ICD-10-CM

## 2022-08-29 DIAGNOSIS — M25.551 HIP PAIN, RIGHT: ICD-10-CM

## 2022-08-29 DIAGNOSIS — Z79.899 ENCOUNTER FOR LONG-TERM (CURRENT) USE OF MEDICATIONS: Primary | ICD-10-CM

## 2022-08-29 DIAGNOSIS — R80.9 MICROALBUMINURIA: ICD-10-CM

## 2022-08-29 DIAGNOSIS — E78.5 DYSLIPIDEMIA: ICD-10-CM

## 2022-08-29 DIAGNOSIS — D64.89 ANEMIA DUE TO OTHER CAUSE, NOT CLASSIFIED: ICD-10-CM

## 2022-08-29 DIAGNOSIS — R73.03 PREDIABETES: ICD-10-CM

## 2022-08-29 DIAGNOSIS — Z12.5 SCREENING FOR PROSTATE CANCER: ICD-10-CM

## 2022-08-29 LAB
ALBUMIN SERPL BCG-MCNC: 4.3 G/DL (ref 3.5–5.2)
ALP SERPL-CCNC: 87 U/L (ref 40–129)
ALT SERPL W P-5'-P-CCNC: 24 U/L (ref 10–50)
ANION GAP SERPL CALCULATED.3IONS-SCNC: 10 MMOL/L (ref 7–15)
AST SERPL W P-5'-P-CCNC: 23 U/L (ref 10–50)
BILIRUB SERPL-MCNC: 0.4 MG/DL
BUN SERPL-MCNC: 35.7 MG/DL (ref 8–23)
CALCIUM SERPL-MCNC: 10 MG/DL (ref 8.8–10.2)
CHLORIDE SERPL-SCNC: 100 MMOL/L (ref 98–107)
CHOLEST SERPL-MCNC: 141 MG/DL
CREAT SERPL-MCNC: 0.87 MG/DL (ref 0.67–1.17)
DEPRECATED HCO3 PLAS-SCNC: 28 MMOL/L (ref 22–29)
ERYTHROCYTE [DISTWIDTH] IN BLOOD BY AUTOMATED COUNT: 12.3 % (ref 10–15)
GFR SERPL CREATININE-BSD FRML MDRD: 86 ML/MIN/1.73M2
GLUCOSE SERPL-MCNC: 108 MG/DL (ref 70–99)
HBA1C MFR BLD: 6 % (ref 0–5.6)
HCT VFR BLD AUTO: 39.9 % (ref 40–53)
HDLC SERPL-MCNC: 49 MG/DL
HGB BLD-MCNC: 13.4 G/DL (ref 13.3–17.7)
LDLC SERPL CALC-MCNC: 69 MG/DL
MCH RBC QN AUTO: 33 PG (ref 26.5–33)
MCHC RBC AUTO-ENTMCNC: 33.6 G/DL (ref 31.5–36.5)
MCV RBC AUTO: 98 FL (ref 78–100)
NONHDLC SERPL-MCNC: 92 MG/DL
PLATELET # BLD AUTO: 253 10E3/UL (ref 150–450)
POTASSIUM SERPL-SCNC: 4.1 MMOL/L (ref 3.4–5.3)
PROT SERPL-MCNC: 7.1 G/DL (ref 6.4–8.3)
PSA SERPL-MCNC: 11.6 NG/ML
RBC # BLD AUTO: 4.06 10E6/UL (ref 4.4–5.9)
SODIUM SERPL-SCNC: 138 MMOL/L (ref 136–145)
TRIGL SERPL-MCNC: 113 MG/DL
TSH SERPL DL<=0.005 MIU/L-ACNC: 0.91 UIU/ML (ref 0.3–4.2)
WBC # BLD AUTO: 9.2 10E3/UL (ref 4–11)

## 2022-08-29 PROCEDURE — 80053 COMPREHEN METABOLIC PANEL: CPT | Performed by: INTERNAL MEDICINE

## 2022-08-29 PROCEDURE — 85027 COMPLETE CBC AUTOMATED: CPT | Performed by: INTERNAL MEDICINE

## 2022-08-29 PROCEDURE — 99214 OFFICE O/P EST MOD 30 MIN: CPT | Performed by: INTERNAL MEDICINE

## 2022-08-29 PROCEDURE — 80061 LIPID PANEL: CPT | Performed by: INTERNAL MEDICINE

## 2022-08-29 PROCEDURE — 82043 UR ALBUMIN QUANTITATIVE: CPT | Performed by: INTERNAL MEDICINE

## 2022-08-29 PROCEDURE — 83036 HEMOGLOBIN GLYCOSYLATED A1C: CPT | Performed by: INTERNAL MEDICINE

## 2022-08-29 PROCEDURE — 84443 ASSAY THYROID STIM HORMONE: CPT | Performed by: INTERNAL MEDICINE

## 2022-08-29 PROCEDURE — G0103 PSA SCREENING: HCPCS | Performed by: INTERNAL MEDICINE

## 2022-08-29 PROCEDURE — 36415 COLL VENOUS BLD VENIPUNCTURE: CPT | Performed by: INTERNAL MEDICINE

## 2022-08-29 RX ORDER — CELECOXIB 100 MG/1
100 CAPSULE ORAL 2 TIMES DAILY PRN
Qty: 60 CAPSULE | Refills: 1 | Status: SHIPPED | OUTPATIENT
Start: 2022-08-29 | End: 2023-08-22 | Stop reason: SINTOL

## 2022-08-29 RX ORDER — AMITRIPTYLINE HYDROCHLORIDE 10 MG/1
10 TABLET ORAL AT BEDTIME
Qty: 90 TABLET | Refills: 11 | Status: SHIPPED | OUTPATIENT
Start: 2022-08-29 | End: 2023-12-01

## 2022-08-29 NOTE — PROGRESS NOTES
Assessment & Plan     Encounter for long-term (current) use of medications  Discussed not using opioids for long term pain treatment, patient is in agreement with this    Hip pain, right  Requesting home care for assistance which seems reasonable, ordered, will do care coordinator as well to help if needed. Can continue to try using celebrex for pain if needed  - Home Care Referral  - celecoxib (CELEBREX) 100 MG capsule; Take 1 capsule (100 mg) by mouth 2 times daily as needed for moderate pain    Spinal stenosis of lumbar region, unspecified whether neurogenic claudication present  Has ongoing issues from this. Seeing PMR, discussed that reasonable to get the MRI/imaging to evaluate further. Using CBD and topical from cannabis dispensary.   - Home Care Referral  - amitriptyline (ELAVIL) 10 MG tablet; Take 1 tablet (10 mg) by mouth At Bedtime    Prediabetes  Labs done today for repeat.   - TSH with free T4 reflex; Future  - Hemoglobin A1c  - TSH with free T4 reflex    Screening for prostate cancer  Having some urinary frequency at night, requests PSA to be done, noted that out of typical age range. Will likely follow over a time.  - PSA, screen; Future  - PSA, screen    Dyslipidemia  Monitoring labs ordered  - Comprehensive metabolic panel  - Lipid panel reflex to direct LDL Fasting    Anemia due to other cause, not classified  - CBC with platelets    Microalbuminuria  - Albumin Random Urine Quantitative with Creat Ratio    Patient Instructions   Ok to continue to use the CBD salve on the area.     Can call to set up with imaging studies- Please call [facility] [phone] to arrange your study.    Stop the hydrocodone.     They should call to set you up with home care.     Labs today as we discussed.     Angel Sung MD                   No follow-ups on file.    Angel Sung MD  [facility]    Aftab Nam is a 81 year old, presenting for the following  health issues:  RECHECK (Consultation for home care, medication concerns and diabetic testing )      HPI     BLOOD PRESSURE- has not been able to check at home. Has been on amlodipine 5 mg per day, chlorthalidone 25 mg and lisinopril 40 mg.     Back pain- has been cutting down on the hydrocodone- has been trying to get off these. Seen by spine and goal to have repeat imaging done. Noted to have progressive right lower extremity weakness concerning for progression.   - has been using topical medical cannabis. Able to stop   - did try [employer]- using red roll on from there, using CBD salve    Prediabetes- due for repeat labs. Has been using diet to control- hard to exercise with back pain. Using walker.     Review of Systems   Constitutional, HEENT, cardiovascular, pulmonary, gi and gu systems are negative, except as otherwise noted.      Objective    /70   Pulse 83   Wt 78.9 kg (174 lb)   SpO2 97%   BMI 24.27 kg/m    Body mass index is 24.27 kg/m .   Wt Readings from Last 4 Encounters:   08/29/22 78.9 kg (174 lb)   08/16/22 74.8 kg (165 lb)   08/02/22 74.8 kg (165 lb)   07/30/22 79.4 kg (175 lb)       Physical Exam   General: alert, interactive, NAD  HEENT: sclerae clear  Neck: appears supple  Resp: breathing regular and unlabored, speaking in full sentences  MSK:walking with walker, slightly more weak on the right versus left, slow with antalgic gait  Ext: warm and well perfused without edema                      .  ..

## 2022-08-29 NOTE — LETTER
August 31, 2022      Cyrli Galdamez  [address] [place] [address]        Dear ,      Here are the results from the recent Labs that we did.     Your urine does show some protein in it. The lisinopril helps with this.     Your PSA is slightly elevated- we allow for higher amounts of this over time with age. I would check this again in 3-6 months to see if having a big change. We can have you see urology if wanting for urinary symptoms.     Your urea nitrogen was slightly high- this can be from slight dehydration- pushing fluids can help with that.     Let me know if you are having difficulty getting in for a recheck or questions you have for me.       Resulted Orders   Comprehensive metabolic panel   Result Value Ref Range    Sodium 138 136 - 145 mmol/L    Potassium 4.1 3.4 - 5.3 mmol/L    Creatinine 0.87 0.67 - 1.17 mg/dL    Urea Nitrogen 35.7 (H) 8.0 - 23.0 mg/dL    Chloride 100 98 - 107 mmol/L    Carbon Dioxide (CO2) 28 22 - 29 mmol/L    Anion Gap 10 7 - 15 mmol/L    Glucose 108 (H) 70 - 99 mg/dL    Calcium 10.0 8.8 - 10.2 mg/dL    Protein Total 7.1 6.4 - 8.3 g/dL    Albumin 4.3 3.5 - 5.2 g/dL    Bilirubin Total 0.4 <=1.2 mg/dL    Alkaline Phosphatase 87 40 - 129 U/L    AST 23 10 - 50 U/L    ALT 24 10 - 50 U/L    GFR Estimate 86 >60 mL/min/1.73m2      Comment:      Effective December 21, 2021 eGFRcr in adults is calculated using the 2021 CKD-EPI creatinine equation which includes age and gender (Ludwin et al., NEJM, DOI: 10.1056/AIDSsh1029453)   Lipid panel reflex to direct LDL Fasting   Result Value Ref Range    Cholesterol 141 <200 mg/dL    Triglycerides 113 <150 mg/dL    Direct Measure HDL 49 >=40 mg/dL    LDL Cholesterol Calculated 69 <=100 mg/dL    Non HDL Cholesterol 92 <130 mg/dL    Narrative    Cholesterol  Desirable:  <200 mg/dL    Triglycerides  Normal:  Less than 150 mg/dL  Borderline High:  150-199 mg/dL  High:  200-499 mg/dL  Very High:  Greater than or equal to 500  mg/dL    Direct Measure HDL  Female:  Greater than or equal to 50 mg/dL   Male:  Greater than or equal to 40 mg/dL    LDL Cholesterol  Desirable:  <100mg/dL  Above Desirable:  100-129 mg/dL   Borderline High:  130-159 mg/dL   High:  160-189 mg/dL   Very High:  >= 190 mg/dL    Non HDL Cholesterol  Desirable:  130 mg/dL  Above Desirable:  130-159 mg/dL  Borderline High:  160-189 mg/dL  High:  190-219 mg/dL  Very High:  Greater than or equal to 220 mg/dL   Hemoglobin A1c   Result Value Ref Range    Hemoglobin A1C 6.0 (H) 0.0 - 5.6 %      Comment:      Normal <5.7%   Prediabetes 5.7-6.4%    Diabetes 6.5% or higher     Note: Adopted from ADA consensus guidelines.   CBC with platelets   Result Value Ref Range    WBC Count 9.2 4.0 - 11.0 10e3/uL    RBC Count 4.06 (L) 4.40 - 5.90 10e6/uL    Hemoglobin 13.4 13.3 - 17.7 g/dL    Hematocrit 39.9 (L) 40.0 - 53.0 %    MCV 98 78 - 100 fL    MCH 33.0 26.5 - 33.0 pg    MCHC 33.6 31.5 - 36.5 g/dL    RDW 12.3 10.0 - 15.0 %    Platelet Count 253 150 - 450 10e3/uL   TSH with free T4 reflex   Result Value Ref Range    TSH 0.91 0.30 - 4.20 uIU/mL   PSA, screen   Result Value Ref Range    Prostate Specific Antigen Screen 11.60 ng/mL      Comment:      No reference ranges have been established for patients over 80 years.    Narrative    This result is obtained using the Roche Elecsys total PSA method on the roula [id number] immunoassay analyzer. Results obtained with different assay methods or kits cannot be used interchangeably.   Albumin Random Urine Quantitative with Creat Ratio   Result Value Ref Range    Albumin Urine mg/L 147.0 mg/L      Comment:      The reference ranges have not been established in urine albumin. The results should be integrated into the clinical context for interpretation.    Albumin Urine mg/g Cr 203.60 (H) 0.00 - 17.00 mg/g Cr      Comment:      Microalbuminuria is defined as an albumin:creatinine ratio of 17 to 299 for males and 25 to 299 for females. A ratio of  albumin:creatinine of 300 or higher is indicative of overt proteinuria.  Due to biologic variability, positive results should be confirmed by a second, first-morning random or 24-hour timed urine specimen. If there is discrepancy, a third specimen is recommended. When 2 out of 3 results are in the microalbuminuria range, this is evidence for incipient nephropathy and warrants increased efforts at glucose control, blood pressure control, and institution of therapy with an angiotensin-converting-enzyme (ACE) inhibitor (if the patient can tolerate it).      Creatinine Urine mg/dL 72.2 mg/dL      Comment:      The reference ranges have not been established in urine creatinine. The results should be integrated into the clinical context for interpretation.       If you have any questions or concerns, please call the clinic at the number listed above.       Sincerely,      Angel Sung MD

## 2022-08-29 NOTE — PATIENT INSTRUCTIONS
Ok to continue to use the CBD salve on the area.     Can call to set up with imaging studies- Please call [facility] [phone] to arrange your study.    Stop the hydrocodone.     They should call to set you up with home care.     Labs today as we discussed.     Angel Sung MD

## 2022-08-30 ENCOUNTER — TELEPHONE (OUTPATIENT)
Dept: FAMILY MEDICINE | Facility: CLINIC | Age: 82
End: 2022-08-30

## 2022-08-30 DIAGNOSIS — M48.061 SPINAL STENOSIS OF LUMBAR REGION, UNSPECIFIED WHETHER NEUROGENIC CLAUDICATION PRESENT: Primary | ICD-10-CM

## 2022-08-30 LAB
CREAT UR-MCNC: 72.2 MG/DL
MICROALBUMIN UR-MCNC: 147 MG/L
MICROALBUMIN/CREAT UR: 203.6 MG/G CR (ref 0–17)

## 2022-08-30 NOTE — TELEPHONE ENCOUNTER
Patient Call regarding recent referral placement for home care.     Patient said that [facility] is booked up and he would like his referral sent to [facility] or [email or web address] something that will take [facility] insurance.     Patient is also requesting a follow up call if and when this can be done.

## 2022-08-31 ENCOUNTER — PATIENT OUTREACH (OUTPATIENT)
Dept: CARE COORDINATION | Facility: CLINIC | Age: 82
End: 2022-08-31

## 2022-08-31 NOTE — PROGRESS NOTES
Clinic Care Coordination Contact    Situation: Patient chart reviewed by care coordinator.    Background: CCC Referral to assist with locating a home care agency.    Assessment: Home Care Referral placed by PCP on 8/29 was declined by [facility] due to capacity.  Patient seeking SN, PT.    Plan/Recommendations: Will have the [facility] assist with locating a home care agency.

## 2022-09-02 ENCOUNTER — PATIENT OUTREACH (OUTPATIENT)
Dept: CARE COORDINATION | Facility: CLINIC | Age: 82
End: 2022-09-02

## 2022-09-02 NOTE — PROGRESS NOTES
[facility]    Background: CCRC team member received notification from [facility] team requesting assistance in finding a home care agency to accept referral for skilled home care due to [facility] declining referral.     Home Care Referral placed for the following services:     Skilled Nursing  Physical Therapy Complex medication teaching and monitoring  Gait Training  Range of Motion  Therapeutic Exercise      CTA called [facility]  and per their request, home care referral and Face to Face OV  visit notes from 8/29/22 was faxed through Epic to them for their review.    CTA will await return call from agency with update on status of referral.      CTA will await response(s) from agencies with update on referral.      Received a return call from Gloria at [facility] they are able to accept patient with a SOC of 9/6/22 or 9/7/22 (due to the holiday)  CTA will send message to PCP.    The following home care agencies declined referral for reason(s) noted:     [facility]- SOC not until 9/8  [facility]- No staff  [facility]- Out of Network       Alyssa Prado  Care Transitions Assistant  [facility] [place]       *Connected Care Resource Team does NOT follow patient ongoing. Referrals are identified based on internal discharge reports and the outreach is to ensure patient has an understanding of their discharge instructions.

## 2022-09-02 NOTE — LETTER
To Whom It May Concern:    My name is Alyssa Prado, a CTA with [facility]. Please review attached home care referral for SOC services.    For any questions/concerns, and/or to update on the status of this referral, please call me directly at [phone].      Thank you,    Alyssa Prado, CTA  [facility]  [facility] - Ambulatory Care Management          PRIMARY LOCATION: [facility]*   CSN:[id number]      PATIENT DEMOGRAPHICS:   Name: Cyril Galdamez MRN: [id number]   Address: [address] [place] [address] Sex: Male   City/St/Zip: [place] [address] : 1940   Home Phone: [phone] Work Phone:     [facility] Cell Phone: [phone]       EMERGENCY CONTACT:  Contact Name: Riya Galdamez Relationship: Spouse   Home Phone:   Work Phone:         Cell Phone: [phone]      GUARANTOR INFORMATION: Relationship to Patient: Self  Name: Cyril Galdamez       Address: [address] [place] [address]  Account Type: Personal/family   City/St/[place] [address] Employer: Retired   Home Phone: [phone] Work Phone:          COVERAGE INFORMATION:  Primary Payor: [facility] Plan: [facility] Medicare   Subscriber: Cyril Galdamez Group #: [id number]   Subscriber Sex: Male       Subscriber : 1940 Patient Rel'ship: Self   Subscriber Effective Date:   Member Effective Date: 2022    Subscriber ID [id number] Member ID: [id number]      Secondary Payor:   Plan:     Subscriber:   Group #:     Subscriber Sex:         Subscriber :   Patient Rel'ship:     Subscriber Effective Date:   Member Effective Date:     Subscriber ID   Member ID:        PROVIDER INFORMATION:  Referring Physician:   Referring Address:     Referring Phone: N/A Referring Fax:     Primary Physician: Angel Sung Primary Address:  [address], [facility] [address]   Primary Phone: [phone] Primary Fax: [phone]               Documentation of Face to Face and Certification  for Home Health Services     I attest that I saw or will see Cyril Galdamez on this date:  8/29/2022     This encounter with the patient was in whole, or in part, for medical condition, which is the primary reason for Home Health Care:       Patient Active Problem List   Diagnosis     Anemia     Benign paroxysmal positional vertigo     Dizziness     Hypertension     Spinal stenosis of lumbar region, unspecified whether neurogenic claudication present     Asbestosis (H)     Mild asthma     Dyslipidemia     First degree atrioventricular block     History of malignant neoplasm of colon     Malignant neoplasm of skin     Microalbuminuria     Prediabetes     Sinus bradycardia     Delirium     Hip pain, right      I certify that, based on my findings, the following services are medically necessary Home Health Services: Skilled Nursing  Physical Therapy Complex medication teaching and monitoring  Gait Training  Range of Motion  Therapeutic Exercise     Additional services needed:        Further, I certify that my clinical findings support that this patient is homebound (i.e. absences from home require considerable and taxing effort and are for medical reasons or [employer] services or infrequently or of short duration when for other reasons) related to:Patient has difficulty ambulating >100 ft     Based on the above findings, I certify that this patient is confined to the home and needs intermittent skilled nursing care, physical therapy and/or speech therapy. The patient is under my care, and I have initiated the establishment of the plan of care. This patient will be followed by a physician who will periodically review the plan of care.        Physician/Provider to provide follow up care: Provider to follow patient: AUSTIN RAY [[id number]]        Please be aware that coverage of these services is subject to the terms and limitations of your health insurance plan.  Call member services at your health plan with any  benefit or coverage questions.  _______________________________________________________________________  Authorized by:  Angel Sung MD       PLEASE EVALUATE AND TREAT (Evaluation timeline is 24 - 48 hrs. Please call if there is need for a variance to this timeline).     Medications:         Current Outpatient Medications   Medication Sig Dispense Refill     albuterol (PROAIR HFA/PROVENTIL HFA/VENTOLIN HFA) 108 (90 Base) MCG/ACT inhaler Inhale 1 puff into the lungs         amitriptyline (ELAVIL) 10 MG tablet Take 1 tablet (10 mg) by mouth At Bedtime 90 tablet 11     amLODIPine (NORVASC) 5 MG tablet Take 1 tablet (5 mg) by mouth daily 90 tablet 11     atorvastatin (LIPITOR) 10 MG tablet Take 1 tablet (10 mg) by mouth daily 90 tablet 11     celecoxib (CELEBREX) 100 MG capsule Take 1 capsule (100 mg) by mouth 2 times daily as needed for moderate pain 60 capsule 1     chlorthalidone (HYGROTON) 25 MG tablet Take 1 tablet (25 mg) by mouth daily 90 tablet 11     HEMP OIL OR EXTRACT OR OTHER CBD CANNABINOID, NOT MEDICAL CANNABIS, Topical         lisinopril (ZESTRIL) 40 MG tablet Take 1 tablet (40 mg) by mouth daily 90 tablet 11     medical cannabis (Patient's own supply) See Admin Instructions (The purpose of this order is to document that the patient reports taking medical cannabis.  This is not a prescription, and is not used to certify that the patient has a qualifying medical condition.)          Problems:      Patient Active Problem List   Diagnosis     Anemia     Benign paroxysmal positional vertigo     Dizziness     Hypertension     Spinal stenosis of lumbar region, unspecified whether neurogenic claudication present     Asbestosis (H)     Mild asthma     Dyslipidemia     First degree atrioventricular block     History of malignant neoplasm of colon     Malignant neoplasm of skin     Microalbuminuria     Prediabetes     Sinus bradycardia     Delirium     Hip pain, right      Diet:  None        Code Status:     Code Status: Prior     Allergies:  Shellfish allergy             Order  Home Care Referral [[id number]] (Order [id number])        Referred By  Referred To   Angel Sung MD   [address] [facility] [address]   Phone: [phone]   Fax: [phone]    Diagnoses: Hip pain, right   Spinal stenosis of lumbar region, unspecified whether neurogenic claudication present   Order: Home Care Referral    [facility] - Intake/Referral   [address]   [facility] [address]   Phone: [phone]   Fax: [phone]        Patient Name   Cyril Galdamez MRN   [id number] Legal Sex   Male    1940       Patient Demographics    Address   [address] [place] [address] Phone   [phone] (Home)   [phone] (Mobile) *Preferred* E-mail Address   [email or web address]                   Order Date/Time Release Date/Time Start Date/Time End Date/Time   22 01:13 PM None 2022 None           Frequency Duration Priority Order Class   None None Routine: Next available opening  Home Care           Authorizing Provider Encounter Provider   Angel Sung MD Foss, Adam Phillip, MD                   Home Care Referral [[id number]]    Electronically signed by: Angel Sung MD on 22 1313 Status: Active   Ordering user: Angel Sung MD 22 1313           Question Answer   Reason for Referral: Skilled Nursing   Note: Must select at least one of Skilled Nursing, Physical Therapy, and/or Speech Therapy in addition to any other services.    Physical Therapy   Note: Must select at least one of Skilled Nursing, Physical Therapy, and/or Speech Therapy in addition to any other services.   Physical Therapy Eval and Treat for: Gait Training    Range of Motion    Therapeutic Exercise   Skilled Nursing Eval and Treat for: Complex medication teaching and monitoring   Is the patient homebound? Yes   Homebound Status (describe the functional limitations that  support this patient is confined to his/her home. Medicaid recipients are not required to be homebound.): Patient has difficulty ambulating >100 ft   I attest that I saw or will see the patient on this date: 8/29/2022   Provider to follow patient AUSTIN RAY                         Encounter for long-term (current) use of medications  Discussed not using opioids for long term pain treatment, patient is in agreement with this     Hip pain, right  Requesting home care for assistance which seems reasonable, ordered, will do care coordinator as well to help if needed. Can continue to try using celebrex for pain if needed  - Home Care Referral  - celecoxib (CELEBREX) 100 MG capsule; Take 1 capsule (100 mg) by mouth 2 times daily as needed for moderate pain     Spinal stenosis of lumbar region, unspecified whether neurogenic claudication present  Has ongoing issues from this. Seeing PMR, discussed that reasonable to get the MRI/imaging to evaluate further. Using CBD and topical from cannabis dispensary.   - Home Care Referral  - amitriptyline (ELAVIL) 10 MG tablet; Take 1 tablet (10 mg) by mouth At Bedtime     Prediabetes  Labs done today for repeat.   - TSH with free T4 reflex; Future  - Hemoglobin A1c  - TSH with free T4 reflex     Screening for prostate cancer  Having some urinary frequency at night, requests PSA to be done, noted that out of typical age range. Will likely follow over a time.  - PSA, screen; Future  - PSA, screen     Dyslipidemia  Monitoring labs ordered  - Comprehensive metabolic panel  - Lipid panel reflex to direct LDL Fasting     Anemia due to other cause, not classified  - CBC with platelets     Microalbuminuria  - Albumin Random Urine Quantitative with Creat Ratio     Patient Instructions   Ok to continue to use the CBD salve on the area.      Can call to set up with imaging studies- Please call [facility] [phone] to arrange your study.     Stop the hydrocodone.       They should call to set you up with home care.      Labs today as we discussed.      Angel Sung MD

## 2022-09-05 ENCOUNTER — MEDICAL CORRESPONDENCE (OUTPATIENT)
Dept: HEALTH INFORMATION MANAGEMENT | Facility: CLINIC | Age: 82
End: 2022-09-05

## 2022-09-14 ENCOUNTER — MEDICAL CORRESPONDENCE (OUTPATIENT)
Dept: HEALTH INFORMATION MANAGEMENT | Facility: CLINIC | Age: 82
End: 2022-09-14

## 2022-09-23 ENCOUNTER — TELEPHONE (OUTPATIENT)
Dept: FAMILY MEDICINE | Facility: CLINIC | Age: 82
End: 2022-09-23

## 2022-09-23 NOTE — TELEPHONE ENCOUNTER
Order/Referral Request    Who is requesting: malik @ [facility]    Orders being requested: Physical therapy effective 1 X/ per week for 7 weeks    Reason service is needed/diagnosis: strengthening, balance, gait training, falls prevention and pain management    When are orders needed by:asap    Has this been discussed with Provider: No    Does patient have a preference on a Group/Provider/Facility? [facility]     Does patient have an appointment scheduled?: No    Where to send orders: verbal    Okay to leave a detailed message?: Yes at Cell number on file:  [phone]  Telephone Information:   Mobile [phone]

## 2022-09-26 NOTE — TELEPHONE ENCOUNTER
Outgoing Call:  Please see notes below    Dr. Sung's message left on VM    Meredith OVALLES RN  [facility]

## 2022-09-30 ENCOUNTER — TELEPHONE (OUTPATIENT)
Dept: FAMILY MEDICINE | Facility: CLINIC | Age: 82
End: 2022-09-30

## 2022-09-30 NOTE — TELEPHONE ENCOUNTER
Called ORI Stallings to leave verbal orders on confidential voicemail.     Marcia Saavedra RN, BSN  [facility]

## 2022-09-30 NOTE — TELEPHONE ENCOUNTER
Order/Referral Request    Who is requesting: Haylie OT with [facility]    Orders being requested: Initiate Home Care OT   2 visits to address adaptive equipment training and compression garment training    Reason service is needed/diagnosis: Recent fall with back pain    When are orders needed by:  Yesterday 9.29.22      Has this been discussed with Provider: No    Does patient have a preference on a Group/Provider/Facility? No    Does patient have an appointment scheduled?: No    Where to send orders: Verbal Orders    Okay to leave a detailed message?: Yes at Other phone number:  [phone]    Meredith OVALLES RN  [facility]

## 2022-10-04 ENCOUNTER — MEDICAL CORRESPONDENCE (OUTPATIENT)
Dept: HEALTH INFORMATION MANAGEMENT | Facility: CLINIC | Age: 82
End: 2022-10-04

## 2022-11-08 ENCOUNTER — TELEPHONE (OUTPATIENT)
Dept: FAMILY MEDICINE | Facility: CLINIC | Age: 82
End: 2022-11-08

## 2022-11-08 DIAGNOSIS — J40 BRONCHITIS: Primary | ICD-10-CM

## 2022-11-08 RX ORDER — DOXYCYCLINE 100 MG/1
100 CAPSULE ORAL 2 TIMES DAILY
Qty: 14 CAPSULE | Refills: 0 | Status: SHIPPED | OUTPATIENT
Start: 2022-11-08 | End: 2022-11-15

## 2022-11-08 NOTE — TELEPHONE ENCOUNTER
S-(situation): Cough X 5days    B-(background):   Cough for over a week  Covid testing  Boosted up    A-(assessment): Sore throat  Using lozenges  Tylenol 650mg (today) for relief  Mucinex 12 hour  Phlegm   Fever comes and goes    NO SOB  NO wheezing, chest pain, difficulty breathing    R-(recommendations): Patient seeking providers recommendations  The following provided to pt    Fever of 100.4 F ( 38.0 C) or higher, or as directed by your healthcare provider    Symptoms that get worse, or new symptoms    Breathing not getting better with treatments    Symptoms that don t start to get better in 1 week    If needing to call in RX, pharmacy is [facility] in [place]    Message will be routed to PCP for review/recommedation    Meredith OVALLES RN  [facility]

## 2022-11-08 NOTE — TELEPHONE ENCOUNTER
General Call    Contacts       Type Contact Phone/Fax    11/08/2022 01:17 PM CST Phone (Incoming) Cyril Galdamez (Self) [phone] (M)        Reason for Call:  On going cough    What are your questions or concerns:  Pt called to say that he has had an on going cough for a few weeks. Pt states he had a fever but it is gone now. Pt states he doesn't think he has covid 19 because he has all his vaccinations and booster. Pt states he is will take an at home covid test and call back with the results. Pt is wondering if he can get a Rx for his cough?     Date of last appointment with provider:     Okay to leave a detailed message?: Yes at Cell number on file:    Telephone Information:   Mobile [phone]     Leah Hill

## 2022-11-08 NOTE — TELEPHONE ENCOUNTER
Outgoing call: NO answer.     LMTCB    If patient returns call, please send to any available RN.    Marcia Saavedra RN, BSN  [facility]

## 2022-11-08 NOTE — TELEPHONE ENCOUNTER
Recommend evaluation if any shortness of breath or ongoing fevers to evaluate oxygen and to consider chest xray, did send in doxycycline twice per day for 7 days for bronchitis to [facility]. Take a probiotic twice per day to prevent diarrhea while on the antibiotic. Florastor and culturelle are common brands, but generics work just as well.

## 2023-03-26 ENCOUNTER — NURSE TRIAGE (OUTPATIENT)
Dept: NURSING | Facility: CLINIC | Age: 83
End: 2023-03-26
Payer: COMMERCIAL

## 2023-03-26 NOTE — TELEPHONE ENCOUNTER
"S: L foot & L toe concerns.    B: Calling about swelling & redness  in Left foot and second great toe.  Symptoms are:    On 3/24 L top of foot swelling    3/25 L second toe swollen and red    L foot slightly warm to the touch (denies fever)    States no pain in foot    Is prediabetic    Has been trying to elevate foot    A: Per protocol to be seen within 3 days.  Transferred to scheduling to make an appointment.    R: Protocol and care advice reviewed. Patient verbalized understanding, in agreement with plan, and voiced no further questions. Call back with any new or worsening symptoms, concerns, or questions.    Kathryn Carrasquillo RN, MA  [facility] Triage Nurse Advisor    Reason for Disposition    Diabetes mellitus or peripheral vascular disease (\"poor circulation\")    Additional Information    Negative: Foot is cool or blue in comparison to other foot    Negative: Purple or black skin on toe  (Exception: simple recalled injury with bruise)    Negative: [1] Looks infected (spreading redness, red streak, pus) AND [2] fever    Negative: Patient sounds very sick or weak to the triager    Negative: [1] SEVERE pain (e.g., excruciating, unable to do any normal activities) AND [2] not improved after 2 hours of pain medicine    Negative: [1] Redness spreading into foot or red streak into foot AND [2] no fever    Negative: Looks like a boil, infected sore, or deep ulcer    Negative: [1] Swollen toe AND [2] no fever  (Exceptions: just localized bump from bunion, corns, insect bite, sting)    Negative: Yellow pus seen in skin around toenail (cuticle area), or pus seen under toenail    Negative: Numbness in one foot (i.e., loss of sensation)    Negative: Pain in the big toe joint    Negative: Localized redness and swelling of skin around nail (i.e., cuticle area or nail fold)    Negative: [1] MODERATE pain (e.g., interferes with normal activities, limping) AND [2] present > 3 days    Protocols used: TOE PAIN-A-AH      "

## 2023-03-28 ENCOUNTER — OFFICE VISIT (OUTPATIENT)
Dept: FAMILY MEDICINE | Facility: CLINIC | Age: 83
End: 2023-03-28
Payer: COMMERCIAL

## 2023-03-28 VITALS
OXYGEN SATURATION: 97 % | TEMPERATURE: 97.9 F | BODY MASS INDEX: 24.99 KG/M2 | SYSTOLIC BLOOD PRESSURE: 132 MMHG | WEIGHT: 179.2 LBS | HEART RATE: 78 BPM | DIASTOLIC BLOOD PRESSURE: 68 MMHG

## 2023-03-28 DIAGNOSIS — R60.0 PEDAL EDEMA: Primary | ICD-10-CM

## 2023-03-28 PROCEDURE — 99214 OFFICE O/P EST MOD 30 MIN: CPT | Performed by: PHYSICIAN ASSISTANT

## 2023-03-28 RX ORDER — FUROSEMIDE 20 MG
20 TABLET ORAL DAILY
Qty: 5 TABLET | Refills: 0 | Status: SHIPPED | OUTPATIENT
Start: 2023-03-28 | End: 2023-04-04

## 2023-03-28 NOTE — PROGRESS NOTES
Patient presents with:  Foot Swelling: Bilateral swelling in feet. Ongoing 2-3 days.       Clinical Decision Making:  Patient is treated with a short course of Lasix and will have close follow-up with primary care provider for evaluation of treatment progress.  Patient may return to the ER in the interim.  Today's appointment follow-up with primary care provider if complications or sequela should arise.      ICD-10-CM    1. Pedal edema  R60.0 furosemide (LASIX) 20 MG tablet          Patient Instructions   Follow-up with your primary care provider in 1 week to recheck your cause of the swelling in the feet.  If you have any shortness of breath dizziness feeling that you are going to pass out or swelling that is starting to go higher than the ankles, cough inability to lay flat fatigue or confusion present to the emergency room for evaluation and treatment.          HPI:  Cyril Galdamez is a 82 year old male who presents today for 2 to 3-day history of bilateral feet swelling.  At this time the patient has not had shortness of breath dizziness syncope presyncope cardiac symptoms to include chest arm or jaw pain diaphoresis nausea or vomiting.  Patient has not had orthopnea, PND.    History obtained from chart review and the patient.    Problem List:  2022-08: Hip pain, right  2022-07: Delirium  2022-04: History of malignant neoplasm of colon  2022-04: Prediabetes  2022-01: Benign paroxysmal positional vertigo  2021-05: Malignant neoplasm of skin  2021-03: Anemia  2021-03: Dizziness  2021-02: Asbestosis (H)  2021-02: First degree atrioventricular block  2019-07: Hypertension  2019-07: Malignant tumor of colon (H)  2019-07: Spinal stenosis of lumbar region, unspecified whether   neurogenic claudication present  2019-07: Sinus bradycardia  2019-03: Dyslipidemia  2019-03: Microalbuminuria  2003-06: Mild asthma      No past medical history on file.    Social History     Tobacco Use     Smoking status: Never      Smokeless tobacco: Never   Substance Use Topics     Alcohol use: Not on file       Review of Systems  As above in HPI otherwise negative.    Vitals:    03/28/23 1330   BP: 132/68   BP Location: Left arm   Patient Position: Sitting   Cuff Size: Adult Regular   Pulse: 78   Temp: 97.9  F (36.6  C)   TempSrc: Oral   SpO2: 97%   Weight: 81.3 kg (179 lb 3.2 oz)       General: Patient is resting comfortably no acute distress is afebrile  HEENT: Head is normocephalic atraumatic   eyes are PERRL EOMI sclera anicteric   TMs are clear bilaterally  Throat is without pharyngeal wall erythema and no exudate  No cervical lymphadenopathy present  LUNGS: Clear to auscultation bilaterally  HEART: Regular rate and rhythm  Skin: Without rash non-diaphoretic  Extremities:  2+ pitting edema of the ankles and feet bilaterally  Negative Homans' sign on left or right calf    Physical Exam      At the end of the encounter, I discussed results, diagnosis, medications. Discussed red flags for immediate return to clinic/ER, as well as indications for follow up if no improvement. Patient understood and agreed to plan. Patient was stable for discharge.

## 2023-03-28 NOTE — PATIENT INSTRUCTIONS
Follow-up with your primary care provider in 1 week to recheck your cause of the swelling in the feet.  If you have any shortness of breath dizziness feeling that you are going to pass out or swelling that is starting to go higher than the ankles, cough inability to lay flat fatigue or confusion present to the emergency room for evaluation and treatment.

## 2023-04-04 ENCOUNTER — OFFICE VISIT (OUTPATIENT)
Dept: INTERNAL MEDICINE | Facility: CLINIC | Age: 83
End: 2023-04-04
Payer: COMMERCIAL

## 2023-04-04 VITALS
BODY MASS INDEX: 24.97 KG/M2 | OXYGEN SATURATION: 99 % | SYSTOLIC BLOOD PRESSURE: 128 MMHG | HEART RATE: 78 BPM | TEMPERATURE: 97.6 F | WEIGHT: 179 LBS | DIASTOLIC BLOOD PRESSURE: 62 MMHG

## 2023-04-04 DIAGNOSIS — I10 PRIMARY HYPERTENSION: ICD-10-CM

## 2023-04-04 DIAGNOSIS — R60.9 EDEMA, UNSPECIFIED TYPE: Primary | ICD-10-CM

## 2023-04-04 PROCEDURE — 99214 OFFICE O/P EST MOD 30 MIN: CPT | Performed by: NURSE PRACTITIONER

## 2023-04-04 ASSESSMENT — ASTHMA QUESTIONNAIRES
QUESTION_5 LAST FOUR WEEKS HOW WOULD YOU RATE YOUR ASTHMA CONTROL: WELL CONTROLLED
ACT_TOTALSCORE: 23
QUESTION_4 LAST FOUR WEEKS HOW OFTEN HAVE YOU USED YOUR RESCUE INHALER OR NEBULIZER MEDICATION (SUCH AS ALBUTEROL): ONCE A WEEK OR LESS
QUESTION_2 LAST FOUR WEEKS HOW OFTEN HAVE YOU HAD SHORTNESS OF BREATH: NOT AT ALL
QUESTION_1 LAST FOUR WEEKS HOW MUCH OF THE TIME DID YOUR ASTHMA KEEP YOU FROM GETTING AS MUCH DONE AT WORK, SCHOOL OR AT HOME: NONE OF THE TIME
ACT_TOTALSCORE: 23
QUESTION_3 LAST FOUR WEEKS HOW OFTEN DID YOUR ASTHMA SYMPTOMS (WHEEZING, COUGHING, SHORTNESS OF BREATH, CHEST TIGHTNESS OR PAIN) WAKE YOU UP AT NIGHT OR EARLIER THAN USUAL IN THE MORNING: NOT AT ALL

## 2023-04-04 NOTE — PATIENT INSTRUCTIONS
Blood pressure other vital signs look fantastic today.    Lets keep all of your medications the same.    Keep your legs elevated when sitting.    Try to move around a little bit more with your walker.    Wear your compression stockings as much as you can.    If the swelling in your legs continues to be a problem, could consider reducing your amlodipine in the future, if needed.

## 2023-04-04 NOTE — PROGRESS NOTES
Assessment & Plan     Edema, unspecified type  Suspect this was related to sedentary behavior with feet in downward position for most of the day, along with mild amlodipine type effect.    We talked about watching salt, keeping legs elevated, wearing compression stockings, and increasing physical activity.  No need to continue on the furosemide    Primary hypertension  Controlled on amlodipine, lisinopril, chlorthalidone    Patient Instructions   Blood pressure other vital signs look fantastic today.    Lets keep all of your medications the same.    Keep your legs elevated when sitting.    Try to move around a little bit more with your walker.    Wear your compression stockings as much as you can.    If the swelling in your legs continues to be a problem, could consider reducing your amlodipine in the future, if needed.        Sid Machado, CNP  [facility]    Aftab Nam is a 82 year old, presenting for the following health issues:    RECHECK (Swollen on both feet )        4/4/2023     2:48 PM   Additional Questions   Roomed by DWAYNE VEGA   Accompanied by vu Santana)     HPI     Patient presents to clinic today for follow-up of swelling in his feet.  He presented to walk-in clinic and was told to use furosemide for 5 days.    He has compression stockings but does not wear them regularly.    Denies any pain in his feet or calf muscles.        Review of Systems   Constitutional, HEENT, cardiovascular, pulmonary, gi and gu systems are negative, except as otherwise noted.      Objective    /62 (BP Location: Right arm, Patient Position: Sitting, Cuff Size: Adult Regular)   Pulse 78   Temp 97.6  F (36.4  C) (Oral)   Wt 81.2 kg (179 lb)   SpO2 99%   BMI 24.97 kg/m    Body mass index is 24.97 kg/m .  Physical Exam     Trace edema bilateral lower extremities

## 2023-05-04 ENCOUNTER — NURSE TRIAGE (OUTPATIENT)
Dept: NURSING | Facility: CLINIC | Age: 83
End: 2023-05-04
Payer: COMMERCIAL

## 2023-05-04 NOTE — TELEPHONE ENCOUNTER
Ren from [facility] is calling regarding refill requests that we sent on Monday to clinic.  Cyril is needing a  new prescription for Lisinopril 40 mg and [id number] mg tablet.  Please send prescription to [facility] in [place], [place].      Reason for Disposition    Pharmacy calling with prescription question and triager answers question    Additional Information    Negative: Intentional drug overdose and suicidal thoughts or ideas    Negative: MORE THAN A DOUBLE DOSE of a prescription or over-the-counter (OTC) drug    Negative: DOUBLE DOSE (an extra dose or lesser amount) of prescription drug and any symptoms (e.g., dizziness, nausea, pain, sleepiness)    Negative: DOUBLE DOSE (an extra dose or lesser amount) of over-the-counter (OTC) drug and any symptoms (e.g., dizziness, nausea, pain, sleepiness)    Negative: Took another person's prescription drug    Negative: DOUBLE DOSE (an extra dose or lesser amount) of prescription drug and NO symptoms  (Exception: A double dose of antibiotics.)    Negative: Diabetes drug error or overdose (e.g., took wrong type of insulin or took extra dose)    Negative: Caller has medication question about med NOT prescribed by PCP and triager unable to answer question (e.g., compatibility with other med, storage)    Negative: Prescription not at pharmacy and was prescribed by PCP recently  (Exception: triager has access to EMR and prescription is recorded there. Go to Home Care and confirm for pharmacy.)    Negative: Pharmacy calling with prescription question and triager unable to answer question    Negative: Caller has URGENT medicine question about med that PCP or specialist prescribed and triager unable to answer question    Negative: Caller has NON-URGENT medicine question about med that PCP or specialist prescribed and triager unable to answer question    Negative: Caller wants to use a complementary or alternative medicine    Negative: Medicine patch causing  local rash or itching    Negative: Prescription request for new medicine (not a refill)    Negative: Prescription prescribed recently is not at pharmacy and triager has access to patient's EMR and prescription is recorded in the EMR    Negative: DOUBLE DOSE (an extra dose or lesser amount) of over-the-counter (OTC) drug and NO symptoms    Negative: DOUBLE DOSE (an extra dose or lesser amount) of antibiotic drug and NO symptoms    Negative: Caller has medicine question only, adult not sick, and triager answers question    Negative: Caller has medicine question, adult has minor symptoms, caller declines triage, and triager answers question    Negative: Caller requesting information about medicine during pregnancy; adult is not ill AND triager answers question    Negative: Caller requesting information about medicine use with breastfeeding; neither adult nor infant is ill, triager answers question    Protocols used: MEDICATION QUESTION CALL-A-OH

## 2023-05-26 DIAGNOSIS — I10 PRIMARY HYPERTENSION: ICD-10-CM

## 2023-05-27 RX ORDER — CHLORTHALIDONE 25 MG/1
25 TABLET ORAL DAILY
Qty: 90 TABLET | Refills: 0 | Status: SHIPPED | OUTPATIENT
Start: 2023-05-27 | End: 2023-12-21

## 2023-05-28 NOTE — TELEPHONE ENCOUNTER
"Last Written Prescription Date:  4/4/2022  Last Fill Quantity: 90,  # refills: 11   Last office visit provider:  4/4/2023     Requested Prescriptions   Pending Prescriptions Disp Refills     chlorthalidone (HYGROTON) 25 MG tablet 90 tablet 11     Sig: Take 1 tablet (25 mg) by mouth daily       Diuretics (Including Combos) Protocol Passed - 5/26/2023  3:11 PM        Passed - Blood pressure under 140/90 in past 12 months     BP Readings from Last 3 Encounters:   04/04/23 128/62   03/28/23 132/68   08/29/22 127/70                 Passed - Recent (12 mo) or future (30 days) visit within the authorizing provider's specialty     Patient has had an office visit with the authorizing provider or a provider within the authorizing providers department within the previous 12 mos or has a future within next 30 days. See \"Patient Info\" tab in inbasket, or \"Choose Columns\" in Meds & Orders section of the refill encounter.              Passed - Medication is active on med list        Passed - Patient is age 18 or older        Passed - Normal serum creatinine on file in past 12 months     Recent Labs   Lab Test 08/29/22  1327   CR 0.87              Passed - Normal serum potassium on file in past 12 months     Recent Labs   Lab Test 08/29/22  1327   POTASSIUM 4.1                    Passed - Normal serum sodium on file in past 12 months     Recent Labs   Lab Test 08/29/22  1327                      Gloria Eugene RN 05/27/23 9:26 PM  "

## 2023-05-30 DIAGNOSIS — I10 PRIMARY HYPERTENSION: ICD-10-CM

## 2023-05-30 RX ORDER — CHLORTHALIDONE 25 MG/1
25 TABLET ORAL DAILY
Qty: 90 TABLET | Refills: 0 | OUTPATIENT
Start: 2023-05-30

## 2023-06-28 ENCOUNTER — TRANSFERRED RECORDS (OUTPATIENT)
Dept: HEALTH INFORMATION MANAGEMENT | Facility: CLINIC | Age: 83
End: 2023-06-28
Payer: COMMERCIAL

## 2023-07-03 ENCOUNTER — OFFICE VISIT (OUTPATIENT)
Dept: FAMILY MEDICINE | Facility: CLINIC | Age: 83
End: 2023-07-03
Payer: COMMERCIAL

## 2023-07-03 ENCOUNTER — HOSPITAL ENCOUNTER (OUTPATIENT)
Dept: ULTRASOUND IMAGING | Facility: CLINIC | Age: 83
Discharge: HOME | End: 2023-07-03
Attending: NURSE PRACTITIONER | Admitting: NURSE PRACTITIONER
Payer: COMMERCIAL

## 2023-07-03 VITALS
WEIGHT: 178 LBS | HEIGHT: 71 IN | RESPIRATION RATE: 14 BRPM | OXYGEN SATURATION: 97 % | HEART RATE: 77 BPM | DIASTOLIC BLOOD PRESSURE: 62 MMHG | BODY MASS INDEX: 24.92 KG/M2 | TEMPERATURE: 98.4 F | SYSTOLIC BLOOD PRESSURE: 133 MMHG

## 2023-07-03 DIAGNOSIS — R73.03 PREDIABETES: Primary | ICD-10-CM

## 2023-07-03 DIAGNOSIS — R60.0 LEG EDEMA, RIGHT: ICD-10-CM

## 2023-07-03 DIAGNOSIS — I10 PRIMARY HYPERTENSION: ICD-10-CM

## 2023-07-03 DIAGNOSIS — R60.0 LOWER LEG EDEMA: ICD-10-CM

## 2023-07-03 DIAGNOSIS — I44.0 FIRST DEGREE ATRIOVENTRICULAR BLOCK: ICD-10-CM

## 2023-07-03 DIAGNOSIS — R97.20 ELEVATED PROSTATE SPECIFIC ANTIGEN (PSA): ICD-10-CM

## 2023-07-03 LAB
ALBUMIN SERPL BCG-MCNC: 4 G/DL (ref 3.5–5.2)
ALP SERPL-CCNC: 75 U/L (ref 40–129)
ALT SERPL W P-5'-P-CCNC: 24 U/L (ref 0–70)
ANION GAP SERPL CALCULATED.3IONS-SCNC: 10 MMOL/L (ref 7–15)
AST SERPL W P-5'-P-CCNC: 29 U/L (ref 0–45)
BILIRUB SERPL-MCNC: 0.5 MG/DL
BUN SERPL-MCNC: 34.2 MG/DL (ref 8–23)
CALCIUM SERPL-MCNC: 9.8 MG/DL (ref 8.8–10.2)
CHLORIDE SERPL-SCNC: 104 MMOL/L (ref 98–107)
CREAT SERPL-MCNC: 1.04 MG/DL (ref 0.67–1.17)
DEPRECATED HCO3 PLAS-SCNC: 26 MMOL/L (ref 22–29)
GFR SERPL CREATININE-BSD FRML MDRD: 71 ML/MIN/1.73M2
GLUCOSE SERPL-MCNC: 122 MG/DL (ref 70–99)
LDLC SERPL DIRECT ASSAY-MCNC: 68 MG/DL
POTASSIUM SERPL-SCNC: 4.9 MMOL/L (ref 3.4–5.3)
PROT SERPL-MCNC: 7.2 G/DL (ref 6.4–8.3)
PSA SERPL DL<=0.01 NG/ML-MCNC: 12.1 NG/ML
SODIUM SERPL-SCNC: 140 MMOL/L (ref 136–145)

## 2023-07-03 PROCEDURE — 36415 COLL VENOUS BLD VENIPUNCTURE: CPT | Performed by: NURSE PRACTITIONER

## 2023-07-03 PROCEDURE — 83721 ASSAY OF BLOOD LIPOPROTEIN: CPT | Performed by: NURSE PRACTITIONER

## 2023-07-03 PROCEDURE — 84153 ASSAY OF PSA TOTAL: CPT | Performed by: NURSE PRACTITIONER

## 2023-07-03 PROCEDURE — 99214 OFFICE O/P EST MOD 30 MIN: CPT | Performed by: NURSE PRACTITIONER

## 2023-07-03 PROCEDURE — 93971 EXTREMITY STUDY: CPT | Mod: RT

## 2023-07-03 PROCEDURE — 80053 COMPREHEN METABOLIC PANEL: CPT | Performed by: NURSE PRACTITIONER

## 2023-07-03 NOTE — PROGRESS NOTES
Assessment & Plan     Prediabetes      Primary hypertension    - Comprehensive metabolic panel (BMP + Alb, Alk Phos, ALT, AST, Total. Bili, TP)  - LDL cholesterol direct  - Comprehensive metabolic panel (BMP + Alb, Alk Phos, ALT, AST, Total. Bili, TP)  - LDL cholesterol direct    First degree atrioventricular block      Leg edema, right    - Echardiogram Complete  - US Lower Extremity Venous Duplex Right    Elevated prostate specific antigen (PSA)    - PSA, tumor marker  - PSA, tumor marker  - Adult Urology  Referral    Lower leg edema      - US completed to ensure no DVT. US came back negative. I called him 7/6 and discussed this with him over the phone.   - He will get an ECHO, this was scheduled. It appears his edema is newer than chronic when I reviewed previous notes in chart.   - I want him to see Urology for PSA and nocturia. He will hold chorthalidone since this was helpful in the past with nocturia. I will follow back up on Monday to reassess his edema. May need to switch to Lasix (fast acting, short duration).   - concerned for possible other etiology for unilateral leg swelling. Will have Urology further assess.   - BUN elevated, patient feels he drinks water but then has to void a lot. Mixed balance between diuretics and fluid balance, this was discussed. GFR is good.                  LYNDON [facility]      Reviewed echocardiogram from 09/2021: Normal ventricles, estimated EF 65%.  Reviewed cardiology note and holter monitor report from 08/2021: Frequent PVCs.    Aftab Nam is a 82 year old, presenting for the following health issues:  Edema (Right ankle and foot increased 4x monthago)        4/4/2023     2:48 PM   Additional Questions   Roomed by DWAYNE VEGA   Accompanied by vu Santana)     Patient seen for right leg and foot swelling. He states the swelling started months ago and he was seen for it in March, took lasix and swelling  "resolved, but reports the swelling has gradually increased since then. Denies chest pain, shortness of breath, or pain in right lower extremity. States he is generally active around the house, uses a walker. Has compression stockings that he wears sometimes. Tries to watch his sodium intake. Patient also states he felt like his \"knees buckled\" yesterday and he lowered himself to the ground.   Before this, he reports he did \"70\" repetitions of his exercises with a resistance band on each leg and thinks he may have overworked his legs. His left leg feels like a buckled and he slightly fell. Today, he is wearing a knee sleeve. No new falls, plans on stopping the band.    he is voiding every hour he is voiding at night, has been there chronically. He has tried holding the chorthalidone and this appears to help with his night-time symptoms \"some what\".        History of Present Illness       Reason for visit:  Knees are causing problems    He eats 2-3 servings of fruits and vegetables daily.He consumes 0 sweetened beverage(s) daily.He exercises with enough effort to increase his heart rate 20 to 29 minutes per day.  He exercises with enough effort to increase his heart rate 5 days per week.   He is taking medications regularly.               Review of Systems   Cardiovascular: Positive for peripheral edema.   All other systems reviewed and are negative.           Objective      /62   Pulse 77   Temp 98.4  F (36.9  C) (Oral)   Resp 14   Ht 1.803 m (5' 11\")   Wt 80.7 kg (178 lb)   SpO2 97%   BMI 24.83 kg/m    Body mass index is 24.83 kg/m .  Physical Exam  Constitutional:       General: He is not in acute distress.     Appearance: Normal appearance.   HENT:      Head: Normocephalic and atraumatic.   Cardiovascular:      Rate and Rhythm: Normal rate and regular rhythm.      Heart sounds: S1 normal and S2 normal. No murmur heard.     No friction rub. No gallop.   Pulmonary:      Effort: Pulmonary effort is " normal.      Breath sounds: Normal breath sounds.   Musculoskeletal:      Right lower leg: Edema present.      Left lower leg: Edema present.      Comments: RLE more edematous than LLE, about double in size.  3+ pitting edema to bilateral feet. No warmth or erythema.   Neurological:      Mental Status: He is alert.   Psychiatric:         Mood and Affect: Mood and affect normal.

## 2023-07-07 DIAGNOSIS — I10 PRIMARY HYPERTENSION: ICD-10-CM

## 2023-07-07 NOTE — TELEPHONE ENCOUNTER
"Routing refill request to provider for review/approval because:  Was last ordered almost a 3 year supply, should have refills on file but may have  depending on pharmacy guidlines    Last Written Prescription Date:  22  Last Fill Quantity: 90,  # refills: 11   Last office visit provider:   7/3/23    Requested Prescriptions   Pending Prescriptions Disp Refills     amLODIPine (NORVASC) 5 MG tablet 90 tablet 11     Sig: Take 1 tablet (5 mg) by mouth daily       Calcium Channel Blockers Protocol  Passed - 2023  5:00 PM        Passed - Blood pressure under 140/90 in past 12 months     BP Readings from Last 3 Encounters:   23 133/62   23 128/62   23 132/68                 Passed - Recent (12 mo) or future (30 days) visit within the authorizing provider's specialty     Patient has had an office visit with the authorizing provider or a provider within the authorizing providers department within the previous 12 mos or has a future within next 30 days. See \"Patient Info\" tab in inbasket, or \"Choose Columns\" in Meds & Orders section of the refill encounter.              Passed - Medication is active on med list        Passed - Patient is age 18 or older        Passed - Normal serum creatinine on file in past 12 months     Recent Labs   Lab Test 23  1704   CR 1.04       Ok to refill medication if creatinine is low               AJ STALLINGS RN 23 5:01 PM  "

## 2023-07-10 RX ORDER — AMLODIPINE BESYLATE 5 MG/1
5 TABLET ORAL DAILY
Qty: 90 TABLET | Refills: 3 | Status: SHIPPED | OUTPATIENT
Start: 2023-07-10 | End: 2023-12-21

## 2023-08-11 DIAGNOSIS — J61 ASBESTOSIS (H): Primary | ICD-10-CM

## 2023-08-11 RX ORDER — ALBUTEROL SULFATE 90 UG/1
2 AEROSOL, METERED RESPIRATORY (INHALATION) EVERY 4 HOURS PRN
Qty: 18 G | Refills: 11 | Status: SHIPPED | OUTPATIENT
Start: 2023-08-11

## 2023-08-11 NOTE — TELEPHONE ENCOUNTER
"Routing refill request to provider for review/approval because:  Medication is patient reported/historical    Last Written Prescription Date:  3/3/2022  Last Fill Quantity: n/a,  # refills: n/a   Last office visit provider:  7/3/2023     Requested Prescriptions   Pending Prescriptions Disp Refills    albuterol (PROAIR HFA/PROVENTIL HFA/VENTOLIN HFA) 108 (90 Base) MCG/ACT inhaler 18 g      Sig: Inhale 1 puff into the lungs       Asthma Maintenance Inhalers - Anticholinergics Passed - 8/11/2023  3:36 PM        Passed - Patient is age 12 years or older        Passed - Asthma control assessment score within normal limits in last 6 months     Please review ACT score.           Passed - Medication is active on med list        Passed - Recent (6 mo) or future (30 days) visit within the authorizing provider's specialty     Patient had office visit in the last 6 months or has a visit in the next 30 days with authorizing provider or within the authorizing provider's specialty.  See \"Patient Info\" tab in inbasket, or \"Choose Columns\" in Meds & Orders section of the refill encounter.           Short-Acting Beta Agonist Inhalers Protocol  Passed - 8/11/2023  3:36 PM        Passed - Patient is age 12 or older        Passed - Asthma control assessment score within normal limits in last 6 months     Please review ACT score.           Passed - Medication is active on med list        Passed - Recent (6 mo) or future (30 days) visit within the authorizing provider's specialty     Patient had office visit in the last 6 months or has a visit in the next 30 days with authorizing provider or within the authorizing provider's specialty.  See \"Patient Info\" tab in inbasket, or \"Choose Columns\" in Meds & Orders section of the refill encounter.                 Inessa Perez RN 08/11/23 3:46 PM  "

## 2023-08-15 ENCOUNTER — HOSPITAL ENCOUNTER (OUTPATIENT)
Dept: MRI IMAGING | Facility: HOSPITAL | Age: 83
Discharge: HOME | End: 2023-08-15
Attending: NURSE PRACTITIONER
Payer: COMMERCIAL

## 2023-08-15 ENCOUNTER — HOSPITAL ENCOUNTER (OUTPATIENT)
Dept: GENERAL RADIOLOGY | Facility: HOSPITAL | Age: 83
Discharge: HOME | End: 2023-08-15
Attending: NURSE PRACTITIONER
Payer: COMMERCIAL

## 2023-08-15 DIAGNOSIS — M43.16 SPONDYLOLISTHESIS OF LUMBAR REGION: ICD-10-CM

## 2023-08-15 DIAGNOSIS — M47.816 LUMBAR FACET ARTHROPATHY: ICD-10-CM

## 2023-08-15 DIAGNOSIS — M48.062 SPINAL STENOSIS OF LUMBAR REGION WITH NEUROGENIC CLAUDICATION: ICD-10-CM

## 2023-08-15 DIAGNOSIS — M54.50 CHRONIC BILATERAL LOW BACK PAIN WITHOUT SCIATICA: ICD-10-CM

## 2023-08-15 DIAGNOSIS — R29.898 RIGHT LEG WEAKNESS: ICD-10-CM

## 2023-08-15 DIAGNOSIS — G89.29 CHRONIC BILATERAL LOW BACK PAIN WITHOUT SCIATICA: ICD-10-CM

## 2023-08-15 PROCEDURE — 72148 MRI LUMBAR SPINE W/O DYE: CPT

## 2023-08-15 PROCEDURE — 72110 X-RAY EXAM L-2 SPINE 4/>VWS: CPT

## 2023-08-22 ENCOUNTER — OFFICE VISIT (OUTPATIENT)
Dept: PHYSICAL MEDICINE AND REHAB | Facility: CLINIC | Age: 83
End: 2023-08-22
Payer: COMMERCIAL

## 2023-08-22 VITALS
SYSTOLIC BLOOD PRESSURE: 116 MMHG | OXYGEN SATURATION: 99 % | HEIGHT: 71 IN | BODY MASS INDEX: 25.48 KG/M2 | HEART RATE: 61 BPM | DIASTOLIC BLOOD PRESSURE: 62 MMHG | WEIGHT: 182 LBS

## 2023-08-22 DIAGNOSIS — M48.062 SPINAL STENOSIS OF LUMBAR REGION WITH NEUROGENIC CLAUDICATION: ICD-10-CM

## 2023-08-22 DIAGNOSIS — M47.816 LUMBAR FACET ARTHROPATHY: ICD-10-CM

## 2023-08-22 DIAGNOSIS — M54.50 CHRONIC BILATERAL LOW BACK PAIN WITHOUT SCIATICA: Primary | ICD-10-CM

## 2023-08-22 DIAGNOSIS — M43.16 SPONDYLOLISTHESIS OF LUMBAR REGION: ICD-10-CM

## 2023-08-22 DIAGNOSIS — G89.29 CHRONIC BILATERAL LOW BACK PAIN WITHOUT SCIATICA: Primary | ICD-10-CM

## 2023-08-22 DIAGNOSIS — R29.898 RIGHT LEG WEAKNESS: ICD-10-CM

## 2023-08-22 PROCEDURE — 99215 OFFICE O/P EST HI 40 MIN: CPT | Performed by: NURSE PRACTITIONER

## 2023-08-22 ASSESSMENT — PAIN SCALES - GENERAL: PAINLEVEL: NO PAIN (0)

## 2023-08-22 NOTE — PROGRESS NOTES
Assessment:     Diagnoses and all orders for this visit:  Chronic bilateral low back pain without sciatica  Right leg weakness  Spinal stenosis of lumbar region with neurogenic claudication  Spondylolisthesis of lumbar region  Lumbar facet arthropathy     Cyril Galdamez is a 82 year old y.o. male with past medical history significant for dyslipidemia, hypertension, mild asthma, first-degree AV block, bradycardia, benign paroxysmal positional vertigo, anemia asbestos exposure, dizziness, lumbar stenosis who presents today for follow-up regarding:    -Chronic bilateral low back pain with weakness right lower extremity..  Flexion-extension x-ray shows 4 mm of instability at L4-5, stable spondylolisthesis at L3-4.  Severe spinal stenosis noted at both L3-4 and L4-5.     Plan:     A shared decision making plan was used. The patient's values and choices were respected. Prior medical records were reviewed today. The following represents what was discussed and decided upon by the provider and the patient.        -DIAGNOSTIC TESTS: Images were personally reviewed and interpreted.   -- Lumbar spine flexion-extension x-ray 8/15/2023 with 8 mm spondylolisthesis L4-5 neutral spine that increases to 12 mm with forward flexion, this is 4 mm of instability.  L3-4 spondylolisthesis 6 mm in neutral spine that increases to 7 mm on forward flexion.  --Lumbar spine MRI 8/15/2023 with posterior intra stabilization devices at L3-4 and L4-5.  L4-5 with severe spinal stenosis with mild bilateral foraminal stenosis and subarticular stenosis.  L3-4 with severe spinal stenosis with moderate bilateral foraminal stenosis.  L5-S1 with mild spinal stenosis.  L2-3 with moderate spinal stenosis.  -- Pelvic/right hip x-ray 7/10/2022 with moderate hip degenerative changes.  Postoperative lumbar spine Coflex device placement.  -- Lumbar spine MRI [facility] 2017 with L4-5 grade 1 spondylolisthesis with severe facet arthropathy and significant central  stenosis with subarticular recess stenosis and L5 impingement.  L3-4 minimal anterolisthesis with disc herniation with severe spinal stenosis subarticular recess stenosis and L4 impingement.    -INTERVENTIONS: Discussed that we could consider an L5-S1 interlaminar epidural steroid injection at any point to see if we can calm down his back pain.  Did discuss with patient that typically injections do not improve pain less weakness however.  That is his biggest concern.  We did discuss surgical options but he is not at the point where he wants to consider surgery as it would be a fusion surgery due to unstable spondylolisthesis at  L4-5 with severe spinal stenosis as well spondylolisthesis that is stable at L3-4 with again severe spinal stenosis.    -MEDICATIONS: No changes in medications, he does have CBD cream that gives him benefit for at least 24 hours which is great, advised patient to continue using this as needed.  He previously was on medical marijuana but had confusion side effects therefore he has discontinued this.    Discussed side effects of medications and proper use. Patient verbalized understanding.    -PHYSICAL THERAPY: Referral to physical therapy [facility] for Generalized core strengthening as well as right leg strengthening.  Discussed the importance of core strengthening, ROM, stretching exercises with the patient and how each of these entities is important in decreasing pain.  Explained to the patient that the purpose of physical therapy is to teach the patient a home exercise program.  These exercises need to be performed every day in order to decrease pain and prevent future occurrences of pain.        -PATIENT EDUCATION:  Total time of 44 minutes, on the day of service, spent with the patient, reviewing the chart, placing orders, and documenting.   -Today we also discussed the issues related to the current COVID-19 pandemic, the pros and cons of the current treatment  plan, the CDC guidelines such as social distancing, washing the hands, and covering the cough.    -FOLLOW UP: Follow-up as needed  **40 minutes always**   Advised to contact clinic if symptoms worsen or change.    Subjective:     Cyril Galdamez is a 82 year old male who presents today for follow-up regarding chronic bilateral low back pain that is worse with standing walking currently 0/10 with sitting but up to a 4 at its worst.  He is here today as he is more concerned about the weakness in his leg specifically on the right leg but he denies any pain in his right leg.  Denies any recent trips or falls he does use a walker currently because his right knee coral and gives out on him.  Patient denies otherwise bowel or bladder loss control, denies saddle anesthesia.  He does report that his back pain is overall tolerable at this time.    Patient's daughter Kathryn was present today during entire visit and added to past medical/surgical/family/social history and history of presenting illness.     -Treatment to Date:      Left L3-L5 RFA [facility] 9/11/2020 with 70-80% relief, relief x7 months  Left L3-L5 RFA [facility] 12/7/2021 with 70-90% relief, relief x4 months     -Medications: CBD salve with benefit     Topical cannabis with benefit  Tizanidine  Acetaminophen  Celebrex 100 mg twice daily  Amitriptyline     Prior failed medications:  Cymbalta with side effects  Gabapentin 300 mg 3 times daily with ineffectiveness  Cannabis pills with side effect    Patient Active Problem List   Diagnosis    Anemia    Benign paroxysmal positional vertigo    Dizziness    Hypertension    Spinal stenosis of lumbar region, unspecified whether neurogenic claudication present    Asbestosis (H)    Mild asthma    Dyslipidemia    First degree atrioventricular block    History of malignant neoplasm of colon    Malignant neoplasm of skin    Microalbuminuria    Prediabetes    Sinus bradycardia    Delirium    Hip pain, right       Current  Outpatient Medications   Medication    albuterol (PROAIR HFA/PROVENTIL HFA/VENTOLIN HFA) 108 (90 Base) MCG/ACT inhaler    amitriptyline (ELAVIL) 10 MG tablet    amLODIPine (NORVASC) 5 MG tablet    atorvastatin (LIPITOR) 10 MG tablet    celecoxib (CELEBREX) 100 MG capsule    chlorthalidone (HYGROTON) 25 MG tablet    HEMP OIL OR EXTRACT OR OTHER CBD CANNABINOID, NOT MEDICAL CANNABIS,    lisinopril (ZESTRIL) 40 MG tablet    medical cannabis (Patient's own supply)     No current facility-administered medications for this visit.       Allergies   Allergen Reactions    Shellfish Allergy Anaphylaxis       No past medical history on file.     Review of Systems  ROS:  Specifically negative for bowel/bladder dysfunction, balance changes, headache, dizziness, foot drop, fevers, chills, appetite changes, nausea/vomiting, unexplained weight loss. Otherwise 13 systems reviewed are negative. Please see the patient's intake questionnaire from today for details.    Reviewed Social, Family, Past Medical and Past Surgical history with patient, no significant changes noted since prior visit.     Objective:     PHYSICAL EXAMINATION:    --CONSTITUTIONAL: Well developed, well nourished, healthy appearing individual.  --PSYCHIATRIC: Appropriate mood and affect. No difficulty interacting due to temper, social withdrawal, or memory issues.  --SKIN: Lumbar region is dry and intact.   --RESPIRATORY: Normal rhythm and effort. No abnormal accessory muscle breathing patterns noted.   --MUSCULOSKELETAL:  Normal lumbar lordosis noted, no lateral shift.  --GROSS MOTOR: Easily arises from a seated position. Gait is non-antalgic  --LUMBAR SPINE:  Inspection reveals no evidence of deformity. Range of motion is not limited in lumbar flexion, extension, lateral rotation. No tenderness to palpation lumbar spine. Straight leg raising is negative to radicular pain. Sciatic notch non-tender.   --LOWER EXTREMITY MOTOR TESTING:  Plantar flexion left 5/5, right  5/5   Dorsiflexion left 5/5, right 5/5   Great toe MTP extension left 5/5, right 5/5  Knee flexion left 5/5, right 5/5  Knee extension left 5/5, right 5/5   Hip flexion left 5/5, right 5/5  Hip abduction left 5/5, right 5/5  Hip adduction left 5/5, right 5/5   --HIPS: Full range of motion bilaterally.   --NEUROLOGIC: Bilateral patellar and achilles reflexes are physiologic and symmetric. Sensation to light touch is intact in the bilateral L4, L5, and S1 dermatomes.    RESULTS:   Imaging: Spine imaging was reviewed today. The images were shown to the patient and the findings were explained using a spine model.      MR Lumbar Spine w/o Contrast  Result Date: 8/16/2023  EXAM: MR LUMBAR SPINE W/O CONTRAST LOCATION: [facility] DATE: 8/15/2023 INDICATION: Chronic bilateral low back pain without sciatica. Right leg weakness. Spinal stenosis of lumbar region with neurogenic claudication. Lumbar facet arthropathy. Spondylolisthesis of lumbar region. COMPARISON: Plain film imaging 08/15/2023, previous lumbar spine imaging 11/10/2021. TECHNIQUE: Routine Lumbar Spine MRI without IV contrast. FINDINGS: Nomenclature is based on 5 lumbar type vertebral bodies. Artifact associated with posterior intra-stabilization devices at L3-L4 and L4-L5. Satisfactory lumbar vertebral body height with no compression fractures. Modic type I endplate signal changes about the L2-L3 and L3-L4 disc spaces can represent independent source of pain generation, and have progressed from prior MR imaging. Modic type II endplate signal changes L4-L5 and L5-S1 as before. Nothing for a marrow infiltrative process. Stable alignment from prior plain film and MR imaging with low-grade degenerative anterior septations L3-L4, L4-L5 measuring about 6-7 mm and 2 mm retrolisthesis L5-S1. No progressive or high-grade subluxations are evident. There is some degree of dynamic instability suggested on prior plain film imaging 08/15/2023. Normal  distal spinal cord and cauda equina with conus medullaris at L1. Nerve roots of the cauda equina are satisfactory without nodularity or thickening. Sacral neural foramina are intact. Degenerative changes both SI joints and lower lumbar spine facet joints. No retroperitoneal solid mass or adenopathy. Symmetric psoas appearance. Normal caliber abdominal aorta. Satisfactory renal and adrenal contours bilaterally. Nothing for sacral insufficiency/stress fracture. No additional marrow or ligamentous edema. T12-L1: Normal disc height with mild disc desiccation. No herniation. Normal facets. No spinal canal or neural foraminal stenosis. L1-L2: Normal disc height and signal. No herniation. Normal facets. No spinal canal or neural foraminal stenosis. L2-L3: Marked loss of disc height. Generalized disc bulge. Progressive adjacent Modic type I endplate signal changes. Superimposed broad-based right paracentral and foraminal disc extrusion. Moderate canal stenosis as before. Mild to moderate bilateral foraminal narrowing as before, right more than left. Mild narrowing left L3 subarticular zone with stable mild facet joint arthropathy. Progressive interspace narrowing and Modic type I endplate signal changes from prior study with stable degrees of canal stenosis/foraminal narrowing. L3-L4: Degenerative anterior subluxation with moderate loss of disc height. Facet joint arthropathy. Stabilization interspinous level posteriorly. Thickening of the ligamentum flavum. Uncovering of disc material superiorly without disc herniation. Severe  spinal stenosis. Moderate-severe L4 subarticular zone narrowing, right more than left. Moderate foraminal narrowing bilaterally. There is some overall decrease joint space fluid left L3-L4 facet joint from prior study with progressive Modic type I endplate signal changes developing. The degree of spinal stenosis/foraminal compromise, subarticular zone narrowing and subluxation is otherwise stable.  L4-L5: Moderate loss of disc height. Degenerative anterior subluxation. Stabilization posteriorly at the interspinous level. Generalized disc bulge without disc herniation. Uncovering of disc material superiorly. Severe spinal stenosis. Moderate L5 subarticular zone narrowing. Mild bilateral foraminal narrowing, left more than right. Stable appearance from prior study at this level. L5-S1: Marked loss of disc height. Low-grade retrolisthesis. Generalized disc bulge. Advanced facet joint arthropathy. No disc herniation. Mild spinal stenosis. Mild to moderate narrowing S1 subarticular zones bilaterally, left more than right. Mild to moderate foraminal narrowing, left more than right. Stable appearance from prior study at this level.   IMPRESSION:   1.  Multilevel degenerative lumbar spondylosis has slightly progressed overall from 11/10/2021, most marked L2-L3 and L3-L4 where there is some progressive loss of disc height and development/increased Modic type I endplate signal changes which can represent independent source of pain generationon.   2.  Modic type II endplate signal changes about L4-L5 and L5-S1 as previous.   3.  Satisfactory vertebral body height. Stable alignment with low-grade degenerative anterior/posterior subluxations L3-L4 through L5-S1 from previous MR 11/10/2021. Prior plain film study 08/15/2023 suggested, some degree of dynamic instability on flexion and extension at the subluxation levels.   4.  Multilevel spinal stenosis, neural foraminal and/or subarticular zone narrowing otherwise appears fairly stable from 11/10/2021. Severe canal compromise is present at the L3-L4 and L4-L5 levels. Multilevel moderate-severe subarticular zone narrowing is present as well, stable from previous. Multilevel foraminal compromise, predominantly mild-moderate in degree throughout mid and lower lumbar levels, stable.   5.  See above for description and details.      XR Lumbar Spine G/E 4 Views  Result Date:  8/16/2023  EXAM: XR LUMBAR SPINE G/E 4 VIEWS LOCATION: [facility] DATE: 8/15/2023 INDICATION: Evaluate spondylolisthesis stability: Please provide standing flexion, extension, and neutral measurements. COMPARISON: 08/15/2023 lumbar spine MRI. 08/29/2017 lumbar spine plain film.   IMPRESSION: Five nonrib-bearing lumbar type vertebrae. Slight rightward curvature to the lumbar spine. In neutral positioning, there is 6 mm anterolisthesis of L3 on L4 and 8 mm anterolisthesis of L4 on L5. This increases to 7 mm anterolisthesis of L3 on L4 and 12 mm anterolisthesis of L4 on L5 with flexion. This suggests dynamic instability at the L4-L5 level. Diffuse osteopenia. Maintained vertebral body heights. Severe L2-L3 and moderate L3-L4 degenerative disc disease. Moderate to severe L4-L5 and severe L5-S1  degenerative disc disease. Severe L4-L5 and L5-S1 facet arthropathy with moderate to severe L3-L4 facet arthropathy. Coflex device is at the L3-L4 and L4-L5 level.

## 2023-08-22 NOTE — LETTER
8/22/2023         RE: Cyril Galdamez  [id number] [facility] [id number]        Dear Colleague,    Thank you for referring your patient, Cyril Galdamez, to the [facility]. Please see a copy of my visit note below.      Assessment:     Diagnoses and all orders for this visit:  Chronic bilateral low back pain without sciatica  Right leg weakness  Spinal stenosis of lumbar region with neurogenic claudication  Spondylolisthesis of lumbar region  Lumbar facet arthropathy     Cyril Galdamez is a 82 year old y.o. male with past medical history significant for dyslipidemia, hypertension, mild asthma, first-degree AV block, bradycardia, benign paroxysmal positional vertigo, anemia asbestos exposure, dizziness, lumbar stenosis who presents today for follow-up regarding:    -Chronic bilateral low back pain with weakness right lower extremity..  Flexion-extension x-ray shows 4 mm of instability at L4-5, stable spondylolisthesis at L3-4.  Severe spinal stenosis noted at both L3-4 and L4-5.     Plan:     A shared decision making plan was used. The patient's values and choices were respected. Prior medical records were reviewed today. The following represents what was discussed and decided upon by the provider and the patient.        -DIAGNOSTIC TESTS: Images were personally reviewed and interpreted.   -- Lumbar spine flexion-extension x-ray 8/15/2023 with 8 mm spondylolisthesis L4-5 neutral spine that increases to 12 mm with forward flexion, this is 4 mm of instability.  L3-4 spondylolisthesis 6 mm in neutral spine that increases to 7 mm on forward flexion.  --Lumbar spine MRI 8/15/2023 with posterior intra stabilization devices at L3-4 and L4-5.  L4-5 with severe spinal stenosis with mild bilateral foraminal stenosis and subarticular stenosis.  L3-4 with severe spinal stenosis with moderate bilateral foraminal stenosis.  L5-S1 with mild spinal stenosis.  L2-3 with moderate spinal  stenosis.  -- Pelvic/right hip x-ray 7/10/2022 with moderate hip degenerative changes.  Postoperative lumbar spine Coflex device placement.  -- Lumbar spine MRI CDI 2017 with L4-5 grade 1 spondylolisthesis with severe facet arthropathy and significant central stenosis with subarticular recess stenosis and L5 impingement.  L3-4 minimal anterolisthesis with disc herniation with severe spinal stenosis subarticular recess stenosis and L4 impingement.    -INTERVENTIONS: Discussed that we could consider an L5-S1 interlaminar epidural steroid injection at any point to see if we can calm down his back pain.  Did discuss with patient that typically injections do not improve pain less weakness however.  That is his biggest concern.  We did discuss surgical options but he is not at the point where he wants to consider surgery as it would be a fusion surgery due to unstable spondylolisthesis at  L4-5 with severe spinal stenosis as well spondylolisthesis that is stable at L3-4 with again severe spinal stenosis.    -MEDICATIONS: No changes in medications, he does have CBD cream that gives him benefit for at least 24 hours which is great, advised patient to continue using this as needed.  He previously was on medical marijuana but had confusion side effects therefore he has discontinued this.    Discussed side effects of medications and proper use. Patient verbalized understanding.    -PHYSICAL THERAPY: Referral to physical therapy [facility] for Generalized core strengthening as well as right leg strengthening.  Discussed the importance of core strengthening, ROM, stretching exercises with the patient and how each of these entities is important in decreasing pain.  Explained to the patient that the purpose of physical therapy is to teach the patient a home exercise program.  These exercises need to be performed every day in order to decrease pain and prevent future occurrences of pain.        -PATIENT  EDUCATION:  Total time of 44 minutes, on the day of service, spent with the patient, reviewing the chart, placing orders, and documenting.   -Today we also discussed the issues related to the current COVID-19 pandemic, the pros and cons of the current treatment plan, the CDC guidelines such as social distancing, washing the hands, and covering the cough.    -FOLLOW UP: Follow-up as needed  **40 minutes always**   Advised to contact clinic if symptoms worsen or change.    Subjective:     Cyril Galdamez is a 82 year old male who presents today for follow-up regarding chronic bilateral low back pain that is worse with standing walking currently 0/10 with sitting but up to a 4 at its worst.  He is here today as he is more concerned about the weakness in his leg specifically on the right leg but he denies any pain in his right leg.  Denies any recent trips or falls he does use a walker currently because his right knee coral and gives out on him.  Patient denies otherwise bowel or bladder loss control, denies saddle anesthesia.  He does report that his back pain is overall tolerable at this time.    Patient's daughter Kathryn was present today during entire visit and added to past medical/surgical/family/social history and history of presenting illness.     -Treatment to Date:      Left L3-L5 RFA [facility] 9/11/2020 with 70-80% relief, relief x7 months  Left L3-L5 RFA [facility] 12/7/2021 with 70-90% relief, relief x4 months     -Medications: CBD salve with benefit     Topical cannabis with benefit  Tizanidine  Acetaminophen  Celebrex 100 mg twice daily  Amitriptyline     Prior failed medications:  Cymbalta with side effects  Gabapentin 300 mg 3 times daily with ineffectiveness  Cannabis pills with side effect    Patient Active Problem List   Diagnosis     Anemia     Benign paroxysmal positional vertigo     Dizziness     Hypertension     Spinal stenosis of lumbar region, unspecified whether neurogenic claudication  present     Asbestosis (H)     Mild asthma     Dyslipidemia     First degree atrioventricular block     History of malignant neoplasm of colon     Malignant neoplasm of skin     Microalbuminuria     Prediabetes     Sinus bradycardia     Delirium     Hip pain, right       Current Outpatient Medications   Medication     albuterol (PROAIR HFA/PROVENTIL HFA/VENTOLIN HFA) 108 (90 Base) MCG/ACT inhaler     amitriptyline (ELAVIL) 10 MG tablet     amLODIPine (NORVASC) 5 MG tablet     atorvastatin (LIPITOR) 10 MG tablet     celecoxib (CELEBREX) 100 MG capsule     chlorthalidone (HYGROTON) 25 MG tablet     HEMP OIL OR EXTRACT OR OTHER CBD CANNABINOID, NOT MEDICAL CANNABIS,     lisinopril (ZESTRIL) 40 MG tablet     medical cannabis (Patient's own supply)     No current facility-administered medications for this visit.       Allergies   Allergen Reactions     Shellfish Allergy Anaphylaxis       No past medical history on file.     Review of Systems  ROS:  Specifically negative for bowel/bladder dysfunction, balance changes, headache, dizziness, foot drop, fevers, chills, appetite changes, nausea/vomiting, unexplained weight loss. Otherwise 13 systems reviewed are negative. Please see the patient's intake questionnaire from today for details.    Reviewed Social, Family, Past Medical and Past Surgical history with patient, no significant changes noted since prior visit.     Objective:     PHYSICAL EXAMINATION:    --CONSTITUTIONAL: Well developed, well nourished, healthy appearing individual.  --PSYCHIATRIC: Appropriate mood and affect. No difficulty interacting due to temper, social withdrawal, or memory issues.  --SKIN: Lumbar region is dry and intact.   --RESPIRATORY: Normal rhythm and effort. No abnormal accessory muscle breathing patterns noted.   --MUSCULOSKELETAL:  Normal lumbar lordosis noted, no lateral shift.  --GROSS MOTOR: Easily arises from a seated position. Gait is non-antalgic  --LUMBAR SPINE:  Inspection reveals  no evidence of deformity. Range of motion is not limited in lumbar flexion, extension, lateral rotation. No tenderness to palpation lumbar spine. Straight leg raising is negative to radicular pain. Sciatic notch non-tender.   --LOWER EXTREMITY MOTOR TESTING:  Plantar flexion left 5/5, right 5/5   Dorsiflexion left 5/5, right 5/5   Great toe MTP extension left 5/5, right 5/5  Knee flexion left 5/5, right 5/5  Knee extension left 5/5, right 5/5   Hip flexion left 5/5, right 5/5  Hip abduction left 5/5, right 5/5  Hip adduction left 5/5, right 5/5   --HIPS: Full range of motion bilaterally.   --NEUROLOGIC: Bilateral patellar and achilles reflexes are physiologic and symmetric. Sensation to light touch is intact in the bilateral L4, L5, and S1 dermatomes.    RESULTS:   Imaging: Spine imaging was reviewed today. The images were shown to the patient and the findings were explained using a spine model.      MR Lumbar Spine w/o Contrast  Result Date: 8/16/2023  EXAM: MR LUMBAR SPINE W/O CONTRAST LOCATION: [facility] DATE: 8/15/2023 INDICATION: Chronic bilateral low back pain without sciatica. Right leg weakness. Spinal stenosis of lumbar region with neurogenic claudication. Lumbar facet arthropathy. Spondylolisthesis of lumbar region. COMPARISON: Plain film imaging 08/15/2023, previous lumbar spine imaging 11/10/2021. TECHNIQUE: Routine Lumbar Spine MRI without IV contrast. FINDINGS: Nomenclature is based on 5 lumbar type vertebral bodies. Artifact associated with posterior intra-stabilization devices at L3-L4 and L4-L5. Satisfactory lumbar vertebral body height with no compression fractures. Modic type I endplate signal changes about the L2-L3 and L3-L4 disc spaces can represent independent source of pain generation, and have progressed from prior MR imaging. Modic type II endplate signal changes L4-L5 and L5-S1 as before. Nothing for a marrow infiltrative process. Stable alignment from prior plain  film and MR imaging with low-grade degenerative anterior septations L3-L4, L4-L5 measuring about 6-7 mm and 2 mm retrolisthesis L5-S1. No progressive or high-grade subluxations are evident. There is some degree of dynamic instability suggested on prior plain film imaging 08/15/2023. Normal distal spinal cord and cauda equina with conus medullaris at L1. Nerve roots of the cauda equina are satisfactory without nodularity or thickening. Sacral neural foramina are intact. Degenerative changes both SI joints and lower lumbar spine facet joints. No retroperitoneal solid mass or adenopathy. Symmetric psoas appearance. Normal caliber abdominal aorta. Satisfactory renal and adrenal contours bilaterally. Nothing for sacral insufficiency/stress fracture. No additional marrow or ligamentous edema. T12-L1: Normal disc height with mild disc desiccation. No herniation. Normal facets. No spinal canal or neural foraminal stenosis. L1-L2: Normal disc height and signal. No herniation. Normal facets. No spinal canal or neural foraminal stenosis. L2-L3: Marked loss of disc height. Generalized disc bulge. Progressive adjacent Modic type I endplate signal changes. Superimposed broad-based right paracentral and foraminal disc extrusion. Moderate canal stenosis as before. Mild to moderate bilateral foraminal narrowing as before, right more than left. Mild narrowing left L3 subarticular zone with stable mild facet joint arthropathy. Progressive interspace narrowing and Modic type I endplate signal changes from prior study with stable degrees of canal stenosis/foraminal narrowing. L3-L4: Degenerative anterior subluxation with moderate loss of disc height. Facet joint arthropathy. Stabilization interspinous level posteriorly. Thickening of the ligamentum flavum. Uncovering of disc material superiorly without disc herniation. Severe  spinal stenosis. Moderate-severe L4 subarticular zone narrowing, right more than left. Moderate foraminal  narrowing bilaterally. There is some overall decrease joint space fluid left L3-L4 facet joint from prior study with progressive Modic type I endplate signal changes developing. The degree of spinal stenosis/foraminal compromise, subarticular zone narrowing and subluxation is otherwise stable. L4-L5: Moderate loss of disc height. Degenerative anterior subluxation. Stabilization posteriorly at the interspinous level. Generalized disc bulge without disc herniation. Uncovering of disc material superiorly. Severe spinal stenosis. Moderate L5 subarticular zone narrowing. Mild bilateral foraminal narrowing, left more than right. Stable appearance from prior study at this level. L5-S1: Marked loss of disc height. Low-grade retrolisthesis. Generalized disc bulge. Advanced facet joint arthropathy. No disc herniation. Mild spinal stenosis. Mild to moderate narrowing S1 subarticular zones bilaterally, left more than right. Mild to moderate foraminal narrowing, left more than right. Stable appearance from prior study at this level.   IMPRESSION:   1.  Multilevel degenerative lumbar spondylosis has slightly progressed overall from 11/10/2021, most marked L2-L3 and L3-L4 where there is some progressive loss of disc height and development/increased Modic type I endplate signal changes which can represent independent source of pain generationon.   2.  Modic type II endplate signal changes about L4-L5 and L5-S1 as previous.   3.  Satisfactory vertebral body height. Stable alignment with low-grade degenerative anterior/posterior subluxations L3-L4 through L5-S1 from previous MR 11/10/2021. Prior plain film study 08/15/2023 suggested, some degree of dynamic instability on flexion and extension at the subluxation levels.   4.  Multilevel spinal stenosis, neural foraminal and/or subarticular zone narrowing otherwise appears fairly stable from 11/10/2021. Severe canal compromise is present at the L3-L4 and L4-L5 levels. Multilevel  moderate-severe subarticular zone narrowing is present as well, stable from previous. Multilevel foraminal compromise, predominantly mild-moderate in degree throughout mid and lower lumbar levels, stable.   5.  See above for description and details.      XR Lumbar Spine G/E 4 Views  Result Date: 8/16/2023  EXAM: XR LUMBAR SPINE G/E 4 VIEWS LOCATION: [facility] DATE: 8/15/2023 INDICATION: Evaluate spondylolisthesis stability: Please provide standing flexion, extension, and neutral measurements. COMPARISON: 08/15/2023 lumbar spine MRI. 08/29/2017 lumbar spine plain film.   IMPRESSION: Five nonrib-bearing lumbar type vertebrae. Slight rightward curvature to the lumbar spine. In neutral positioning, there is 6 mm anterolisthesis of L3 on L4 and 8 mm anterolisthesis of L4 on L5. This increases to 7 mm anterolisthesis of L3 on L4 and 12 mm anterolisthesis of L4 on L5 with flexion. This suggests dynamic instability at the L4-L5 level. Diffuse osteopenia. Maintained vertebral body heights. Severe L2-L3 and moderate L3-L4 degenerative disc disease. Moderate to severe L4-L5 and severe L5-S1  degenerative disc disease. Severe L4-L5 and L5-S1 facet arthropathy with moderate to severe L3-L4 facet arthropathy. Coflex device is at the L3-L4 and L4-L5 level.                          Again, thank you for allowing me to participate in the care of your patient.        Sincerely,        Kavitha Jacinto, CNP

## 2023-08-22 NOTE — PATIENT INSTRUCTIONS
~Spine Center Scheduling #[phone].  ~Please call our [facility] Spine Nurse Navigation #[phone] with any questions or concerns about your treatment plan, if symptoms worsen and you would like to be seen urgently, or if you have problems controlling bladder and bowel function.  ~Please note that any My Chart messages may take multiple days for a response due to the high volume of patients seen in clinic.  Anything sent Thursday night or after will be answered the following week when able, as Kavitha Jacinto CNP does not work in clinic on Fridays.   ~Kavitha Jacinto CNP is at the [facility] on the first and third Tuesdays of the month only, otherwise primarily at the [facility].        ~You have been referred for Physical Therapy to [facility]. They will call you to schedule an appointment.      Scheduling phone number is [phone] for [facility], [place], or [place] location.  If you have not heard from the scheduling office within 2 business days, please call [phone] for ALL other locations.    Discussed the importance of core strengthening, ROM, stretching exercises and how each of these entities is important in decreasing pain and improving long term spine health.  The purpose of physical therapy is to teach you an individualized home exercise program.  These exercises need to be performed every day in order to decrease pain and prevent future occurrences of pain.

## 2023-08-30 ENCOUNTER — THERAPY VISIT (OUTPATIENT)
Dept: PHYSICAL THERAPY | Facility: REHABILITATION | Age: 83
End: 2023-08-30
Attending: NURSE PRACTITIONER
Payer: COMMERCIAL

## 2023-08-30 DIAGNOSIS — M47.816 LUMBAR FACET ARTHROPATHY: ICD-10-CM

## 2023-08-30 DIAGNOSIS — M48.062 SPINAL STENOSIS OF LUMBAR REGION WITH NEUROGENIC CLAUDICATION: ICD-10-CM

## 2023-08-30 DIAGNOSIS — G89.29 CHRONIC BILATERAL LOW BACK PAIN WITHOUT SCIATICA: ICD-10-CM

## 2023-08-30 DIAGNOSIS — M43.16 SPONDYLOLISTHESIS OF LUMBAR REGION: ICD-10-CM

## 2023-08-30 DIAGNOSIS — M54.50 CHRONIC BILATERAL LOW BACK PAIN WITHOUT SCIATICA: ICD-10-CM

## 2023-08-30 DIAGNOSIS — R29.898 RIGHT LEG WEAKNESS: ICD-10-CM

## 2023-08-30 PROCEDURE — 97110 THERAPEUTIC EXERCISES: CPT | Mod: GP | Performed by: PHYSICAL THERAPIST

## 2023-08-30 PROCEDURE — 97161 PT EVAL LOW COMPLEX 20 MIN: CPT | Mod: GP | Performed by: PHYSICAL THERAPIST

## 2023-08-30 NOTE — PROGRESS NOTES
PHYSICAL THERAPY EVALUATION  Type of Visit: Evaluation    See electronic medical record for Abuse and Falls Screening details.    Subjective       Cyril presents to therapy with his grandson. Cyril presents with chronic bilateral low back pain for many years. He has been using CBD lotion/oil which helps give him relief. He has also been dealing with right leg weakness for a long time. He notes that his right leg coral at times when walking with his walker. He was also doing some seated resistance exercises a few weeks ago and he went to stand and his legs felt really weak.   Did home PT for a while back in 2022. Hasn't had any falls recently, other than his legs buckling a few weeks ago. He also notes swelling in both of his legs, worse on the right. He wears a compression bandage around his right knee to help with swelling and stability. Has compression socks he wears intermittently for the leg swelling. Has also tried medication which helped, but the swelling returned once he stopped taking it.   Overall, pt reports he wants to walk around better with his walker & move around easier.       PMHx: dyslipidemia, hypertension, mild asthma, first-degree AV block, bradycardia, benign paroxysmal positional vertigo per MD note    Presenting condition or subjective complaint:    Date of onset:      Relevant medical history:   Asthma  Dates & types of surgery:  Vertiflex on back 2007, 2 implants    Prior diagnostic imaging/testing results:       MRI lumbar spine:   Per MD note on 8/22/2023: Chronic bilateral low back pain with weakness right lower extremity.. Flexion-extension x-ray shows 4 mm of instability at L4-5, stable spondylolisthesis at L3-4.  Severe spinal stenosis noted at both L3-4 and L4-5.     Prior therapy history for the same diagnosis, illness or injury:        Prior Level of Function  Transfers:   Ambulation:   ADL:   IADL:     Living Environment  Social support:     Type of home:     Stairs to enter the  home:         Ramp:     Stairs inside the home:         Help at home:    Equipment owned:       Employment:      Hobbies/Interests:      Patient goals for therapy:      Pain assessment:      Objective   LUMBAR SPINE EVALUATION  PAIN:   POSTURE:  Rounded shoulders, forward head, increased thoracic kyphosis-- stooped posture. (+) shopping cart sign  GAIT:   Weightbearing Status:   Assistive Device(s): Wheelchair (manual), walker  Gait Deviations: Ambulated 50 ft for observation; Base of support decreased  Stance time decreased  Moon decreased  Forward base of support, stooped posture when ambulating  BALANCE/PROPRIOCEPTION:   WEIGHTBEARING ALIGNMENT:   NON-WEIGHTBEARING ALIGNMENT:    ROM:  Hip ROM: Limited IR>ER juany, WNL hip flexion, decreased hip extension ROM  PELVIC/SI SCREEN:   STRENGTH:  Hip flexion: 4/5 left, 4-/5 right   Knee flexion: 4/5 left, 4-/5 right   Knee extension: 4+/5 left, 4-/5 right    Ankle DF: 4-/5 left, 3+/5 right    MYOTOMES:   DTR S: WNL  CORD SIGNS:   DERMATOMES:   NEURAL TENSION:  SLR: (-) juany  FLEXIBILITY:  Significantly decreased hamstring flexibility juany  LUMBAR/HIP Special Tests:  Hip scour (-) juany    Increased pain in supine with legs extended, relief with knees bent    PELVIS/SI SPECIAL TESTS:   FUNCTIONAL TESTS:  Sit to stand: Requires juany UE push-off to stand & reaches behind to table to help lower down. Demonstrates decreased control of lowering phase.  PALPATION:   SPINAL SEGMENTAL CONCLUSIONS:       Assessment & Plan   CLINICAL IMPRESSIONS  Medical Diagnosis: Chronic bilateral low back pain without sciatica, right leg weakness, spinal stenosis of lumbar region with neurogenic claudication, spondylolisthesis of lumbar region, lumbar facet arthropathy    Treatment Diagnosis: Chronic bilateral low back pain with juany LE weakness R>L   Impression/Assessment: Patient is a 82 year old male with chronic bilateral low back pain for many years and bilateral LE weakness R>L. He presented  to therapy in a wheel-chair, but uses a walker for normal community ambulation. He demonstrates significantly decreased right LE weakness and moderately decreased left LE weakness. He ambulates with a stooped posture and demonstrates little lumbar extension ROM. Overall, these impairments interfere with their ability to perform recreational activities, driving , household mobility, and community mobility as compared to previous level of function.     Clinical Decision Making (Complexity):  Clinical Presentation: Stable/Uncomplicated  Clinical Presentation Rationale: based on medical and personal factors listed in PT evaluation  Clinical Decision Making (Complexity): Low complexity    PLAN OF CARE  Treatment Interventions:  Interventions: Gait Training, Manual Therapy, Neuromuscular Re-education, Therapeutic Activity, Therapeutic Exercise, Self-Care/Home Management    Long Term Goals     PT Goal 1  Goal Identifier: HEP  Goal Description: In 45 days, pt will demonstrate understanding of and independence with their HEP.  Goal Progress: Initial  Target Date: 10/14/23  PT Goal 2  Goal Identifier: PRASHANTH  Goal Description: In 90 days, pt will improve PRASHANTH score from 46.6% disability to at least 36.6% disability to indicate improved functional mobility.  Goal Progress: Initial  Target Date: 11/28/23  PT Goal 3  Goal Identifier: Walking  Goal Description: In 90 days, pt will ambulate 100 ft with upright posture and increased stride/ step length for improved ability to perform household and community ambulation.  Goal Progress: Initial  Target Date: 11/28/23  PT Goal 4  Goal Identifier: Bed mobility  Goal Progress: Initial  Target Date: 11/28/23      Frequency of Treatment: 1x/week  Duration of Treatment: 90 days    Recommended Referrals to Other Professionals:   Education Assessment:   Learner/Method: Patient    Risks and benefits of evaluation/treatment have been explained.   Patient/Family/caregiver agrees with Plan of Care.      Evaluation Time:     PT Eval, Low Complexity Minutes (17033): 20       Signing Clinician: FESTUS Ballesteros [facility]                                                                                   OUTPATIENT PHYSICAL THERAPY      PLAN OF TREATMENT FOR OUTPATIENT REHABILITATION   Patient's Last Name, First Name, Cyril Morales YOB: 1940   Provider's Name   [facility]   Medical Record No.  [id number]     Onset Date:    Start of Care Date: 08/30/23     Medical Diagnosis:  Chronic bilateral low back pain without sciatica, right leg weakness, spinal stenosis of lumbar region with neurogenic claudication, spondylolisthesis of lumbar region, lumbar facet arthropathy      PT Treatment Diagnosis:  Chronic bilateral low back pain with juany LE weakness R>L Plan of Treatment  Frequency/Duration: 1x/week/ 90 days    Certification date from 08/30/23 to 11/28/23         See note for plan of treatment details and functional goals     Lexi Chauhan PT                         I CERTIFY THE NEED FOR THESE SERVICES FURNISHED UNDER        THIS PLAN OF TREATMENT AND WHILE UNDER MY CARE     (Physician attestation of this document indicates review and certification of the therapy plan).                Referring Provider:  Kavitha Jacinto      Initial Assessment  See Epic Evaluation- Start of Care Date: 08/30/23

## 2023-09-08 ENCOUNTER — THERAPY VISIT (OUTPATIENT)
Dept: PHYSICAL THERAPY | Facility: REHABILITATION | Age: 83
End: 2023-09-08
Payer: COMMERCIAL

## 2023-09-08 DIAGNOSIS — M48.062 SPINAL STENOSIS OF LUMBAR REGION WITH NEUROGENIC CLAUDICATION: ICD-10-CM

## 2023-09-08 DIAGNOSIS — M43.16 SPONDYLOLISTHESIS OF LUMBAR REGION: ICD-10-CM

## 2023-09-08 DIAGNOSIS — M47.816 LUMBAR FACET ARTHROPATHY: ICD-10-CM

## 2023-09-08 DIAGNOSIS — R29.898 RIGHT LEG WEAKNESS: ICD-10-CM

## 2023-09-08 DIAGNOSIS — M54.50 CHRONIC BILATERAL LOW BACK PAIN WITHOUT SCIATICA: Primary | ICD-10-CM

## 2023-09-08 DIAGNOSIS — G89.29 CHRONIC BILATERAL LOW BACK PAIN WITHOUT SCIATICA: Primary | ICD-10-CM

## 2023-09-08 PROCEDURE — 97530 THERAPEUTIC ACTIVITIES: CPT | Mod: GP | Performed by: PHYSICAL THERAPIST

## 2023-09-08 PROCEDURE — 97116 GAIT TRAINING THERAPY: CPT | Mod: GP | Performed by: PHYSICAL THERAPIST

## 2023-09-14 ENCOUNTER — THERAPY VISIT (OUTPATIENT)
Dept: PHYSICAL THERAPY | Facility: REHABILITATION | Age: 83
End: 2023-09-14
Payer: COMMERCIAL

## 2023-09-14 DIAGNOSIS — M54.50 CHRONIC BILATERAL LOW BACK PAIN WITHOUT SCIATICA: Primary | ICD-10-CM

## 2023-09-14 DIAGNOSIS — M47.816 LUMBAR FACET ARTHROPATHY: ICD-10-CM

## 2023-09-14 DIAGNOSIS — R29.898 RIGHT LEG WEAKNESS: ICD-10-CM

## 2023-09-14 DIAGNOSIS — M43.16 SPONDYLOLISTHESIS OF LUMBAR REGION: ICD-10-CM

## 2023-09-14 DIAGNOSIS — G89.29 CHRONIC BILATERAL LOW BACK PAIN WITHOUT SCIATICA: Primary | ICD-10-CM

## 2023-09-14 DIAGNOSIS — M48.062 SPINAL STENOSIS OF LUMBAR REGION WITH NEUROGENIC CLAUDICATION: ICD-10-CM

## 2023-09-14 PROCEDURE — 97112 NEUROMUSCULAR REEDUCATION: CPT | Mod: GP | Performed by: PHYSICAL THERAPIST

## 2023-09-14 PROCEDURE — 97530 THERAPEUTIC ACTIVITIES: CPT | Mod: GP | Performed by: PHYSICAL THERAPIST

## 2023-10-09 ENCOUNTER — TELEPHONE (OUTPATIENT)
Dept: FAMILY MEDICINE | Facility: CLINIC | Age: 83
End: 2023-10-09
Payer: COMMERCIAL

## 2023-10-09 NOTE — TELEPHONE ENCOUNTER
Reason for Call:  Appointment Request    Patient requesting this type of appt:  Follow Up     Requested provider: Angel Sung    Reason patient unable to be scheduled: Not within requested timeframe - Patient is seeking afternoon appt with PCP within the next month.      When does patient want to be seen/preferred time:  This month - October 2023    Comments: Patient wants to discuss elevated PSA with [facility] with Dr. Ocampo and treatment.    Also seek referral to [facility]    Patient wanted to speak to [facility] provider Maricruz for follow up instead of PCP states it is too hard to get appt, patient uses profanity to express his frustration.       Okay to leave a detailed message?: Yes at Home number on file [phone] (home)    Call taken on 10/9/2023 at 11:52 AM by Naomi Peterson

## 2023-10-11 NOTE — TELEPHONE ENCOUNTER
10/11/23  LM for pt to schedule an appt. Pt requested an afternoon appt within the month. At this time, there is currently a same day appt open at  10/12 1pm (1240 arrival). Per Pineda, ok to use held spots.  [place]

## 2023-10-12 NOTE — TELEPHONE ENCOUNTER
10/12/23  LM for pt to call back to schedule an appt. It appears the only opening left in October is at [facility] 10/24 at 9 am (8:40 arrival). Per Pineda, ok to use held spots.  [place]

## 2023-10-13 NOTE — TELEPHONE ENCOUNTER
10/13/23  LM for pt to schedule an appt. Patient has been contacted 3x. At this time pt is not currently signed up for [facility], so no message sent.  [place]ena

## 2023-10-19 ENCOUNTER — NURSE TRIAGE (OUTPATIENT)
Dept: NURSING | Facility: CLINIC | Age: 83
End: 2023-10-19
Payer: COMMERCIAL

## 2023-10-19 NOTE — TELEPHONE ENCOUNTER
"10-19-23  Pt called back to schedule :  elevated PSA   Pt only wants a PM appt,no appts are available or \"held\" spots available?   "

## 2023-10-19 NOTE — TELEPHONE ENCOUNTER
Nurse Triage SBAR    Situation: Bilateral edema    Background: Patient calling. Patient stated he called last week but he was having phone issues. Pt stated he has been on lasix before.     Assessment: Bilateral edema. Swelling from his calves to his toes. Denies pain. He wears compression stockings. Left leg has less swelling. Some pitting edema. No redness.     Protocol Recommended Disposition: See Physician within 4 hours (Or PCP Triage)    Recommendation: According to the protocol, Patient should be seen within 4 hours (Or PCP Triage). Advised Patient that the patient needs to be seen within 4 hours (Or PCP Triage). Care advice given. Patient verbalizes understanding and agrees with plan of care. Reviewed concerning symptoms and when to call back.     Inessa Perez RN Nursing Advisor 10/19/2023 4:25 PM     Reason for Disposition   [1] Thigh, calf, or ankle swelling AND [2] bilateral AND [3] 1 side is more swollen    Additional Information   Negative: SEVERE difficulty breathing (e.g., struggling for each breath, speaks in single words)   Negative: Looks like a broken bone or dislocated joint (e.g., crooked or deformed)   Negative: Sounds like a life-threatening emergency to the triager   Negative: Chest pain   Negative: Followed a leg injury   Negative: [1] Followed an insect bite AND [2] localized swelling (e.g., small area of puffy or swollen skin)   Negative: Swelling of one ankle joint   Negative: Swelling of knee is main symptom   Negative: Pregnant   Negative: Postpartum (from 0 to 6 weeks after delivery)   Negative: [1] Heart failure suspected (e.g., ankle swelling, shortness of breath, weight gain) AND [2] recently in hospital for heart failure   Negative: Difficulty breathing at rest   Negative: Entire foot is cool or blue in comparison to other side   Negative: [1] Can't walk or can barely walk AND [2] new-onset   Negative: [1] Difficulty breathing with exertion (e.g., walking) AND [2] new-onset or  worsening   Negative: [1] Red area or streak AND [2] fever   Negative: [1] Swelling is painful to touch AND [2] fever   Negative: [1] Cast on leg or ankle AND [2] now increased pain   Negative: Patient sounds very sick or weak to the triager   Negative: SEVERE leg swelling (e.g., swelling extends above knee, entire leg is swollen, weeping fluid)   Negative: [1] Red area or streak [2] large (> 2 in. or 5 cm)   Negative: [1] Thigh or calf pain AND [2] only 1 side AND [3] present > 1 hour   Negative: [1] Thigh, calf, or ankle swelling AND [2] only 1 side    Protocols used: Leg Swelling and Edema-A-AH

## 2023-10-20 NOTE — TELEPHONE ENCOUNTER
Patient is requesting medication refill. Please approve or deny this request.    Rx requested:  Requested Prescriptions     Pending Prescriptions Disp Refills    pregabalin (LYRICA) 150 MG capsule 270 capsule 0     Sig: Take 1 capsule by mouth 3 times daily for 90 days.          Last Office Visit:   7/29/2022      Next Visit Date:  Future Appointments   Date Time Provider Amado Aceves   12/5/2022  7:30 AM Eve Delgado, [address] Please advise if patient can be seen sooner and where we should schedule.

## 2023-10-24 ENCOUNTER — OFFICE VISIT (OUTPATIENT)
Dept: FAMILY MEDICINE | Facility: CLINIC | Age: 83
End: 2023-10-24
Payer: COMMERCIAL

## 2023-10-24 VITALS
DIASTOLIC BLOOD PRESSURE: 62 MMHG | SYSTOLIC BLOOD PRESSURE: 135 MMHG | RESPIRATION RATE: 16 BRPM | HEART RATE: 80 BPM | BODY MASS INDEX: 25.48 KG/M2 | TEMPERATURE: 97.7 F | WEIGHT: 182 LBS | OXYGEN SATURATION: 98 % | HEIGHT: 71 IN

## 2023-10-24 DIAGNOSIS — R60.0 LOCALIZED EDEMA: Primary | ICD-10-CM

## 2023-10-24 DIAGNOSIS — Z13.1 SCREENING FOR DIABETES MELLITUS: ICD-10-CM

## 2023-10-24 DIAGNOSIS — R97.20 ELEVATED PROSTATE SPECIFIC ANTIGEN (PSA): ICD-10-CM

## 2023-10-24 LAB — HBA1C MFR BLD: 5.7 % (ref 0–5.6)

## 2023-10-24 PROCEDURE — 99213 OFFICE O/P EST LOW 20 MIN: CPT | Performed by: NURSE PRACTITIONER

## 2023-10-24 PROCEDURE — 83036 HEMOGLOBIN GLYCOSYLATED A1C: CPT | Performed by: NURSE PRACTITIONER

## 2023-10-24 PROCEDURE — 80053 COMPREHEN METABOLIC PANEL: CPT | Performed by: NURSE PRACTITIONER

## 2023-10-24 PROCEDURE — 36415 COLL VENOUS BLD VENIPUNCTURE: CPT | Performed by: NURSE PRACTITIONER

## 2023-10-24 RX ORDER — DULOXETIN HYDROCHLORIDE 20 MG/1
1 CAPSULE, DELAYED RELEASE ORAL 2 TIMES DAILY
COMMUNITY
End: 2023-12-21

## 2023-10-24 RX ORDER — FUROSEMIDE 20 MG
20 TABLET ORAL DAILY
Qty: 5 TABLET | Refills: 0 | Status: SHIPPED | OUTPATIENT
Start: 2023-10-24 | End: 2023-12-21

## 2023-10-24 RX ORDER — FUROSEMIDE 20 MG
TABLET ORAL
COMMUNITY
End: 2023-12-21

## 2023-10-24 RX ORDER — RESPIRATORY SYNCYTIAL VIRUS VACCINE 120MCG/0.5
0.5 KIT INTRAMUSCULAR ONCE
Qty: 1 EACH | Refills: 0 | Status: CANCELLED | OUTPATIENT
Start: 2023-10-24 | End: 2023-10-24

## 2023-10-24 ASSESSMENT — ASTHMA QUESTIONNAIRES: ACT_TOTALSCORE: 23

## 2023-10-24 NOTE — PROGRESS NOTES
"  Assessment & Plan     Localized edema  Compression socks.  Elevate feet as able.  Likely just dependent edema.  Kidney and liver function are stable.  Bun is still minimally elevated.  Encourage patient to drink fluids.  We will repeat Lasix for 5 days.  This may be something may need to continue to prevent edema in the future.  Patient is also on amlodipine.  We could consider stopping this and starting different blood pressure medication.  We did discuss this during the visit today however discussed that other blood pressure medications with, other side effects and since he is tolerating this well and may be better just to stay with this medication.  Patient has follow-up with his primary in a couple weeks and can be readdressed at that time.  - furosemide (LASIX) 20 MG tablet; Take 1 tablet (20 mg) by mouth daily for 5 days  - Comprehensive metabolic panel; Future  - Comprehensive metabolic panel    Screening for diabetes mellitus    A1c 5.7.  This is slightly better than last check. Still in prediabetes range.    - Hemoglobin A1c; Future  - Hemoglobin A1c    Elevated prostate specific antigen (PSA)  Previously seen by urology.  He is currently at [facility] and is wondering if he can get any sooner through [facility].  I have placed a referral for this for him.    - Adult Urology  Referral; Future             BMI:   Estimated body mass index is 25.4 kg/m  as calculated from the following:    Height as of this encounter: 1.803 m (5' 10.98\").    Weight as of this encounter: 82.6 kg (182 lb).           LYNDON HSU CNP  [facility] KADE Nam is a 82 year old, presenting for the following health issues:  Edema (Bilateral swollen feet up to knees. Prescribed lasix for x5 days, medication worked but the edema came back. Last Lasix dose was in March 2023.)      10/24/2023     3:14 PM   Additional Questions   Roomed by Bert   Accompanied by " "Galindo (grandson)       History of Present Illness       Reason for visit:  Swollen foot    He eats 2-3 servings of fruits and vegetables daily.He consumes 0 sweetened beverage(s) daily.He exercises with enough effort to increase his heart rate 20 to 29 minutes per day.  He exercises with enough effort to increase his heart rate 7 days per week.   He is taking medications regularly.                 Review of Systems         Objective    /62 (BP Location: Left arm, Patient Position: Sitting, Cuff Size: Adult Regular)   Pulse 80   Temp 97.7  F (36.5  C) (Oral)   Resp 16   Ht 5' 10.98\" (1.803 m)   Wt 182 lb (82.6 kg)   SpO2 98%   BMI 25.40 kg/m    Body mass index is 25.4 kg/m .  Physical Exam  Constitutional:       Appearance: Normal appearance.   Musculoskeletal:      Right lower le+ Edema present.      Left lower le+ Edema present.   Neurological:      General: No focal deficit present.      Mental Status: He is alert and oriented to person, place, and time.   Psychiatric:         Mood and Affect: Mood normal.         Behavior: Behavior normal.                              "

## 2023-10-24 NOTE — TELEPHONE ENCOUNTER
10/24/23   for pt to schedule an appt. At this time, [place] has a held appt 11/20 at [facility] FM 2:40 arrival (3pm)

## 2023-10-25 LAB
ALBUMIN SERPL BCG-MCNC: 3.9 G/DL (ref 3.5–5.2)
ALP SERPL-CCNC: 81 U/L (ref 40–129)
ALT SERPL W P-5'-P-CCNC: 26 U/L (ref 0–70)
ANION GAP SERPL CALCULATED.3IONS-SCNC: 11 MMOL/L (ref 7–15)
AST SERPL W P-5'-P-CCNC: 38 U/L (ref 0–45)
BILIRUB SERPL-MCNC: 0.3 MG/DL
BUN SERPL-MCNC: 28.4 MG/DL (ref 8–23)
CALCIUM SERPL-MCNC: 9.7 MG/DL (ref 8.8–10.2)
CHLORIDE SERPL-SCNC: 104 MMOL/L (ref 98–107)
CREAT SERPL-MCNC: 0.92 MG/DL (ref 0.67–1.17)
DEPRECATED HCO3 PLAS-SCNC: 25 MMOL/L (ref 22–29)
EGFRCR SERPLBLD CKD-EPI 2021: 82 ML/MIN/1.73M2
GLUCOSE SERPL-MCNC: 123 MG/DL (ref 70–99)
POTASSIUM SERPL-SCNC: 5.1 MMOL/L (ref 3.4–5.3)
PROT SERPL-MCNC: 7.1 G/DL (ref 6.4–8.3)
SODIUM SERPL-SCNC: 140 MMOL/L (ref 135–145)

## 2023-10-26 ENCOUNTER — TELEPHONE (OUTPATIENT)
Dept: FAMILY MEDICINE | Facility: CLINIC | Age: 83
End: 2023-10-26
Payer: COMMERCIAL

## 2023-10-26 NOTE — TELEPHONE ENCOUNTER
Left message to return call. Please relay provider's message to pt when he returns call or route to RN queue with any questions.    Aba Calabrese, WALLYN, RN  [facility]

## 2023-10-26 NOTE — TELEPHONE ENCOUNTER
----- Message from Meliza Zhao MA sent at 10/26/2023  9:16 AM CDT -----    ----- Message -----  From: Shanice Mercado APRN CNP  Sent: 10/26/2023   9:12 AM CDT  To: Shanice Mercado Care Team Pool    Please call: Lab work overall normal. NO indication of liver or kidney concerns. Urea nitrogen is closer to normal. Make sure you are drinking fluids. A1c is still in prediabetic range but slightly better than when it was checked last. Keep up diet and exercise -decreased carbs.

## 2023-10-26 NOTE — TELEPHONE ENCOUNTER
Pt returned call. TC relayed message. Pt understood message.    Nothing else needed at this time.    Leah Hill

## 2023-10-31 ENCOUNTER — TRANSFERRED RECORDS (OUTPATIENT)
Dept: HEALTH INFORMATION MANAGEMENT | Facility: CLINIC | Age: 83
End: 2023-10-31

## 2023-11-10 ENCOUNTER — TELEPHONE (OUTPATIENT)
Dept: FAMILY MEDICINE | Facility: CLINIC | Age: 83
End: 2023-11-10
Payer: COMMERCIAL

## 2023-11-10 NOTE — TELEPHONE ENCOUNTER
"11-10-23  Pt called stated he had an appt 11-14 but requested to cancel appt because on 11-13 @ urology for \"tissue removal\" and feel he physically can't make it to 11--14 appt.  Dr Sung next availability is not until March,pt is asking to get worked in sooner  [facility]  "

## 2023-11-10 NOTE — PROGRESS NOTES
DISCHARGE  Reason for Discharge: It has been >30 days since pt's last visit. Discharging per clinic policy.     Equipment Issued: none    Discharge Plan: None formally discussed as this was an unplanned discharge.     Referring Provider:  Kavitha Jacinto         09/14/23 9530   Appointment Info   Signing clinician's name / credentials Talon Sanchez DPT   Visits Used 3   Medical Diagnosis Chronic bilateral low back pain without sciatica, right leg weakness, spinal stenosis of lumbar region with neurogenic claudication, spondylolisthesis of lumbar region, lumbar facet arthropathy   PT Tx Diagnosis Chronic bilateral low back pain with juany LE weakness R>L   Quick Adds Certification   Progress Note/Certification   Start of Care Date 08/30/23   Therapy Frequency 1x/week   Predicted Duration 90 days   Certification date from 08/30/23   Certification date to 11/28/23   Progress Note Due Date 10/14/23   Progress Note Completed Date 08/30/23   GOALS   PT Goals 2;3;4   PT Goal 1   Goal Identifier HEP   Goal Description In 45 days, pt will demonstrate understanding of and independence with their HEP.   Goal Progress Initial   Target Date 10/14/23   PT Goal 2   Goal Identifier PRASHANTH   Goal Description In 90 days, pt will improve PRASHANTH score from 46.6% disability to at least 36.6% disability to indicate improved functional mobility.   Goal Progress Initial   Target Date 11/28/23   PT Goal 3   Goal Identifier Walking   Goal Description In 90 days, pt will ambulate 100 ft with upright posture and increased stride/ step length for improved ability to perform household and community ambulation.   Goal Progress Initial   Target Date 11/28/23   PT Goal 4   Goal Identifier Bed mobility   Goal Progress Initial   Target Date 11/28/23   Subjective Report   Subjective Report Cyril says that he continues to struggle to stand up straight and to stay inside his walker when walking.   Treatment Interventions (PT)   Interventions  Therapeutic Procedure/Exercise;Neuromuscular Re-education;Therapeutic Activity;Gait Training   Therapeutic Procedure/Exercise   Therapeutic Procedures Ther Proc 2;Ther Proc 3   Ther Proc 1 Supine march: x10 juany   Ther Proc 1 - Details Supine LTRs: x15 juany   Ther Proc 2 Supine DKTC stretch: x30 sec   Ther Proc 2 - Details Seated hip flexion: x10 juany   Ther Proc 3 Seated heel to toe raises: x10 each, juany-- decreased heel height and toe raise juany R>L   Ther Proc 3 - Details Sit to stands from elevated table: x8 with juany UE support & cues to control lowering   Skilled Intervention Initiated HEP, discussed walking more and staying active, discussed therapy POC, discussed performing sit to stands when someone is home/ nearby, demonstration, cueing   Patient Response/Progress Demonstrated understanding   Therapeutic Activity   Therapeutic Activities: dynamic activities to improve functional performance minutes (60447) 15   Ther Act 1 Review of STS With FWW for dynamic exercise and mobility. Review of safe hand positions and procedure. VCs for erect posture, hip extension, glute squeeze and repeated VCs for staying inside walker for safety and promotion of good posture. Ambulated 250' with SBA and chair follow (grandson pushing) from treatment room to car to end session.   Neuromuscular Re-education   Neuromuscular re-ed of mvmt, balance, coord, kinesthetic sense, posture, proprioception minutes (91078) 25   Neuro Re-ed 1 Review of previous HEP elements. Added supine glute/quad/tib anterior squeeze for functional strength trained safetly. Patient advised to practice in bed before sleeping. Education regarding role of these tx to improve functional strength and how this will dovetail with WB exercises already learned.   Gait Training   Gait 1 Ambulated in quinteros with FWW; patient demonstrates bilateral hip flexion postural pattern and habit of walking well behind walker. Taught and practiced walking inside walker and how this  naturally promotes erect posture and improved gait. VCs for posture and relaxed shoulders. Patient has tendency to lose balance during turns and informed of this, and advised to exercise additional caution during turns.   Education   Learner/Method Patient   Plan   Home program see PTRx   Plan for next session Review HEP, hands on lumbar assessment as needed, further assess hip strength as needed, flexion-based core strengthening, lumbar mobility exercises, LE stretches, gait training with walker, continue working on sit to stands   Comments   Comments Reviewed and progressed program. Patient may not return for a month or so.   Total Session Time   Timed Code Treatment Minutes 40   Total Treatment Time (sum of timed and untimed services) 40

## 2023-11-13 ENCOUNTER — TRANSFERRED RECORDS (OUTPATIENT)
Dept: HEALTH INFORMATION MANAGEMENT | Facility: CLINIC | Age: 83
End: 2023-11-13

## 2023-11-22 NOTE — TELEPHONE ENCOUNTER
Patient cancelled appointment 11/28 - anywhere else he can be fit in - 10am-11am appt preferred.   I cannot find any openings.

## 2023-11-27 DIAGNOSIS — M48.061 SPINAL STENOSIS OF LUMBAR REGION, UNSPECIFIED WHETHER NEUROGENIC CLAUDICATION PRESENT: ICD-10-CM

## 2023-11-27 RX ORDER — AMITRIPTYLINE HYDROCHLORIDE 10 MG/1
10 TABLET ORAL AT BEDTIME
Qty: 90 TABLET | Refills: 1 | OUTPATIENT
Start: 2023-11-27

## 2023-11-29 RX ORDER — AMITRIPTYLINE HYDROCHLORIDE 10 MG/1
10 TABLET ORAL AT BEDTIME
Qty: 90 TABLET | Refills: 11 | OUTPATIENT
Start: 2023-11-29

## 2023-11-29 NOTE — TELEPHONE ENCOUNTER
Pharmacy called to check on the status of the refill request. TC advised we received the refill request on 11/27/2023 and the turn around time is 1-3 business days.    Leah Hill

## 2023-12-01 RX ORDER — AMITRIPTYLINE HYDROCHLORIDE 10 MG/1
10 TABLET ORAL AT BEDTIME
Qty: 90 TABLET | Refills: 0 | Status: SHIPPED | OUTPATIENT
Start: 2023-12-01 | End: 2024-02-27

## 2023-12-20 ENCOUNTER — TRANSFERRED RECORDS (OUTPATIENT)
Dept: HEALTH INFORMATION MANAGEMENT | Facility: CLINIC | Age: 83
End: 2023-12-20

## 2023-12-21 ENCOUNTER — OFFICE VISIT (OUTPATIENT)
Dept: FAMILY MEDICINE | Facility: CLINIC | Age: 83
End: 2023-12-21
Payer: COMMERCIAL

## 2023-12-21 VITALS
WEIGHT: 186 LBS | DIASTOLIC BLOOD PRESSURE: 54 MMHG | RESPIRATION RATE: 16 BRPM | OXYGEN SATURATION: 99 % | BODY MASS INDEX: 26.04 KG/M2 | TEMPERATURE: 97.9 F | SYSTOLIC BLOOD PRESSURE: 124 MMHG | HEIGHT: 71 IN | HEART RATE: 72 BPM

## 2023-12-21 DIAGNOSIS — R60.0 BILATERAL LEG EDEMA: ICD-10-CM

## 2023-12-21 DIAGNOSIS — J61 ASBESTOSIS (H): ICD-10-CM

## 2023-12-21 DIAGNOSIS — C61 PROSTATE CANCER (H): Primary | ICD-10-CM

## 2023-12-21 PROCEDURE — 99214 OFFICE O/P EST MOD 30 MIN: CPT | Performed by: INTERNAL MEDICINE

## 2023-12-21 RX ORDER — FUROSEMIDE 20 MG
TABLET ORAL
Qty: 90 TABLET | Refills: 3 | Status: SHIPPED | OUTPATIENT
Start: 2023-12-21 | End: 2024-02-26

## 2023-12-21 NOTE — PATIENT INSTRUCTIONS
We will start furosemide daily to see if helps with swelling. Recheck next week with labs.   - stop the amlodipine  - ok to use the compression stockings.    They should call to set up heart ultrasound- if not call the number noted for cardiology.     We can get second opinion through [facility] if wanting, but seems that have a good team in place.    Check at the pharmacy for the RSV vaccine. This can help prevent significant lung infections for you. Medicare typically does not cover this in clinic and may cover it at your pharmacy.    Recheck with Shanice next week and with me in February.    Angel Sung MD

## 2023-12-21 NOTE — PROGRESS NOTES
"  Assessment & Plan     Prostate cancer (H)  Following with Dr. Ocampo at [facility]- has questions about treatment with injections +/- radiation, discussed urology is typically the best area to treat prostate cancer but we can consider evaluation with other specialist if he is wanting    Bilateral leg edema  Will start lasix daily- did have some improvement in this in the past. Reviewed last echo, discussed low salt diet. Reviewed compression stockings, discussed last US of the legs with no DVT. No signs of CHF on exam. Will have patient scheduled for recheck in 1-2 weeks with recheck of BMP at that visit.  - furosemide (LASIX) 20 MG tablet; TAKE 1 TABLET BY MOUTH ONCE DAILY  - Echocardiogram Complete; Future    Asbestosis (H)  Stable calcifications on last imaging, no current symptoms. Continue to monitor.    Patient Instructions   We will start furosemide daily to see if helps with swelling. Recheck next week with labs.   - stop the amlodipine  - ok to use the compression stockings.    They should call to set up heart ultrasound- if not call the number noted for cardiology.     We can get second opinion through [facility] if wanting, but seems that have a good team in place.    Check at the pharmacy for the RSV vaccine. This can help prevent significant lung infections for you. Medicare typically does not cover this in clinic and may cover it at your pharmacy.    Recheck with Shanice next week and with me in February.    Angel Sung MD                   BMI:   Estimated body mass index is 25.94 kg/m  as calculated from the following:    Height as of this encounter: 1.803 m (5' 11\").    Weight as of this encounter: 84.4 kg (186 lb).           Angel Sung MD  [facility]    Aftab Nam is a 83 year old, presenting for the following health issues:  Swelling (Right leg swelling )        12/21/2023    11:06 AM   Additional Questions   Roomed by Eric X       History " of Present Illness       Reason for visit:  Swollen feet    He eats 4 or more servings of fruits and vegetables daily.He consumes 0 sweetened beverage(s) daily.He exercises with enough effort to increase his heart rate 10 to 19 minutes per day.  He exercises with enough effort to increase his heart rate 5 days per week.   He is taking medications regularly.     Has been having ongoing edema in the legs- has been started on furosemide    Echo on 9/2021   Final Conclusion    1. Normal left ventricular chamber size. Normal left ventricular wall thickness. Normal left   ventricular systolic function. Calculated left    ventricular ejection fraction (modified [place] technique) is 65 %. No regional wall motion   abnormalities.    2. Normal right ventricular chamber size. Normal right ventricular systolic function.    3. Mild tricuspid valve regurgitation.     Does seem to help some times. Has been off the chlorthalidone this summer with nocturia- does not equate worsening edema with stopping this and had before. He did have US of the leg don before that was negative for DVT in July.    Not having shortness of breath- using albuterol intermittently. Doing exercises to strengthen back/buttock area from PT.     No chest pain, no orthopnea or PND. Checking BLOOD PRESSURE at home- getting range of 110's    Going to bathroom every 3 to 4 hours as night.     Elevated PSA testing- following with urology- Dr. Espinosa- coming up with treatment plan. He did have MRI of the prostate done yesterday- waiting on results wondering about treatment.        Today's PHQ-9 Score:       7/26/2022     3:18 PM   PHQ-9 SCORE   PHQ-9 Total Score Karent 11 (Moderate depression)   PHQ-9 Total Score 11     PHQ-2 Score:         12/21/2023    11:00 AM 4/4/2023     2:54 PM   PHQ-2 ( 1999 [employer])   Q1: Little interest or pleasure in doing things 1 0   Q2: Feeling down, depressed or hopeless 1 0   PHQ-2 Score 2 0   Q1: Little interest or pleasure in  "doing things  Not at all   Q2: Feeling down, depressed or hopeless  Not at all   PHQ-2 Score  0         Review of Systems   Constitutional, HEENT, cardiovascular, pulmonary, gi and gu systems are negative, except as otherwise noted.      Objective    /54 (BP Location: Right arm, Patient Position: Sitting, Cuff Size: Adult Regular)   Pulse 72   Temp 97.9  F (36.6  C) (Temporal)   Resp 16   Ht 1.803 m (5' 11\")   Wt 84.4 kg (186 lb)   SpO2 99%   BMI 25.94 kg/m    Body mass index is 25.94 kg/m .  Physical Exam   GENERAL: healthy, alert and no distress  NECK: no adenopathy, no asymmetry, masses, or scars and thyroid normal to palpation, No JVD noted on exam  RESP: lungs clear to auscultation - no rales, rhonchi or wheezes  CV: regular rate and rhythm, normal S1 S2, no S3 or S4, no murmur, click or rub,   ABDOMEN: soft, nontender, no hepatosplenomegaly, no masses and bowel sounds normal  MS: noted +2 edema of bilateral extremities R>L  PSYCH: mentation appears normal, affect normal/bright                      "

## 2023-12-26 ENCOUNTER — TRANSFERRED RECORDS (OUTPATIENT)
Dept: HEALTH INFORMATION MANAGEMENT | Facility: CLINIC | Age: 83
End: 2023-12-26
Payer: COMMERCIAL

## 2023-12-28 ENCOUNTER — OFFICE VISIT (OUTPATIENT)
Dept: FAMILY MEDICINE | Facility: CLINIC | Age: 83
End: 2023-12-28
Payer: COMMERCIAL

## 2023-12-28 VITALS
WEIGHT: 182 LBS | RESPIRATION RATE: 16 BRPM | BODY MASS INDEX: 25.38 KG/M2 | OXYGEN SATURATION: 97 % | SYSTOLIC BLOOD PRESSURE: 116 MMHG | TEMPERATURE: 97.9 F | DIASTOLIC BLOOD PRESSURE: 64 MMHG | HEART RATE: 77 BPM

## 2023-12-28 DIAGNOSIS — R60.0 BILATERAL LEG EDEMA: Primary | ICD-10-CM

## 2023-12-28 LAB
ANION GAP SERPL CALCULATED.3IONS-SCNC: 11 MMOL/L (ref 7–15)
BUN SERPL-MCNC: 31.5 MG/DL (ref 8–23)
CALCIUM SERPL-MCNC: 9.7 MG/DL (ref 8.8–10.2)
CHLORIDE SERPL-SCNC: 100 MMOL/L (ref 98–107)
CREAT SERPL-MCNC: 1.05 MG/DL (ref 0.67–1.17)
DEPRECATED HCO3 PLAS-SCNC: 27 MMOL/L (ref 22–29)
EGFRCR SERPLBLD CKD-EPI 2021: 70 ML/MIN/1.73M2
GLUCOSE SERPL-MCNC: 96 MG/DL (ref 70–99)
POTASSIUM SERPL-SCNC: 4.8 MMOL/L (ref 3.4–5.3)
SODIUM SERPL-SCNC: 138 MMOL/L (ref 135–145)

## 2023-12-28 PROCEDURE — 99213 OFFICE O/P EST LOW 20 MIN: CPT | Performed by: NURSE PRACTITIONER

## 2023-12-28 PROCEDURE — 36415 COLL VENOUS BLD VENIPUNCTURE: CPT | Performed by: NURSE PRACTITIONER

## 2023-12-28 PROCEDURE — 80048 BASIC METABOLIC PNL TOTAL CA: CPT | Performed by: NURSE PRACTITIONER

## 2023-12-28 NOTE — PROGRESS NOTES
"  Assessment & Plan     Bilateral leg edema  BMP is stable. BP is normal and within normal limits.  Continue lasix as prescribed   Patient hasn't noticed a significant improvement yet regarding edema. He has been on the medication for about a week so may not have the best response yet.     - Basic metabolic panel  - Basic metabolic panel               BMI:   Estimated body mass index is 25.38 kg/m  as calculated from the following:    Height as of 23: 1.803 m (5' 11\").    Weight as of this encounter: 82.6 kg (182 lb).           LYNDON HSU CNP  [facility]    Aftab Nam is a 83 year old, presenting for the following health issues:  Follow Up (Right foot still swollen )        2023     3:09 PM   Additional Questions   Roomed by Eric LINTON       History of Present Illness       Reason for visit:  Swollen feet    He eats 4 or more servings of fruits and vegetables daily.He consumes 0 sweetened beverage(s) daily.He exercises with enough effort to increase his heart rate 10 to 19 minutes per day.  He exercises with enough effort to increase his heart rate 5 days per week.   He is taking medications regularly.     Has been taking lasix as prescribed. Has not noticed a significant               Review of Systems         Objective    /76 (BP Location: Right arm, Patient Position: Sitting, Cuff Size: Adult Regular)   Pulse 77   Temp 97.9  F (36.6  C) (Temporal)   Resp 16   Wt 82.6 kg (182 lb)   SpO2 97%   BMI 25.38 kg/m    Body mass index is 25.38 kg/m .  Physical Exam  Constitutional:       Appearance: Normal appearance.   Musculoskeletal:      Right lower le+ Edema present.      Left lower le+ Edema present.   Neurological:      General: No focal deficit present.      Mental Status: He is alert and oriented to person, place, and time.   Psychiatric:         Mood and Affect: Mood normal.         Behavior: Behavior normal.                  "

## 2024-01-04 ENCOUNTER — PRE VISIT (OUTPATIENT)
Dept: ONCOLOGY | Facility: CLINIC | Age: 84
End: 2024-01-04
Payer: COMMERCIAL

## 2024-01-04 ENCOUNTER — TRANSCRIBE ORDERS (OUTPATIENT)
Dept: OTHER | Age: 84
End: 2024-01-04

## 2024-01-04 DIAGNOSIS — C61 PROSTATE CANCER (H): Primary | ICD-10-CM

## 2024-01-04 NOTE — TELEPHONE ENCOUNTER
Action    Action Taken 1/4/24  Received call from pt, pt provided email address: [email or web address] for PETER to be sent.  3:23 PM

## 2024-01-04 NOTE — TELEPHONE ENCOUNTER
Action Taken  Date/Description  TJ     WT from [facility]PS January 4, 2024  10:29 AM   Call from Pt wanting to schedule for a Dx of Prostate cancer (H) [C61] Dr Espinosa, [facility]   He is requesting a consult for radiation as [place] is closer   Imaging at [facility]  Pt is going to have daughter call to provide email address so we can send an PETER   4:05 PM  Per message from Danial, sent PETER to email noted above.    January 5, 2024  LVM for Lyubov at [facility] to see if pt has an PETER on file.  Call to Pt and he will try to get to the [facility] office ASAP to sign a form.  1:00 PM  CB from Cristian at [facility] and Pt has a path report and one other visit that she is faxing records for now.  CB to Pt's daughter Meera with this update and to let them know we don't need the PETER     Records Requested  TJ   Clinic name Comments/Action Taken   [facility] January 4, 2024 10:38 AM    Called and requested all imaging to be pushed to PACS  12.20.23 PET  10.31.23 MRI     Imaging Received  January 5, 2024  1:08 PM  JANAY   Action: MRI from [facility] received and resolved to PACS.  Call to film room as PET is not showing up. Jojo found it and is resolving now.     Action January 8, 2024 8:58 AM TJ   Incoming Records Records received from [facility] and sent to [facility] for upload.  Emailed to JASON

## 2024-01-05 ENCOUNTER — PATIENT OUTREACH (OUTPATIENT)
Dept: ONCOLOGY | Facility: CLINIC | Age: 84
End: 2024-01-05
Payer: COMMERCIAL

## 2024-01-05 NOTE — PROGRESS NOTES
New Patient Radiation Oncology Nurse Navigator Note     Referring provider:   Self referral     Referred to (specialty): Radiation Oncology    Requested provider (if applicable): [place]     Date Referral Received: 1/4/24     Evaluation for : prostate cancer     Clinical History (per Nurse review of records provided):    Patient with prostate cancer, current care under Dr. Ocampo, [facility].    Per urology note:  12/26/2023 12/26/2023 Cyril is an 83-year-old man with high risk prostate cancer. PSA is 12 and the grade group is 4. PSMA PET shows no evidence for metastasis. We discussed treatment options. We discussed watchful waiting with the institution of hormonal therapy if and when he develops metastasis. We discussed instituting hormone therapy as primary treatment. We discussed radiation therapy with adjunct of hormones. Given the extent of the malignancy, I am concerned that this is going to cause some significant symptoms without treatment. We will get an opinion from radiation oncology. He is fairly certain that he is likely to proceed with radiation and so we will start ADT today. If he is undergoing treatment with radiation oncology, I will plan to see him in 6 months with a PSA, at which point he will be due for his next ADT. We discussed the risks of ADT as well as the rationale and benefits.     Lupron given 12/26/23 - [facility]    Records Location (Care Everywhere, Media, etc.): [facility] - need notes, labs, outside imaging, PSMA PET was for certain done- [facility]...per protocol for prostate cancer  Lupron therapy    I called Cyril, to discuss referral to oncology.  I introduced my role and reviewed what this consult visit will entail/what to expect.      He was initially going to see Dr. Mcghee at [facility] in [place], but would like  location.  I reviewed a very general plan for radiation as an example of possible needs including CT Sim, possible fiducials, est length, and possible wait needed  between Lupron injection and start of RT. I referred him to talk with rad onc at visit for a concrete plan.     I reviewed the location and gave contact numbers including new patient scheduling and clinic phone numbers.  I also discussed that we are waiting on a PETER for records from [facility].  He provided his daughter's email to our records team.  I have sent a message to Debby, to let her know his daughter will not likely be able to print it out.  Cyril is inquiring about other options of possibly going in person to sign PETER.  I let Cyril know once records are in process I will forward on his referral for scheduling.  Cyril has no other questions at this time.    PLAN: SCHEDULE: First available Dr. Langford or Dr. Lam, @ , NEW, in person    Florence Correia, RN, BSN  Oncology New Patient Nurse Navigator   [facility]  [phone]

## 2024-01-15 ENCOUNTER — DOCUMENTATION ONLY (OUTPATIENT)
Dept: FAMILY MEDICINE | Facility: CLINIC | Age: 84
End: 2024-01-15
Payer: COMMERCIAL

## 2024-01-15 DIAGNOSIS — R97.20 ELEVATED PROSTATE SPECIFIC ANTIGEN (PSA): Primary | ICD-10-CM

## 2024-01-15 DIAGNOSIS — Z13.220 SCREENING FOR HYPERLIPIDEMIA: ICD-10-CM

## 2024-01-15 DIAGNOSIS — Z13.1 SCREENING FOR DIABETES MELLITUS: ICD-10-CM

## 2024-01-16 DIAGNOSIS — R60.0 LOCALIZED EDEMA: ICD-10-CM

## 2024-01-16 DIAGNOSIS — I10 PRIMARY HYPERTENSION: Primary | ICD-10-CM

## 2024-01-18 ENCOUNTER — LAB (OUTPATIENT)
Dept: LAB | Facility: CLINIC | Age: 84
End: 2024-01-18
Payer: COMMERCIAL

## 2024-01-18 DIAGNOSIS — Z13.220 SCREENING FOR HYPERLIPIDEMIA: ICD-10-CM

## 2024-01-18 DIAGNOSIS — R60.0 LOCALIZED EDEMA: ICD-10-CM

## 2024-01-18 DIAGNOSIS — Z13.1 SCREENING FOR DIABETES MELLITUS: ICD-10-CM

## 2024-01-18 DIAGNOSIS — I10 PRIMARY HYPERTENSION: ICD-10-CM

## 2024-01-18 DIAGNOSIS — R97.20 ELEVATED PROSTATE SPECIFIC ANTIGEN (PSA): ICD-10-CM

## 2024-01-18 PROCEDURE — 84153 ASSAY OF PSA TOTAL: CPT

## 2024-01-18 PROCEDURE — 80061 LIPID PANEL: CPT

## 2024-01-18 PROCEDURE — 36415 COLL VENOUS BLD VENIPUNCTURE: CPT

## 2024-01-18 PROCEDURE — 80053 COMPREHEN METABOLIC PANEL: CPT

## 2024-01-19 LAB
ALBUMIN SERPL BCG-MCNC: 4.1 G/DL (ref 3.5–5.2)
ALP SERPL-CCNC: 83 U/L (ref 40–150)
ALT SERPL W P-5'-P-CCNC: 27 U/L (ref 0–70)
ANION GAP SERPL CALCULATED.3IONS-SCNC: 10 MMOL/L (ref 7–15)
AST SERPL W P-5'-P-CCNC: 42 U/L (ref 0–45)
BILIRUB SERPL-MCNC: 0.4 MG/DL
BUN SERPL-MCNC: 30.5 MG/DL (ref 8–23)
CALCIUM SERPL-MCNC: 9.6 MG/DL (ref 8.8–10.2)
CHLORIDE SERPL-SCNC: 103 MMOL/L (ref 98–107)
CHOLEST SERPL-MCNC: 145 MG/DL
CREAT SERPL-MCNC: 1.01 MG/DL (ref 0.67–1.17)
DEPRECATED HCO3 PLAS-SCNC: 26 MMOL/L (ref 22–29)
EGFRCR SERPLBLD CKD-EPI 2021: 73 ML/MIN/1.73M2
FASTING STATUS PATIENT QL REPORTED: ABNORMAL
GLUCOSE SERPL-MCNC: 120 MG/DL (ref 70–99)
HDLC SERPL-MCNC: 44 MG/DL
LDLC SERPL CALC-MCNC: 61 MG/DL
NONHDLC SERPL-MCNC: 101 MG/DL
POTASSIUM SERPL-SCNC: 5.6 MMOL/L (ref 3.4–5.3)
PROT SERPL-MCNC: 7.4 G/DL (ref 6.4–8.3)
PSA SERPL DL<=0.01 NG/ML-MCNC: 8.39 NG/ML
SODIUM SERPL-SCNC: 139 MMOL/L (ref 135–145)
TRIGL SERPL-MCNC: 201 MG/DL

## 2024-01-22 NOTE — TELEPHONE ENCOUNTER
MEDICAL RECORDS REQUEST   Radiation Oncology  [address] [place] [address]  PHONE: [phone]  Fax: [phone]        FUTURE VISIT INFORMATION                                                   Cyril Galdamez, : 1940 scheduled for future visit at [facility] Radiation Oncology    APPOINTMENT INFORMATION:  Date: 2024   Provider: Dr. Lam   Reason for Visit/Diagnosis: Prostate Cancer     REFERRAL INFORMATION:  Referring provider:  Self     RECORDS REQUESTED FOR VISIT                                                     Imaging Received  2024  3:31 PM  JANAY   Action: Confirmed mages from [facility] received and resolved to PACS.  Faxed reports to [facility] for upload     Action    Action Taken 2024 10:30AM ROWAN   I called pt Cyril - his phone went straight to . I called again - unavailable. I left a brief  requesting a call back. I provided the [facility] team phone # in case he needs to call us back tomorrow.     12:01pm ROWAN   Pt Cyril called back- no hx of radiation oncology.     I called [facility] [phone] #4 - I requested the image reports.      Prostate Cancer     CT, MRI, PET/CT AND REPORTS yes- [facility] scans are in PACS    PET 2023     MRI Prostate 10/31/2023    ANY RECENT LABS yes   SURGICAL REPORTS yes- [facility] Report is in [facility]   PATHOLOGY REPORTS yes   CHEMO & MEDICAL ONCOLOGY NOTES yes    Seen by Dr Espinosa, [facility] - records are in [facility]   CURRENT MEDICATION LIST yes

## 2024-01-25 ENCOUNTER — OFFICE VISIT (OUTPATIENT)
Dept: RADIATION ONCOLOGY | Facility: CLINIC | Age: 84
End: 2024-01-25
Attending: STUDENT IN AN ORGANIZED HEALTH CARE EDUCATION/TRAINING PROGRAM
Payer: COMMERCIAL

## 2024-01-25 ENCOUNTER — PRE VISIT (OUTPATIENT)
Dept: RADIATION ONCOLOGY | Facility: CLINIC | Age: 84
End: 2024-01-25
Payer: COMMERCIAL

## 2024-01-25 VITALS
TEMPERATURE: 97.7 F | OXYGEN SATURATION: 97 % | HEART RATE: 76 BPM | DIASTOLIC BLOOD PRESSURE: 60 MMHG | RESPIRATION RATE: 16 BRPM | SYSTOLIC BLOOD PRESSURE: 123 MMHG

## 2024-01-25 DIAGNOSIS — Z79.818 ANDROGEN DEPRIVATION THERAPY: ICD-10-CM

## 2024-01-25 DIAGNOSIS — C61 MALIGNANT NEOPLASM OF PROSTATE (H): Primary | ICD-10-CM

## 2024-01-25 PROCEDURE — 99417 PROLNG OP E/M EACH 15 MIN: CPT | Performed by: STUDENT IN AN ORGANIZED HEALTH CARE EDUCATION/TRAINING PROGRAM

## 2024-01-25 PROCEDURE — 99205 OFFICE O/P NEW HI 60 MIN: CPT | Performed by: STUDENT IN AN ORGANIZED HEALTH CARE EDUCATION/TRAINING PROGRAM

## 2024-01-25 PROCEDURE — G0463 HOSPITAL OUTPT CLINIC VISIT: HCPCS | Performed by: STUDENT IN AN ORGANIZED HEALTH CARE EDUCATION/TRAINING PROGRAM

## 2024-01-25 ASSESSMENT — PAIN SCALES - GENERAL: PAINLEVEL: NO PAIN (0)

## 2024-01-25 NOTE — PROGRESS NOTES
[facility] Radiation Oncology Consult Note     Patient: Cyril Galdamez  MRN: [id number]  Date of Service: 01/25/2024          Self Referred  Dr. Ocampo       Thank you very much for referring this patient for consideration of radiotherapy. As you know Mr. Galdamez is a 83 year old male with a diagnosis of high risk prostate cancer. He was seen today for consideration of definitive radiation.   Prostate Adnocarcinoma  Stage:  T2 N0 M0  Gracewood: 4+4 = 8 (grade group 4)  Pre-treatment PSA: 12.10  TRUS/MRI volume: 40 cc    HISTORY OF PRESENT ILLNESS:   Mr. Galdamez is a 83 year old male who was noted to have elevated PSA    8/29/2022 PSA: 11.6  7/3/2023 PSA: 12.10    Per notes he had palpable prostate nodule.    10/31/2023 MR prostate: 40 cc prostate gland.    Transitional central zones: Well encapsulated BPH nodules.  Peripheral zones:   Lesion 1 left mid gland to apical at 1 to 7 o'clock position with secondary involvement of the transition zone and right apex.  T2 hypointensity with diffusion restriction measuring 3.1 x 1.1 x 1.7 cm.  Broad capsular contact, perineural invasion may be present.  PI-RADS 5.  Lesion to right mid gland at 8:00 posterior lateral T2 hypointensity with marked diffusion restriction measuring 1.4 x 0.7 x 1.0 cm.  PI-RADS 4.  No identified transscapular disease or involvement neurovascular bundles or seminal vesicles.  No bony involvement or pathologically enlarged lymph nodes.  Multiple small hyperemic nodes in the lower retroperitoneal and pelvic inlet.    11/13/2023:  Prostate biopsy, pathology: Prostate adenocarcinoma,   RMA: Gracewood 4+3=7, 40%  RLA: Emiliano 4+3=7, 25%  RMM: Emiliano 3+4=7, 50%  RLM: PIN  RMB: Benign  RLB: Gracewood 4+3=7, 40%  LMA: Gracewood 4+3=7, 50%  L LA: Gracewood 4+3=7, 100%  LMM: Emiliano 4+3=7, 16%  LLM:  Gracewood 4+3=7, 60%  L MB: Emiliano 4+3=7, 40% 1%  LLB:   Emiliano 3+4=7, 83%  Lesion 1 left peripheral zone: 4+3=7, 80%  Lesion 2: Right peripheral zone: 4+4  = 8, 70%    12/20/2023 PSMA PET/CT: Intense uptake present within the left aspect of the prostate gland (max SUV 18.2), intense uptake in the right posterior prostate (max SUV 46).  No lymph node involvement or evidence of metastatic disease  Diffuse bilateral calcified pleural plaques within the chest recommend follow-up CT chest 4 to 6 months.    12/26/2023 Eligard 45 mg #1    1/18/2024 PSA: 8.39    AUA: 13/35  He reports some urinary frequency and weak stream but feels he is able to fully empty when he urinates.  Nocturia 2-3 times per night, can go 3 to 4 hours between.    Has history of hemorrhoids which have been symptomatic in the past.  No history of inflammatory bowel disease.  History of colon cancer status post right colectomy (2000).  Last colonoscopy 7/9/2019 normal.    Was in [employer] but no known history of agent orange exposure.  No known asbestos exposure.    He is tolerating ADT well and without any notable side effects.  No hot flashes.  Discussed calcium and vitamin D given long-term ADT.    Family history positive for prostate cancer in his father.    CHEMOTHERAPY HISTORY: Concurrent Chemotherapy: No: Concurrent ADT    RADIATION THERAPY HISTORY: Prior Radiation: No    IMPLANTED CARDIAC DEVICE: none         Current Outpatient Medications   Medication Sig Dispense Refill    albuterol (PROAIR HFA/PROVENTIL HFA/VENTOLIN HFA) 108 (90 Base) MCG/ACT inhaler Inhale 2 puffs into the lungs every 4 hours as needed for shortness of breath, wheezing or cough 18 g 11    amitriptyline (ELAVIL) 10 MG tablet Take 1 tablet (10 mg) by mouth at bedtime 90 tablet 0    atorvastatin (LIPITOR) 10 MG tablet Take 1 tablet (10 mg) by mouth daily 90 tablet 2    furosemide (LASIX) 20 MG tablet TAKE 1 TABLET BY MOUTH ONCE DAILY 90 tablet 3    HEMP OIL OR EXTRACT OR OTHER CBD CANNABINOID, NOT MEDICAL CANNABIS, Topical      lisinopril (ZESTRIL) 40 MG tablet Take 1 tablet (40 mg) by mouth daily 90 tablet 2     No past medical  history on file.  No past surgical history on file.  Allergies   Allergen Reactions    Shellfish Allergy Anaphylaxis     No family history on file.  Social History     Socioeconomic History    Marital status:      Spouse name: Not on file    Number of children: Not on file    Years of education: Not on file    Highest education level: Not on file   Occupational History    Not on file   Tobacco Use    Smoking status: Never    Smokeless tobacco: Never   Vaping Use    Vaping Use: Never used   Substance and Sexual Activity    Alcohol use: Not on file    Drug use: Not on file    Sexual activity: Not on file   Other Topics Concern    Not on file   Social History Narrative    Not on file     Social Determinants of Health     Financial Resource Strain: Low Risk  (12/21/2023)    Financial Resource Strain     Within the past 12 months, have you or your family members you live with been unable to get utilities (heat, electricity) when it was really needed?: No   Food Insecurity: Low Risk  (12/21/2023)    Food Insecurity     Within the past 12 months, did you worry that your food would run out before you got money to buy more?: No     Within the past 12 months, did the food you bought just not last and you didn t have money to get more?: No   Transportation Needs: Low Risk  (12/21/2023)    Transportation Needs     Within the past 12 months, has lack of transportation kept you from medical appointments, getting your medicines, non-medical meetings or appointments, work, or from getting things that you need?: No   Physical Activity: Not on file   Stress: Not on file   Social Connections: Not on file   Interpersonal Safety: Low Risk  (12/21/2023)    Interpersonal Safety     Do you feel physically and emotionally safe where you currently live?: Yes     Within the past 12 months, have you been hit, slapped, kicked or otherwise physically hurt by someone?: No     Within the past 12 months, have you been humiliated or  emotionally abused in other ways by your partner or ex-partner?: No   Housing Stability: Low Risk  (12/21/2023)    Housing Stability     Do you have housing? : Yes     Are you worried about losing your housing?: No        REVIEW OF SYMPTOMS:  A full 14-point review of systems was performed. Pertinent findings are noted in the HPI.    General  Constitutional  Constitutional (WDL): All constitutional elements are within defined limits  EENT  Eye Disorders  Eye Disorder (WDL): All eye disorder elements are within defined limits (wears reading glasses)  Ear Disorders  Ear Disorder (WDL): All ear disorder elements are within defined limits  Respiratory  Respiratory  Respiratory (WDL): All respiratory elements are within defined limits  Cardiovascular  Cardiovascular  Cardiovascular (WDL): Exceptions to WDL (no pacemaker)  Edema: Yes  Edema Limbs: 5 - 10% inter-limb discrepancy in volume or circumference at point of greatest visible difference OR swelling or obscuration of anatomic architecture on close inspection (bilateral LE edema)  Gastrointestinal  Gastrointestinal  Gastrointestinal (WDL): Exceptions to WDL  Constipation: Occasional or intermittent symptoms OR occasional use of stool softeners, laxatives, dietary modification, or enema (has hemorrhoid)  Musculoskeletal  Musculoskeletal and Connective Tissue Disorders  Musculoskeletal & Connective (WDL): Exceptions to WDL  Arthralgia: Mild pain (low back, degenerative disc)  Generalized Muscle Weakness: Symptomatic OR perceived by patient but not evident on physical exam (uses wheeled walker)  Integumentary  Integumentary  Integumentary (WDL): All integumentary elements are within defined limits  Neurological  Neurosensory  Neurosensory (WDL): Exceptions to WDL  Peripheral Motor Neuropathy: Asymptomatic OR clinical or diagnostic observations only (bilateral feet with edema)  Ataxia: Asymptomatic OR clinical or diagnostic observations only OR intervention not indicated  (uses walker)  Peripheral Sensory Neuropathy: Asymptomatic (bilateral feet with edema)  Genitourinary/Reproductive  Genitourinary  Genitourinary (WDL): Exceptions to WDL  Urinary Frequency: Present (nocturia x2, frequent during day with water pill)  Lymphatic  Lymph System Disorders  Lymph (WDL): All lymph elements are within defined limits  Pain  Pain Score: No Pain (0)  AUA Assessment                                                              Accompanied by  Accompanied By: family (daughter)    ECOG Status: 1 - Can't do physically strenuous work, but fully ambyulatory and can do light sedentary work    KPS Score: 80% Can perform normal activity with effort, some signs of disease    Pain Management Plan: N/A    Recent Labs: No results found for this or any previous visit (from the past 168 hour(s)).    8/29/2022 PSA: 11.6  7/3/2023 PSA: 12.10  ----------------------------- 12/26/2023  Eligard #1  1/18/2024 PSA: 8.39      Personally reviewed PSMA PET/CT images  Imaging: Imaging results 6 weeks:No results found.    12/20/2023 PSMA PET/CT: Intense uptake present within the left aspect of the prostate gland (max SUV 18.2), intense uptake in the right posterior prostate (max SUV 46).  No lymph node involvement or evidence of metastatic disease  Diffuse bilateral calcified pleural plaques within the chest recommend follow-up CT chest 4 to 6 months.    Pathology:   No results found for this or any previous visit (from the past 8760 hour(s)).    Prostate biopsy, pathology: Prostate adenocarcinoma,   RMA: Serena 4+3=7, 40%  RLA: Serena 4+3=7, 25%  RMM: Emiliano 3+4=7, 50%  RLM: PIN  RMB: Benign  RLB: Serena 4+3=7, 40%  LMA: Serena 4+3=7, 50%  L LA: Serena 4+3=7, 100%  LMM: Emiliano 4+3=7, 16%  LLM:  Serena 4+3=7, 60%  L MB: Serena 4+3=7, 40% 1%  LLB:   Serena 3+4=7, 83%  Lesion 1 left peripheral zone: 4+3=7, 80%  Lesion 2: Right peripheral zone: 4+4 = 8, 70%          Objective:        PHYSICAL EXAMINATION:    BP  123/60 (BP Location: Left arm, Patient Position: Sitting, Cuff Size: Adult Regular)   Pulse 76   Temp 97.7  F (36.5  C) (Oral)   Resp 16   SpO2 97%     Gen: Alert, in NAD pleasant interactive, well nourished  Eyes: EOMI, sclera anicteric  HENT     Head: NC/AT  Pulm: No wheezing, stridor or respiratory distress  Musculoskeletal: Normal muscle bulk and tone  Skin: Normal tone and turgor, warm, dry, intact  Neurologic: A/Ox3, , face symmetric, speech fluent, no focal motor deficits.  Gait  Psychiatric: Appropriate mood and affect     Intent of Therapy: Curative  Side effects that may occur during or within weeks after Radiation Therapy    Fatigue and general weakness  Nausea, vomiting and decrease in appetite  Pain on urination, frequent urge to urinate, blood in the urine, difficulty starting and decrease in the urine stream, retention of urine, and leakage of urine  Diarrhea, frequent, urgent and painful bowel movements, and blood in the stool  Darkening, irritation, itchiness, redness, dryness,and peeling of the skin of the pelvis   Loss of hair on the pelvis and groin    Side effects that may occur months or years after Radiation Therapy    Development of another tumor or cancer  Thickening, telangiectasias (development of spider like blood vessels in the skin) and ulceration of the skin of the pelvis  Decrease in function of the kidneys and liver  Ulcer and bleeding from the intestine or rectum  Obstruction of the bowel  Fistula of the rectum  Swelling of the feet and legs  Poor healing after a trauma or surgery in the irradiated area  Nerve damage resulting in loss of strength and sensation  Infertiility (sterility)  Painful ejaculation or impotence    The risks, benefits and alternatives to radiation therapy were outlined with the patient. All questions were answered and a consent was signed.     Impression    83 year old male with a diagnosis of high risk Prostate Adnocarcinoma  Stage:  T2 N0 M0  Whitewood:  4+4 = 8 (grade group 4)  Pre-treatment PSA: 12.10  TRUS/MRI volume: 40 ml    Began Reuben December 2023    Visit Dx:  (C61) Malignant neoplasm of prostate (H)  (primary encounter diagnosis)       Cancer Staging   Malignant neoplasm of prostate (H)  Staging form: Prostate, AJCC 8th Edition  - Clinical stage from 12/20/2023: Stage IIC (cT2c, cN0, cM0, PSA: 12.1, Grade Group: 4) - Signed by Ina Lam MD on 1/25/2024      Assessment & Plan:   Discussed high risk prostate cancer management and NCCN guidelines based on life expectancy.  He has high-volume disease.  Difficult to anticipate likelihood of developing symptoms and while androgen deprivation therapy is not curative, expect good response to androgen deprivation therapy for some time.   Discussed definitive radiation therapy with patient and his daughter.  The indications for, process of, alternatives, potential side effects and complications of RT to the prostate with or without pelvic lymph nodes were discussed.    Given age and side effect profile after discussion they are leaning toward proceeding with radiation therapy to prostate/seminal vesicles alone and foregoing pelvic lymph node fields.  Although there is a risk of microscopic lymph node involvement based on high risk prostate cancer there is no evidence of lymph node involvement on PSMA PET/CT and given age would not expect inclusion/exclusion of lymph nodes to affect his overall survival so this is reasonable decision.    Discussed prostate gold fiducial placement with ultrasound guidance by urology with or without placement prior to fiducials.  Will refer him back to MD Ocampo.  Patient and daughter request next available appointment for fiducials.      Discussed initiation of ADT 2 months prior to initiation of RT as scheduled in prior trials publications as not necessary per additional trials and his treatment has effectively begun with initiation of ADT.     They asked multiple  questions which were answered to their satisfaction and they verbalized understanding.      They wish to proceed with radiation therapy and consent is signed today.  They will return to clinic 1 week following prostate fiducial placement for CT simulation for RT planning.     Discussed CT simulation as well as treatments with full bladder and empty rectum.    Anticipate 7020 cGy in 26 fractions hypofractionation.     Discussed baseline DEXA scan given long-term ADT planned as well as calcium vitamin D supplementation.    His pleural plaques have been previously followed.  He reports no known history of asbestos exposure, radiology recommendation for continued follow-up CT chest 4 to 6 months.    Thank you for allowing me to participate in the care of this patient. Feel free to contact me with all questions or concerns.        Total time of this visit, including time spent face-to-face with patient and or via video/audio, and also in preparing for today's visit for MDM and documentation. Medical decision-making included consideration and possible diagnoses, management options, complex record review, review of diagnostic tests and information, consideration and discussion of significant complications based on comorbidities, discussion with providers involved in the care of the patient.     80 Minutes spent.      Sincerely,      Ina Lam MD  Department of Radiation Oncology   [facility] Radiation Oncology  Tel: [phone]  Page: [phone]    [facility]  [address] [place] [address]     [address] [place] [address]    CC:  Patient Care Team:  Angel Sung MD as PCP - General (Internal Medicine - Pediatrics)  Sid Machado CNP as Assigned PCP  Ina Lam MD as MD (Radiation Oncology)

## 2024-01-25 NOTE — PROGRESS NOTES
"No pacemaker/ICD  No prior radiation therapy treatments    Oncology Rooming Note    January 25, 2024 3:51 PM   Cyril Galdamez is a 83 year old male who presents for:    Chief Complaint   Patient presents with    Oncology Clinic Visit     Consult with Dr. Lam     Initial Vitals: /60 (BP Location: Left arm, Patient Position: Sitting, Cuff Size: Adult Regular)   Pulse 76   Temp 97.7  F (36.5  C) (Oral)   Resp 16   SpO2 97%  Estimated body mass index is 25.38 kg/m  as calculated from the following:    Height as of 12/21/23: 1.803 m (5' 11\").    Weight as of 12/28/23: 82.6 kg (182 lb). There is no height or weight on file to calculate BSA.  No Pain (0) Comment: Data Unavailable   No LMP for male patient.  Allergies reviewed: Yes  Medications reviewed: Yes    Medications: Medication refills not needed today.  Pharmacy name entered into MYFL[facility]: [facility] [address] [place] [address]    Frailty Screening:   Is the patient here for a new oncology consult visit in cancer care? 1. Yes. Over the past month, have you experienced difficulty or required a caregiver to assist with:   1. Balance, walking or general mobility (including any falls)? YES  2. Completion of self-care tasks such as bathing, dressing, toileting, grooming/hygiene?  NO  3. Concentration or memory that affects your daily life?  NO       Clinical concerns: Patient here with wheeled walker accompanied by daughter for radiation consult for his prostate cancer.  Patient tripped on a rug and fell coming into clinic today.  Rapid response was called and rapid response team evaluated patient and felt he did not need to go to the ER.  Patient placed in a wheelchair for his visit in the clinic.  SANDY completed.  25 minutes spent in review of radiation process and potential side effects.  Written information given for review.  Seen by Dr. Lam.  Plan return to clinic for follow-up and or CT simulation as directed by provider.   Dr. Lam " was notified.    Radiation Therapy Patient Education    Person involved with teaching: Patient and Daughter    Patient educational needs for self management of treatment-related side effects assessment completed.  EPIC Patient Ed tab contains Patient Learning Assessment    Education Materials Given  Managing Side Effects of Radiation Therapy: Care Instructions, Learning About External Beam Radiation Treatment, Dealing With Being Tired From Cancer Treatment: Care Instructions, Radiation Treatment For Cancer, Full Bladder Instructions, Radiation Therapy to the Male Pelvis Guidelines, Oncology Supportive Care Services , Welcome Letter, Insurance [place] Information, and Map Difficult [facility]    Educational Topics Discussed  Side effects expected and When to call MD/RN    Response To Teaching  More review necessary    GYN Only  Vaginal Dilator-given and educated: N/A    Referrals sent: None    Chemotherapy?  No, Dr. Ocampo's urologist for hormonal therapy          Echo Ramirez RN

## 2024-01-25 NOTE — LETTER
1/25/2024         RE: Cyril Galdamez  [address] [facility] [address]        Dear Colleague,    Thank you for referring your patient, Cyril Galdamez, to the [facility] RADIATION ONCOLOGY [place]. Please see a copy of my visit note below.    [facility] Radiation Oncology Consult Note     Patient: Cyril Galdamez  MRN: [id number]  Date of Service: 01/25/2024          Self Referred  Dr. Ocampo       Thank you very much for referring this patient for consideration of radiotherapy. As you know Mr. Galdamez is a 83 year old male with a diagnosis of high risk prostate cancer. He was seen today for consideration of definitive radiation.   Prostate Adnocarcinoma  Stage:  T2 N0 M0  [place]: 4+4 = 8 (grade group 4)  Pre-treatment PSA: 12.10  TRUS/MRI volume: 40 cc    HISTORY OF PRESENT ILLNESS:   Mr. Galdamez is a 83 year old male who was noted to have elevated PSA    8/29/2022 PSA: 11.6  7/3/2023 PSA: 12.10    Per notes he had palpable prostate nodule.    10/31/2023 MR prostate: 40 cc prostate gland.    Transitional central zones: Well encapsulated BPH nodules.  Peripheral zones:   Lesion 1 left mid gland to apical at 1 to 7 o'clock position with secondary involvement of the transition zone and right apex.  T2 hypointensity with diffusion restriction measuring 3.1 x 1.1 x 1.7 cm.  Broad capsular contact, perineural invasion may be present.  PI-RADS 5.  Lesion to right mid gland at 8:00 posterior lateral T2 hypointensity with marked diffusion restriction measuring 1.4 x 0.7 x 1.0 cm.  PI-RADS 4.  No identified transscapular disease or involvement neurovascular bundles or seminal vesicles.  No bony involvement or pathologically enlarged lymph nodes.  Multiple small hyperemic nodes in the lower retroperitoneal and pelvic inlet.    11/13/2023:  Prostate biopsy, pathology: Prostate adenocarcinoma,   RMA: [place] 4+3=7, 40%  RLA: [place] 4+3=7, 25%  RMM: [place] 3+4=7, 50%  RLM: PIN  RMB:  Benign  RLB: [place] 4+3=7, 40%  LMA: [place] 4+3=7, 50%  L LA: [place] 4+3=7, 100%  LMM: [place] 4+3=7, 16%  LLM:  [place] 4+3=7, 60%  L MB: [place] 4+3=7, 40% 1%  LLB:   [place] 3+4=7, 83%  Lesion 1 left peripheral zone: 4+3=7, 80%  Lesion 2: Right peripheral zone: 4+4 = 8, 70%    12/20/2023 PSMA PET/CT: Intense uptake present within the left aspect of the prostate gland (max SUV 18.2), intense uptake in the right posterior prostate (max SUV 46).  No lymph node involvement or evidence of metastatic disease  Diffuse bilateral calcified pleural plaques within the chest recommend follow-up CT chest 4 to 6 months.    12/26/2023 Eligard 45 mg #1    1/18/2024 PSA: 8.39    AUA: 13/35  He reports some urinary frequency and weak stream but feels he is able to fully empty when he urinates.  Nocturia 2-3 times per night, can go 3 to 4 hours between.    Has history of hemorrhoids which have been symptomatic in the past.  No history of inflammatory bowel disease.  History of colon cancer status post right colectomy (2000).  Last colonoscopy 7/9/2019 normal.    Was in the [employer] but no known history of agent orange exposure.  No known asbestos exposure.    He is tolerating ADT well and without any notable side effects.  No hot flashes.  Discussed calcium and vitamin D given long-term ADT.    Family history positive for prostate cancer in his father.    CHEMOTHERAPY HISTORY: Concurrent Chemotherapy: No: Concurrent ADT    RADIATION THERAPY HISTORY: Prior Radiation: No    IMPLANTED CARDIAC DEVICE: none         Current Outpatient Medications   Medication Sig Dispense Refill     albuterol (PROAIR HFA/PROVENTIL HFA/VENTOLIN HFA) 108 (90 Base) MCG/ACT inhaler Inhale 2 puffs into the lungs every 4 hours as needed for shortness of breath, wheezing or cough 18 g 11     amitriptyline (ELAVIL) 10 MG tablet Take 1 tablet (10 mg) by mouth at bedtime 90 tablet 0     atorvastatin (LIPITOR) 10 MG tablet Take 1 tablet (10 mg) by mouth daily 90  tablet 2     furosemide (LASIX) 20 MG tablet TAKE 1 TABLET BY MOUTH ONCE DAILY 90 tablet 3     HEMP OIL OR EXTRACT OR OTHER CBD CANNABINOID, NOT MEDICAL CANNABIS, Topical       lisinopril (ZESTRIL) 40 MG tablet Take 1 tablet (40 mg) by mouth daily 90 tablet 2     No past medical history on file.  No past surgical history on file.  Allergies   Allergen Reactions     Shellfish Allergy Anaphylaxis     No family history on file.  Social History     Socioeconomic History     Marital status:      Spouse name: Not on file     Number of children: Not on file     Years of education: Not on file     Highest education level: Not on file   Occupational History     Not on file   Tobacco Use     Smoking status: Never     Smokeless tobacco: Never   Vaping Use     Vaping Use: Never used   Substance and Sexual Activity     Alcohol use: Not on file     Drug use: Not on file     Sexual activity: Not on file   Other Topics Concern     Not on file   Social History Narrative     Not on file     Social Determinants of Health     Financial Resource Strain: Low Risk  (12/21/2023)    Financial Resource Strain      Within the past 12 months, have you or your family members you live with been unable to get utilities (heat, electricity) when it was really needed?: No   Food Insecurity: Low Risk  (12/21/2023)    Food Insecurity      Within the past 12 months, did you worry that your food would run out before you got money to buy more?: No      Within the past 12 months, did the food you bought just not last and you didn t have money to get more?: No   Transportation Needs: Low Risk  (12/21/2023)    Transportation Needs      Within the past 12 months, has lack of transportation kept you from medical appointments, getting your medicines, non-medical meetings or appointments, work, or from getting things that you need?: No   Physical Activity: Not on file   Stress: Not on file   Social Connections: Not on file   Interpersonal Safety: Low  Risk  (12/21/2023)    Interpersonal Safety      Do you feel physically and emotionally safe where you currently live?: Yes      Within the past 12 months, have you been hit, slapped, kicked or otherwise physically hurt by someone?: No      Within the past 12 months, have you been humiliated or emotionally abused in other ways by your partner or ex-partner?: No   Housing Stability: Low Risk  (12/21/2023)    Housing Stability      Do you have housing? : Yes      Are you worried about losing your housing?: No        REVIEW OF SYMPTOMS:  A full 14-point review of systems was performed. Pertinent findings are noted in the HPI.    General  Constitutional  Constitutional (WDL): All constitutional elements are within defined limits  EENT  Eye Disorders  Eye Disorder (WDL): All eye disorder elements are within defined limits (wears reading glasses)  Ear Disorders  Ear Disorder (WDL): All ear disorder elements are within defined limits  Respiratory  Respiratory  Respiratory (WDL): All respiratory elements are within defined limits  Cardiovascular  Cardiovascular  Cardiovascular (WDL): Exceptions to WDL (no pacemaker)  Edema: Yes  Edema Limbs: 5 - 10% inter-limb discrepancy in volume or circumference at point of greatest visible difference OR swelling or obscuration of anatomic architecture on close inspection (bilateral LE edema)  Gastrointestinal  Gastrointestinal  Gastrointestinal (WDL): Exceptions to WDL  Constipation: Occasional or intermittent symptoms OR occasional use of stool softeners, laxatives, dietary modification, or enema (has hemorrhoid)  Musculoskeletal  Musculoskeletal and Connective Tissue Disorders  Musculoskeletal & Connective (WDL): Exceptions to WDL  Arthralgia: Mild pain (low back, degenerative disc)  Generalized Muscle Weakness: Symptomatic OR perceived by patient but not evident on physical exam (uses wheeled walker)  Integumentary  Integumentary  Integumentary (WDL): All integumentary elements are  within defined limits  Neurological  Neurosensory  Neurosensory (WDL): Exceptions to WDL  Peripheral Motor Neuropathy: Asymptomatic OR clinical or diagnostic observations only (bilateral feet with edema)  Ataxia: Asymptomatic OR clinical or diagnostic observations only OR intervention not indicated (uses walker)  Peripheral Sensory Neuropathy: Asymptomatic (bilateral feet with edema)  Genitourinary/Reproductive  Genitourinary  Genitourinary (WDL): Exceptions to WDL  Urinary Frequency: Present (nocturia x2, frequent during day with water pill)  Lymphatic  Lymph System Disorders  Lymph (WDL): All lymph elements are within defined limits  Pain  Pain Score: No Pain (0)  AUA Assessment                                                              Accompanied by  Accompanied By: family (daughter)    ECOG Status: 1 - Can't do physically strenuous work, but fully ambyulatory and can do light sedentary work    KPS Score: 80% Can perform normal activity with effort, some signs of disease    Pain Management Plan: N/A    Recent Labs: No results found for this or any previous visit (from the past 168 hour(s)).    8/29/2022 PSA: 11.6  7/3/2023 PSA: 12.10  ----------------------------- 12/26/2023  Eligard #1  1/18/2024 PSA: 8.39      Personally reviewed PSMA PET/CT images  Imaging: Imaging results 6 weeks:No results found.    12/20/2023 PSMA PET/CT: Intense uptake present within the left aspect of the prostate gland (max SUV 18.2), intense uptake in the right posterior prostate (max SUV 46).  No lymph node involvement or evidence of metastatic disease  Diffuse bilateral calcified pleural plaques within the chest recommend follow-up CT chest 4 to 6 months.    Pathology:   No results found for this or any previous visit (from the past 8760 hour(s)).    Prostate biopsy, pathology: Prostate adenocarcinoma,   RMA: [place] 4+3=7, 40%  RLA: [place] 4+3=7, 25%  RMM: [place] 3+4=7, 50%  RLM: PIN  RMB: Benign  RLB: [place] 4+3=7, 40%  LMA:  [place] 4+3=7, 50%  L LA: [place] 4+3=7, 100%  LMM: [place] 4+3=7, 16%  LLM:  Em[place] 4+3=7, 60%  L MB: [place] 4+3=7, 40% 1%  LLB:   [place] 3+4=7, 83%  Lesion 1 left peripheral zone: 4+3=7, 80%  Lesion 2: Right peripheral zone: 4+4 = 8, 70%          Objective:        PHYSICAL EXAMINATION:    /60 (BP Location: Left arm, Patient Position: Sitting, Cuff Size: Adult Regular)   Pulse 76   Temp 97.7  F (36.5  C) (Oral)   Resp 16   SpO2 97%     Gen: Alert, in NAD pleasant interactive, well nourished  Eyes: EOMI, sclera anicteric  HENT     Head: NC/AT  Pulm: No wheezing, stridor or respiratory distress  Musculoskeletal: Normal muscle bulk and tone  Skin: Normal tone and turgor, warm, dry, intact  Neurologic: A/Ox3, , face symmetric, speech fluent, no focal motor deficits.  Gait  Psychiatric: Appropriate mood and affect     Intent of Therapy: Curative  Side effects that may occur during or within weeks after Radiation Therapy    Fatigue and general weakness  Nausea, vomiting and decrease in appetite  Pain on urination, frequent urge to urinate, blood in the urine, difficulty starting and decrease in the urine stream, retention of urine, and leakage of urine  Diarrhea, frequent, urgent and painful bowel movements, and blood in the stool  Darkening, irritation, itchiness, redness, dryness,and peeling of the skin of the pelvis   Loss of hair on the pelvis and groin    Side effects that may occur months or years after Radiation Therapy    Development of another tumor or cancer  Thickening, telangiectasias (development of spider like blood vessels in the skin) and ulceration of the skin of the pelvis  Decrease in function of the kidneys and liver  Ulcer and bleeding from the intestine or rectum  Obstruction of the bowel  Fistula of the rectum  Swelling of the feet and legs  Poor healing after a trauma or surgery in the irradiated area  Nerve damage resulting in loss of strength and sensation  Infertiility  (sterility)  Painful ejaculation or impotence    The risks, benefits and alternatives to radiation therapy were outlined with the patient. All questions were answered and a consent was signed.     Impression    83 year old male with a diagnosis of high risk Prostate Adnocarcinoma  Stage:  T2 N0 M0  [place]: 4+4 = 8 (grade group 4)  Pre-treatment PSA: 12.10  TRUS/MRI volume: 40 ml    Began Eligard December 2023    Visit Dx:  (C61) Malignant neoplasm of prostate (H)  (primary encounter diagnosis)       Cancer Staging   Malignant neoplasm of prostate (H)  Staging form: Prostate, AJCC 8th Edition  - Clinical stage from 12/20/2023: Stage IIC (cT2c, cN0, cM0, PSA: 12.1, Grade Group: 4) - Signed by Ina Lam MD on 1/25/2024      Assessment & Plan:   Discussed high risk prostate cancer management and NCCN guidelines based on life expectancy.  He has high-volume disease.  Difficult to anticipate likelihood of developing symptoms and while androgen deprivation therapy is not curative, expect good response to androgen deprivation therapy for some time.   Discussed definitive radiation therapy with patient and his daughter.  The indications for, process of, alternatives, potential side effects and complications of RT to the prostate with or without pelvic lymph nodes were discussed.    Given age and side effect profile after discussion they are leaning toward proceeding with radiation therapy to prostate/seminal vesicles alone and foregoing pelvic lymph node fields.  Although there is a risk of microscopic lymph node involvement based on high risk prostate cancer there is no evidence of lymph node involvement on PSMA PET/CT and given age would not expect inclusion/exclusion of lymph nodes to affect his overall survival so this is reasonable decision.    Discussed prostate gold fiducial placement with ultrasound guidance by urology with or without placement prior to fiducials.  Will refer him back to MD Ocampo.   Patient and daughter request next available appointment for fiducials.      Discussed initiation of ADT 2 months prior to initiation of RT as scheduled in prior trials publications as not necessary per additional trials and his treatment has effectively begun with initiation of ADT.     They asked multiple questions which were answered to their satisfaction and they verbalized understanding.      They wish to proceed with radiation therapy and consent is signed today.  They will return to clinic 1 week following prostate fiducial placement for CT simulation for RT planning.     Discussed CT simulation as well as treatments with full bladder and empty rectum.    Anticipate 7020 cGy in 26 fractions hypofractionation.     Discussed baseline DEXA scan given long-term ADT planned as well as calcium vitamin D supplementation.    His pleural plaques have been previously followed.  He reports no known history of asbestos exposure, radiology recommendation for continued follow-up CT chest 4 to 6 months.    Thank you for allowing me to participate in the care of this patient. Feel free to contact me with all questions or concerns.        Total time of this visit, including time spent face-to-face with patient and or via video/audio, and also in preparing for today's visit for MDM and documentation. Medical decision-making included consideration and possible diagnoses, management options, complex record review, review of diagnostic tests and information, consideration and discussion of significant complications based on comorbidities, discussion with providers involved in the care of the patient.     80 Minutes spent.      Sincerely,      Ina Lam MD  Department of Radiation Oncology   [facility] Radiation Oncology  Tel: [phone]  Page: [phone]    [facility]  [address] [place] [address]     [facility]   [address] [facility] [place] [address]    CC:  Patient Care Team:  [place]  "Angel Pickering MD as PCP - General (Internal Medicine - Pediatrics)  Sid Machado, CNP as Assigned PCP  Ina Lam MD as MD (Radiation Oncology)          No pacemaker/ICD  No prior radiation therapy treatments    Oncology Rooming Note    January 25, 2024 3:51 PM   Cyril Galdamez is a 83 year old male who presents for:    Chief Complaint   Patient presents with     Oncology Clinic Visit     Consult with Dr. Lam     Initial Vitals: /60 (BP Location: Left arm, Patient Position: Sitting, Cuff Size: Adult Regular)   Pulse 76   Temp 97.7  F (36.5  C) (Oral)   Resp 16   SpO2 97%  Estimated body mass index is 25.38 kg/m  as calculated from the following:    Height as of 12/21/23: 1.803 m (5' 11\").    Weight as of 12/28/23: 82.6 kg (182 lb). There is no height or weight on file to calculate BSA.  No Pain (0) Comment: Data Unavailable   No LMP for male patient.  Allergies reviewed: Yes  Medications reviewed: Yes    Medications: Medication refills not needed today.  Pharmacy name entered into [employer]: [facility] [address] [place] [address]    Frailty Screening:   Is the patient here for a new oncology consult visit in cancer care? 1. Yes. Over the past month, have you experienced difficulty or required a caregiver to assist with:   1. Balance, walking or general mobility (including any falls)? YES  2. Completion of self-care tasks such as bathing, dressing, toileting, grooming/hygiene?  NO  3. Concentration or memory that affects your daily life?  NO       Clinical concerns: Patient here with wheeled walker accompanied by daughter for radiation consult for his prostate cancer.  Patient tripped on a rug and fell coming into clinic today.  Rapid response was called and rapid response team evaluated patient and felt he did not need to go to the ER.  Patient placed in a wheelchair for his visit in the clinic.  SANDY completed.  25 minutes spent in review of radiation process and " potential side effects.  Written information given for review.  Seen by Dr. Lam.  Plan return to clinic for follow-up and or CT simulation as directed by provider.   Dr. Lam was notified.    Radiation Therapy Patient Education    Person involved with teaching: Patient and Daughter    Patient educational needs for self management of treatment-related side effects assessment completed.  [facility] Patient Ed tab contains Patient Learning Assessment    Education Materials Given  Managing Side Effects of Radiation Therapy: Care Instructions, Learning About External Beam Radiation Treatment, Dealing With Being Tired From Cancer Treatment: Care Instructions, Radiation Treatment For Cancer, Full Bladder Instructions, Radiation Therapy to the Male Pelvis Guidelines, Oncology Supportive Care Services , Welcome Letter, Insurance PA Information, and Map [employer]    Educational Topics Discussed  Side effects expected and When to call MD/RN    Response To Teaching  More review necessary    GYN Only  Vaginal Dilator-given and educated: N/A    Referrals sent: None    Chemotherapy?  No, Dr. Ocampo's urologist for hormonal therapy          Echo Ramirez, NAMITA                Again, thank you for allowing me to participate in the care of your patient.        Sincerely,        Ina Lam MD

## 2024-01-29 ENCOUNTER — APPOINTMENT (OUTPATIENT)
Dept: RADIATION ONCOLOGY | Facility: CLINIC | Age: 84
End: 2024-01-29
Attending: STUDENT IN AN ORGANIZED HEALTH CARE EDUCATION/TRAINING PROGRAM
Payer: COMMERCIAL

## 2024-01-29 DIAGNOSIS — I10 PRIMARY HYPERTENSION: ICD-10-CM

## 2024-01-29 DIAGNOSIS — R73.03 PREDIABETES: ICD-10-CM

## 2024-01-29 DIAGNOSIS — E78.5 DYSLIPIDEMIA: ICD-10-CM

## 2024-01-29 PROCEDURE — 77470 SPECIAL RADIATION TREATMENT: CPT | Mod: 26 | Performed by: STUDENT IN AN ORGANIZED HEALTH CARE EDUCATION/TRAINING PROGRAM

## 2024-01-29 PROCEDURE — 77263 THER RADIOLOGY TX PLNG CPLX: CPT | Performed by: STUDENT IN AN ORGANIZED HEALTH CARE EDUCATION/TRAINING PROGRAM

## 2024-01-29 PROCEDURE — 77470 SPECIAL RADIATION TREATMENT: CPT | Performed by: STUDENT IN AN ORGANIZED HEALTH CARE EDUCATION/TRAINING PROGRAM

## 2024-01-29 RX ORDER — LISINOPRIL 40 MG/1
40 TABLET ORAL DAILY
Qty: 90 TABLET | Refills: 2 | Status: SHIPPED | OUTPATIENT
Start: 2024-01-29

## 2024-01-29 NOTE — TELEPHONE ENCOUNTER
Refill Request  Medication name: Pending Prescriptions:                       Disp   Refills    lisinopril (ZESTRIL) 40 MG tablet         90 tab*2            Sig: Take 1 tablet (40 mg) by mouth daily    Requested Pharmacy:  [facility] [address] [place] [address] - [address] [facility]

## 2024-01-29 NOTE — TELEPHONE ENCOUNTER
Refill Request  Medication name: Pending Prescriptions:                       Disp   Refills    atorvastatin (LIPITOR) 10 MG tablet       90 tab*2            Sig: Take 1 tablet (10 mg) by mouth daily    Requested Pharmacy:  [facility] [address] [place] [address] - [address] [facility]

## 2024-01-30 RX ORDER — ATORVASTATIN CALCIUM 10 MG/1
10 TABLET, FILM COATED ORAL DAILY
Qty: 90 TABLET | Refills: 0 | Status: SHIPPED | OUTPATIENT
Start: 2024-01-30 | End: 2024-04-26

## 2024-02-09 ENCOUNTER — TELEPHONE (OUTPATIENT)
Dept: RADIATION ONCOLOGY | Facility: HOSPITAL | Age: 84
End: 2024-02-09
Payer: COMMERCIAL

## 2024-02-09 NOTE — TELEPHONE ENCOUNTER
Pt had reached out thru intake to talk to Dr. Lam re: next steps. Called pt and LM that he is to get fiducials and then follow up SIM. Left dates of next appointments and gave call back number for any further questions.

## 2024-02-12 ENCOUNTER — TELEPHONE (OUTPATIENT)
Dept: FAMILY MEDICINE | Facility: CLINIC | Age: 84
End: 2024-02-12
Payer: COMMERCIAL

## 2024-02-12 NOTE — TELEPHONE ENCOUNTER
Forms/Letter Request    Type of form/letter: FMLA - Unknown       Have you been seen for this request: daughter is not sure    Do we have the form/letter: No    When is form/letter needed by: ASAP    How would you like the form/letter returned: Fax to HR will have fax # when she drops off forms    Patient Notified form requests are processed in 3-5 business days:Yes    Okay to leave a detailed message?: Yes at Other phone number:  daughter [phone]

## 2024-02-13 NOTE — TELEPHONE ENCOUNTER
Daughter dropped off form FMLA paperwork for father today in clinic. Please call daughter and fax when completed. Placed in folder and routed to provider.    Fax: [phone]    [facility] Flaa-ANNETTE, [facility], February 13, 2024, 2:06 PM

## 2024-02-20 ENCOUNTER — TELEPHONE (OUTPATIENT)
Dept: FAMILY MEDICINE | Facility: CLINIC | Age: 84
End: 2024-02-20
Payer: COMMERCIAL

## 2024-02-22 NOTE — TELEPHONE ENCOUNTER
Patient called with Questions:    Patient read an article that Vit D 3 is (bad for your heart). (Rasheed is taking Vit D 3 50mcg). Patient would like to know if he should continue this medication supplement.    2. Patient concerned with elevated Glucose/Cholesterol.   Patient reports that he was not fasting for these labs.    Patient would like to know if provider suggests these to be redrawn fasting?  3. Patient would like to know if he needs Potassium redrawn?    Patient will also be starting Radiation Therapy.  
2-22-24  [facility] team: please see other telephone encounter (someone created a new encounter versus adding to original msg)  Sounds like pt wants form faxed   
Finished form for daughter and huertas that it is at  to .  
I left a detailed message telling pts daughter that she will need to finish her part of the form.  She did not fill out her part of the form, so that is why it is at the  for her to .  Didn't think she would want me to fax it to the number provided when she did not do her part of the form.  
We can discuss at upcoming appointment  
Statement Selected

## 2024-02-23 ENCOUNTER — TRANSFERRED RECORDS (OUTPATIENT)
Dept: HEALTH INFORMATION MANAGEMENT | Facility: CLINIC | Age: 84
End: 2024-02-23
Payer: COMMERCIAL

## 2024-02-26 ENCOUNTER — OFFICE VISIT (OUTPATIENT)
Dept: FAMILY MEDICINE | Facility: CLINIC | Age: 84
End: 2024-02-26
Payer: COMMERCIAL

## 2024-02-26 VITALS
HEIGHT: 71 IN | SYSTOLIC BLOOD PRESSURE: 132 MMHG | BODY MASS INDEX: 25.2 KG/M2 | DIASTOLIC BLOOD PRESSURE: 64 MMHG | WEIGHT: 180 LBS | OXYGEN SATURATION: 98 % | HEART RATE: 56 BPM

## 2024-02-26 DIAGNOSIS — E55.9 VITAMIN D DEFICIENCY: ICD-10-CM

## 2024-02-26 DIAGNOSIS — E78.5 DYSLIPIDEMIA: ICD-10-CM

## 2024-02-26 DIAGNOSIS — C61 MALIGNANT NEOPLASM OF PROSTATE (H): ICD-10-CM

## 2024-02-26 DIAGNOSIS — R60.0 BILATERAL LOWER EXTREMITY EDEMA: ICD-10-CM

## 2024-02-26 DIAGNOSIS — C44.90 MALIGNANT NEOPLASM OF SKIN: ICD-10-CM

## 2024-02-26 DIAGNOSIS — I10 PRIMARY HYPERTENSION: ICD-10-CM

## 2024-02-26 DIAGNOSIS — J61 ASBESTOSIS (H): ICD-10-CM

## 2024-02-26 DIAGNOSIS — R60.0 BILATERAL LEG EDEMA: ICD-10-CM

## 2024-02-26 DIAGNOSIS — Z00.00 ENCOUNTER FOR MEDICARE ANNUAL WELLNESS EXAM: ICD-10-CM

## 2024-02-26 DIAGNOSIS — L30.9 DERMATITIS: Primary | ICD-10-CM

## 2024-02-26 DIAGNOSIS — R73.03 PREDIABETES: ICD-10-CM

## 2024-02-26 PROCEDURE — G0439 PPPS, SUBSEQ VISIT: HCPCS | Performed by: INTERNAL MEDICINE

## 2024-02-26 PROCEDURE — 99214 OFFICE O/P EST MOD 30 MIN: CPT | Mod: 25 | Performed by: INTERNAL MEDICINE

## 2024-02-26 RX ORDER — FUROSEMIDE 40 MG
40 TABLET ORAL DAILY
Qty: 90 TABLET | Refills: 3 | Status: SHIPPED | OUTPATIENT
Start: 2024-02-26

## 2024-02-26 RX ORDER — AMMONIUM LACTATE 12 G/100G
LOTION TOPICAL 2 TIMES DAILY
Qty: 396 G | Refills: 3 | Status: SHIPPED | OUTPATIENT
Start: 2024-02-26

## 2024-02-26 RX ORDER — RESPIRATORY SYNCYTIAL VIRUS VACCINE 120MCG/0.5
0.5 KIT INTRAMUSCULAR ONCE
Qty: 1 EACH | Refills: 0 | Status: CANCELLED | OUTPATIENT
Start: 2024-02-26 | End: 2024-02-26

## 2024-02-26 SDOH — HEALTH STABILITY: PHYSICAL HEALTH: ON AVERAGE, HOW MANY DAYS PER WEEK DO YOU ENGAGE IN MODERATE TO STRENUOUS EXERCISE (LIKE A BRISK WALK)?: 7 DAYS

## 2024-02-26 ASSESSMENT — SOCIAL DETERMINANTS OF HEALTH (SDOH): HOW OFTEN DO YOU GET TOGETHER WITH FRIENDS OR RELATIVES?: MORE THAN THREE TIMES A WEEK

## 2024-02-26 NOTE — PROGRESS NOTES
Preventive Care Visit  [facility]  Angel Sung MD, Internal Medicine - Pediatrics  Feb 26, 2024    Assessment & Plan     Asbestosis (H)  Patient continues to have a history of plaques on his lungs and ongoing symptoms.  Will recheck if having worsening respiratory symptoms.    Dermatitis  Has a area of dry patches on his bilateral feet will trial ammonium lactate  - ammonium lactate (LAC-HYDRIN) 12 % external lotion; Apply topically 2 times daily    Bilateral lower extremity edema  Will get echocardiogram for evaluation for possible heart failure.  Will change furosemide to from 20 to 40 mg/day.  Discussed use of compression stockings.  I will have patient come back for labs in approximately 1 week to recheck potassium after increasing his furosemide  - Echocardiogram Complete; Future    Bilateral leg edema  - furosemide (LASIX) 40 MG tablet; Take 1 tablet (40 mg) by mouth daily TAKE 1 TABLET BY MOUTH ONCE DAILY  - Basic metabolic panel  (Ca, Cl, CO2, Creat, Gluc, K, Na, BUN); Future    Prediabetes  Lab Results   Component Value Date    A1C 5.7 10/24/2023    A1C 6.0 08/29/2022    A1C 5.7 12/14/2020    A1C 6.1 03/10/2020    A1C 5.9 07/01/2019   This has been slightly improved we will continue to monitor periodically every 3 to 6 months no medications needed right now.  - Hemoglobin A1c; Future    Vitamin D deficiency  - Vitamin D deficiency screening; Future    Encounter for Medicare annual wellness exam  Discussed routine health guidance.    Dyslipidemia  Patient is on atorvastatin and he is tolerating this well.  LDL Cholesterol Calculated   Date Value Ref Range Status   01/18/2024 61 <=100 mg/dL Final   LDL at goal of under 70.    Primary hypertension  Hypertension has been well-controlled continue furosemide right now.  And lisinopril.    Malignant neoplasm of skin  Following with dermatology just had recent treatment of lesion on the top of the head.    Malignant neoplasm of  "prostate (H)  Is established with urology and oncology is going to be doing radiation treatment for this.          BMI  Estimated body mass index is 25.1 kg/m  as calculated from the following:    Height as of this encounter: 1.803 m (5' 11\").    Weight as of this encounter: 81.6 kg (180 lb).   Weight management plan: Discussed healthy diet and exercise guidelines    Counseling  Appropriate preventive services were discussed with this patient, including applicable screening as appropriate for fall prevention, nutrition, physical activity, Tobacco-use cessation, weight loss and cognition.  Checklist reviewing preventive services available has been given to the patient.  Reviewed patient's diet, addressing concerns and/or questions.   Updated plan of care.  Patient reported difficulty with activities of daily living were addressed today.        Aftab Nam is a 83 year old, presenting for the following:  Medicare Visit (AWV. Questions regarding upcoming testing. Taking Lasix starting 12/25, retaining fluid around feet and ankles, having to cut his socks and can't get shoes on, right foot seems to be worse. Vit D3, should he be taking this? Touch base on labs. )        2/26/2024     4:13 PM   Additional Questions   Roomed by Leisa OVALLES MA         Health Care Directive  Patient does not have a Health Care Directive or Living Will: Patient states has Advance Directive and will bring in a copy to clinic.    HPI    Edema- has been on furosemide for help with this- did help some in the beginning and seems to help less now.     Patient has newly diagnosed prostate cancer he is following with radiation oncology as well as urology.  He had radioactive implants put in and is going to have targeted radiation treatments.  Wt Readings from Last 4 Encounters:   02/26/24 81.6 kg (180 lb)   12/28/23 82.6 kg (182 lb)   12/21/23 84.4 kg (186 lb)   10/24/23 82.6 kg (182 lb)         Annual Wellness Visit     Patient has been " advised of split billing requirements and indicates understanding: Yes             2/26/2024   General Health   How would you rate your overall physical health? (!) FAIR   Feel stress (tense, anxious, or unable to sleep) To some extent          2/26/2024   Nutrition   Diet: Low salt    Low fat/cholesterol         2/26/2024   Exercise   Days per week of moderate/strenous exercise 7 days           2/26/2024   Social Factors   Frequency of gathering with friends or relatives More than three times a week   Worry food won't last until get money to buy more No   Food not last or not have enough money for food? No   Do you have housing?  Yes   Are you worried about losing your housing? No   Lack of transportation? No   Unable to get utilities (heat,electricity)? No           2/26/2024   Fall Risk   Fallen 2 or more times in the past year? Yes   Trouble with walking or balance? Yes   Reason Gait Speed Test Not Completed Patient does not tolerate an upright or standing position (e.g. wheelchair)          2/26/2024   Activities of Daily Living- Home Safety   Needs help with the following daily activites Transportation    Shopping    Preparing meals    Bathing    Laundry   Safety concerns in the home None of the above         2/26/2024   Dental   Dentist two times every year? Yes         2/26/2024   Hearing Screening   Hearing concerns? None of the above         2/26/2024   Driving Risk Screening   Patient/family members have concerns about driving No         2/26/2024   General Alertness/Fatigue Screening   Have you been more tired than usual lately? No         2/26/2024   Urinary Incontinence Screening   Bothered by leaking urine in past 6 months No          No data to display                Social History     Tobacco Use    Smoking status: Never     Passive exposure: Never    Smokeless tobacco: Never   Vaping Use    Vaping Use: Never used         Today's PHQ-2 Score:       2/26/2024     4:11 PM   PHQ-2 ( 1999 [employer])   Q1:  Little interest or pleasure in doing things 0   Q2: Feeling down, depressed or hopeless 0   PHQ-2 Score 0   Q1: Little interest or pleasure in doing things Not at all   Q2: Feeling down, depressed or hopeless Not at all   PHQ-2 Score 0                   Reviewed and updated as needed this visit by Provider   Tobacco  Allergies  Meds  Problems  Med Hx  Surg Hx  Fam Hx                Current providers sharing in care for this patient include:  Patient Care Team:  Angel Sung MD as PCP - General (Internal Medicine - Pediatrics)  Ina Lam MD as MD (Radiation Oncology)  Kavitha Jacinto CNP as Assigned Neuroscience Provider  Ina Lam MD as Assigned Cancer Care Provider  Angel Sung MD as Assigned PCP    The following health maintenance items are reviewed in Epic and correct as of today:  Health Maintenance   Topic Date Due    URINE DRUG SCREEN  Never done    ASTHMA ACTION PLAN  Never done    ADVANCE CARE PLANNING  Never done    ASTHMA CONTROL TEST  04/24/2024    ANNUAL REVIEW OF HM ORDERS  07/03/2024    LIPID  01/18/2025    MEDICARE ANNUAL WELLNESS VISIT  02/26/2025    FALL RISK ASSESSMENT  02/26/2025    DTAP/TDAP/TD IMMUNIZATION (4 - Td or Tdap) 03/11/2032    PHQ-2 (once per calendar year)  Completed    INFLUENZA VACCINE  Completed    Pneumococcal Vaccine: 65+ Years  Completed    ZOSTER IMMUNIZATION  Completed    RSV VACCINE (Pregnancy & 60+)  Completed    COVID-19 Vaccine  Completed    IPV IMMUNIZATION  Aged Out    HPV IMMUNIZATION  Aged Out    MENINGITIS IMMUNIZATION  Aged Out    RSV MONOCLONAL ANTIBODY  Aged Out       Appropriate preventive services were discussed with this patient, including applicable screening as appropriate for fall prevention, nutrition, physical activity, Tobacco-use cessation, weight loss and cognition.  Checklist reviewing preventive services available has been given to the patient.           2/26/2024   Mini Cog   Clock Draw Score 2 Normal   3  Item Recall 3 objects recalled   Mini Cog Total Score 5                  2/26/2024   General Health   How would you rate your overall physical health? (!) FAIR   Feel stress (tense, anxious, or unable to sleep) To some extent   (!) STRESS CONCERN      2/26/2024   Nutrition   Diet: Low salt    Low fat/cholesterol         2/26/2024   Exercise   Days per week of moderate/strenous exercise 7 days         2/26/2024   Social Factors   Frequency of gathering with friends or relatives More than three times a week   Worry food won't last until get money to buy more No   Food not last or not have enough money for food? No   Do you have housing?  Yes   Are you worried about losing your housing? No   Lack of transportation? No   Unable to get utilities (heat,electricity)? No         2/26/2024   Fall Risk   Fallen 2 or more times in the past year? Yes   Trouble with walking or balance? Yes   Reason Gait Speed Test Not Completed Patient does not tolerate an upright or standing position (e.g. wheelchair)          2/26/2024   Activities of Daily Living- Home Safety   Needs help with the following daily activites Transportation    Shopping    Preparing meals    Bathing    Laundry   Safety concerns in the home None of the above         2/26/2024   Dental   Dentist two times every year? Yes         2/26/2024   Hearing Screening   Hearing concerns? None of the above         2/26/2024   Driving Risk Screening   Patient/family members have concerns about driving No         2/26/2024   General Alertness/Fatigue Screening   Have you been more tired than usual lately? No         2/26/2024   Urinary Incontinence Screening   Bothered by leaking urine in past 6 months No            Today's PHQ-2 Score:       2/26/2024     4:11 PM   PHQ-2 ( 1999 [employer])   Q1: Little interest or pleasure in doing things 0   Q2: Feeling down, depressed or hopeless 0   PHQ-2 Score 0   Q1: Little interest or pleasure in doing things Not at all   Q2: Feeling down,  "depressed or hopeless Not at all   PHQ-2 Score 0           2/26/2024   Substance Use   Alcohol more than 3/day or more than 7/wk No   Do you have a current opioid prescription? No   How severe/bad is pain from 1 to 10? 5/10   Do you use any other substances recreationally? No     Social History     Tobacco Use    Smoking status: Never     Passive exposure: Never    Smokeless tobacco: Never   Vaping Use    Vaping Use: Never used                 Reviewed and updated as needed this visit by Provider   Tobacco  Allergies  Meds  Problems  Med Hx  Surg Hx  Fam Hx              Current providers sharing in care for this patient include:  Patient Care Team:  Angel Sung MD as PCP - General (Internal Medicine - Pediatrics)  Ina Lam MD as MD (Radiation Oncology)  Kavitha Jacinto CNP as Assigned Neuroscience Provider  Ina Lam MD as Assigned Cancer Care Provider  Angel Sung MD as Assigned PCP    The following health maintenance items are reviewed in Epic and correct as of today:  Health Maintenance   Topic Date Due    URINE DRUG SCREEN  Never done    ASTHMA ACTION PLAN  Never done    ADVANCE CARE PLANNING  Never done    ASTHMA CONTROL TEST  04/24/2024    ANNUAL REVIEW OF HM ORDERS  07/03/2024    LIPID  01/18/2025    MEDICARE ANNUAL WELLNESS VISIT  02/26/2025    FALL RISK ASSESSMENT  02/26/2025    DTAP/TDAP/TD IMMUNIZATION (4 - Td or Tdap) 03/11/2032    PHQ-2 (once per calendar year)  Completed    INFLUENZA VACCINE  Completed    Pneumococcal Vaccine: 65+ Years  Completed    ZOSTER IMMUNIZATION  Completed    RSV VACCINE (Pregnancy & 60+)  Completed    COVID-19 Vaccine  Completed    IPV IMMUNIZATION  Aged Out    HPV IMMUNIZATION  Aged Out    MENINGITIS IMMUNIZATION  Aged Out    RSV MONOCLONAL ANTIBODY  Aged Out            Objective    Exam  /64   Pulse 56   Ht 1.803 m (5' 11\")   Wt 81.6 kg (180 lb)   SpO2 98%   BMI 25.10 kg/m     Estimated body mass index is 25.1 kg/m  " "as calculated from the following:    Height as of this encounter: 1.803 m (5' 11\").    Weight as of this encounter: 81.6 kg (180 lb).    Physical Exam  General alert sitting in wheelchair no acute distress HEENT sclera clear head is notable for an area on his right occipital area that was treated recently by dermatology.  Mouth with moist pink oromucosa ears with normal tympanic membranes no significant cerumen  Neck supple no carotid bruits noted  Cardiovascular regular rate and rhythm no murmurs  Respiratory clear no wheezes or crackles  Abdomen hospital sounds nontender nondistended  Extremities with +1 pitting edema noted to the pretibial area.  No palpable cords.  There is lichenification of skin with some peeling noted over bilateral feet         2/26/2024   Mini Cog   Clock Draw Score 2 Normal   3 Item Recall 3 objects recalled   Mini Cog Total Score 5            Signed Electronically by: Angel Sung MD    "

## 2024-02-26 NOTE — PATIENT INSTRUCTIONS
They should call to set up heart ultrasound if not call the number below.   Increase the furosemide to 40 mg per day to help with swelling.     We will recheck labs with lab only in 1 week to recheck electrolytes.     We will check vitamin D with upcoming labs- take at least 1000 international units of vitamin D per day.     We will recheck your A1C (diabetes testing) with your next labs.                 Preventive Care Advice   This is general advice given by our system to help you stay healthy. However, your care team may have specific advice just for you. Please talk to your care team about your preventive care needs.  Nutrition  Eat 5 or more servings of fruits and vegetables each day.  Try wheat bread, brown rice and whole grain pasta (instead of white bread, rice, and pasta).  Get enough calcium and vitamin D. Check the label on foods and aim for 100% of the RDA (recommended daily allowance).  Lifestyle  Exercise at least 150 minutes each week   (30 minutes a day, 5 days a week).  Do muscle strengthening activities 2 days a week. These help control your weight and prevent disease.  No smoking.  Wear sunscreen to prevent skin cancer.  Have a dental exam and cleaning every 6 months.  Yearly exams  See your health care team every year to talk about:  Any changes in your health.  Any medicines your care team has prescribed.  Preventive care, family planning, and ways to prevent chronic diseases.  Shots (vaccines)   HPV shots (up to age 26), if you've never had them before.  Hepatitis B shots (up to age 59), if you've never had them before.  COVID-19 shot: Get this shot when it's due.  Flu shot: Get a flu shot every year.  Tetanus shot: Get a tetanus shot every 10 years.  Pneumococcal, hepatitis A, and RSV shots: Ask your care team if you need these based on your risk.  Shingles shot (for age 50 and up).  General health tests  Diabetes screening:  Starting at age 35, Get screened for diabetes at least every 3  years.  If you are younger than age 35, ask your care team if you should be screened for diabetes.  Cholesterol test: At age 39, start having a cholesterol test every 5 years, or more often if advised.  Bone density scan (DEXA): At age 50, ask your care team if you should have this scan for osteoporosis (brittle bones).  Hepatitis C: Get tested at least once in your life.  STIs (sexually transmitted infections)  Before age 24: Ask your care team if you should be screened for STIs.  After age 24: Get screened for STIs if you're at risk. You are at risk for STIs (including HIV) if:  You are sexually active with more than one person.  You don't use condoms every time.  You or a partner was diagnosed with a sexually transmitted infection.  If you are at risk for HIV, ask about PrEP medicine to prevent HIV.  Get tested for HIV at least once in your life, whether you are at risk for HIV or not.  Cancer screening tests  Cervical cancer screening: If you have a cervix, begin getting regular cervical cancer screening tests at age 21. Most people who have regular screenings with normal results can stop after age 65. Talk about this with your provider.  Breast cancer scan (mammogram): If you've ever had breasts, begin having regular mammograms starting at age 40. This is a scan to check for breast cancer.  Colon cancer screening: It is important to start screening for colon cancer at age 45.  Have a colonoscopy test every 10 years (or more often if you're at risk) Or, ask your provider about stool tests like a FIT test every year or Cologuard test every 3 years.  To learn more about your testing options, visit: https://www.Futurefleet/676792.pdf.  For help making a decision, visit: https://bit.ly/wo41466.  Prostate cancer screening test: If you have a prostate and are age 55 to 69, ask your provider if you would benefit from a yearly prostate cancer screening test.  Lung cancer screening: If you are a current or former smoker  age 50 to 80, ask your care team if ongoing lung cancer screenings are right for you.  For informational purposes only. Not to replace the advice of your health care provider. Copyright   2023 [facility]. All rights reserved. Clinically reviewed by the [facility] Transitions Program. [employer] 130800 - REV 01/24.    Learning About Activities of Daily Living  What are activities of daily living?     Activities of daily living (ADLs) are the basic self-care tasks you do every day. As you age, and if you have health problems, you may find that it's harder to do these things for yourself. That's when you may need some help.  Your doctor uses ADLs to measure how much help you need. Knowing what you can and can't do for yourself is an important first step to getting help. And when you have the help you need, you can stay as independent as possible.  Your doctor will want to know if you are able to do tasks such as:  Take a bath or shower without help.  Go to the bathroom by yourself.  Dress and undress without help.  Shave, comb your hair, and brush teeth on your own.  Get in and out of bed or a chair without help.  Feed yourself without help.  If you are having trouble doing basic self-care tasks, talk with your doctor. You may want to bring a caregiver or family member who can help the doctor understand your needs and abilities.  How will a doctor assess your ADLs?  Asking about ADLs is part of a routine health checkup your doctor will likely do as you age. Your health check might be done in a doctor's office, in your home, or at a hospital. The goal is to find out if you are having any problems that could make your health problems worse or that make it unsafe for you to be on your own.  To measure your ADLs, your doctor will ask how hard it is for you to do routine tasks. He or she may also want to know if you have changed the way you do a task because of a health problem. He or she may watch how  you:  Walk back and forth.  Keep your balance while you stand or walk.  Move from sitting to standing or from a bed to a chair.  Button or unbutton a shirt or sweater.  Remove and put on your shoes.  It's normal to feel a little worried or anxious if you find you can't do all the things you used to be able to do. Talking with your doctor about ADLs isn't a test that you either pass or fail. It's just a way to get more information about your health and safety.  Follow-up care is a key part of your treatment and safety. Be sure to make and go to all appointments, and call your doctor if you are having problems. It's also a good idea to know your test results and keep a list of the medicines you take.  Current as of: February 26, 2023               Content Version: 13.8    9936-1082 [employer].   Care instructions adapted under license by your healthcare professional. If you have questions about a medical condition or this instruction, always ask your healthcare professional. [employer] disclaims any warranty or liability for your use of this information.      Preventing Falls: Care Instructions  Injuries and health problems such as trouble walking or poor eyesight can increase your risk of falling. So can some medicines. But there are things you can do to help prevent falls. You can exercise to get stronger. You can also arrange your home to make it safer.    Talk to your doctor about the medicines you take. Ask if any of them increase the risk of falls and whether they can be changed or stopped.   Try to exercise regularly. It can help improve your strength and balance. This can help lower your risk of falling.     Practice fall safety and prevention.    Wear low-heeled shoes that fit well and give your feet good support. Talk to your doctor if you have foot problems that make this hard.  Carry a cellphone or wear a medical alert device that you can use to call for help.  Use stepladders  "instead of chairs to reach high objects. Don't climb if you're at risk for falls. Ask for help, if needed.  Wear the correct eyeglasses, if you need them.    Make your home safer.    Remove rugs, cords, clutter, and furniture from walkways.  Keep your house well lit. Use night-lights in hallways and bathrooms.  Install and use sturdy handrails on stairways.  Wear nonskid footwear, even inside. Don't walk barefoot or in socks without shoes.    Be safe outside.    Use handrails, curb cuts, and ramps whenever possible.  Keep your hands free by using a shoulder bag or backpack.  Try to walk in well-lit areas. Watch out for uneven ground, changes in pavement, and debris.  Be careful in the winter. Walk on the grass or gravel when sidewalks are slippery. Use de-icer on steps and walkways. Add non-slip devices to shoes.    Put grab bars and nonskid mats in your shower or tub and near the toilet. Try to use a shower chair or bath bench when bathing.   Get into a tub or shower by putting in your weaker leg first. Get out with your strong side first. Have a phone or medical alert device in the bathroom with you.   Where can you learn more?  Go to https://www.Soflow.net/patiented  Enter G117 in the search box to learn more about \"Preventing Falls: Care Instructions.\"  Current as of: July 18, 2023               Content Version: 13.8    7772-5797 [employer].   Care instructions adapted under license by your healthcare professional. If you have questions about a medical condition or this instruction, always ask your healthcare professional. [employer] disclaims any warranty or liability for your use of this information.      Learning About Stress  What is stress?     Stress is your body's response to a hard situation. Your body can have a physical, emotional, or mental response. Stress is a fact of life for most people, and it affects everyone differently. What causes stress for you may not be " stressful for someone else.  A lot of things can cause stress. You may feel stress when you go on a job interview, take a test, or run a race. This kind of short-term stress is normal and even useful. It can help you if you need to work hard or react quickly. For example, stress can help you finish an important job on time.  Long-term stress is caused by ongoing stressful situations or events. Examples of long-term stress include long-term health problems, ongoing problems at work, or conflicts in your family. Long-term stress can harm your health.  How does stress affect your health?  When you are stressed, your body responds as though you are in danger. It makes hormones that speed up your heart, make you breathe faster, and give you a burst of energy. This is called the fight-or-flight stress response. If the stress is over quickly, your body goes back to normal and no harm is done.  But if stress happens too often or lasts too long, it can have bad effects. Long-term stress can make you more likely to get sick, and it can make symptoms of some diseases worse. If you tense up when you are stressed, you may develop neck, shoulder, or low back pain. Stress is linked to high blood pressure and heart disease.  Stress also harms your emotional health. It can make you marshall, tense, or depressed. Your relationships may suffer, and you may not do well at work or school.  What can you do to manage stress?  You can try these things to help manage stress:   Do something active. Exercise or activity can help reduce stress. Walking is a great way to get started. Even everyday activities such as housecleaning or yard work can help.  Try yoga or nevin chi. These techniques combine exercise and meditation. You may need some training at first to learn them.  Do something you enjoy. For example, listen to music or go to a movie. Practice your hobby or do volunteer work.  Meditate. This can help you relax, because you are not  "worrying about what happened before or what may happen in the future.  Do guided imagery. Imagine yourself in any setting that helps you feel calm. You can use online videos, books, or a teacher to guide you.  Do breathing exercises. For example:  From a standing position, bend forward from the waist with your knees slightly bent. Let your arms dangle close to the floor.  Breathe in slowly and deeply as you return to a standing position. Roll up slowly and lift your head last.  Hold your breath for just a few seconds in the standing position.  Breathe out slowly and bend forward from the waist.  Let your feelings out. Talk, laugh, cry, and express anger when you need to. Talking with supportive friends or family, a counselor, or a kiran leader about your feelings is a healthy way to relieve stress. Avoid discussing your feelings with people who make you feel worse.  Write. It may help to write about things that are bothering you. This helps you find out how much stress you feel and what is causing it. When you know this, you can find better ways to cope.  What can you do to prevent stress?  You might try some of these things to help prevent stress:  Manage your time. This helps you find time to do the things you want and need to do.  Get enough sleep. Your body recovers from the stresses of the day while you are sleeping.  Get support. Your family, friends, and community can make a difference in how you experience stress.  Limit your news feed. Avoid or limit time on social media or news that may make you feel stressed.  Do something active. Exercise or activity can help reduce stress. Walking is a great way to get started.  Where can you learn more?  Go to https://www.[employer].net/patiented  Enter N032 in the search box to learn more about \"Learning About Stress.\"  Current as of: February 26, 2023               Content Version: 13.8    0884-8832 [employer].   Care instructions adapted under license " by your healthcare professional. If you have questions about a medical condition or this instruction, always ask your healthcare professional. [employer] disclaims any warranty or liability for your use of this information.

## 2024-02-27 DIAGNOSIS — M48.061 SPINAL STENOSIS OF LUMBAR REGION, UNSPECIFIED WHETHER NEUROGENIC CLAUDICATION PRESENT: ICD-10-CM

## 2024-02-27 RX ORDER — AMITRIPTYLINE HYDROCHLORIDE 10 MG/1
10 TABLET ORAL AT BEDTIME
Qty: 90 TABLET | Refills: 0 | Status: SHIPPED | OUTPATIENT
Start: 2024-02-27 | End: 2024-05-29

## 2024-02-28 ENCOUNTER — ALLIED HEALTH/NURSE VISIT (OUTPATIENT)
Dept: RADIATION ONCOLOGY | Facility: HOSPITAL | Age: 84
End: 2024-02-28
Attending: STUDENT IN AN ORGANIZED HEALTH CARE EDUCATION/TRAINING PROGRAM
Payer: COMMERCIAL

## 2024-03-08 ENCOUNTER — APPOINTMENT (OUTPATIENT)
Dept: RADIATION ONCOLOGY | Facility: CLINIC | Age: 84
End: 2024-03-08
Attending: STUDENT IN AN ORGANIZED HEALTH CARE EDUCATION/TRAINING PROGRAM
Payer: COMMERCIAL

## 2024-03-08 PROCEDURE — 77300 RADIATION THERAPY DOSE PLAN: CPT | Performed by: STUDENT IN AN ORGANIZED HEALTH CARE EDUCATION/TRAINING PROGRAM

## 2024-03-08 PROCEDURE — 77301 RADIOTHERAPY DOSE PLAN IMRT: CPT | Mod: 26 | Performed by: STUDENT IN AN ORGANIZED HEALTH CARE EDUCATION/TRAINING PROGRAM

## 2024-03-08 PROCEDURE — 77338 DESIGN MLC DEVICE FOR IMRT: CPT | Performed by: STUDENT IN AN ORGANIZED HEALTH CARE EDUCATION/TRAINING PROGRAM

## 2024-03-08 PROCEDURE — 77300 RADIATION THERAPY DOSE PLAN: CPT | Mod: 26 | Performed by: STUDENT IN AN ORGANIZED HEALTH CARE EDUCATION/TRAINING PROGRAM

## 2024-03-08 PROCEDURE — 77301 RADIOTHERAPY DOSE PLAN IMRT: CPT | Performed by: STUDENT IN AN ORGANIZED HEALTH CARE EDUCATION/TRAINING PROGRAM

## 2024-03-08 PROCEDURE — 77338 DESIGN MLC DEVICE FOR IMRT: CPT | Mod: 26 | Performed by: STUDENT IN AN ORGANIZED HEALTH CARE EDUCATION/TRAINING PROGRAM

## 2024-03-08 NOTE — PROGRESS NOTES
Area(s) simulated:  Pelvis  For planning:  CT Scan  Custom devices to be used:  Vaculock  Patient position: Supine  Field arrangement: IMRT  Signed Informed Consent obtained.  Planned dose:  7020 cGy in 26 fractions hypofractionation.      Comments:  DIAGNOSIS: Prostate Adnocarcinoma  Stage:  T2 N0 M0  Emiliano: 4+4 = 8 (grade group 4)  Pre-treatment PSA: 12.10  TRUS/MRI volume: 40 cc      PSMA PET/CT:  Intense uptake present within the left aspect of the prostate gland (max SUV 18.2), intense uptake in the right posterior prostate (max SUV 46).  No lymph node involvement or evidence of metastatic disease     12/26/2023 Eligard 45 mg #1     Simulation for a definitive course of radiotherapy was performed. The patient was placed in the supine position on the CT simulator couch and positioned using a Vaculock.  Planning CT of the pelvis was done with full bladder. The procedure was well tolerated.    CTV will include the prostate and proximal SV.    Complete documentation for simulation, devices, treatment planning, calculations,  in [facility]A

## 2024-03-12 ENCOUNTER — TELEPHONE (OUTPATIENT)
Dept: RADIATION ONCOLOGY | Facility: HOSPITAL | Age: 84
End: 2024-03-12
Payer: COMMERCIAL

## 2024-03-12 NOTE — TELEPHONE ENCOUNTER
Pt called BOB RIVERA RN line at this time, said he's happy to finally talk to a person as he'd been hung up on several times. No record of him calling here prior today.     He had questions about his upcoming treatment schedule, why he was seeing Dr. Lam Friday and not Thursday (OTV day is Friday so she sees all her pt's getting RT that day). He had questions about bowel/bladder prep which I answered. He kept referencing a list of foods he has, but I told him we do not provide a list of foods for him to eat or avoid so I told him to eat his normal diet, try to have a BM prior to radiation each day, and come with a full bladder. I told him the radiation therapists would give him feedback if his bowel/bladder prep wasn't adequate.     He was appreciative of all the info.

## 2024-03-14 ENCOUNTER — APPOINTMENT (OUTPATIENT)
Dept: RADIATION ONCOLOGY | Facility: CLINIC | Age: 84
End: 2024-03-14
Attending: STUDENT IN AN ORGANIZED HEALTH CARE EDUCATION/TRAINING PROGRAM
Payer: COMMERCIAL

## 2024-03-14 ENCOUNTER — TELEPHONE (OUTPATIENT)
Dept: FAMILY MEDICINE | Facility: CLINIC | Age: 84
End: 2024-03-14
Payer: COMMERCIAL

## 2024-03-14 PROCEDURE — 77385 HC IMRT TREATMENT DELIVERY, SIMPLE: CPT | Performed by: STUDENT IN AN ORGANIZED HEALTH CARE EDUCATION/TRAINING PROGRAM

## 2024-03-14 PROCEDURE — 77014 PR CT GUIDE FOR PLACEMENT RADIATION THERAPY FIELDS: CPT | Mod: 26 | Performed by: STUDENT IN AN ORGANIZED HEALTH CARE EDUCATION/TRAINING PROGRAM

## 2024-03-14 NOTE — TELEPHONE ENCOUNTER
General Call    Contacts         Type Contact Phone/Fax    03/14/2024 11:29 AM CDT Phone (Incoming) Cyril Galdamez (Self) [phone] (M)          Reason for Call:  Prostate Cancer    What are your questions or concerns:  Patient is being treated for Prostate Cancer and was wondering if he can discontinue his furosemide (LASIX) 40 MG tablet temporarily while in treatment for the cancer.    Please call patient either way.    Date of last appointment with provider:  2.26.24-AWVKolby Sung     Okay to leave a detailed message?:  Yes-Cell number on file:    Telephone Information:   Mobile [phone]

## 2024-03-14 NOTE — TELEPHONE ENCOUNTER
Outgoing call, relayed provider message.     Pt already have echo scheduled next week.     No further questions.     Pay P. RN

## 2024-03-14 NOTE — TELEPHONE ENCOUNTER
He had worsened edema last time I saw him so no he should continue.     He needs echo- should call to set up with cardiology [phone]

## 2024-03-15 ENCOUNTER — OFFICE VISIT (OUTPATIENT)
Dept: RADIATION ONCOLOGY | Facility: CLINIC | Age: 84
End: 2024-03-15
Attending: STUDENT IN AN ORGANIZED HEALTH CARE EDUCATION/TRAINING PROGRAM
Payer: COMMERCIAL

## 2024-03-15 VITALS
RESPIRATION RATE: 16 BRPM | SYSTOLIC BLOOD PRESSURE: 125 MMHG | TEMPERATURE: 97.7 F | WEIGHT: 183.8 LBS | OXYGEN SATURATION: 98 % | HEART RATE: 57 BPM | DIASTOLIC BLOOD PRESSURE: 67 MMHG | BODY MASS INDEX: 25.63 KG/M2

## 2024-03-15 DIAGNOSIS — C61 MALIGNANT NEOPLASM OF PROSTATE (H): Primary | ICD-10-CM

## 2024-03-15 PROCEDURE — 77014 PR CT GUIDE FOR PLACEMENT RADIATION THERAPY FIELDS: CPT | Mod: 26 | Performed by: STUDENT IN AN ORGANIZED HEALTH CARE EDUCATION/TRAINING PROGRAM

## 2024-03-15 PROCEDURE — 77385 HC IMRT TREATMENT DELIVERY, SIMPLE: CPT | Performed by: STUDENT IN AN ORGANIZED HEALTH CARE EDUCATION/TRAINING PROGRAM

## 2024-03-15 ASSESSMENT — PAIN SCALES - GENERAL: PAINLEVEL: NO PAIN (0)

## 2024-03-15 NOTE — LETTER
3/15/2024         RE: Cyril Galdamez  [address] [facility] [address]        Dear Colleague,    Thank you for referring your patient, Cyril Galdamez, to the [facility] [place]. Please see a copy of my visit note below.    RADIATION ONCOLOGY WEEKLY TREATMENT VISIT NOTE      Assessment / Impression       Visit Dx:  (C61) Malignant neoplasm of prostate (H)  (primary encounter diagnosis)       Cancer Staging   Malignant neoplasm of prostate (H)  Staging form: Prostate, AJCC 8th Edition  - Clinical stage from 12/20/2023: Stage IIC (cT2c, cN0, cM0, PSA: 12.1, Grade Group: 4) - Signed by Ina Lam MD on 1/25/2024       Tolerating radiation therapy well.  All questions and concerns addressed.  No acute side effects  Lots of questions answered today    Plan:     Continue radiation treatment as prescribed.  Radiation:   Site: Prostate  Stereotactic Radiosurgery: No  Concurrent Therapy: Yes (Eligard at [facility])  Today's Dose: 540  Total Dose for Pelvis: 7020  Today's Fraction/Total Fraction Pelvis: 2/26    Pain Management Plan: N/A    Subjective:      HPI: Cyril Galdamez is a 83 year old male with  Malignant neoplasm of prostate (H) [C61]    Tolerating radiation therapy well.  Doing all right with bladder filling struggling a little bit more with regular bowels.  Does not feel the need for stool softener at this time.  No significant fatigue.  Multiple questions answered today    The following portions of the patient's history were reviewed and updated as appropriate: allergies, current medications, past family history, past medical history, past social history, past surgical history and problem list.    Assessment                  Body Site:  Pelvis  Stereotactic Radiosurgery: No  Concurrent Therapy: Yes (Eligard at [facility])  Today's Dose: 540  Total Dose for Pelvis: 7020  Today's Fraction/Total Fraction Pelvis: 2/26  Drainage: 0: Absent  Diarrhea W/O Colostomy:  0: None  Constipation: 0: None  Proctitis: 0: None  Urinary frequency and/or urgency: 1: Increase in frequency or nocturia up to two times normal (goes frequently with furosemide)  Urinary Retention: 0 : Normal  Urinary Incontinence: 0: None  Dysuria: 0: None  Urine Color Change: 0: Normal                                   Sexuality Alteration                    Emotional Alteration    Copin: Effective  Comfort Alteration   KPS: 80% Can perform normal activity with effort, some signs of disease  Fatigue (ONS scale): 0: No Fatigue  Pain Location: denies  Pain Intensity. Rate degree of pain ranging from 0 (no pain) to 10 (severe pain): 0   Nutrition Alteration   Anorexia: 0: None  Nausea: 0: None  Vomitin: None  Weight: 83.4 kg (183 lb 12.8 oz)  Skin Alteration   Skin Sensation: 0: No problem  Skin Reaction: 0: None  AUA Assessment                                           Accompanied by       Objective:     Exam:     Vitals:    03/15/24 1026   BP: 125/67   Pulse: 57   Resp: 16   Temp: 97.7  F (36.5  C)   TempSrc: Oral   SpO2: 98%   Weight: 83.4 kg (183 lb 12.8 oz)   PainSc: No Pain (0)       Wt Readings from Last 8 Encounters:   03/15/24 83.4 kg (183 lb 12.8 oz)   24 81.6 kg (180 lb)   23 82.6 kg (182 lb)   23 84.4 kg (186 lb)   10/24/23 82.6 kg (182 lb)   23 82.6 kg (182 lb)   23 80.7 kg (178 lb)   23 81.2 kg (179 lb)       General: Alert and oriented, in no acute distress  Cyril has no Erythema.    Treatment Summary to Date    [facility]ria chart and setup information reviewed    Ina Lam MD      Again, thank you for allowing me to participate in the care of your patient.        Sincerely,        Ina Lam MD

## 2024-03-15 NOTE — PROGRESS NOTES
RADIATION ONCOLOGY WEEKLY TREATMENT VISIT NOTE      Assessment / Impression       Visit Dx:  (C61) Malignant neoplasm of prostate (H)  (primary encounter diagnosis)       Cancer Staging   Malignant neoplasm of prostate (H)  Staging form: Prostate, AJCC 8th Edition  - Clinical stage from 12/20/2023: Stage IIC (cT2c, cN0, cM0, PSA: 12.1, Grade Group: 4) - Signed by Ina Lam MD on 1/25/2024       Tolerating radiation therapy well.  All questions and concerns addressed.  No acute side effects  Lots of questions answered today    Plan:     Continue radiation treatment as prescribed.  Radiation:   Site: Prostate  Stereotactic Radiosurgery: No  Concurrent Therapy: Yes (Eligard at [facility])  Today's Dose: 540  Total Dose for Pelvis: 7020  Today's Fraction/Total Fraction Pelvis: 2/26    Pain Management Plan: N/A    Subjective:      HPI: Cyril Galdamez is a 83 year old male with  Malignant neoplasm of prostate (H) [C61]    Tolerating radiation therapy well.  Doing all right with bladder filling struggling a little bit more with regular bowels.  Does not feel the need for stool softener at this time.  No significant fatigue.  Multiple questions answered today    The following portions of the patient's history were reviewed and updated as appropriate: allergies, current medications, past family history, past medical history, past social history, past surgical history and problem list.    Assessment                  Body Site:  Pelvis  Stereotactic Radiosurgery: No  Concurrent Therapy: Yes (Eligard at [facility])  Today's Dose: 540  Total Dose for Pelvis: 7020  Today's Fraction/Total Fraction Pelvis: 2/26  Drainage: 0: Absent  Diarrhea W/O Colostomy: 0: None  Constipation: 0: None  Proctitis: 0: None  Urinary frequency and/or urgency: 1: Increase in frequency or nocturia up to two times normal (goes frequently with furosemide)  Urinary Retention: 0 : Normal  Urinary Incontinence: 0: None  Dysuria: 0: None  Urine  Color Change: 0: Normal                                   Sexuality Alteration                    Emotional Alteration    Copin: Effective  Comfort Alteration   KPS: 80% Can perform normal activity with effort, some signs of disease  Fatigue (ONS scale): 0: No Fatigue  Pain Location: denies  Pain Intensity. Rate degree of pain ranging from 0 (no pain) to 10 (severe pain): 0   Nutrition Alteration   Anorexia: 0: None  Nausea: 0: None  Vomitin: None  Weight: 83.4 kg (183 lb 12.8 oz)  Skin Alteration   Skin Sensation: 0: No problem  Skin Reaction: 0: None  AUA Assessment                                           Accompanied by       Objective:     Exam:     Vitals:    03/15/24 1026   BP: 125/67   Pulse: 57   Resp: 16   Temp: 97.7  F (36.5  C)   TempSrc: Oral   SpO2: 98%   Weight: 83.4 kg (183 lb 12.8 oz)   PainSc: No Pain (0)       Wt Readings from Last 8 Encounters:   03/15/24 83.4 kg (183 lb 12.8 oz)   24 81.6 kg (180 lb)   23 82.6 kg (182 lb)   23 84.4 kg (186 lb)   10/24/23 82.6 kg (182 lb)   23 82.6 kg (182 lb)   23 80.7 kg (178 lb)   23 81.2 kg (179 lb)       General: Alert and oriented, in no acute distress  Cyril has no Erythema.    Treatment Summary to Date    Aria chart and setup information reviewed    Ina Lam MD

## 2024-03-18 ENCOUNTER — APPOINTMENT (OUTPATIENT)
Dept: RADIATION ONCOLOGY | Facility: CLINIC | Age: 84
End: 2024-03-18
Attending: STUDENT IN AN ORGANIZED HEALTH CARE EDUCATION/TRAINING PROGRAM
Payer: COMMERCIAL

## 2024-03-18 PROCEDURE — 77014 PR CT GUIDE FOR PLACEMENT RADIATION THERAPY FIELDS: CPT | Mod: 26 | Performed by: STUDENT IN AN ORGANIZED HEALTH CARE EDUCATION/TRAINING PROGRAM

## 2024-03-18 PROCEDURE — 77385 HC IMRT TREATMENT DELIVERY, SIMPLE: CPT | Performed by: STUDENT IN AN ORGANIZED HEALTH CARE EDUCATION/TRAINING PROGRAM

## 2024-03-19 ENCOUNTER — HOSPITAL ENCOUNTER (OUTPATIENT)
Dept: CARDIOLOGY | Facility: CLINIC | Age: 84
Discharge: HOME | End: 2024-03-19
Attending: INTERNAL MEDICINE | Admitting: INTERNAL MEDICINE
Payer: COMMERCIAL

## 2024-03-19 ENCOUNTER — APPOINTMENT (OUTPATIENT)
Dept: RADIATION ONCOLOGY | Facility: CLINIC | Age: 84
End: 2024-03-19
Attending: STUDENT IN AN ORGANIZED HEALTH CARE EDUCATION/TRAINING PROGRAM
Payer: COMMERCIAL

## 2024-03-19 DIAGNOSIS — R60.0 BILATERAL LOWER EXTREMITY EDEMA: ICD-10-CM

## 2024-03-19 LAB — LVEF ECHO: NORMAL

## 2024-03-19 PROCEDURE — 93306 TTE W/DOPPLER COMPLETE: CPT

## 2024-03-19 PROCEDURE — 93306 TTE W/DOPPLER COMPLETE: CPT | Mod: 26 | Performed by: INTERNAL MEDICINE

## 2024-03-19 PROCEDURE — 77385 HC IMRT TREATMENT DELIVERY, SIMPLE: CPT | Performed by: STUDENT IN AN ORGANIZED HEALTH CARE EDUCATION/TRAINING PROGRAM

## 2024-03-19 PROCEDURE — 77014 PR CT GUIDE FOR PLACEMENT RADIATION THERAPY FIELDS: CPT | Mod: 26 | Performed by: STUDENT IN AN ORGANIZED HEALTH CARE EDUCATION/TRAINING PROGRAM

## 2024-03-20 ENCOUNTER — APPOINTMENT (OUTPATIENT)
Dept: RADIATION ONCOLOGY | Facility: CLINIC | Age: 84
End: 2024-03-20
Attending: STUDENT IN AN ORGANIZED HEALTH CARE EDUCATION/TRAINING PROGRAM
Payer: COMMERCIAL

## 2024-03-20 PROCEDURE — 77014 PR CT GUIDE FOR PLACEMENT RADIATION THERAPY FIELDS: CPT | Mod: 26 | Performed by: RADIOLOGY

## 2024-03-20 PROCEDURE — 77385 HC IMRT TREATMENT DELIVERY, SIMPLE: CPT | Performed by: RADIOLOGY

## 2024-03-20 PROCEDURE — 77336 RADIATION PHYSICS CONSULT: CPT | Performed by: STUDENT IN AN ORGANIZED HEALTH CARE EDUCATION/TRAINING PROGRAM

## 2024-03-20 PROCEDURE — 77427 RADIATION TX MANAGEMENT X5: CPT | Performed by: STUDENT IN AN ORGANIZED HEALTH CARE EDUCATION/TRAINING PROGRAM

## 2024-03-21 ENCOUNTER — APPOINTMENT (OUTPATIENT)
Dept: RADIATION ONCOLOGY | Facility: CLINIC | Age: 84
End: 2024-03-21
Attending: STUDENT IN AN ORGANIZED HEALTH CARE EDUCATION/TRAINING PROGRAM
Payer: COMMERCIAL

## 2024-03-21 PROCEDURE — 77014 PR CT GUIDE FOR PLACEMENT RADIATION THERAPY FIELDS: CPT | Mod: 26 | Performed by: STUDENT IN AN ORGANIZED HEALTH CARE EDUCATION/TRAINING PROGRAM

## 2024-03-21 PROCEDURE — 77385 HC IMRT TREATMENT DELIVERY, SIMPLE: CPT | Performed by: STUDENT IN AN ORGANIZED HEALTH CARE EDUCATION/TRAINING PROGRAM

## 2024-03-22 ENCOUNTER — APPOINTMENT (OUTPATIENT)
Dept: RADIATION ONCOLOGY | Facility: CLINIC | Age: 84
End: 2024-03-22
Attending: STUDENT IN AN ORGANIZED HEALTH CARE EDUCATION/TRAINING PROGRAM
Payer: COMMERCIAL

## 2024-03-22 VITALS
HEART RATE: 55 BPM | OXYGEN SATURATION: 99 % | RESPIRATION RATE: 16 BRPM | SYSTOLIC BLOOD PRESSURE: 132 MMHG | BODY MASS INDEX: 25.29 KG/M2 | TEMPERATURE: 97.6 F | DIASTOLIC BLOOD PRESSURE: 64 MMHG | WEIGHT: 181.3 LBS

## 2024-03-22 DIAGNOSIS — C61 MALIGNANT NEOPLASM OF PROSTATE (H): Primary | ICD-10-CM

## 2024-03-22 PROCEDURE — 77385 HC IMRT TREATMENT DELIVERY, SIMPLE: CPT | Performed by: STUDENT IN AN ORGANIZED HEALTH CARE EDUCATION/TRAINING PROGRAM

## 2024-03-22 PROCEDURE — 77014 PR CT GUIDE FOR PLACEMENT RADIATION THERAPY FIELDS: CPT | Mod: 26 | Performed by: STUDENT IN AN ORGANIZED HEALTH CARE EDUCATION/TRAINING PROGRAM

## 2024-03-22 ASSESSMENT — PAIN SCALES - GENERAL: PAINLEVEL: NO PAIN (0)

## 2024-03-22 NOTE — LETTER
3/22/2024         RE: Cyril Galdamez  [address] [facility] [address]        Dear Colleague,    Thank you for referring your patient, Cyril Galdamez, to the [facility] RADIATION ONCOLOGY [place]. Please see a copy of my visit note below.    RADIATION ONCOLOGY WEEKLY TREATMENT VISIT NOTE      Assessment / Impression       Visit Dx:  (C61) Malignant neoplasm of prostate (H)  (primary encounter diagnosis)       Cancer Staging   Malignant neoplasm of prostate (H)  Staging form: Prostate, AJCC 8th Edition  - Clinical stage from 12/20/2023: Stage IIC (cT2c, cN0, cM0, PSA: 12.1, Grade Group: 4) - Signed by Ina Lam MD on 1/25/2024       Tolerating radiation therapy well.  All questions and concerns addressed.  No significant side effect    Plan:     Continue radiation treatment as prescribed.  Radiation:   Site: Prostate  Stereotactic Radiosurgery: No  Concurrent Therapy: Yes (Eligard at [facility])  Today's Dose: 1890  Total Dose for Pelvis: 7020  Today's Fraction/Total Fraction Pelvis: 7/26    Pain Management Plan: N/A    Subjective:      HPI: Cyril Galdamez is a 83 year old male with  Malignant neoplasm of prostate (H) [C61]    Tolerating radiation therapy well.  No significant change in bladder or bowel symptoms.  Questions answered.    The following portions of the patient's history were reviewed and updated as appropriate: allergies, current medications, past family history, past medical history, past social history, past surgical history and problem list.    Assessment                  Body Site:  Pelvis  Stereotactic Radiosurgery: No  Concurrent Therapy: Yes (Eligard at [facility])  Today's Dose: 1890  Total Dose for Pelvis: 7020  Today's Fraction/Total Fraction Pelvis: 7/26  Drainage: 0: Absent  Diarrhea W/O Colostomy: 0: None  Constipation: 0: None  Proctitis: 0: None  Urinary frequency and/or urgency: 1: Increase in frequency or nocturia up to two times normal (goes  frequently with furosemide)  Urinary Retention: 0 : Normal  Urinary Incontinence: 0: None  Dysuria: 0: None  Nocturia: 3-4  Urine Color Change: 0: Normal                                   Sexuality Alteration                    Emotional Alteration    Copin: Effective  Comfort Alteration   KPS: 80% Can perform normal activity with effort, some signs of disease  Fatigue (ONS scale): 0: No Fatigue  Pain Location: denies  Pain Intensity. Rate degree of pain ranging from 0 (no pain) to 10 (severe pain): 0   Nutrition Alteration   Anorexia: 0: None  Nausea: 0: None  Vomitin: None  Weight: 82.2 kg (181 lb 4.8 oz)  Skin Alteration   Skin Sensation: 0: No problem  Skin Reaction: 0: None  AUA Assessment                                           Accompanied by       Objective:     Exam:     Vitals:    24 1323   BP: 132/64   Pulse: 55   Resp: 16   Temp: 97.6  F (36.4  C)   TempSrc: Oral   SpO2: 99%   Weight: 82.2 kg (181 lb 4.8 oz)   PainSc: No Pain (0)       Wt Readings from Last 8 Encounters:   24 82.2 kg (181 lb 4.8 oz)   03/15/24 83.4 kg (183 lb 12.8 oz)   24 81.6 kg (180 lb)   23 82.6 kg (182 lb)   23 84.4 kg (186 lb)   10/24/23 82.6 kg (182 lb)   23 82.6 kg (182 lb)   23 80.7 kg (178 lb)       General: Alert and oriented, in no acute distress  Cyril has no Erythema.    Treatment Summary to Date    Aria chart and setup information reviewed    Ina Lam MD      Again, thank you for allowing me to participate in the care of your patient.        Sincerely,        Ina Lam MD

## 2024-03-22 NOTE — PROGRESS NOTES
RADIATION ONCOLOGY WEEKLY TREATMENT VISIT NOTE      Assessment / Impression       Visit Dx:  (C61) Malignant neoplasm of prostate (H)  (primary encounter diagnosis)       Cancer Staging   Malignant neoplasm of prostate (H)  Staging form: Prostate, AJCC 8th Edition  - Clinical stage from 2023: Stage IIC (cT2c, cN0, cM0, PSA: 12.1, Grade Group: 4) - Signed by Ina Lam MD on 2024       Tolerating radiation therapy well.  All questions and concerns addressed.  No significant side effect    Plan:     Continue radiation treatment as prescribed.  Radiation:   Site: Prostate  Stereotactic Radiosurgery: No  Concurrent Therapy: Yes (Eligard at [facility])  Today's Dose: 1890  Total Dose for Pelvis: 7020  Today's Fraction/Total Fraction Pelvis:     Pain Management Plan: N/A    Subjective:      HPI: Cyril Galdamez is a 83 year old male with  Malignant neoplasm of prostate (H) [C61]    Tolerating radiation therapy well.  No significant change in bladder or bowel symptoms.  Questions answered.    The following portions of the patient's history were reviewed and updated as appropriate: allergies, current medications, past family history, past medical history, past social history, past surgical history and problem list.    Assessment                  Body Site:  Pelvis  Stereotactic Radiosurgery: No  Concurrent Therapy: Yes (Eligard at [facility])  Today's Dose: 1890  Total Dose for Pelvis: 7020  Today's Fraction/Total Fraction Pelvis:   Drainage: 0: Absent  Diarrhea W/O Colostomy: 0: None  Constipation: 0: None  Proctitis: 0: None  Urinary frequency and/or urgency: 1: Increase in frequency or nocturia up to two times normal (goes frequently with furosemide)  Urinary Retention: 0 : Normal  Urinary Incontinence: 0: None  Dysuria: 0: None  Nocturia: 3-4  Urine Color Change: 0: Normal                                   Sexuality Alteration                    Emotional Alteration    Copin:  Effective  Comfort Alteration   KPS: 80% Can perform normal activity with effort, some signs of disease  Fatigue (ONS scale): 0: No Fatigue  Pain Location: denies  Pain Intensity. Rate degree of pain ranging from 0 (no pain) to 10 (severe pain): 0   Nutrition Alteration   Anorexia: 0: None  Nausea: 0: None  Vomitin: None  Weight: 82.2 kg (181 lb 4.8 oz)  Skin Alteration   Skin Sensation: 0: No problem  Skin Reaction: 0: None  AUA Assessment                                           Accompanied by       Objective:     Exam:     Vitals:    24 1323   BP: 132/64   Pulse: 55   Resp: 16   Temp: 97.6  F (36.4  C)   TempSrc: Oral   SpO2: 99%   Weight: 82.2 kg (181 lb 4.8 oz)   PainSc: No Pain (0)       Wt Readings from Last 8 Encounters:   24 82.2 kg (181 lb 4.8 oz)   03/15/24 83.4 kg (183 lb 12.8 oz)   24 81.6 kg (180 lb)   23 82.6 kg (182 lb)   23 84.4 kg (186 lb)   10/24/23 82.6 kg (182 lb)   23 82.6 kg (182 lb)   23 80.7 kg (178 lb)       General: Alert and oriented, in no acute distress  Cyril has no Erythema.    Treatment Summary to Date    Aria chart and setup information reviewed    Ina Lam MD

## 2024-03-26 ENCOUNTER — APPOINTMENT (OUTPATIENT)
Dept: RADIATION ONCOLOGY | Facility: CLINIC | Age: 84
End: 2024-03-26
Attending: STUDENT IN AN ORGANIZED HEALTH CARE EDUCATION/TRAINING PROGRAM
Payer: COMMERCIAL

## 2024-03-26 PROCEDURE — 77385 HC IMRT TREATMENT DELIVERY, SIMPLE: CPT | Performed by: RADIOLOGY

## 2024-03-26 PROCEDURE — 77014 PR CT GUIDE FOR PLACEMENT RADIATION THERAPY FIELDS: CPT | Mod: 26 | Performed by: RADIOLOGY

## 2024-03-27 ENCOUNTER — APPOINTMENT (OUTPATIENT)
Dept: RADIATION ONCOLOGY | Facility: CLINIC | Age: 84
End: 2024-03-27
Attending: STUDENT IN AN ORGANIZED HEALTH CARE EDUCATION/TRAINING PROGRAM
Payer: COMMERCIAL

## 2024-03-27 PROCEDURE — 77014 PR CT GUIDE FOR PLACEMENT RADIATION THERAPY FIELDS: CPT | Mod: 26 | Performed by: RADIOLOGY

## 2024-03-27 PROCEDURE — 77385 HC IMRT TREATMENT DELIVERY, SIMPLE: CPT | Performed by: RADIOLOGY

## 2024-03-28 ENCOUNTER — APPOINTMENT (OUTPATIENT)
Dept: RADIATION ONCOLOGY | Facility: CLINIC | Age: 84
End: 2024-03-28
Attending: STUDENT IN AN ORGANIZED HEALTH CARE EDUCATION/TRAINING PROGRAM
Payer: COMMERCIAL

## 2024-03-28 PROCEDURE — 77427 RADIATION TX MANAGEMENT X5: CPT | Performed by: STUDENT IN AN ORGANIZED HEALTH CARE EDUCATION/TRAINING PROGRAM

## 2024-03-28 PROCEDURE — 77385 HC IMRT TREATMENT DELIVERY, SIMPLE: CPT

## 2024-03-28 PROCEDURE — 77336 RADIATION PHYSICS CONSULT: CPT | Performed by: STUDENT IN AN ORGANIZED HEALTH CARE EDUCATION/TRAINING PROGRAM

## 2024-03-28 PROCEDURE — 77014 PR CT GUIDE FOR PLACEMENT RADIATION THERAPY FIELDS: CPT | Mod: 26 | Performed by: RADIOLOGY

## 2024-03-29 ENCOUNTER — OFFICE VISIT (OUTPATIENT)
Dept: RADIATION ONCOLOGY | Facility: CLINIC | Age: 84
End: 2024-03-29
Attending: STUDENT IN AN ORGANIZED HEALTH CARE EDUCATION/TRAINING PROGRAM
Payer: COMMERCIAL

## 2024-03-29 VITALS
TEMPERATURE: 98.7 F | DIASTOLIC BLOOD PRESSURE: 64 MMHG | OXYGEN SATURATION: 99 % | BODY MASS INDEX: 25.54 KG/M2 | WEIGHT: 183.1 LBS | HEART RATE: 64 BPM | SYSTOLIC BLOOD PRESSURE: 133 MMHG | RESPIRATION RATE: 18 BRPM

## 2024-03-29 DIAGNOSIS — C61 MALIGNANT NEOPLASM OF PROSTATE (H): Primary | ICD-10-CM

## 2024-03-29 PROCEDURE — 77385 HC IMRT TREATMENT DELIVERY, SIMPLE: CPT

## 2024-03-29 PROCEDURE — 77014 PR CT GUIDE FOR PLACEMENT RADIATION THERAPY FIELDS: CPT | Mod: 26 | Performed by: RADIOLOGY

## 2024-03-29 ASSESSMENT — PAIN SCALES - GENERAL: PAINLEVEL: NO PAIN (0)

## 2024-03-29 NOTE — PROGRESS NOTES
RADIATION ONCOLOGY WEEKLY TREATMENT VISIT NOTE      Assessment / Impression       Visit Dx:  (C61) Malignant neoplasm of prostate (H)  (primary encounter diagnosis)       Cancer Staging   Malignant neoplasm of prostate (H)  Staging form: Prostate, AJCC 8th Edition  - Clinical stage from 2023: Stage IIC (cT2c, cN0, cM0, PSA: 12.1, Grade Group: 4) - Signed by Ina Lam MD on 2024       Tolerating radiation therapy well.  All questions and concerns addressed.  takes lasix so does wake up at night to urinate.   No diarrhea      Plan:     Continue radiation treatment as prescribed.  Radiation:   Site: Prostate  Stereotactic Radiosurgery: No  Concurrent Therapy: Yes (Eligard at [facility])  Today's Dose: 2970  Total Dose for Pelvis: 7020  Today's Fraction/Total Fraction Pelvis:     Pain Management Plan: na    Subjective:      HPI: Cyril Galdamez is a 83 year old male with  Malignant neoplasm of prostate (H) [C61]    The following portions of the patient's history were reviewed and updated as appropriate: allergies, current medications, past family history, past medical history, past social history, past surgical history and problem list.    Assessment                  Body Site:  Abdomen                              Concurrent Therapy: Yes (Eligard at [facility])  Diarrhea W/O Colostomy: 1: Increase of less than 4 stools/day over pretreatment  Dysuria: 0: None  Nocturia: 3-4  Proctitis: 0: None  Stereotactic Radiosurgery: No  Urinary frequency and/or urgency: 1: Increase in frequency or nocturia up to two times normal (goes frequently with furosemide)  Urinary Incontinence: 0: None  Urinary Retention: 0 : Normal  Urine Color Change: 0: Normal                                   Sexuality Alteration                    Emotional Alteration    Copin: Effective  Comfort Alteration   KPS: 80% Can perform normal activity with effort, some signs of disease  Fatigue (ONS scale): 3: Mild  Fatigue  Pain Location: denies   Nutrition Alteration   Anorexia: 0: None  Nausea: 0: None  Vomitin: None  Weight: 83.1 kg (183 lb 1.6 oz)  Skin Alteration   Skin Sensation: 0: No problem  Skin Reaction: 0: None  AUA Assessment                                           Accompanied by       Objective:     Exam:     Vitals:    24 1308   BP: 133/64   Pulse: 64   Resp: 18   Temp: 98.7  F (37.1  C)   TempSrc: Oral   SpO2: 99%   Weight: 83.1 kg (183 lb 1.6 oz)   PainSc: No Pain (0)       Wt Readings from Last 8 Encounters:   24 83.1 kg (183 lb 1.6 oz)   24 82.2 kg (181 lb 4.8 oz)   03/15/24 83.4 kg (183 lb 12.8 oz)   24 81.6 kg (180 lb)   23 82.6 kg (182 lb)   23 84.4 kg (186 lb)   10/24/23 82.6 kg (182 lb)   23 82.6 kg (182 lb)       General: Alert and oriented, in no acute distress  in wheel chair   accompanied by his wife  Cyril has no Erythema.    Treatment Summary to Date    Aria chart and setup information reviewed    Michelle Almaraz MD

## 2024-03-29 NOTE — LETTER
3/29/2024         RE: Cyril Galdamez  [address] [facility] [address]        Dear Colleague,    Thank you for referring your patient, Cyril Galdamez, to the [facility] RADIATION ONCOLOGY [place]. Please see a copy of my visit note below.    RADIATION ONCOLOGY WEEKLY TREATMENT VISIT NOTE      Assessment / Impression       Visit Dx:  (C61) Malignant neoplasm of prostate (H)  (primary encounter diagnosis)       Cancer Staging   Malignant neoplasm of prostate (H)  Staging form: Prostate, AJCC 8th Edition  - Clinical stage from 12/20/2023: Stage IIC (cT2c, cN0, cM0, PSA: 12.1, Grade Group: 4) - Signed by Ina Lam MD on 1/25/2024       Tolerating radiation therapy well.  All questions and concerns addressed.  takes lasix so does wake up at night to urinate.   No diarrhea      Plan:     Continue radiation treatment as prescribed.  Radiation:   Site: Prostate  Stereotactic Radiosurgery: No  Concurrent Therapy: Yes (Eligard at [facility])  Today's Dose: 2970  Total Dose for Pelvis: 7020  Today's Fraction/Total Fraction Pelvis: 11/26    Pain Management Plan: na    Subjective:      HPI: Cyril Galdamez is a 83 year old male with  Malignant neoplasm of prostate (H) [C61]    The following portions of the patient's history were reviewed and updated as appropriate: allergies, current medications, past family history, past medical history, past social history, past surgical history and problem list.    Assessment                  Body Site:  Abdomen                              Concurrent Therapy: Yes (Eligard at [facility])  Diarrhea W/O Colostomy: 1: Increase of less than 4 stools/day over pretreatment  Dysuria: 0: None  Nocturia: 3-4  Proctitis: 0: None  Stereotactic Radiosurgery: No  Urinary frequency and/or urgency: 1: Increase in frequency or nocturia up to two times normal (goes frequently with furosemide)  Urinary Incontinence: 0: None  Urinary Retention: 0 : Normal  Urine Color  Change: 0: Normal                                   Sexuality Alteration                    Emotional Alteration    Copin: Effective  Comfort Alteration   KPS: 80% Can perform normal activity with effort, some signs of disease  Fatigue (ONS scale): 3: Mild Fatigue  Pain Location: denies   Nutrition Alteration   Anorexia: 0: None  Nausea: 0: None  Vomitin: None  Weight: 83.1 kg (183 lb 1.6 oz)  Skin Alteration   Skin Sensation: 0: No problem  Skin Reaction: 0: None  AUA Assessment                                           Accompanied by       Objective:     Exam:     Vitals:    24 1308   BP: 133/64   Pulse: 64   Resp: 18   Temp: 98.7  F (37.1  C)   TempSrc: Oral   SpO2: 99%   Weight: 83.1 kg (183 lb 1.6 oz)   PainSc: No Pain (0)       Wt Readings from Last 8 Encounters:   24 83.1 kg (183 lb 1.6 oz)   24 82.2 kg (181 lb 4.8 oz)   03/15/24 83.4 kg (183 lb 12.8 oz)   24 81.6 kg (180 lb)   23 82.6 kg (182 lb)   23 84.4 kg (186 lb)   10/24/23 82.6 kg (182 lb)   23 82.6 kg (182 lb)       General: Alert and oriented, in no acute distress  in wheel chair   accompanied by his wife  Cyril has no Erythema.    Treatment Summary to Date    Aria chart and setup information reviewed    Michelle Almaraz MD      Again, thank you for allowing me to participate in the care of your patient.        Sincerely,        Michelle Almaraz MD

## 2024-04-01 ENCOUNTER — APPOINTMENT (OUTPATIENT)
Dept: RADIATION ONCOLOGY | Facility: CLINIC | Age: 84
End: 2024-04-01
Attending: STUDENT IN AN ORGANIZED HEALTH CARE EDUCATION/TRAINING PROGRAM
Payer: COMMERCIAL

## 2024-04-01 PROCEDURE — 77385 HC IMRT TREATMENT DELIVERY, SIMPLE: CPT

## 2024-04-01 PROCEDURE — 77014 PR CT GUIDE FOR PLACEMENT RADIATION THERAPY FIELDS: CPT | Mod: 26 | Performed by: RADIOLOGY

## 2024-04-02 ENCOUNTER — APPOINTMENT (OUTPATIENT)
Dept: RADIATION ONCOLOGY | Facility: CLINIC | Age: 84
End: 2024-04-02
Attending: STUDENT IN AN ORGANIZED HEALTH CARE EDUCATION/TRAINING PROGRAM
Payer: COMMERCIAL

## 2024-04-02 PROCEDURE — 77385 HC IMRT TREATMENT DELIVERY, SIMPLE: CPT

## 2024-04-02 PROCEDURE — 77014 PR CT GUIDE FOR PLACEMENT RADIATION THERAPY FIELDS: CPT | Mod: 26 | Performed by: RADIOLOGY

## 2024-04-03 ENCOUNTER — APPOINTMENT (OUTPATIENT)
Dept: RADIATION ONCOLOGY | Facility: CLINIC | Age: 84
End: 2024-04-03
Attending: STUDENT IN AN ORGANIZED HEALTH CARE EDUCATION/TRAINING PROGRAM
Payer: COMMERCIAL

## 2024-04-03 PROCEDURE — 77014 PR CT GUIDE FOR PLACEMENT RADIATION THERAPY FIELDS: CPT | Mod: 26 | Performed by: RADIOLOGY

## 2024-04-03 PROCEDURE — 77385 HC IMRT TREATMENT DELIVERY, SIMPLE: CPT | Performed by: RADIOLOGY

## 2024-04-04 ENCOUNTER — APPOINTMENT (OUTPATIENT)
Dept: RADIATION ONCOLOGY | Facility: CLINIC | Age: 84
End: 2024-04-04
Attending: STUDENT IN AN ORGANIZED HEALTH CARE EDUCATION/TRAINING PROGRAM
Payer: COMMERCIAL

## 2024-04-04 PROCEDURE — 77336 RADIATION PHYSICS CONSULT: CPT | Performed by: STUDENT IN AN ORGANIZED HEALTH CARE EDUCATION/TRAINING PROGRAM

## 2024-04-04 PROCEDURE — 77427 RADIATION TX MANAGEMENT X5: CPT | Performed by: RADIOLOGY

## 2024-04-04 PROCEDURE — 77014 PR CT GUIDE FOR PLACEMENT RADIATION THERAPY FIELDS: CPT | Mod: 26 | Performed by: STUDENT IN AN ORGANIZED HEALTH CARE EDUCATION/TRAINING PROGRAM

## 2024-04-04 PROCEDURE — 77385 HC IMRT TREATMENT DELIVERY, SIMPLE: CPT | Performed by: STUDENT IN AN ORGANIZED HEALTH CARE EDUCATION/TRAINING PROGRAM

## 2024-04-05 ENCOUNTER — OFFICE VISIT (OUTPATIENT)
Dept: RADIATION ONCOLOGY | Facility: CLINIC | Age: 84
End: 2024-04-05
Attending: STUDENT IN AN ORGANIZED HEALTH CARE EDUCATION/TRAINING PROGRAM
Payer: COMMERCIAL

## 2024-04-05 VITALS
SYSTOLIC BLOOD PRESSURE: 157 MMHG | DIASTOLIC BLOOD PRESSURE: 65 MMHG | OXYGEN SATURATION: 99 % | WEIGHT: 184.4 LBS | RESPIRATION RATE: 16 BRPM | HEART RATE: 54 BPM | BODY MASS INDEX: 25.72 KG/M2 | TEMPERATURE: 97.6 F

## 2024-04-05 DIAGNOSIS — C61 MALIGNANT NEOPLASM OF PROSTATE (H): Primary | ICD-10-CM

## 2024-04-05 PROCEDURE — 77014 PR CT GUIDE FOR PLACEMENT RADIATION THERAPY FIELDS: CPT | Mod: 26 | Performed by: STUDENT IN AN ORGANIZED HEALTH CARE EDUCATION/TRAINING PROGRAM

## 2024-04-05 PROCEDURE — 77385 HC IMRT TREATMENT DELIVERY, SIMPLE: CPT | Performed by: STUDENT IN AN ORGANIZED HEALTH CARE EDUCATION/TRAINING PROGRAM

## 2024-04-05 ASSESSMENT — PAIN SCALES - GENERAL: PAINLEVEL: NO PAIN (0)

## 2024-04-05 NOTE — PROGRESS NOTES
RADIATION ONCOLOGY WEEKLY TREATMENT VISIT NOTE      Assessment / Impression       Visit Dx:  (C61) Malignant neoplasm of prostate (H)  (primary encounter diagnosis)       Cancer Staging   Malignant neoplasm of prostate (H)  Staging form: Prostate, AJCC 8th Edition  - Clinical stage from 12/20/2023: Stage IIC (cT2c, cN0, cM0, PSA: 12.1, Grade Group: 4) - Signed by Ina Lam MD on 1/25/2024       Tolerating radiation therapy well.  All questions and concerns addressed.  Grade 1 radiation cystitis  Grade 1 radiation proctitis    Plan:     Continue radiation treatment as prescribed.  Radiation:   Site: Prostate  Stereotactic Radiosurgery: No  Concurrent Therapy: Yes (Eligard at [facility])  Today's Dose: 4320  Total Dose for Pelvis: 7020  Today's Fraction/Total Fraction Pelvis: 16/26    Will monitor LUTS and bowel symptoms.    Pain Management Plan: N/A    Subjective:      HPI: Cyril Galdamez is a 83 year old male with  Malignant neoplasm of prostate (H) [C61]    Fairly stable urinary and bowel pattern, slight increase.    The following portions of the patient's history were reviewed and updated as appropriate: allergies, current medications, past family history, past medical history, past social history, past surgical history and problem list.    Assessment                  Body Site:  Pelvis  Stereotactic Radiosurgery: No  Concurrent Therapy: Yes (Eligard at [facility])  Today's Dose: 4320  Total Dose for Pelvis: 7020  Today's Fraction/Total Fraction Pelvis: 16/26  Drainage: 0: Absent  Diarrhea W/O Colostomy: 1: Increase of less than 4 stools/day over pretreatment  Constipation: 0: None  Proctitis: 0: None  Urinary frequency and/or urgency: 1: Increase in frequency or nocturia up to two times normal (goes frequently with furosemide)  Urinary Retention: 0 : Normal  Urinary Incontinence: 0: None  Dysuria: 0: None  Nocturia: 3-4  Urine Color Change: 0: Normal                                    Sexuality Alteration                    Emotional Alteration    Copin: Effective  Comfort Alteration   KPS: 80% Can perform normal activity with effort, some signs of disease  Fatigue (ONS scale): 3: Mild Fatigue  Pain Location: denies  Pain Intensity. Rate degree of pain ranging from 0 (no pain) to 10 (severe pain): 0   Nutrition Alteration   Anorexia: 0: None  Nausea: 0: None  Vomitin: None  Weight: 83.6 kg (184 lb 6.4 oz)  Skin Alteration   Skin Sensation: 0: No problem  Skin Reaction: 0: None  AUA Assessment                                           Accompanied by       Objective:     Exam:     Vitals:    24 1251   BP: (!) 157/65   Pulse: 54   Resp: 16   Temp: 97.6  F (36.4  C)   TempSrc: Oral   SpO2: 99%   Weight: 83.6 kg (184 lb 6.4 oz)   PainSc: No Pain (0)       Wt Readings from Last 8 Encounters:   24 83.6 kg (184 lb 6.4 oz)   24 83.1 kg (183 lb 1.6 oz)   24 82.2 kg (181 lb 4.8 oz)   03/15/24 83.4 kg (183 lb 12.8 oz)   24 81.6 kg (180 lb)   23 82.6 kg (182 lb)   23 84.4 kg (186 lb)   10/24/23 82.6 kg (182 lb)       General: Alert and oriented, in no acute distress  Cyril has no Erythema.    Treatment Summary to Date    Aria chart and setup information reviewed    Ina Lam MD

## 2024-04-08 ENCOUNTER — APPOINTMENT (OUTPATIENT)
Dept: RADIATION ONCOLOGY | Facility: CLINIC | Age: 84
End: 2024-04-08
Attending: STUDENT IN AN ORGANIZED HEALTH CARE EDUCATION/TRAINING PROGRAM
Payer: COMMERCIAL

## 2024-04-08 PROCEDURE — 77385 HC IMRT TREATMENT DELIVERY, SIMPLE: CPT | Performed by: RADIOLOGY

## 2024-04-08 PROCEDURE — 77014 PR CT GUIDE FOR PLACEMENT RADIATION THERAPY FIELDS: CPT | Mod: 26 | Performed by: RADIOLOGY

## 2024-04-09 ENCOUNTER — APPOINTMENT (OUTPATIENT)
Dept: RADIATION ONCOLOGY | Facility: CLINIC | Age: 84
End: 2024-04-09
Attending: STUDENT IN AN ORGANIZED HEALTH CARE EDUCATION/TRAINING PROGRAM
Payer: COMMERCIAL

## 2024-04-09 PROCEDURE — 77014 PR CT GUIDE FOR PLACEMENT RADIATION THERAPY FIELDS: CPT | Mod: 26 | Performed by: STUDENT IN AN ORGANIZED HEALTH CARE EDUCATION/TRAINING PROGRAM

## 2024-04-09 PROCEDURE — 77385 HC IMRT TREATMENT DELIVERY, SIMPLE: CPT | Performed by: STUDENT IN AN ORGANIZED HEALTH CARE EDUCATION/TRAINING PROGRAM

## 2024-04-10 ENCOUNTER — APPOINTMENT (OUTPATIENT)
Dept: RADIATION ONCOLOGY | Facility: CLINIC | Age: 84
End: 2024-04-10
Attending: STUDENT IN AN ORGANIZED HEALTH CARE EDUCATION/TRAINING PROGRAM
Payer: COMMERCIAL

## 2024-04-10 PROCEDURE — 77014 PR CT GUIDE FOR PLACEMENT RADIATION THERAPY FIELDS: CPT | Mod: 26 | Performed by: RADIOLOGY

## 2024-04-10 PROCEDURE — 77385 HC IMRT TREATMENT DELIVERY, SIMPLE: CPT | Performed by: RADIOLOGY

## 2024-04-11 ENCOUNTER — APPOINTMENT (OUTPATIENT)
Dept: RADIATION ONCOLOGY | Facility: CLINIC | Age: 84
End: 2024-04-11
Attending: STUDENT IN AN ORGANIZED HEALTH CARE EDUCATION/TRAINING PROGRAM
Payer: COMMERCIAL

## 2024-04-11 VITALS
HEART RATE: 48 BPM | RESPIRATION RATE: 16 BRPM | DIASTOLIC BLOOD PRESSURE: 65 MMHG | BODY MASS INDEX: 25.9 KG/M2 | OXYGEN SATURATION: 99 % | TEMPERATURE: 97.6 F | SYSTOLIC BLOOD PRESSURE: 141 MMHG | WEIGHT: 185.7 LBS

## 2024-04-11 DIAGNOSIS — C61 MALIGNANT NEOPLASM OF PROSTATE (H): Primary | ICD-10-CM

## 2024-04-11 PROCEDURE — 77014 PR CT GUIDE FOR PLACEMENT RADIATION THERAPY FIELDS: CPT | Mod: 26 | Performed by: STUDENT IN AN ORGANIZED HEALTH CARE EDUCATION/TRAINING PROGRAM

## 2024-04-11 PROCEDURE — 77385 HC IMRT TREATMENT DELIVERY, SIMPLE: CPT | Performed by: STUDENT IN AN ORGANIZED HEALTH CARE EDUCATION/TRAINING PROGRAM

## 2024-04-11 PROCEDURE — 77336 RADIATION PHYSICS CONSULT: CPT | Performed by: STUDENT IN AN ORGANIZED HEALTH CARE EDUCATION/TRAINING PROGRAM

## 2024-04-11 PROCEDURE — 77427 RADIATION TX MANAGEMENT X5: CPT | Performed by: STUDENT IN AN ORGANIZED HEALTH CARE EDUCATION/TRAINING PROGRAM

## 2024-04-11 RX ORDER — TAMSULOSIN HYDROCHLORIDE 0.4 MG/1
0.4 CAPSULE ORAL DAILY
Qty: 30 CAPSULE | Refills: 5 | Status: SHIPPED | OUTPATIENT
Start: 2024-04-11

## 2024-04-11 ASSESSMENT — PAIN SCALES - GENERAL: PAINLEVEL: NO PAIN (0)

## 2024-04-11 NOTE — LETTER
4/11/2024         RE: Cyril Galdamez  [address] [facility] [address]        Dear Colleague,    Thank you for referring your patient, Cyril Galdamez, to the [facility] RADIATION ONCOLOGY [place]. Please see a copy of my visit note below.    RADIATION ONCOLOGY WEEKLY TREATMENT VISIT NOTE      Assessment / Impression       Visit Dx:  (C61) Malignant neoplasm of prostate (H)  (primary encounter diagnosis)  Plan: tamsulosin (FLOMAX) 0.4 MG capsule       Cancer Staging   Malignant neoplasm of prostate (H)  Staging form: Prostate, AJCC 8th Edition  - Clinical stage from 12/20/2023: Stage IIC (cT2c, cN0, cM0, PSA: 12.1, Grade Group: 4) - Signed by Ina Lam MD on 1/25/2024       Tolerating radiation therapy well.  All questions and concerns addressed.  Grade 2 radiation cystitis    Plan:     Continue radiation treatment as prescribed.  Radiation:   Site: Prostate  Stereotactic Radiosurgery: No  Concurrent Therapy: Yes (Eligard at [facility])  Today's Dose: 5400  Total Dose for Pelvis: 7020  Today's Fraction/Total Fraction Pelvis: 20/26    Flomax prescribed    Pain Management Plan: N/A    Subjective:      HPI: Cyril Galdamez is a 83 year old male with  Malignant neoplasm of prostate (H) [C61]    Is tolerating radiation therapy well.  Notices increase in urinary frequency and weaker stream.  Some difficulty with emptying but is able to fully empty with time.  No significant change to baseline bowel habits.  Questions answered.    The following portions of the patient's history were reviewed and updated as appropriate: allergies, current medications, past family history, past medical history, past social history, past surgical history and problem list.    Assessment                  Body Site:  Pelvis  Stereotactic Radiosurgery: No  Concurrent Therapy: Yes (Eligard at [facility])  Today's Dose: 5400  Total Dose for Pelvis: 7020  Today's Fraction/Total Fraction Pelvis: 20/26  Drainage:  0: Absent  Diarrhea W/O Colostomy: 1: Increase of less than 4 stools/day over pretreatment  Constipation: 0: None  Proctitis: 0: None  Urinary frequency and/or urgency: 1: Increase in frequency or nocturia up to two times normal (goes frequently with furosemide)  Urinary Retention: 1: Hesitancy or dribbling. No sig residual, retention during immediate post op  Urinary Incontinence: 0: None  Dysuria: 0: None  Nocturia: 3-4  Urine Color Change: 0: Normal                                   Sexuality Alteration                    Emotional Alteration    Copin: Effective  Comfort Alteration   KPS: 80% Can perform normal activity with effort, some signs of disease  Fatigue (ONS scale): 3: Mild Fatigue  Pain Location: denies  Pain Intensity. Rate degree of pain ranging from 0 (no pain) to 10 (severe pain): 0   Nutrition Alteration   Anorexia: 0: None  Nausea: 0: None  Vomitin: None  Weight: 84.2 kg (185 lb 11.2 oz)  Skin Alteration   Skin Sensation: 0: No problem  Skin Reaction: 0: None  AUA Assessment                                           Accompanied by       Objective:     Exam:     Vitals:    24 1244   BP: (!) 141/65   Pulse: (!) 48   Resp: 16   Temp: 97.6  F (36.4  C)   TempSrc: Oral   SpO2: 99%   Weight: 84.2 kg (185 lb 11.2 oz)   PainSc: No Pain (0)       Wt Readings from Last 8 Encounters:   24 84.2 kg (185 lb 11.2 oz)   24 83.6 kg (184 lb 6.4 oz)   24 83.1 kg (183 lb 1.6 oz)   24 82.2 kg (181 lb 4.8 oz)   03/15/24 83.4 kg (183 lb 12.8 oz)   24 81.6 kg (180 lb)   23 82.6 kg (182 lb)   23 84.4 kg (186 lb)       General: Alert and oriented, in no acute distress  Cyril has no Erythema.    Treatment Summary to Date    Aria chart and setup information reviewed    Ina Lam MD      Again, thank you for allowing me to participate in the care of your patient.        Sincerely,        Ina Lam MD

## 2024-04-11 NOTE — PROGRESS NOTES
RADIATION ONCOLOGY WEEKLY TREATMENT VISIT NOTE      Assessment / Impression       Visit Dx:  (C61) Malignant neoplasm of prostate (H)  (primary encounter diagnosis)  Plan: tamsulosin (FLOMAX) 0.4 MG capsule       Cancer Staging   Malignant neoplasm of prostate (H)  Staging form: Prostate, AJCC 8th Edition  - Clinical stage from 12/20/2023: Stage IIC (cT2c, cN0, cM0, PSA: 12.1, Grade Group: 4) - Signed by Ina Lam MD on 1/25/2024       Tolerating radiation therapy well.  All questions and concerns addressed.  Grade 2 radiation cystitis    Plan:     Continue radiation treatment as prescribed.  Radiation:   Site: Prostate  Stereotactic Radiosurgery: No  Concurrent Therapy: Yes (Eligard at [facility])  Today's Dose: 5400  Total Dose for Pelvis: 7020  Today's Fraction/Total Fraction Pelvis: 20/26    Flomax prescribed    Pain Management Plan: N/A    Subjective:      HPI: Cyril Galdamez is a 83 year old male with  Malignant neoplasm of prostate (H) [C61]    Is tolerating radiation therapy well.  Notices increase in urinary frequency and weaker stream.  Some difficulty with emptying but is able to fully empty with time.  No significant change to baseline bowel habits.  Questions answered.    The following portions of the patient's history were reviewed and updated as appropriate: allergies, current medications, past family history, past medical history, past social history, past surgical history and problem list.    Assessment                  Body Site:  Pelvis  Stereotactic Radiosurgery: No  Concurrent Therapy: Yes (Eligard at [facility])  Today's Dose: 5400  Total Dose for Pelvis: 7020  Today's Fraction/Total Fraction Pelvis: 20/26  Drainage: 0: Absent  Diarrhea W/O Colostomy: 1: Increase of less than 4 stools/day over pretreatment  Constipation: 0: None  Proctitis: 0: None  Urinary frequency and/or urgency: 1: Increase in frequency or nocturia up to two times normal (goes frequently with  furosemide)  Urinary Retention: 1: Hesitancy or dribbling. No sig residual, retention during immediate post op  Urinary Incontinence: 0: None  Dysuria: 0: None  Nocturia: 3-4  Urine Color Change: 0: Normal                                   Sexuality Alteration                    Emotional Alteration    Copin: Effective  Comfort Alteration   KPS: 80% Can perform normal activity with effort, some signs of disease  Fatigue (ONS scale): 3: Mild Fatigue  Pain Location: denies  Pain Intensity. Rate degree of pain ranging from 0 (no pain) to 10 (severe pain): 0   Nutrition Alteration   Anorexia: 0: None  Nausea: 0: None  Vomitin: None  Weight: 84.2 kg (185 lb 11.2 oz)  Skin Alteration   Skin Sensation: 0: No problem  Skin Reaction: 0: None  AUA Assessment                                           Accompanied by       Objective:     Exam:     Vitals:    24 1244   BP: (!) 141/65   Pulse: (!) 48   Resp: 16   Temp: 97.6  F (36.4  C)   TempSrc: Oral   SpO2: 99%   Weight: 84.2 kg (185 lb 11.2 oz)   PainSc: No Pain (0)       Wt Readings from Last 8 Encounters:   24 84.2 kg (185 lb 11.2 oz)   24 83.6 kg (184 lb 6.4 oz)   24 83.1 kg (183 lb 1.6 oz)   24 82.2 kg (181 lb 4.8 oz)   03/15/24 83.4 kg (183 lb 12.8 oz)   24 81.6 kg (180 lb)   23 82.6 kg (182 lb)   23 84.4 kg (186 lb)       General: Alert and oriented, in no acute distress  Cyril has no Erythema.    Treatment Summary to Date    Aria chart and setup information reviewed    Ina Lam MD

## 2024-04-12 ENCOUNTER — APPOINTMENT (OUTPATIENT)
Dept: RADIATION ONCOLOGY | Facility: CLINIC | Age: 84
End: 2024-04-12
Attending: STUDENT IN AN ORGANIZED HEALTH CARE EDUCATION/TRAINING PROGRAM
Payer: COMMERCIAL

## 2024-04-12 PROCEDURE — 77385 HC IMRT TREATMENT DELIVERY, SIMPLE: CPT

## 2024-04-12 PROCEDURE — 77014 PR CT GUIDE FOR PLACEMENT RADIATION THERAPY FIELDS: CPT | Mod: 26 | Performed by: RADIOLOGY

## 2024-04-15 ENCOUNTER — APPOINTMENT (OUTPATIENT)
Dept: RADIATION ONCOLOGY | Facility: CLINIC | Age: 84
End: 2024-04-15
Attending: STUDENT IN AN ORGANIZED HEALTH CARE EDUCATION/TRAINING PROGRAM
Payer: COMMERCIAL

## 2024-04-15 PROCEDURE — 77014 PR CT GUIDE FOR PLACEMENT RADIATION THERAPY FIELDS: CPT | Mod: 26 | Performed by: RADIOLOGY

## 2024-04-15 PROCEDURE — 77385 HC IMRT TREATMENT DELIVERY, SIMPLE: CPT | Performed by: RADIOLOGY

## 2024-04-16 ENCOUNTER — APPOINTMENT (OUTPATIENT)
Dept: RADIATION ONCOLOGY | Facility: CLINIC | Age: 84
End: 2024-04-16
Attending: STUDENT IN AN ORGANIZED HEALTH CARE EDUCATION/TRAINING PROGRAM
Payer: COMMERCIAL

## 2024-04-16 PROCEDURE — 77014 PR CT GUIDE FOR PLACEMENT RADIATION THERAPY FIELDS: CPT | Mod: 26 | Performed by: RADIOLOGY

## 2024-04-16 PROCEDURE — 77385 HC IMRT TREATMENT DELIVERY, SIMPLE: CPT | Performed by: RADIOLOGY

## 2024-04-17 ENCOUNTER — APPOINTMENT (OUTPATIENT)
Dept: RADIATION ONCOLOGY | Facility: CLINIC | Age: 84
End: 2024-04-17
Attending: STUDENT IN AN ORGANIZED HEALTH CARE EDUCATION/TRAINING PROGRAM
Payer: COMMERCIAL

## 2024-04-17 PROCEDURE — 77385 HC IMRT TREATMENT DELIVERY, SIMPLE: CPT | Performed by: RADIOLOGY

## 2024-04-17 PROCEDURE — 77014 PR CT GUIDE FOR PLACEMENT RADIATION THERAPY FIELDS: CPT | Mod: 26 | Performed by: RADIOLOGY

## 2024-04-18 ENCOUNTER — APPOINTMENT (OUTPATIENT)
Dept: RADIATION ONCOLOGY | Facility: CLINIC | Age: 84
End: 2024-04-18
Attending: STUDENT IN AN ORGANIZED HEALTH CARE EDUCATION/TRAINING PROGRAM
Payer: COMMERCIAL

## 2024-04-18 VITALS
BODY MASS INDEX: 25.75 KG/M2 | HEART RATE: 48 BPM | OXYGEN SATURATION: 98 % | RESPIRATION RATE: 16 BRPM | TEMPERATURE: 97.8 F | WEIGHT: 184.6 LBS | DIASTOLIC BLOOD PRESSURE: 70 MMHG | SYSTOLIC BLOOD PRESSURE: 155 MMHG

## 2024-04-18 DIAGNOSIS — C61 MALIGNANT NEOPLASM OF PROSTATE (H): Primary | ICD-10-CM

## 2024-04-18 PROCEDURE — 77336 RADIATION PHYSICS CONSULT: CPT | Performed by: STUDENT IN AN ORGANIZED HEALTH CARE EDUCATION/TRAINING PROGRAM

## 2024-04-18 PROCEDURE — 77427 RADIATION TX MANAGEMENT X5: CPT | Performed by: STUDENT IN AN ORGANIZED HEALTH CARE EDUCATION/TRAINING PROGRAM

## 2024-04-18 PROCEDURE — 77014 PR CT GUIDE FOR PLACEMENT RADIATION THERAPY FIELDS: CPT | Mod: 26 | Performed by: STUDENT IN AN ORGANIZED HEALTH CARE EDUCATION/TRAINING PROGRAM

## 2024-04-18 PROCEDURE — 77385 HC IMRT TREATMENT DELIVERY, SIMPLE: CPT | Performed by: STUDENT IN AN ORGANIZED HEALTH CARE EDUCATION/TRAINING PROGRAM

## 2024-04-18 ASSESSMENT — PAIN SCALES - GENERAL: PAINLEVEL: NO PAIN (0)

## 2024-04-18 NOTE — LETTER
4/18/2024         RE: Cyril Galdamez  [address] [facility] [address]        Dear Colleague,    Thank you for referring your patient, Cyril Galdamez, to the [facility] RADIATION ONCOLOGY [place]. Please see a copy of my visit note below.    RADIATION ONCOLOGY WEEKLY TREATMENT VISIT NOTE      Assessment / Impression       Visit Dx:  (C61) Malignant neoplasm of prostate (H)  (primary encounter diagnosis)       Cancer Staging   Malignant neoplasm of prostate (H)  Staging form: Prostate, AJCC 8th Edition  - Clinical stage from 12/20/2023: Stage IIC (cT2c, cN0, cM0, PSA: 12.1, Grade Group: 4) - Signed by Ina Lam MD on 1/25/2024       Tolerating radiation therapy well.  All questions and concerns addressed.  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  Radiation:   Site: Prostate  Stereotactic Radiosurgery: No  Concurrent Therapy: Yes (Eligard at [facility])  Today's Dose: 6750  Total Dose for Pelvis: 7020  Today's Fraction/Total Fraction Pelvis: 25/26    Continue Flomax, may increase to daily use  Will finish radiation tomorrow   Return to clinic in 3 months with PSA sooner if needed.    Pain Management Plan: N/A    Subjective:      HPI: Cyril Galdamez is a 83 year old male with  Malignant neoplasm of prostate (H) [C61]    He is tolerating radiation therapy well.  He started Flomax and notices some benefit but is not taking daily just as he feels needed.  Slight alteration in bowel pattern but no diarrhea.  Questions answered.    The following portions of the patient's history were reviewed and updated as appropriate: allergies, current medications, past family history, past medical history, past social history, past surgical history and problem list.    Assessment                  Body Site:  Pelvis  Stereotactic Radiosurgery: No  Concurrent Therapy: Yes (Eligard at [facility])  Today's Dose: 6750  Total Dose for Pelvis: 7020  Today's Fraction/Total Fraction Pelvis:   Drainage: 0: Absent  Diarrhea W/O Colostomy: 1: Increase of less than 4 stools/day over pretreatment  Constipation: 0: None  Proctitis: 0: None  Urinary frequency and/or urgency: 1: Increase in frequency or nocturia up to two times normal (goes frequently with furosemide)  Urinary Retention: 1: Hesitancy or dribbling. No sig residual, retention during immediate post op  Urinary Incontinence: 0: None  Dysuria: 0: None  Nocturia: 3-4  Urine Color Change: 0: Normal                                   Sexuality Alteration                    Emotional Alteration    Copin: Effective  Comfort Alteration   KPS: 80% Can perform normal activity with effort, some signs of disease  Fatigue (ONS scale): 3: Mild Fatigue  Pain Location: denies  Pain Intensity. Rate degree of pain ranging from 0 (no pain) to 10 (severe pain): 0   Nutrition Alteration   Anorexia: 0: None  Nausea: 0: None  Vomitin: None  Weight: 83.7 kg (184 lb 9.6 oz)  Skin Alteration   Skin Sensation: 0: No problem  Skin Reaction: 0: None  AUA Assessment                                           Accompanied by       Objective:     Exam:     Vitals:    24 1316   BP: (!) 155/70   Pulse: (!) 48   Resp: 16   Temp: 97.8  F (36.6  C)   TempSrc: Oral   SpO2: 98%   Weight: 83.7 kg (184 lb 9.6 oz)   PainSc: No Pain (0)       Wt Readings from Last 8 Encounters:   24 83.7 kg (184 lb 9.6 oz)   24 84.2 kg (185 lb 11.2 oz)   24 83.6 kg (184 lb 6.4 oz)   24 83.1 kg (183 lb 1.6 oz)   24 82.2 kg (181 lb 4.8 oz)   03/15/24 83.4 kg (183 lb 12.8 oz)   24 81.6 kg (180 lb)   23 82.6 kg (182 lb)       General: Alert and oriented, in no acute distress  Cyril has no Erythema.    Treatment Summary to Date    Aria chart and setup information reviewed    Ina Lam MD      Again, thank you for allowing me to  participate in the care of your patient.        Sincerely,        Ina Lam MD

## 2024-04-18 NOTE — PROGRESS NOTES
RADIATION ONCOLOGY WEEKLY TREATMENT VISIT NOTE      Assessment / Impression       Visit Dx:  (C61) Malignant neoplasm of prostate (H)  (primary encounter diagnosis)       Cancer Staging   Malignant neoplasm of prostate (H)  Staging form: Prostate, AJCC 8th Edition  - Clinical stage from 12/20/2023: Stage IIC (cT2c, cN0, cM0, PSA: 12.1, Grade Group: 4) - Signed by Ina Lam MD on 1/25/2024       Tolerating radiation therapy well.  All questions and concerns addressed.  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  Radiation:   Site: Prostate  Stereotactic Radiosurgery: No  Concurrent Therapy: Yes (Eligard at [facility])  Today's Dose: 6750  Total Dose for Pelvis: 7020  Today's Fraction/Total Fraction Pelvis: 25/26    Continue Flomax, may increase to daily use  Will finish radiation tomorrow   Return to clinic in 3 months with PSA sooner if needed.    Pain Management Plan: N/A    Subjective:      HPI: Cyril Galdamez is a 83 year old male with  Malignant neoplasm of prostate (H) [C61]    He is tolerating radiation therapy well.  He started Flomax and notices some benefit but is not taking daily just as he feels needed.  Slight alteration in bowel pattern but no diarrhea.  Questions answered.    The following portions of the patient's history were reviewed and updated as appropriate: allergies, current medications, past family history, past medical history, past social history, past surgical history and problem list.    Assessment                  Body Site:  Pelvis  Stereotactic Radiosurgery: No  Concurrent Therapy: Yes (Eligard at [facility])  Today's Dose: 6750  Total Dose for Pelvis: 7020  Today's Fraction/Total Fraction Pelvis: 25/26  Drainage: 0: Absent  Diarrhea W/O Colostomy: 1: Increase of less than 4 stools/day over pretreatment  Constipation: 0: None  Proctitis: 0: None  Urinary frequency and/or  urgency: 1: Increase in frequency or nocturia up to two times normal (goes frequently with furosemide)  Urinary Retention: 1: Hesitancy or dribbling. No sig residual, retention during immediate post op  Urinary Incontinence: 0: None  Dysuria: 0: None  Nocturia: 3-4  Urine Color Change: 0: Normal                                   Sexuality Alteration                    Emotional Alteration    Copin: Effective  Comfort Alteration   KPS: 80% Can perform normal activity with effort, some signs of disease  Fatigue (ONS scale): 3: Mild Fatigue  Pain Location: denies  Pain Intensity. Rate degree of pain ranging from 0 (no pain) to 10 (severe pain): 0   Nutrition Alteration   Anorexia: 0: None  Nausea: 0: None  Vomitin: None  Weight: 83.7 kg (184 lb 9.6 oz)  Skin Alteration   Skin Sensation: 0: No problem  Skin Reaction: 0: None  AUA Assessment                                           Accompanied by       Objective:     Exam:     Vitals:    24 1316   BP: (!) 155/70   Pulse: (!) 48   Resp: 16   Temp: 97.8  F (36.6  C)   TempSrc: Oral   SpO2: 98%   Weight: 83.7 kg (184 lb 9.6 oz)   PainSc: No Pain (0)       Wt Readings from Last 8 Encounters:   24 83.7 kg (184 lb 9.6 oz)   24 84.2 kg (185 lb 11.2 oz)   24 83.6 kg (184 lb 6.4 oz)   24 83.1 kg (183 lb 1.6 oz)   24 82.2 kg (181 lb 4.8 oz)   03/15/24 83.4 kg (183 lb 12.8 oz)   24 81.6 kg (180 lb)   23 82.6 kg (182 lb)       General: Alert and oriented, in no acute distress  Cyril has no Erythema.    Treatment Summary to Date    Aria chart and setup information reviewed    Ina Lam MD

## 2024-04-19 ENCOUNTER — ALLIED HEALTH/NURSE VISIT (OUTPATIENT)
Dept: RADIATION ONCOLOGY | Facility: CLINIC | Age: 84
End: 2024-04-19
Attending: STUDENT IN AN ORGANIZED HEALTH CARE EDUCATION/TRAINING PROGRAM
Payer: COMMERCIAL

## 2024-04-19 DIAGNOSIS — C61 MALIGNANT NEOPLASM OF PROSTATE (H): Primary | ICD-10-CM

## 2024-04-19 DIAGNOSIS — Z79.818 ANDROGEN DEPRIVATION THERAPY: ICD-10-CM

## 2024-04-19 PROCEDURE — 77385 HC IMRT TREATMENT DELIVERY, SIMPLE: CPT | Performed by: STUDENT IN AN ORGANIZED HEALTH CARE EDUCATION/TRAINING PROGRAM

## 2024-04-19 PROCEDURE — 77014 PR CT GUIDE FOR PLACEMENT RADIATION THERAPY FIELDS: CPT | Mod: 26 | Performed by: STUDENT IN AN ORGANIZED HEALTH CARE EDUCATION/TRAINING PROGRAM

## 2024-04-19 NOTE — PROGRESS NOTES
Patient here per wheelchair accompanied by family for final radiation therapy treatment for his prostate cancer.  Written discharge instructions given to patient by radiation therapist.  Follow-up with Dr. Lam in about 3 months with a PSA.  Patient's left per wheelchair and instructed to make appointments on his way out of the clinic.

## 2024-04-26 DIAGNOSIS — E78.5 DYSLIPIDEMIA: ICD-10-CM

## 2024-04-26 RX ORDER — ATORVASTATIN CALCIUM 10 MG/1
10 TABLET, FILM COATED ORAL DAILY
Qty: 90 TABLET | Refills: 2 | Status: SHIPPED | OUTPATIENT
Start: 2024-04-26 | End: 2024-08-14

## 2024-04-30 ENCOUNTER — TELEPHONE (OUTPATIENT)
Dept: RADIATION ONCOLOGY | Facility: CLINIC | Age: 84
End: 2024-04-30
Payer: COMMERCIAL

## 2024-04-30 NOTE — TELEPHONE ENCOUNTER
Called patient for routine follow-up phone call status post radiation for his prostate cancer.  Patient states he still has some fatigue but denies any other side effects.    Patient scheduled to see his primary care physician next week for ongoing care.  Confirmed follow-up appointments with Dr. Lam for July with a PSA lab draw prior to that visit.  Patient appreciative of call

## 2024-05-03 NOTE — PROGRESS NOTES
Radiation Treatment Summary          Patient: Cyril Galdamez            MRN: [id number]           : 1940        Care Provider: Ina Lam MD         Date of Service: 2024      HISTORY: Cyril Galdamez was treated with IMRT radiation therapy.    DIAGNOSIS: Prostate Adnocarcinoma  Stage:  T2 N0 M0  Emiliano: 4+4 = 8 (grade group 4)  Pre-treatment PSA: 12.10  TRUS/MRI volume: 40 cc     2023 Eligard 45 mg #1     DX: (C61) Malignant neoplasm of prostate (H)  (primary encounter diagnosis)    (Z79.818) Androgen deprivation therapy    SITE TREATED: Prostate  TOTAL DOSE: 7020 cGy  NUMBER OF FRACTIONS: 26  DATES COMPLETED: 3/14/2024 - 2024  CONCURRENT CHEMOTHERAPY: No; concurrent ADT  ADJUVANT THERAPY:Yes: ADT- Reuben    Cyril tolerated the treatment without unexpected side effects.     PLAN: Discharge instructions were given and Cyril knows to call if questions/issues arise. Cyril will be seen in f/u in 3  months without a scan.  Return to clinic in 3 months with PSA sooner if needed.     Pain Management Plan: N/A    Signed by: Ina Lam MD

## 2024-05-06 ENCOUNTER — TELEPHONE (OUTPATIENT)
Dept: ONCOLOGY | Facility: HOSPITAL | Age: 84
End: 2024-05-06

## 2024-05-06 ENCOUNTER — OFFICE VISIT (OUTPATIENT)
Dept: FAMILY MEDICINE | Facility: CLINIC | Age: 84
End: 2024-05-06
Payer: COMMERCIAL

## 2024-05-06 VITALS
TEMPERATURE: 97.7 F | WEIGHT: 184 LBS | SYSTOLIC BLOOD PRESSURE: 130 MMHG | DIASTOLIC BLOOD PRESSURE: 72 MMHG | HEIGHT: 71 IN | BODY MASS INDEX: 25.76 KG/M2 | OXYGEN SATURATION: 97 % | HEART RATE: 40 BPM

## 2024-05-06 DIAGNOSIS — E53.8 VITAMIN B12 DEFICIENCY (NON ANEMIC): ICD-10-CM

## 2024-05-06 DIAGNOSIS — R60.0 BILATERAL LEG EDEMA: ICD-10-CM

## 2024-05-06 DIAGNOSIS — R73.03 PREDIABETES: ICD-10-CM

## 2024-05-06 DIAGNOSIS — D50.9 IRON DEFICIENCY ANEMIA, UNSPECIFIED IRON DEFICIENCY ANEMIA TYPE: ICD-10-CM

## 2024-05-06 DIAGNOSIS — E55.9 VITAMIN D DEFICIENCY: ICD-10-CM

## 2024-05-06 DIAGNOSIS — I48.21 PERMANENT ATRIAL FIBRILLATION (H): ICD-10-CM

## 2024-05-06 DIAGNOSIS — R00.1 BRADYCARDIA: Primary | ICD-10-CM

## 2024-05-06 LAB
ERYTHROCYTE [DISTWIDTH] IN BLOOD BY AUTOMATED COUNT: 13 % (ref 10–15)
HBA1C MFR BLD: 5.5 % (ref 0–5.6)
HCT VFR BLD AUTO: 34.9 % (ref 40–53)
HGB BLD-MCNC: 11.8 G/DL (ref 13.3–17.7)
MCH RBC QN AUTO: 34.2 PG (ref 26.5–33)
MCHC RBC AUTO-ENTMCNC: 33.8 G/DL (ref 31.5–36.5)
MCV RBC AUTO: 101 FL (ref 78–100)
PLATELET # BLD AUTO: 210 10E3/UL (ref 150–450)
RBC # BLD AUTO: 3.45 10E6/UL (ref 4.4–5.9)
WBC # BLD AUTO: 5.6 10E3/UL (ref 4–11)

## 2024-05-06 PROCEDURE — 82607 VITAMIN B-12: CPT | Performed by: INTERNAL MEDICINE

## 2024-05-06 PROCEDURE — 82728 ASSAY OF FERRITIN: CPT | Performed by: INTERNAL MEDICINE

## 2024-05-06 PROCEDURE — 93010 ELECTROCARDIOGRAM REPORT: CPT | Performed by: INTERNAL MEDICINE

## 2024-05-06 PROCEDURE — 85045 AUTOMATED RETICULOCYTE COUNT: CPT | Performed by: INTERNAL MEDICINE

## 2024-05-06 PROCEDURE — 36415 COLL VENOUS BLD VENIPUNCTURE: CPT | Performed by: INTERNAL MEDICINE

## 2024-05-06 PROCEDURE — 85027 COMPLETE CBC AUTOMATED: CPT | Performed by: INTERNAL MEDICINE

## 2024-05-06 PROCEDURE — 80048 BASIC METABOLIC PNL TOTAL CA: CPT | Performed by: INTERNAL MEDICINE

## 2024-05-06 PROCEDURE — 83036 HEMOGLOBIN GLYCOSYLATED A1C: CPT | Performed by: INTERNAL MEDICINE

## 2024-05-06 PROCEDURE — 99214 OFFICE O/P EST MOD 30 MIN: CPT | Performed by: INTERNAL MEDICINE

## 2024-05-06 PROCEDURE — 93005 ELECTROCARDIOGRAM TRACING: CPT | Performed by: INTERNAL MEDICINE

## 2024-05-06 PROCEDURE — 82306 VITAMIN D 25 HYDROXY: CPT | Performed by: INTERNAL MEDICINE

## 2024-05-06 PROCEDURE — 84443 ASSAY THYROID STIM HORMONE: CPT | Performed by: INTERNAL MEDICINE

## 2024-05-06 PROCEDURE — G2211 COMPLEX E/M VISIT ADD ON: HCPCS | Performed by: INTERNAL MEDICINE

## 2024-05-06 ASSESSMENT — ASTHMA QUESTIONNAIRES
QUESTION_1 LAST FOUR WEEKS HOW MUCH OF THE TIME DID YOUR ASTHMA KEEP YOU FROM GETTING AS MUCH DONE AT WORK, SCHOOL OR AT HOME: NONE OF THE TIME
QUESTION_2 LAST FOUR WEEKS HOW OFTEN HAVE YOU HAD SHORTNESS OF BREATH: NOT AT ALL
ACT_TOTALSCORE: 21
QUESTION_5 LAST FOUR WEEKS HOW WOULD YOU RATE YOUR ASTHMA CONTROL: WELL CONTROLLED
ACT_TOTALSCORE: 21
QUESTION_4 LAST FOUR WEEKS HOW OFTEN HAVE YOU USED YOUR RESCUE INHALER OR NEBULIZER MEDICATION (SUCH AS ALBUTEROL): ONE OR TWO TIMES PER DAY
QUESTION_3 LAST FOUR WEEKS HOW OFTEN DID YOUR ASTHMA SYMPTOMS (WHEEZING, COUGHING, SHORTNESS OF BREATH, CHEST TIGHTNESS OR PAIN) WAKE YOU UP AT NIGHT OR EARLIER THAN USUAL IN THE MORNING: NOT AT ALL

## 2024-05-06 NOTE — PROGRESS NOTES
Assessment & Plan     Bradycardia  Noted on exam today and found to have atrial fibrillation as noted below.  No symptoms right now.  He is on a beta-blocker.- EKG 12-lead, tracing only  - TSH with free T4 reflex; Future  - CBC with platelets; Future  - TSH with free T4 reflex  - CBC with platelets  - Adult Cardiology Eval  Referral; Future    Vitamin B12 deficiency (non anemic)  Will check labs today   - Vitamin B12; Future  - Vitamin B12    Bilateral leg edema  Will continue to use of furosemide.  Discussed home nurse to evaluate for feet.  - Basic metabolic panel  (Ca, Cl, CO2, Creat, Gluc, K, Na, BUN)    Prediabetes  Lab Results   Component Value Date    A1C 5.5 05/06/2024    A1C 5.7 10/24/2023    A1C 6.0 08/29/2022    A1C 5.7 12/14/2020    A1C 6.1 03/10/2020     - Hemoglobin A1c    Vitamin D deficiency  - Vitamin D deficiency screening    new atrial fibrillation (H)  Discussed with patient options for anticoagulation atrial fibrillation noted as noted today on EKG.  We will start Eliquis based off of this.  He is bradycardic so question what his ventricular response is.  He is not having overt symptoms.  We will do a Zio patch he did recently have an echocardiogram.  Will get cardiology evaluation as well discussed warning signs for recheck sooner.    - ZIO PATCH 3-7 DAYS (additional cost to patient); Future  - ZIO PATCH 3-7 DAYS APPLICATION; Future  - Adult Cardiology Eval  Referral; Future  - apixaban ANTICOAGULANT (ELIQUIS) 5 MG tablet; Take 1 tablet (5 mg) by mouth 2 times daily    Iron deficiency anemia, unspecified iron deficiency anemia type  Noted on labs today we will recheck with her next set of labs to obtain other labs to check for underlying cause.  - Ferritin; Future  - Reticulocyte count; Future  - Reticulocyte count  - Ferritin        Patient Instructions   [email or web address]  can check this out to help with foot cares. She should be able to come to home.     We will  "do labs to check on things today with the slower heart rate and the blood sugars     We will do the heart tracing to look at the lower heart rate.     Let me know if swelling gets worse.     Angel Sung MD                Aftab Nam is a 83 year old, presenting for the following health issues:  RECHECK (Edema recheck. Seems to be going to the bathroom a lot, hard to rest at night. Dropped to 20 mg Lasix while on chemotherapy for 30 days, has since got back to 40 mg. )        5/6/2024     4:29 PM   Additional Questions   Roomed by Leisa OVALLES MA     History of Present Illness       Reason for visit:  Edema        Patient presents for recheck today.  Overall he feels that his edema is better.  He was using less of his furosemide following radiation treatment he is now increased his furosemide back up.  He did have an echocardiogram done within the last couple months.  That showed normal ejection fraction.    He has not had chest pain heart rates noted to be slower today he has not had overt lightheadedness or dizziness but is walking around using mobility aids.  Is try to keep feet up.    He believes he may have had any irregular rhythm in the past and was told not to worry about it by prior providers per his report.  Cannot find documentation of this.    Review of Systems  Constitutional, HEENT, cardiovascular, pulmonary, gi and gu systems are negative, except as otherwise noted.      Objective    /72 (BP Location: Left arm, Patient Position: Sitting, Cuff Size: Adult Regular)   Pulse (!) 40   Temp 97.7  F (36.5  C) (Temporal)   Ht 1.803 m (5' 11\")   Wt 83.5 kg (184 lb)   SpO2 97%   BMI 25.66 kg/m    Body mass index is 25.66 kg/m .  Physical Exam   GENERAL: alert and no distress  NECK: no adenopathy, no asymmetry, masses, or scars  RESP: lungs clear to auscultation - no rales, rhonchi or wheezes  CV: Bradycardic slightly irregular rhythm no loud murmurs.  Noted to have +1 edema bilaterally on lower " extremities.  ABDOMEN: soft, nontender, no hepatosplenomegaly, no masses and bowel sounds normal  MS: no gross musculoskeletal defects noted, noted significant lichenification of toenails bilaterally slightly faint but palpable pedal pulses.          The longitudinal plan of care for the diagnosis(es)/condition(s) as documented were addressed during this visit. Due to the added complexity in care, I will continue to support Cyril in the subsequent management and with ongoing continuity of care.    Signed Electronically by: Angel Sung MD

## 2024-05-06 NOTE — TELEPHONE ENCOUNTER
Cyril calls nurse triage line with questions about Dr. Espinosa's plan. Explained to Cyril that Dr. Espinosa does not work in this clinic, he is through [facility]. Contact information for [facility] provided. Cyril then explains may like to come to [facility] for Urologic care. Informed Cyril that Dr. Renner is a Urologist who comes to [facility] and provided  Urology scheduling number in case Cyril does want to transfer care at some point. Cyril expresses appreciation for information.    Mariluz Goldberg RN...............................05/06/24  3:05 PM

## 2024-05-06 NOTE — PATIENT INSTRUCTIONS
[email or web address]  can check this out to help with foot cares. She should be able to come to home.     We will do labs to check on things today with the slower heart rate and the blood sugars     We will do the heart tracing to look at the lower heart rate.     Let me know if swelling gets worse.     Angel Sung MD

## 2024-05-07 ENCOUNTER — TELEPHONE (OUTPATIENT)
Dept: FAMILY MEDICINE | Facility: CLINIC | Age: 84
End: 2024-05-07
Payer: COMMERCIAL

## 2024-05-07 PROBLEM — I48.21 PERMANENT ATRIAL FIBRILLATION (H): Status: ACTIVE | Noted: 2024-05-07

## 2024-05-07 LAB
ANION GAP SERPL CALCULATED.3IONS-SCNC: 11 MMOL/L (ref 7–15)
ATRIAL RATE - MUSE: 375 BPM
BUN SERPL-MCNC: 33.4 MG/DL (ref 8–23)
CALCIUM SERPL-MCNC: 9.6 MG/DL (ref 8.8–10.2)
CHLORIDE SERPL-SCNC: 101 MMOL/L (ref 98–107)
CREAT SERPL-MCNC: 1.04 MG/DL (ref 0.67–1.17)
DEPRECATED HCO3 PLAS-SCNC: 27 MMOL/L (ref 22–29)
DIASTOLIC BLOOD PRESSURE - MUSE: NORMAL MMHG
EGFRCR SERPLBLD CKD-EPI 2021: 70 ML/MIN/1.73M2
FERRITIN SERPL-MCNC: 473 NG/ML (ref 31–409)
GLUCOSE SERPL-MCNC: 112 MG/DL (ref 70–99)
INTERPRETATION ECG - MUSE: NORMAL
P AXIS - MUSE: NORMAL DEGREES
POTASSIUM SERPL-SCNC: 4.7 MMOL/L (ref 3.4–5.3)
PR INTERVAL - MUSE: NORMAL MS
QRS DURATION - MUSE: 96 MS
QT - MUSE: 448 MS
QTC - MUSE: 458 MS
R AXIS - MUSE: 25 DEGREES
RETICS # AUTO: 0.05 10E6/UL (ref 0.03–0.1)
RETICS/RBC NFR AUTO: 1.4 % (ref 0.5–2)
SODIUM SERPL-SCNC: 139 MMOL/L (ref 135–145)
SYSTOLIC BLOOD PRESSURE - MUSE: NORMAL MMHG
T AXIS - MUSE: 33 DEGREES
TSH SERPL DL<=0.005 MIU/L-ACNC: 2 UIU/ML (ref 0.3–4.2)
VENTRICULAR RATE- MUSE: 63 BPM
VIT B12 SERPL-MCNC: 465 PG/ML (ref 232–1245)
VIT D+METAB SERPL-MCNC: 37 NG/ML (ref 20–50)

## 2024-05-09 ENCOUNTER — ALLIED HEALTH/NURSE VISIT (OUTPATIENT)
Dept: FAMILY MEDICINE | Facility: CLINIC | Age: 84
End: 2024-05-09
Payer: COMMERCIAL

## 2024-05-09 DIAGNOSIS — I48.21 PERMANENT ATRIAL FIBRILLATION (H): ICD-10-CM

## 2024-05-09 PROCEDURE — 93242 EXT ECG>48HR<7D RECORDING: CPT

## 2024-05-09 NOTE — PROGRESS NOTES
yCril Galdamez arrived here on 5/9/2024 2:14 PM for 3-7 Days  Zio monitor placement per ordering provider Dr. Angel Sung for the diagnosis new atrial fibrillation .  Patient s skin was prepped per protocol.  Zio monitor was placed.  Instructions were reviewed with and given to the patient.  Patient verbalized understanding of wear, troubleshooting and monitor return instructions.    Patch serial number: [id number]

## 2024-05-29 DIAGNOSIS — M48.061 SPINAL STENOSIS OF LUMBAR REGION, UNSPECIFIED WHETHER NEUROGENIC CLAUDICATION PRESENT: ICD-10-CM

## 2024-05-29 RX ORDER — AMITRIPTYLINE HYDROCHLORIDE 10 MG/1
10 TABLET ORAL AT BEDTIME
Qty: 90 TABLET | Refills: 2 | Status: SHIPPED | OUTPATIENT
Start: 2024-05-29

## 2024-05-29 NOTE — TELEPHONE ENCOUNTER
Pending Prescriptions:                       Disp   Refills    amitriptyline (ELAVIL) 10 MG tablet       90 tab*0            Sig: Take 1 tablet (10 mg) by mouth at bedtime    Pharmacy: [facility] [address] [place] [address] - [address]

## 2024-06-11 ENCOUNTER — TELEPHONE (OUTPATIENT)
Dept: CARDIOLOGY | Facility: CLINIC | Age: 84
End: 2024-06-11

## 2024-06-11 ENCOUNTER — OFFICE VISIT (OUTPATIENT)
Dept: CARDIOLOGY | Facility: CLINIC | Age: 84
End: 2024-06-11
Attending: INTERNAL MEDICINE
Payer: COMMERCIAL

## 2024-06-11 ENCOUNTER — TELEPHONE (OUTPATIENT)
Dept: FAMILY MEDICINE | Facility: CLINIC | Age: 84
End: 2024-06-11

## 2024-06-11 VITALS
DIASTOLIC BLOOD PRESSURE: 69 MMHG | WEIGHT: 182 LBS | SYSTOLIC BLOOD PRESSURE: 136 MMHG | HEART RATE: 63 BPM | BODY MASS INDEX: 25.38 KG/M2 | OXYGEN SATURATION: 98 %

## 2024-06-11 DIAGNOSIS — I48.21 PERMANENT ATRIAL FIBRILLATION (H): ICD-10-CM

## 2024-06-11 DIAGNOSIS — I10 PRIMARY HYPERTENSION: Primary | ICD-10-CM

## 2024-06-11 DIAGNOSIS — R00.1 BRADYCARDIA: ICD-10-CM

## 2024-06-11 PROCEDURE — 99204 OFFICE O/P NEW MOD 45 MIN: CPT | Performed by: INTERNAL MEDICINE

## 2024-06-11 NOTE — LETTER
July 15, 2024      Cyril Galdamez  [address] [facility] [address]        Dear Cyril,     Monitor shows Atrial Fibrillation with controlled ventricular response.  No worrisome pauses noted. Would continue anticoagulation and have a one-time follow-up in the Atrial Fibrillation clinic in the next 2 to 3 months. To schedule, please contact [phone].  If you develop shortness of breath, lightheadedness or syncopal episodes will need more urgent follow-up.     Hoping that the peripheral edema is better with more leg movement and support stockings      Sincerely,        Rahul Staples MD

## 2024-06-11 NOTE — TELEPHONE ENCOUNTER
Reason for Call:  prescription    Detailed comments:  I can't afford eliquis can I be put on warfrin instead     Phone Number Patient can be reached at: Cell number on file:    Telephone Information:   Mobile [phone]       Best Time: anytime    Can we leave a detailed message on this number? YES    Call taken on 6/11/2024 at 4:14 PM by DOMINICK GHOSH

## 2024-06-11 NOTE — PROGRESS NOTES
[facility]  [phone]          Assessment/Recommendations   Patient with atrial fibrillation which is something he said he had several years ago.  ECG from July 2022 shows sinus rhythm and EKG from May 6, 2024 shows atrial fibrillation with controlled ventricular response.  He is not on any AV delgado blocking agents and concerned that he may have some bradycardia arrhythmias.  He did have a monitor placed which she wore for several days and turned in last week but the results are not available in the chart.  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    Echocardiogram showed normal left ventricular systolic function without structural heart issues.  CVP was estimated at 3 mmHg so not a cardiac cause for lower extremity edema which is mild.      Suspect pedal edema is related to inactivity and sedentary lifestyle, sitting almost the entire day.  Support stockings may be useful.      Will get the results of the cardiac monitor and call him back with any further recommendations.  He is appropriately anticoagulated for elevated CHADS2 vascular score and will continue him on Eliquis indefinitely.  Recommended that he continue off aspirin while he is on Eliquis.  Patient and grandson had all questions answered.    Thank you for allowing us to participate in his care.       History of Present Illness/Subjective    Mr. Cyril Galdamez is a 83 year old male with recent documentation of atrial fibrillation on EKG.  He denies any palpitations, chest discomfort, syncope or near syncopal episodes.  His functional capacity is dramatically limited by weakness in his legs which she relates to a back issue.  He denies orthopnea, paroxysmal nocturnal dyspnea, but does have peripheral edema which is improved on diuretic therapy.  He had an echocardiogram which was unremarkable.  He had a  twelve-lead EKG as noted below on May 6, 2024.  He had a monitor placed which unfortunately the results are not yet available in [facility] to review.    TSH was unremarkable.    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  He is .  His grandson is with him at her visit.    ECG: Personally reviewed.  May 6, 2024 shows atrial fibrillation with a ventricular response of 63 bpm.  No acute ST-T wave changes noted.       Physical Examination Review of Systems   /69 (BP Location: Left arm, Patient Position: Sitting, Cuff Size: Adult Large)   Pulse 63   Wt 82.6 kg (182 lb)   SpO2 98%   BMI 25.38 kg/m    Body mass index is 25.38 kg/m .  Wt Readings from Last 3 Encounters:   06/11/24 82.6 kg (182 lb)   05/06/24 83.5 kg (184 lb)   04/18/24 83.7 kg (184 lb 9.6 oz)     General Appearance:   Alert, cooperative and in no acute distress.   ENT/Mouth: Pink/moist oral mucosa   EYES:  no scleral icterus, normal conjunctivae   Neck: JVP normal. No Hepatojugular reflux. Thyroid not visualized.   Chest/Lungs:   Lungs are clear to auscultation, equal chest wall expansion.   Cardiovascular:   S1, S2 with 1/6 systolic murmur ,clicks or rubs. Brachial, radial pulses are intact and symetric. No carotid bruits noted   Abdomen:  Nontender.    Extremities: No cyanosis, clubbing and mild pedal edema   Skin: no xanthelasma, warm.    Neurologic: normal arm movement bilateral, no tremors     Psychiatric: Appropriate affect.      Enc Vitals  BP: 136/69  Pulse: 63  SpO2: 98 %  Weight: 82.6 kg (182 lb)                                           Medical History  Surgical History Family History Social History   No past medical history on file. No past surgical history on file. No family history on file. Social History     Socioeconomic History    Marital status:      Spouse name: Not on file    Number of children: Not on file    Years of  education: Not on file    Highest education level: Not on file   Occupational History    Not on file   Tobacco Use    Smoking status: Never     Passive exposure: Never    Smokeless tobacco: Never   Vaping Use    Vaping status: Never Used   Substance and Sexual Activity    Alcohol use: Not on file    Drug use: Not on file    Sexual activity: Not on file   Other Topics Concern    Not on file   Social History Narrative    Not on file     Social Determinants of Health     Financial Resource Strain: Low Risk  (2/26/2024)    Financial Resource Strain     Within the past 12 months, have you or your family members you live with been unable to get utilities (heat, electricity) when it was really needed?: No   Food Insecurity: Low Risk  (2/26/2024)    Food Insecurity     Within the past 12 months, did you worry that your food would run out before you got money to buy more?: No     Within the past 12 months, did the food you bought just not last and you didn t have money to get more?: No   Transportation Needs: Low Risk  (2/26/2024)    Transportation Needs     Within the past 12 months, has lack of transportation kept you from medical appointments, getting your medicines, non-medical meetings or appointments, work, or from getting things that you need?: No   Physical Activity: Unknown (2/26/2024)    Exercise Vital Sign     Days of Exercise per Week: 7 days     Minutes of Exercise per Session: Not on file   Stress: Stress Concern Present (2/26/2024)    [employer] - Occupational Stress Questionnaire     Feeling of Stress : To some extent   Social Connections: Unknown (2/26/2024)    Social Connection and Isolation Panel [NHANES]     Frequency of Communication with Friends and Family: Not on file     Frequency of Social Gatherings with Friends and Family: More than three times a week     Attends Alevism Services: Not on file     Active Member of Clubs or Organizations: Not on file     Attends Club  or Organization Meetings: Not on file     Marital Status: Not on file   Interpersonal Safety: Low Risk  (12/21/2023)    Interpersonal Safety     Do you feel physically and emotionally safe where you currently live?: Yes     Within the past 12 months, have you been hit, slapped, kicked or otherwise physically hurt by someone?: No     Within the past 12 months, have you been humiliated or emotionally abused in other ways by your partner or ex-partner?: No   Housing Stability: Low Risk  (2/26/2024)    Housing Stability     Do you have housing? : Yes     Are you worried about losing your housing?: No          Medications  Allergies   Current Outpatient Medications   Medication Sig Dispense Refill    albuterol (PROAIR HFA/PROVENTIL HFA/VENTOLIN HFA) 108 (90 Base) MCG/ACT inhaler Inhale 2 puffs into the lungs every 4 hours as needed for shortness of breath, wheezing or cough 18 g 11    amitriptyline (ELAVIL) 10 MG tablet Take 1 tablet (10 mg) by mouth at bedtime 90 tablet 2    ammonium lactate (LAC-HYDRIN) 12 % external lotion Apply topically 2 times daily 396 g 3    apixaban ANTICOAGULANT (ELIQUIS) 5 MG tablet Take 1 tablet (5 mg) by mouth 2 times daily 60 tablet 11    atorvastatin (LIPITOR) 10 MG tablet Take 1 tablet (10 mg) by mouth daily 90 tablet 2    furosemide (LASIX) 40 MG tablet Take 1 tablet (40 mg) by mouth daily TAKE 1 TABLET BY MOUTH ONCE DAILY 90 tablet 3    GINSENG PO       HEMP OIL OR EXTRACT OR OTHER CBD CANNABINOID, NOT MEDICAL CANNABIS, Topical      lisinopril (ZESTRIL) 40 MG tablet Take 1 tablet (40 mg) by mouth daily 90 tablet 2    tamsulosin (FLOMAX) 0.4 MG capsule Take 1 capsule (0.4 mg) by mouth daily 30 capsule 5    Allergies   Allergen Reactions    Shellfish Allergy Anaphylaxis         Lab Results    Chemistry/lipid CBC Cardiac Enzymes/BNP/TSH/INR   Lab Results   Component Value Date    CHOL 145 01/18/2024    HDL 44 01/18/2024    TRIG 201 (H) 01/18/2024    BUN 33.4 (H) 05/06/2024      05/06/2024    CO2 27 05/06/2024    Lab Results   Component Value Date    WBC 5.6 05/06/2024    HGB 11.8 (L) 05/06/2024    HCT 34.9 (L) 05/06/2024     (H) 05/06/2024     05/06/2024    Lab Results   Component Value Date    TROPONINI 0.01 07/17/2022    TSH 2.00 05/06/2024

## 2024-06-11 NOTE — LETTER
6/11/2024    Angel Sung MD  [address] [place] [address]    RE: Cyril Galdamez       Dear Colleague,     I had the pleasure of seeing Cyril Galdamez in the [facility].      [facility]  [phone]          Assessment/Recommendations   Patient with atrial fibrillation which is something he said he had several years ago.  ECG from July 2022 shows sinus rhythm and EKG from May 6, 2024 shows atrial fibrillation with controlled ventricular response.  He is not on any AV delgado blocking agents and concerned that he may have some bradycardia arrhythmias.  He did have a monitor placed which she wore for several days and turned in last week but the results are not available in the chart.  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    Echocardiogram showed normal left ventricular systolic function without structural heart issues.  CVP was estimated at 3 mmHg so not a cardiac cause for lower extremity edema which is mild.      Suspect pedal edema is related to inactivity and sedentary lifestyle, sitting almost the entire day.  Support stockings may be useful.      Will get the results of the cardiac monitor and call him back with any further recommendations.  He is appropriately anticoagulated for elevated CHADS2 vascular score and will continue him on Eliquis indefinitely.  Recommended that he continue off aspirin while he is on Eliquis.  Patient and grandson had all questions answered.    Thank you for allowing us to participate in his care.       History of Present Illness/Subjective    Mr. Cyril Galdamez is a 83 year old male with recent documentation of atrial fibrillation on EKG.  He denies any palpitations, chest discomfort, syncope or near syncopal episodes.  His functional capacity is dramatically limited by weakness in his legs which she  relates to a back issue.  He denies orthopnea, paroxysmal nocturnal dyspnea, but does have peripheral edema which is improved on diuretic therapy.  He had an echocardiogram which was unremarkable.  He had a twelve-lead EKG as noted below on May 6, 2024.  He had a monitor placed which unfortunately the results are not yet available in [facility] to review.    TSH was unremarkable.    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  He is .  His grandson is with him at her visit.    ECG: Personally reviewed.  May 6, 2024 shows atrial fibrillation with a ventricular response of 63 bpm.  No acute ST-T wave changes noted.       Physical Examination Review of Systems   /69 (BP Location: Left arm, Patient Position: Sitting, Cuff Size: Adult Large)   Pulse 63   Wt 82.6 kg (182 lb)   SpO2 98%   BMI 25.38 kg/m    Body mass index is 25.38 kg/m .  Wt Readings from Last 3 Encounters:   06/11/24 82.6 kg (182 lb)   05/06/24 83.5 kg (184 lb)   04/18/24 83.7 kg (184 lb 9.6 oz)     General Appearance:   Alert, cooperative and in no acute distress.   ENT/Mouth: Pink/moist oral mucosa   EYES:  no scleral icterus, normal conjunctivae   Neck: JVP normal. No Hepatojugular reflux. Thyroid not visualized.   Chest/Lungs:   Lungs are clear to auscultation, equal chest wall expansion.   Cardiovascular:   S1, S2 with 1/6 systolic murmur ,clicks or rubs. Brachial, radial pulses are intact and symetric. No carotid bruits noted   Abdomen:  Nontender.    Extremities: No cyanosis, clubbing and mild pedal edema   Skin: no xanthelasma, warm.    Neurologic: normal arm movement bilateral, no tremors     Psychiatric: Appropriate affect.      Enc Vitals  BP: 136/69  Pulse: 63  SpO2: 98 %  Weight: 82.6 kg (182 lb)                                           Medical History  Surgical History Family History Social History   No past medical history on file.  No past surgical history on file. No family history on file. Social History     Socioeconomic History    Marital status:      Spouse name: Not on file    Number of children: Not on file    Years of education: Not on file    Highest education level: Not on file   Occupational History    Not on file   Tobacco Use    Smoking status: Never     Passive exposure: Never    Smokeless tobacco: Never   Vaping Use    Vaping status: Never Used   Substance and Sexual Activity    Alcohol use: Not on file    Drug use: Not on file    Sexual activity: Not on file   Other Topics Concern    Not on file   Social History Narrative    Not on file     Social Determinants of Health     Financial Resource Strain: Low Risk  (2/26/2024)    Financial Resource Strain     Within the past 12 months, have you or your family members you live with been unable to get utilities (heat, electricity) when it was really needed?: No   Food Insecurity: Low Risk  (2/26/2024)    Food Insecurity     Within the past 12 months, did you worry that your food would run out before you got money to buy more?: No     Within the past 12 months, did the food you bought just not last and you didn t have money to get more?: No   Transportation Needs: Low Risk  (2/26/2024)    Transportation Needs     Within the past 12 months, has lack of transportation kept you from medical appointments, getting your medicines, non-medical meetings or appointments, work, or from getting things that you need?: No   Physical Activity: Unknown (2/26/2024)    Exercise Vital Sign     Days of Exercise per Week: 7 days     Minutes of Exercise per Session: Not on file   Stress: Stress Concern Present (2/26/2024)    [employer] - Occupational Stress Questionnaire     Feeling of Stress : To some extent   Social Connections: Unknown (2/26/2024)    Social Connection and Isolation Panel [NHANES]     Frequency of Communication with Friends and Family: Not on file      Frequency of Social Gatherings with Friends and Family: More than three times a week     Attends Holiness Services: Not on file     Active Member of Clubs or Organizations: Not on file     Attends Club or Organization Meetings: Not on file     Marital Status: Not on file   Interpersonal Safety: Low Risk  (12/21/2023)    Interpersonal Safety     Do you feel physically and emotionally safe where you currently live?: Yes     Within the past 12 months, have you been hit, slapped, kicked or otherwise physically hurt by someone?: No     Within the past 12 months, have you been humiliated or emotionally abused in other ways by your partner or ex-partner?: No   Housing Stability: Low Risk  (2/26/2024)    Housing Stability     Do you have housing? : Yes     Are you worried about losing your housing?: No          Medications  Allergies   Current Outpatient Medications   Medication Sig Dispense Refill    albuterol (PROAIR HFA/PROVENTIL HFA/VENTOLIN HFA) 108 (90 Base) MCG/ACT inhaler Inhale 2 puffs into the lungs every 4 hours as needed for shortness of breath, wheezing or cough 18 g 11    amitriptyline (ELAVIL) 10 MG tablet Take 1 tablet (10 mg) by mouth at bedtime 90 tablet 2    ammonium lactate (LAC-HYDRIN) 12 % external lotion Apply topically 2 times daily 396 g 3    apixaban ANTICOAGULANT (ELIQUIS) 5 MG tablet Take 1 tablet (5 mg) by mouth 2 times daily 60 tablet 11    atorvastatin (LIPITOR) 10 MG tablet Take 1 tablet (10 mg) by mouth daily 90 tablet 2    furosemide (LASIX) 40 MG tablet Take 1 tablet (40 mg) by mouth daily TAKE 1 TABLET BY MOUTH ONCE DAILY 90 tablet 3    GINSENG PO       HEMP OIL OR EXTRACT OR OTHER CBD CANNABINOID, NOT MEDICAL CANNABIS, Topical      lisinopril (ZESTRIL) 40 MG tablet Take 1 tablet (40 mg) by mouth daily 90 tablet 2    tamsulosin (FLOMAX) 0.4 MG capsule Take 1 capsule (0.4 mg) by mouth daily 30 capsule 5    Allergies   Allergen Reactions    Shellfish Allergy Anaphylaxis         Lab Results     Chemistry/lipid CBC Cardiac Enzymes/BNP/TSH/INR   Lab Results   Component Value Date    CHOL 145 01/18/2024    HDL 44 01/18/2024    TRIG 201 (H) 01/18/2024    BUN 33.4 (H) 05/06/2024     05/06/2024    CO2 27 05/06/2024    Lab Results   Component Value Date    WBC 5.6 05/06/2024    HGB 11.8 (L) 05/06/2024    HCT 34.9 (L) 05/06/2024     (H) 05/06/2024     05/06/2024    Lab Results   Component Value Date    TROPONINI 0.01 07/17/2022    TSH 2.00 05/06/2024                    Thank you for allowing me to participate in the care of your patient.      Sincerely,     Rahul Staples MD     [facility]  cc:   Angel Sung MD  [address] [place] [address]

## 2024-06-12 ENCOUNTER — TELEPHONE (OUTPATIENT)
Dept: FAMILY MEDICINE | Facility: CLINIC | Age: 84
End: 2024-06-12

## 2024-06-12 DIAGNOSIS — I48.21 PERMANENT ATRIAL FIBRILLATION (H): ICD-10-CM

## 2024-06-12 NOTE — TELEPHONE ENCOUNTER
Attempted to contact, no answer.    LMTCB    Need to confirm with patient he is agreeable to coming in for INR checks and working with anticoag team before routing to pcp to review.

## 2024-06-13 NOTE — TELEPHONE ENCOUNTER
Please see telephone encounter from 6/12/24. Pt is planning to stay on Eliquis for the time being and will update the team if anything changes.     Analilia OVALLES RN

## 2024-06-20 PROCEDURE — 93244 EXT ECG>48HR<7D REV&INTERPJ: CPT | Performed by: INTERNAL MEDICINE

## 2024-06-21 NOTE — TELEPHONE ENCOUNTER
From: Rahul Staples MD  Sent: 6/21/2024  11:30 AM CDT  To: Tati Luong RN; Elham Lizarraga,    Monitor shows atrial fibrillation with controlled ventricular response.  Noworrisome pauses noted.    Would continue anticoagulation and have a one-time follow-up in the A-fib clinic in the next 2 to 3 months.  If he develops shortness of breath, lightheadedness or syncopal episodes will need more urgent follow-up.    Hoping that the peripheral edema is better with more leg movement and support stockings.    ThankRahul

## 2024-07-17 ENCOUNTER — LAB (OUTPATIENT)
Dept: INFUSION THERAPY | Facility: HOSPITAL | Age: 84
End: 2024-07-17
Attending: STUDENT IN AN ORGANIZED HEALTH CARE EDUCATION/TRAINING PROGRAM
Payer: COMMERCIAL

## 2024-07-17 DIAGNOSIS — C61 MALIGNANT NEOPLASM OF PROSTATE (H): ICD-10-CM

## 2024-07-17 PROCEDURE — 84153 ASSAY OF PSA TOTAL: CPT

## 2024-07-17 PROCEDURE — 36415 COLL VENOUS BLD VENIPUNCTURE: CPT

## 2024-07-18 LAB — PSA SERPL DL<=0.01 NG/ML-MCNC: 0.08 NG/ML

## 2024-07-19 ENCOUNTER — OFFICE VISIT (OUTPATIENT)
Dept: RADIATION ONCOLOGY | Facility: CLINIC | Age: 84
End: 2024-07-19
Attending: STUDENT IN AN ORGANIZED HEALTH CARE EDUCATION/TRAINING PROGRAM
Payer: COMMERCIAL

## 2024-07-19 VITALS
RESPIRATION RATE: 20 BRPM | DIASTOLIC BLOOD PRESSURE: 66 MMHG | WEIGHT: 184.2 LBS | TEMPERATURE: 97.8 F | SYSTOLIC BLOOD PRESSURE: 148 MMHG | OXYGEN SATURATION: 99 % | HEART RATE: 60 BPM | BODY MASS INDEX: 25.69 KG/M2

## 2024-07-19 DIAGNOSIS — C61 MALIGNANT NEOPLASM OF PROSTATE (H): Primary | ICD-10-CM

## 2024-07-19 DIAGNOSIS — J92.0 PLEURAL PLAQUE DUE TO ASBESTOS EXPOSURE: ICD-10-CM

## 2024-07-19 PROCEDURE — G0463 HOSPITAL OUTPT CLINIC VISIT: HCPCS | Performed by: STUDENT IN AN ORGANIZED HEALTH CARE EDUCATION/TRAINING PROGRAM

## 2024-07-19 PROCEDURE — 99213 OFFICE O/P EST LOW 20 MIN: CPT | Mod: 24 | Performed by: STUDENT IN AN ORGANIZED HEALTH CARE EDUCATION/TRAINING PROGRAM

## 2024-07-19 PROCEDURE — G2211 COMPLEX E/M VISIT ADD ON: HCPCS | Performed by: STUDENT IN AN ORGANIZED HEALTH CARE EDUCATION/TRAINING PROGRAM

## 2024-07-19 ASSESSMENT — PAIN SCALES - GENERAL: PAINLEVEL: NO PAIN (0)

## 2024-07-19 NOTE — PROGRESS NOTES
Cyril is here with his wife Riya. He is feeling well, generally. He denies pain, needs a stand by assist with showering. He has a good urine stream and is satisfied with that. He uses a walker to get around at home and does some strengthening exercises in his bed. CHARLETTE Marks RN, OCN, CBCN

## 2024-07-19 NOTE — LETTER
7/19/2024      Cyril Galdamez  [address] [facility] [address]      Dear Colleague,    Thank you for referring your patient, Cyril Galdamez, to the [facility] RADIATION ONCOLOGY [place]. Please see a copy of my visit note below.    [facility] Radiation Oncology Follow Up     Patient: Cyril Galdamez  MRN: [id number]  Date of Service: 07/19/2024       DISEASE TREATED: Prostate Adnocarcinoma  Stage:  T2 N0 M0  Emiliano: 4+4 = 8 (grade group 4)  Pre-treatment PSA: 12.10  TRUS/MRI volume: 40 cc      12/26/2023 Eligard 45 mg #1       TYPE OF RADIATION THERAPY ADMINISTERED:    SITE TREATED: Prostate  TOTAL DOSE: 7020 cGy  NUMBER OF FRACTIONS: 26  DATES COMPLETED: 3/14/2024 - 4/19/2024  CONCURRENT CHEMOTHERAPY: No; concurrent ADT  ADJUVANT THERAPY:Yes: ADT- Eligard     INTERVAL SINCE COMPLETION OF RADIATION THERAPY: 3 mo      SUBJECTIVE:  Mr. Galdamez is a 83 year old male who was noted to have elevated PSA     8/29/2022 PSA: 11.6  7/3/2023 PSA: 12.10     Per notes he had palpable prostate nodule.     10/31/2023 MR prostate: 40 cc prostate gland.    Transitional central zones: Well encapsulated BPH nodules.  Peripheral zones:   Lesion 1 left mid gland to apical at 1 to 7 o'clock position with secondary involvement of the transition zone and right apex.  T2 hypointensity with diffusion restriction measuring 3.1 x 1.1 x 1.7 cm.  Broad capsular contact, perineural invasion may be present.  PI-RADS 5.  Lesion to right mid gland at 8:00 posterior lateral T2 hypointensity with marked diffusion restriction measuring 1.4 x 0.7 x 1.0 cm.  PI-RADS 4.  No identified transscapular disease or involvement neurovascular bundles or seminal vesicles.  No bony involvement or pathologically enlarged lymph nodes.  Multiple small hyperemic nodes in the lower retroperitoneal and pelvic inlet.     11/13/2023:  Prostate biopsy, pathology: Prostate adenocarcinoma,   RMA: [place] 4+3=7, 40%  RLA: [place] 4+3=7,  25%  RMM: [place] 3+4=7, 50%  RLM: PIN  RMB: Benign  RLB: [place] 4+3=7, 40%  LMA: [place] 4+3=7, 50%  L LA: [place] 4+3=7, 100%  LMM: [place] 4+3=7, 16%  LLM:  [place] 4+3=7, 60%  L MB: [place] 4+3=7, 40% 1%  LLB:   [place] 3+4=7, 83%  Lesion 1 left peripheral zone: 4+3=7, 80%  Lesion 2: Right peripheral zone: 4+4 = 8, 70%     12/20/2023 PSMA PET/CT: Intense uptake present within the left aspect of the prostate gland (max SUV 18.2), intense uptake in the right posterior prostate (max SUV 46).  No lymph node involvement or evidence of metastatic disease.   Diffuse bilateral calcified pleural plaques within the chest recommend follow-up CT chest 4 to 6 months.     12/26/2023 Eligard 45 mg #1  1/18/2024 PSA: 8.39  7/17/2024 PSA: 0.08    AUA: 10/35  He reports some urinary frequency and weak stream but feels he is able to fully empty when he urinates.  Nocturia 2 times per night.   No bowel changes. Has history of hemorrhoids, no concerns today.  History of colon cancer status post right colectomy (2000).  Last colonoscopy 7/9/2019 normal.      He never scheduled his DEXA scan which was ordered.  He has not received his second Eligard injection.  He is tolerating ADT well and without any notable side effects.  No hot flashes, no mood swings, no notable muscle mass loss.  Discussed calcium and vitamin D given long-term ADT.     Has not had any follow-up regarding pleural plaques seen on PSMA PET/CT.  Was in the [employer] but no known history of agent orange exposure.  Reports known asbestos exposure.    Family history positive for prostate cancer in his father.    Medications were reviewed and are up to date on EPIC.    The following portions of the patient's history were reviewed and updated as appropriate: allergies, current medications, past family history, past medical history, past social history, past surgical history and problem list.    Review of Systems:      General  Constitutional  Constitutional (WDL): Exceptions  to WDL  Fatigue: Fatigue relieved by rest  Fever: Absent or within normal limits  Chills: Absent or within normal limits  Weight Gain: Absent or within normal limits  Weight Loss: Absent or within normal limits  Hot Flashes: Absent or within normal limits  EENT  Eye Disorders  Eye Disorder (WDL): All eye disorder elements are within defined limits  Ear Disorders  Ear Disorder (WDL): All ear disorder elements are within defined limits  Respiratory  Respiratory  Respiratory (WDL): All respiratory elements are within defined limits  Cardiovascular  Cardiovascular  Cardiovascular (WDL): All cardiovascular elements are within defined limits  Gastrointestinal  Gastrointestinal  Gastrointestinal (WDL): Exceptions to WDL  Anorexia: Absent or within normal limits  Nausea: Absent or within normal limits  Vomiting: Absent or within normal limits  Dehydration: Absent or within normal limits  Dysgeusia: Absent or within normal limits  Dysphagia: Absent or within normal limits  Mucositis Oral: Absent or within normal limits  Esophagitis: Absent or within normal limits  Constipation: Absent or within normal limits  Diarrhea: Absent or within normal limits  Pharyngitis: Absent or within normal limits  Dry Mouth: Absent or within normal limits  Musculoskeletal  Musculoskeletal and Connective Tissue Disorders  Musculoskeletal & Connective (WDL): Exceptions to WDL  Arthralgia: Absent or within normal limits  Bone Pain: Absent or within normal limits  Generalized Muscle Weakness: Symptomatic OR evident on physical exam OR limiting instrumental ADL  Myalgia: Absent or within normal limits  Integumentary  Integumentary  Integumentary (WDL): All integumentary elements are within defined limits (derm every 6 mos)  Neurological  Neurosensory  Neurosensory (WDL): Exceptions to WDL  Peripheral Motor Neuropathy: Moderate symptoms OR limiting instrumental ADL  Ataxia: Moderate symptoms OR limiting instrumental ADL  Peripheral Sensory  Neuropathy: Absent or within normal limits  Dizziness: Absent or within normal limits  Syncope: Absent or within normal limits  Dysesthesia: Absent or within normal limits  Genitourinary/Reproductive  Genitourinary  Genitourinary (WDL): All genitourinary elements are within defined limits  Lymphatic  Lymph System Disorders  Lymph (WDL): All lymph elements are within defined limits  Pain  Pain Score: No Pain (0)  AUA Assessment  Over the Past Month  How often have you had a sensation of not emptying your bladder completely after you have finished urinating: Less than half the time: Score: 2  How often have you had to urinate again less than 2 hours after you finshed urinating: Less than half the time: Score: 2  How often have you found you stopped and started again several times when you urinated: Less than half the time: Score: 2  How often have you found it difficult to postpone urine: Less than 1 time in 5: Score: 1  How often have you had a weak urinary stream: Almost Always: Score: 5  How often have you had to push or starin to begin uriation: Less than 1 time in 5: Score: 1  How many times do you most typically get up to urinate from the time you went to bed at night until the time you got up in the morning?: 2 times, Score: 2  Total AUA Score: Degree of Severity: 20-35 is Severe  If you had to spend the rest of your life with your urinary condition just the way it is now, how would you feel: Mostly Satisfied  Please check the appropriate box that describes your ability to have an erection: Unable to have an erection                                                           Accompanied by  Accompanied By: family (wife Riya)    Objective:          PHYSICAL EXAMINATION:    BP (!) 148/66   Pulse 60   Temp 97.8  F (36.6  C) (Oral)   Resp 20   Wt 83.6 kg (184 lb 3.2 oz)   SpO2 99%   BMI 25.69 kg/m      Gen: Alert, in NAD pleasant interactive, well nourished  Eyes:  EOMI, sclera anicteric  HENT     Head:  NC/AT  Pulm: No wheezing, stridor or respiratory distress  Musculoskeletal: Normal muscle bulk and tone  Skin: Normal tone and turgor, warm, dry, intact  Neurologic: A/Ox3,  face symmetric, speech fluent. Gait normal and unaided  Psychiatric: Appropriate mood and affect     Imaging: Imaging results 6 weeks:ZIO PATCH 3-7 DAYS (additional cost to patient)    Result Date: 6/20/2024  Zio monitoring from 5/9/2024 to 5/15/2024 (duration 6d 2h). Continuous atrial fibrillation, ventricular rates 33 to 109bpm, average 62bpm. There were no pauses of greater than 3 seconds. Frequent premature ventricular contractions (isolated 5.2%). No symptoms recorded. Electronically signed by Holly Brown MD 6/20/2024  8:37 AM      Impression   83-year-old gentleman with high risk prostate cancer status post initiation of ADT and completion of definitive radiation therapy with good biochemical response.    Has not received second Lupron injection and is interested in proceeding with that at [facility].  Visit Dx:    (C61) Malignant neoplasm of prostate (H)  (primary encounter diagnosis)      Cancer Staging   Malignant neoplasm of prostate (H)  Staging form: Prostate, AJCC 8th Edition  - Clinical stage from 12/20/2023: Stage IIC (cT2c, cN0, cM0, PSA: 12.1, Grade Group: 4) - Signed by Ina Lam MD on 1/25/2024      Assessment & Plan:   1.  Lupron ordered, due for injection as soon as it can be arranged  2.  DEXA scan previously ordered but never done he will schedule that  3.  CT chest to follow-up on pleural plaques seen on PSMA PET/CT, known asbestos exposure  4.  Return to clinic in 6 months with PSA sooner if needed    Pain Management Plan: N/A    Total time of this visit, including time spent face-to-face with patient and or via video/audio, and also in preparing for today's visit for MDM and documentation. Medical decision-making included consideration and possible diagnoses, management options, complex record  review, review of diagnostic tests and information, consideration and discussion of significant complications based on comorbidities, discussion with providers involved in the care of the patient.     45 Minutes spent.          Ina Lam MD  Department of Radiation Oncology   [facility] Radiation Oncology  Tel: [phone]  Page: [phone]    [facility]  [address] [place] [address]     [facility]   [address] [facility] [address] [place] [address]    CC:  Patient Care Team:  Angel Sung MD as PCP - General (Internal Medicine - Pediatrics)  Ina Lam MD as MD (Radiation Oncology)  Kavitha Jacinto CNP as Assigned Neuroscience Provider  Ina Lam MD as Assigned Cancer Care Provider  Angel Sung MD as Assigned PCP  Rahul Staples MD as MD (Cardiovascular Disease)  Rahul Staples MD as Assigned Heart and Vascular Provider      Cyril is here with his wife Riya. He is feeling well, generally. He denies pain, needs a stand by assist with showering. He has a good urine stream and is satisfied with that. He uses a walker to get around at home and does some strengthening exercises in his bed. CHARLETTE Marks, RN, OCN, CBCN      Again, thank you for allowing me to participate in the care of your patient.        Sincerely,        Ina Lam MD

## 2024-07-19 NOTE — PROGRESS NOTES
[facility] Radiation Oncology Follow Up     Patient: Cyril Galdamez  MRN: [id number]  Date of Service: 07/19/2024       DISEASE TREATED: Prostate Adnocarcinoma  Stage:  T2 N0 M0  Emiliano: 4+4 = 8 (grade group 4)  Pre-treatment PSA: 12.10  TRUS/MRI volume: 40 cc      12/26/2023 Eligard 45 mg #1       TYPE OF RADIATION THERAPY ADMINISTERED:    SITE TREATED: Prostate  TOTAL DOSE: 7020 cGy  NUMBER OF FRACTIONS: 26  DATES COMPLETED: 3/14/2024 - 4/19/2024  CONCURRENT CHEMOTHERAPY: No; concurrent ADT  ADJUVANT THERAPY:Yes: ADT- Eligard     INTERVAL SINCE COMPLETION OF RADIATION THERAPY: 3 mo      SUBJECTIVE:  Mr. Galdamez is a 83 year old male who was noted to have elevated PSA     8/29/2022 PSA: 11.6  7/3/2023 PSA: 12.10     Per notes he had palpable prostate nodule.     10/31/2023 MR prostate: 40 cc prostate gland.    Transitional central zones: Well encapsulated BPH nodules.  Peripheral zones:   Lesion 1 left mid gland to apical at 1 to 7 o'clock position with secondary involvement of the transition zone and right apex.  T2 hypointensity with diffusion restriction measuring 3.1 x 1.1 x 1.7 cm.  Broad capsular contact, perineural invasion may be present.  PI-RADS 5.  Lesion to right mid gland at 8:00 posterior lateral T2 hypointensity with marked diffusion restriction measuring 1.4 x 0.7 x 1.0 cm.  PI-RADS 4.  No identified transscapular disease or involvement neurovascular bundles or seminal vesicles.  No bony involvement or pathologically enlarged lymph nodes.  Multiple small hyperemic nodes in the lower retroperitoneal and pelvic inlet.     11/13/2023:  Prostate biopsy, pathology: Prostate adenocarcinoma,   RMA: [place] 4+3=7, 40%  RLA: [place] 4+3=7, 25%  RMM: [place] 3+4=7, 50%  RLM: PIN  RMB: Benign  RLB: [place] 4+3=7, 40%  LMA: [place] 4+3=7, 50%  L LA: [place] 4+3=7, 100%  LMM: [place] 4+3=7, 16%  LLM:  [place] 4+3=7, 60%  L MB: [place] 4+3=7, 40% 1%  LLB:   [place] 3+4=7, 83%  Lesion 1 left  peripheral zone: 4+3=7, 80%  Lesion 2: Right peripheral zone: 4+4 = 8, 70%     12/20/2023 PSMA PET/CT: Intense uptake present within the left aspect of the prostate gland (max SUV 18.2), intense uptake in the right posterior prostate (max SUV 46).  No lymph node involvement or evidence of metastatic disease.   Diffuse bilateral calcified pleural plaques within the chest recommend follow-up CT chest 4 to 6 months.     12/26/2023 Eligard 45 mg #1  1/18/2024 PSA: 8.39  7/17/2024 PSA: 0.08    AUA: 10/35  He reports some urinary frequency and weak stream but feels he is able to fully empty when he urinates.  Nocturia 2 times per night.   No bowel changes. Has history of hemorrhoids, no concerns today.  History of colon cancer status post right colectomy (2000).  Last colonoscopy 7/9/2019 normal.      He never scheduled his DEXA scan which was ordered.  He has not received his second Eligard injection.  He is tolerating ADT well and without any notable side effects.  No hot flashes, no mood swings, no notable muscle mass loss.  Discussed calcium and vitamin D given long-term ADT.     Has not had any follow-up regarding pleural plaques seen on PSMA PET/CT.  Was in the [employer] but no known history of agent orange exposure.  Reports known asbestos exposure.    Family history positive for prostate cancer in his father.    Medications were reviewed and are up to date on EPIC.    The following portions of the patient's history were reviewed and updated as appropriate: allergies, current medications, past family history, past medical history, past social history, past surgical history and problem list.    Review of Systems:      General  Constitutional  Constitutional (WDL): Exceptions to WDL  Fatigue: Fatigue relieved by rest  Fever: Absent or within normal limits  Chills: Absent or within normal limits  Weight Gain: Absent or within normal limits  Weight Loss: Absent or within normal limits  Hot Flashes: Absent or within normal  limits  EENT  Eye Disorders  Eye Disorder (WDL): All eye disorder elements are within defined limits  Ear Disorders  Ear Disorder (WDL): All ear disorder elements are within defined limits  Respiratory  Respiratory  Respiratory (WDL): All respiratory elements are within defined limits  Cardiovascular  Cardiovascular  Cardiovascular (WDL): All cardiovascular elements are within defined limits  Gastrointestinal  Gastrointestinal  Gastrointestinal (WDL): Exceptions to WDL  Anorexia: Absent or within normal limits  Nausea: Absent or within normal limits  Vomiting: Absent or within normal limits  Dehydration: Absent or within normal limits  Dysgeusia: Absent or within normal limits  Dysphagia: Absent or within normal limits  Mucositis Oral: Absent or within normal limits  Esophagitis: Absent or within normal limits  Constipation: Absent or within normal limits  Diarrhea: Absent or within normal limits  Pharyngitis: Absent or within normal limits  Dry Mouth: Absent or within normal limits  Musculoskeletal  Musculoskeletal and Connective Tissue Disorders  Musculoskeletal & Connective (WDL): Exceptions to WDL  Arthralgia: Absent or within normal limits  Bone Pain: Absent or within normal limits  Generalized Muscle Weakness: Symptomatic OR evident on physical exam OR limiting instrumental ADL  Myalgia: Absent or within normal limits  Integumentary  Integumentary  Integumentary (WDL): All integumentary elements are within defined limits (derm every 6 mos)  Neurological  Neurosensory  Neurosensory (WDL): Exceptions to WDL  Peripheral Motor Neuropathy: Moderate symptoms OR limiting instrumental ADL  Ataxia: Moderate symptoms OR limiting instrumental ADL  Peripheral Sensory Neuropathy: Absent or within normal limits  Dizziness: Absent or within normal limits  Syncope: Absent or within normal limits  Dysesthesia: Absent or within normal limits  Genitourinary/Reproductive  Genitourinary  Genitourinary (WDL): All genitourinary  elements are within defined limits  Lymphatic  Lymph System Disorders  Lymph (WDL): All lymph elements are within defined limits  Pain  Pain Score: No Pain (0)  AUA Assessment  Over the Past Month  How often have you had a sensation of not emptying your bladder completely after you have finished urinating: Less than half the time: Score: 2  How often have you had to urinate again less than 2 hours after you finshed urinating: Less than half the time: Score: 2  How often have you found you stopped and started again several times when you urinated: Less than half the time: Score: 2  How often have you found it difficult to postpone urine: Less than 1 time in 5: Score: 1  How often have you had a weak urinary stream: Almost Always: Score: 5  How often have you had to push or starin to begin uriation: Less than 1 time in 5: Score: 1  How many times do you most typically get up to urinate from the time you went to bed at night until the time you got up in the morning?: 2 times, Score: 2  Total AUA Score: Degree of Severity: 20-35 is Severe  If you had to spend the rest of your life with your urinary condition just the way it is now, how would you feel: Mostly Satisfied  Please check the appropriate box that describes your ability to have an erection: Unable to have an erection                                                           Accompanied by  Accompanied By: family (wife Riya)    Objective:          PHYSICAL EXAMINATION:    BP (!) 148/66   Pulse 60   Temp 97.8  F (36.6  C) (Oral)   Resp 20   Wt 83.6 kg (184 lb 3.2 oz)   SpO2 99%   BMI 25.69 kg/m      Gen: Alert, in NAD pleasant interactive, well nourished  Eyes:  EOMI, sclera anicteric  HENT     Head: NC/AT  Pulm: No wheezing, stridor or respiratory distress  Musculoskeletal: Normal muscle bulk and tone  Skin: Normal tone and turgor, warm, dry, intact  Neurologic: A/Ox3,  face symmetric, speech fluent. Gait normal and unaided  Psychiatric: Appropriate mood  and affect     Imaging: Imaging results 6 weeks:ZIO PATCH 3-7 DAYS (additional cost to patient)    Result Date: 6/20/2024  Zio monitoring from 5/9/2024 to 5/15/2024 (duration 6d 2h). Continuous atrial fibrillation, ventricular rates 33 to 109bpm, average 62bpm. There were no pauses of greater than 3 seconds. Frequent premature ventricular contractions (isolated 5.2%). No symptoms recorded. Electronically signed by Holly Brown MD 6/20/2024  8:37 AM      Impression   83-year-old gentleman with high risk prostate cancer status post initiation of ADT and completion of definitive radiation therapy with good biochemical response.    Has not received second Lupron injection and is interested in proceeding with that at [facility].  Visit Dx:    (C61) Malignant neoplasm of prostate (H)  (primary encounter diagnosis)      Cancer Staging   Malignant neoplasm of prostate (H)  Staging form: Prostate, AJCC 8th Edition  - Clinical stage from 12/20/2023: Stage IIC (cT2c, cN0, cM0, PSA: 12.1, Grade Group: 4) - Signed by Ina Lam MD on 1/25/2024      Assessment & Plan:   1.  Lupron ordered, due for injection as soon as it can be arranged  2.  DEXA scan previously ordered but never done he will schedule that  3.  CT chest to follow-up on pleural plaques seen on PSMA PET/CT, known asbestos exposure  4.  Return to clinic in 6 months with PSA sooner if needed    Pain Management Plan: N/A    Total time of this visit, including time spent face-to-face with patient and or via video/audio, and also in preparing for today's visit for MDM and documentation. Medical decision-making included consideration and possible diagnoses, management options, complex record review, review of diagnostic tests and information, consideration and discussion of significant complications based on comorbidities, discussion with providers involved in the care of the patient.     45 Minutes spent.          Ina Lam MD  Department of  Radiation Oncology   [facility] Radiation Oncology  Tel: [phone]  Page: [phone]    [facility]  [address]  [place] [address]     [address] [place] [address]    CC:  Patient Care Team:  Angel Sung MD as PCP - General (Internal Medicine - Pediatrics)  Ina Lam MD as MD (Radiation Oncology)  Kavitha Jacinto CNP as Assigned Neuroscience Provider  Ina Lam MD as Assigned Cancer Care Provider  Angel Sung MD as Assigned PCP  Rahul Staples MD as MD (Cardiovascular Disease)  Rahul Staples MD as Assigned Heart and Vascular Provider

## 2024-08-01 ENCOUNTER — INFUSION THERAPY VISIT (OUTPATIENT)
Dept: INFUSION THERAPY | Facility: HOSPITAL | Age: 84
End: 2024-08-01
Attending: STUDENT IN AN ORGANIZED HEALTH CARE EDUCATION/TRAINING PROGRAM
Payer: COMMERCIAL

## 2024-08-01 VITALS
DIASTOLIC BLOOD PRESSURE: 60 MMHG | OXYGEN SATURATION: 97 % | TEMPERATURE: 98.2 F | HEART RATE: 60 BPM | SYSTOLIC BLOOD PRESSURE: 139 MMHG | BODY MASS INDEX: 25.69 KG/M2 | HEIGHT: 71 IN | RESPIRATION RATE: 18 BRPM

## 2024-08-01 DIAGNOSIS — C61 MALIGNANT NEOPLASM OF PROSTATE (H): Primary | ICD-10-CM

## 2024-08-01 PROCEDURE — 250N000011 HC RX IP 250 OP 636: Performed by: STUDENT IN AN ORGANIZED HEALTH CARE EDUCATION/TRAINING PROGRAM

## 2024-08-01 PROCEDURE — 96402 CHEMO HORMON ANTINEOPL SQ/IM: CPT

## 2024-08-01 RX ADMIN — LEUPROLIDE ACETATE 45 MG: KIT at 15:26

## 2024-08-01 ASSESSMENT — PAIN SCALES - GENERAL: PAINLEVEL: NO PAIN (0)

## 2024-08-01 NOTE — PROGRESS NOTES
Infusion Nursing Note:  Cyril Galdamez presents today for Lupron 45mg (6month dose).    Patient seen by provider today: No    Note: Pt arrives via wheelchair to [facility]. Pt reports having first received lupron inj in December. Pt denies having noted any side effects. Discussed plan for today's visit.    Intravenous Access:  No Intravenous access/labs at this visit.    Post Infusion Assessment:  Patient tolerated IM injection without incident in Left side gluteal.    Discharge Plan:   Pt given written information on Lupron.  Patient discharged in stable condition accompanied by: wife in lobby.  Departure Mode: Wheelchair.      Sully Lynch RN

## 2024-08-12 ENCOUNTER — OFFICE VISIT (OUTPATIENT)
Dept: FAMILY MEDICINE | Facility: CLINIC | Age: 84
End: 2024-08-12
Payer: COMMERCIAL

## 2024-08-12 VITALS
SYSTOLIC BLOOD PRESSURE: 136 MMHG | HEIGHT: 71 IN | BODY MASS INDEX: 25.2 KG/M2 | HEART RATE: 56 BPM | DIASTOLIC BLOOD PRESSURE: 74 MMHG | OXYGEN SATURATION: 99 % | WEIGHT: 180 LBS

## 2024-08-12 DIAGNOSIS — R73.03 PREDIABETES: ICD-10-CM

## 2024-08-12 DIAGNOSIS — C61 MALIGNANT NEOPLASM OF PROSTATE (H): ICD-10-CM

## 2024-08-12 DIAGNOSIS — I48.21 PERMANENT ATRIAL FIBRILLATION (H): ICD-10-CM

## 2024-08-12 DIAGNOSIS — R60.0 BILATERAL LEG EDEMA: ICD-10-CM

## 2024-08-12 DIAGNOSIS — E78.5 DYSLIPIDEMIA: ICD-10-CM

## 2024-08-12 DIAGNOSIS — D64.9 ANEMIA, UNSPECIFIED TYPE: Primary | ICD-10-CM

## 2024-08-12 LAB
HBA1C MFR BLD: 5.9 % (ref 0–5.6)
HGB BLD-MCNC: 12.7 G/DL (ref 13.3–17.7)
RETICS # AUTO: 0.06 10E6/UL (ref 0.03–0.1)
RETICS/RBC NFR AUTO: 1.5 % (ref 0.5–2)

## 2024-08-12 PROCEDURE — 85018 HEMOGLOBIN: CPT | Performed by: INTERNAL MEDICINE

## 2024-08-12 PROCEDURE — 83036 HEMOGLOBIN GLYCOSYLATED A1C: CPT | Performed by: INTERNAL MEDICINE

## 2024-08-12 PROCEDURE — 36415 COLL VENOUS BLD VENIPUNCTURE: CPT | Performed by: INTERNAL MEDICINE

## 2024-08-12 PROCEDURE — 83550 IRON BINDING TEST: CPT | Performed by: INTERNAL MEDICINE

## 2024-08-12 PROCEDURE — 85045 AUTOMATED RETICULOCYTE COUNT: CPT | Performed by: INTERNAL MEDICINE

## 2024-08-12 PROCEDURE — 80053 COMPREHEN METABOLIC PANEL: CPT | Performed by: INTERNAL MEDICINE

## 2024-08-12 PROCEDURE — 83540 ASSAY OF IRON: CPT | Performed by: INTERNAL MEDICINE

## 2024-08-12 PROCEDURE — G2211 COMPLEX E/M VISIT ADD ON: HCPCS | Performed by: INTERNAL MEDICINE

## 2024-08-12 PROCEDURE — 80061 LIPID PANEL: CPT | Performed by: INTERNAL MEDICINE

## 2024-08-12 PROCEDURE — 99214 OFFICE O/P EST MOD 30 MIN: CPT | Performed by: INTERNAL MEDICINE

## 2024-08-12 NOTE — PROGRESS NOTES
Assessment & Plan     Anemia, unspecified type  Improved since last check. Labs today for iron deficiency, likely anemia of chronic disease based on last ferritin, continue to monitor  - Hemoglobin; Future  - Reticulocyte count; Future  - Iron and iron binding capacity; Future  - Hemoglobin  - Reticulocyte count  - Iron and iron binding capacity    Prediabetes  Lab Results   Component Value Date    A1C 5.9 08/12/2024    A1C 5.5 05/06/2024    A1C 5.7 10/24/2023    A1C 6.0 08/29/2022    A1C 5.7 12/14/2020   Slight increase from last check, continue with diet control.   - Hemoglobin A1c; Future  - Comprehensive metabolic panel; Future  - Hemoglobin A1c  - Comprehensive metabolic panel    Malignant neoplasm of prostate (H)  Following with radiation therapy and on leuprolide therapy    Permanent atrial fibrillation (H)  Seen by cardiology, no symptoms of bradycardia- consider recheck with cardiology and repeat holter if needed based on if having symptoms of LH or dizziness, no rate control medication with bradycardia- continue eliquis therapy with CHADS2 score    Dyslipidemia  LDL Cholesterol Calculated   Date Value Ref Range Status   08/12/2024 91 <=100 mg/dL Final   Will verify still taking atorvastatin therapy. Will increase if needed for goal of 70  - Lipid panel reflex to direct LDL Fasting; Future  - Lipid panel reflex to direct LDL Fasting      Bilateral leg edema- stable from prior.   Encourage use of compression stockings  - Echo done 3/2024 Echo result w/o MOPS: Interpretation Summary Left ventricular size, wall motion and function are normal. The ejectionfraction is 60-65%.Normal right ventricle size and systolic function.IVC diameter <2.1 cm collapsing >50% with sniff suggests a normal RA pressureof 3 mmHg.            Aftab Nam is a 83 year old, presenting for the following health issues:  RECHECK (Follow up)        8/12/2024     4:36 PM   Additional Questions   Roomed by Leisa OVALLES MA     History  "of Present Illness       Reason for visit:  Recheck      Wt Readings from Last 4 Encounters:   08/12/24 81.6 kg (180 lb)   07/19/24 83.6 kg (184 lb 3.2 oz)   06/11/24 82.6 kg (182 lb)   05/06/24 83.5 kg (184 lb)     Leg edema has been stable, better with putting them up    On eliquis therapy, wondering if needs to continue. No LH or dizziness. Does not feel aware of his atrial fibrillation. Was noted to have 1st degree block in the past not atrial fib.     Seen by cardiology. Recommend follow up this fall.     No shortness of breath or chest pain.     Prediabetes- due for labs today. Has been watching diet, hard to be very active.    Following with oncology for prostate cancer.          Review of Systems  Constitutional, HEENT, cardiovascular, pulmonary, gi and gu systems are negative, except as otherwise noted.      Objective    /74   Pulse 56   Ht 1.803 m (5' 11\")   Wt 81.6 kg (180 lb)   SpO2 99%   BMI 25.10 kg/m    Body mass index is 25.1 kg/m .  Physical Exam   General: alert, interactive, NAD  HENT; sclerae clear, MMM  Neck- no bruit noted  CV: irregularly irregular, no murmurs, +1 lower extremity edema  Neuro- sitting in wheelchair, no focal deficits  Psych: appropriate mood          The longitudinal plan of care for the diagnosis(es)/condition(s) as documented were addressed during this visit. Due to the added complexity in care, I will continue to support Cyril in the subsequent management and with ongoing continuity of care.      Signed Electronically by: Angel Sung MD    "

## 2024-08-12 NOTE — PATIENT INSTRUCTIONS
We will recheck labs today.     COVID vaccine this fall with flu shot.     Can consider checking blood pressure at home and let me know if getting above 140/90 for blood pressures.    Let me know if leg swelling gets worse.    Let me know if feeling any lightheadedness, shortness of breath or dizziness comes up.    Let me know if issues are coming up.    Angel Sung MD

## 2024-08-13 PROBLEM — R60.0 BILATERAL LEG EDEMA: Status: ACTIVE | Noted: 2024-08-13

## 2024-08-13 LAB
ALBUMIN SERPL BCG-MCNC: 4.2 G/DL (ref 3.5–5.2)
ALP SERPL-CCNC: 70 U/L (ref 40–150)
ALT SERPL W P-5'-P-CCNC: 20 U/L (ref 0–70)
ANION GAP SERPL CALCULATED.3IONS-SCNC: 14 MMOL/L (ref 7–15)
AST SERPL W P-5'-P-CCNC: 25 U/L (ref 0–45)
BILIRUB SERPL-MCNC: 0.7 MG/DL
BUN SERPL-MCNC: 39.2 MG/DL (ref 8–23)
CALCIUM SERPL-MCNC: 9.5 MG/DL (ref 8.8–10.4)
CHLORIDE SERPL-SCNC: 99 MMOL/L (ref 98–107)
CHOLEST SERPL-MCNC: 166 MG/DL
CREAT SERPL-MCNC: 1.16 MG/DL (ref 0.67–1.17)
EGFRCR SERPLBLD CKD-EPI 2021: 62 ML/MIN/1.73M2
FASTING STATUS PATIENT QL REPORTED: ABNORMAL
FASTING STATUS PATIENT QL REPORTED: NORMAL
GLUCOSE SERPL-MCNC: 108 MG/DL (ref 70–99)
HCO3 SERPL-SCNC: 23 MMOL/L (ref 22–29)
HDLC SERPL-MCNC: 58 MG/DL
IRON BINDING CAPACITY (ROCHE): 246 UG/DL (ref 240–430)
IRON SATN MFR SERPL: 43 % (ref 15–46)
IRON SERPL-MCNC: 107 UG/DL (ref 61–157)
LDLC SERPL CALC-MCNC: 91 MG/DL
NONHDLC SERPL-MCNC: 108 MG/DL
POTASSIUM SERPL-SCNC: 4.2 MMOL/L (ref 3.4–5.3)
PROT SERPL-MCNC: 7.1 G/DL (ref 6.4–8.3)
SODIUM SERPL-SCNC: 136 MMOL/L (ref 135–145)
TRIGL SERPL-MCNC: 83 MG/DL

## 2024-08-14 ENCOUNTER — TELEPHONE (OUTPATIENT)
Dept: FAMILY MEDICINE | Facility: CLINIC | Age: 84
End: 2024-08-14
Payer: COMMERCIAL

## 2024-08-14 DIAGNOSIS — E78.5 DYSLIPIDEMIA: ICD-10-CM

## 2024-08-14 RX ORDER — ATORVASTATIN CALCIUM 20 MG/1
20 TABLET, FILM COATED ORAL DAILY
Qty: 90 TABLET | Refills: 3 | Status: SHIPPED | OUTPATIENT
Start: 2024-08-14

## 2024-08-14 NOTE — TELEPHONE ENCOUNTER
----- Message from Veronica SYLVESTER sent at 8/14/2024  8:40 AM CDT -----    ----- Message -----  From: Angel Sung MD  Sent: 8/13/2024  11:55 AM CDT  To: [facility]    Please call with results.       Here are the results from the recent Labs that we did.     Your urea nitrogen was slightly elevated. This can be elevated if having slight dehydration- I would focus on ensuring taking enough fluids in each day.    Your prediabetes is stable.     Are you taking the atorvastatin every day- your LDL (bad cholesterol) is up above our goal for you of 70, so I want to make sure that you are taking this, if so we may increase this.    The other labs look stable.    Let me know if you have questions or concerns!    Sincerely,      Angel Sung MD  Internal Medicine and Pediatrics

## 2024-08-14 NOTE — TELEPHONE ENCOUNTER
I would like patient to increase his atorvastatin to 20 mg/day.  He can do this by taking 2 of his 10 mg tablets.  I sent in a new prescription for 20 mg tablets to [facility].  We can recheck labs with a lab only appointment in 6 to 8 weeks or conversely we can do it when he is in clinic in December either is okay.

## 2024-08-14 NOTE — TELEPHONE ENCOUNTER
Patient returning call, relayed PCP's detailed message. He confirms taking atorvastatin 10mg daily and has not missed a dose.

## 2024-08-14 NOTE — TELEPHONE ENCOUNTER
Outgoing call to patient. Relayed PCP's detailed message. Booked lab only appt 11/6 as per his availability and will see PCP on 12/16 as scheduled. No further questions/concerns at this time.

## 2024-10-16 ENCOUNTER — TELEPHONE (OUTPATIENT)
Dept: RADIATION ONCOLOGY | Facility: CLINIC | Age: 84
End: 2024-10-16
Payer: COMMERCIAL

## 2024-10-16 NOTE — TELEPHONE ENCOUNTER
Patient called with questions in regards to his bone density.  Patient states he has been on hormone therapy for his prostate cancer and has concerns about how this may affect his bones and his strength.    Talk to patient about taking calcium with D while on hormonal therapy.  Per Dr. Lam's office notes from July 2024 she had had a discussion with patient about starting calcium with D and having a DEXA scan.  Explained to patient the DEXA scan and how it can evaluate bone density.  Encouraged patient to schedule that procedure.    Orders are in for both the DEXA scan and for patient to have a CT of the chest.  Patient appreciative of information and transferred call to the scheduling team to assist with scheduling of procedures.  Patient encouraged to call back and/or make a follow up appointment for further discussion as needed.

## 2024-10-17 ENCOUNTER — TELEPHONE (OUTPATIENT)
Dept: FAMILY MEDICINE | Facility: CLINIC | Age: 84
End: 2024-10-17
Payer: COMMERCIAL

## 2024-10-17 DIAGNOSIS — M25.569 KNEE PAIN, UNSPECIFIED CHRONICITY, UNSPECIFIED LATERALITY: Primary | ICD-10-CM

## 2024-10-17 NOTE — TELEPHONE ENCOUNTER
"RN left voice message for patient to callback for more information.  Patient wanting to know \"Would cortizone shots in my knees give me extra support and keep them from buckling?\" Would this be recommended with my health condition?     Please ask patient which health condition he is referring to?  Does he have knee pain?      Patient returning call.  Reports that he does not have any pain or swelling in knees, but he is concerned about both his knees \"buckling\" and weakness in his knees. Wondering is Cortisone injections would help.  Patient does use a walker for safety. But reports he is concerned about doing activities outside the household, through he does state he has \"good help.\"    Routing to PCP to review and advise.  Patient would like a callback to schedule if PCP recommends.   "

## 2024-10-17 NOTE — TELEPHONE ENCOUNTER
"  General Call    Contacts       Contact Date/Time Type Contact Phone/Fax    10/17/2024 10:32 AM CDT Phone (Incoming) Cyril Galdamez (Self) [phone] (M)          Reason for Call:  Patient calling to ask Dr. Sung a question.    \"Would cortizone shots in my knees give me extra support and keep them from buckling?\" Would this be recommended with my health condition?   Patient has a DEXA scheduled on 10/31/24.    Please Advise.  Patient would like to speak to have Dr. Sung or his nurse call back.    Okay to leave a detailed message?: No  at Cell number on file:    Telephone Information:   Mobile [phone]     "

## 2024-10-17 NOTE — TELEPHONE ENCOUNTER
Cortisone not likely to help if not having pain.     Would consider physical therapy. We can do a referral if interested.

## 2024-10-17 NOTE — TELEPHONE ENCOUNTER
Call to patient and relayed provider message. He is interested in physical therapy. Will route request back to PCP.

## 2024-10-24 DIAGNOSIS — R73.03 PREDIABETES: ICD-10-CM

## 2024-10-24 DIAGNOSIS — I10 PRIMARY HYPERTENSION: ICD-10-CM

## 2024-10-24 RX ORDER — LISINOPRIL 40 MG/1
40 TABLET ORAL DAILY
Qty: 90 TABLET | Refills: 2 | Status: SHIPPED | OUTPATIENT
Start: 2024-10-24

## 2024-11-02 DIAGNOSIS — J61 ASBESTOSIS (H): ICD-10-CM

## 2024-11-04 RX ORDER — ALBUTEROL SULFATE 90 UG/1
INHALANT RESPIRATORY (INHALATION)
Qty: 18 G | Refills: 2 | Status: SHIPPED | OUTPATIENT
Start: 2024-11-04

## 2024-11-12 ENCOUNTER — ANCILLARY PROCEDURE (OUTPATIENT)
Dept: BONE DENSITY | Facility: CLINIC | Age: 84
End: 2024-11-12
Attending: STUDENT IN AN ORGANIZED HEALTH CARE EDUCATION/TRAINING PROGRAM
Payer: COMMERCIAL

## 2024-11-12 DIAGNOSIS — Z79.818 LONG TERM (CURRENT) USE OF OTHER AGENTS AFFECTING ESTROGEN RECEPTORS AND ESTROGEN LEVELS: ICD-10-CM

## 2024-11-12 DIAGNOSIS — C61 PROSTATE CA (H): ICD-10-CM

## 2024-11-12 PROCEDURE — 77081 DXA BONE DENSITY APPENDICULR: CPT | Mod: TC | Performed by: PHYSICIAN ASSISTANT

## 2024-11-12 PROCEDURE — 77080 DXA BONE DENSITY AXIAL: CPT | Mod: TC | Performed by: PHYSICIAN ASSISTANT

## 2024-11-12 PROCEDURE — 77091 TBS TECHL CALCULATION ONLY: CPT | Performed by: PHYSICIAN ASSISTANT

## 2024-11-26 ENCOUNTER — THERAPY VISIT (OUTPATIENT)
Dept: PHYSICAL THERAPY | Facility: REHABILITATION | Age: 84
End: 2024-11-26
Attending: FAMILY MEDICINE
Payer: COMMERCIAL

## 2024-11-26 DIAGNOSIS — R29.898 LEG WEAKNESS, BILATERAL: Primary | ICD-10-CM

## 2024-11-26 DIAGNOSIS — M25.569 KNEE PAIN, UNSPECIFIED CHRONICITY, UNSPECIFIED LATERALITY: ICD-10-CM

## 2024-11-26 DIAGNOSIS — Z74.09 IMPAIRED FUNCTIONAL MOBILITY, BALANCE, GAIT, AND ENDURANCE: ICD-10-CM

## 2024-11-26 PROCEDURE — 97110 THERAPEUTIC EXERCISES: CPT | Mod: GP | Performed by: PHYSICAL THERAPIST

## 2024-11-26 PROCEDURE — 97162 PT EVAL MOD COMPLEX 30 MIN: CPT | Mod: GP | Performed by: PHYSICAL THERAPIST

## 2024-11-26 ASSESSMENT — ACTIVITIES OF DAILY LIVING (ADL)
GO DOWN STAIRS: I AM UNABLE TO DO THE ACTIVITY
SIT WITH YOUR KNEE BENT: ACTIVITY IS NOT DIFFICULT
RISE FROM A CHAIR: ACTIVITY IS VERY DIFFICULT
PLEASE_INDICATE_YOR_PRIMARY_REASON_FOR_REFERRAL_TO_THERAPY:: KNEE
PAIN: THE SYMPTOM AFFECTS MY ACTIVITY MODERATELY
STAND: ACTIVITY IS VERY DIFFICULT
LIMPING: I DO NOT HAVE THE SYMPTOM
SQUAT: I AM UNABLE TO DO THE ACTIVITY
STIFFNESS: THE SYMPTOM AFFECTS MY ACTIVITY MODERATELY
KNEE_ACTIVITY_OF_DAILY_LIVING_SUM: 25
PAIN: THE SYMPTOM AFFECTS MY ACTIVITY MODERATELY
WALK: ACTIVITY IS VERY DIFFICULT
GO DOWN STAIRS: I AM UNABLE TO DO THE ACTIVITY
KNEE_ACTIVITY_OF_DAILY_LIVING_SCORE: 35.71
STIFFNESS: THE SYMPTOM AFFECTS MY ACTIVITY MODERATELY
WEAKNESS: THE SYMPTOM AFFECTS MY ACTIVITY MODERATELY
STAND: ACTIVITY IS VERY DIFFICULT
KNEEL ON THE FRONT OF YOUR KNEE: I AM UNABLE TO DO THE ACTIVITY
GO UP STAIRS: I AM UNABLE TO DO THE ACTIVITY
KNEEL ON THE FRONT OF YOUR KNEE: I AM UNABLE TO DO THE ACTIVITY
SIT WITH YOUR KNEE BENT: ACTIVITY IS NOT DIFFICULT
GO UP STAIRS: I AM UNABLE TO DO THE ACTIVITY
HOW_WOULD_YOU_RATE_THE_CURRENT_FUNCTION_OF_YOUR_KNEE_DURING_YOUR_USUAL_DAILY_ACTIVITIES_ON_A_SCALE_FROM_0_TO_100_WITH_100_BEING_YOUR_LEVEL_OF_KNEE_FUNCTION_PRIOR_TO_YOUR_INJURY_AND_0_BEING_THE_INABILITY_TO_PERFORM_ANY_OF_YOUR_USUAL_DAILY_ACTIVITIES?: 15
RAW_SCORE: 25
SWELLING: I DO NOT HAVE THE SYMPTOM
AS_A_RESULT_OF_YOUR_KNEE_INJURY,_HOW_WOULD_YOU_RATE_YOUR_CURRENT_LEVEL_OF_DAILY_ACTIVITY?: ABNORMAL
GIVING WAY, BUCKLING OR SHIFTING OF KNEE: THE SYMPTOM AFFECTS MY ACTIVITY SEVERELY
AS_A_RESULT_OF_YOUR_KNEE_INJURY,_HOW_WOULD_YOU_RATE_YOUR_CURRENT_LEVEL_OF_DAILY_ACTIVITY?: ABNORMAL
WEAKNESS: THE SYMPTOM AFFECTS MY ACTIVITY MODERATELY
GIVING WAY, BUCKLING OR SHIFTING OF KNEE: THE SYMPTOM AFFECTS MY ACTIVITY SEVERELY
HOW_WOULD_YOU_RATE_THE_CURRENT_FUNCTION_OF_YOUR_KNEE_DURING_YOUR_USUAL_DAILY_ACTIVITIES_ON_A_SCALE_FROM_0_TO_100_WITH_100_BEING_YOUR_LEVEL_OF_KNEE_FUNCTION_PRIOR_TO_YOUR_INJURY_AND_0_BEING_THE_INABILITY_TO_PERFORM_ANY_OF_YOUR_USUAL_DAILY_ACTIVITIES?: 15
SQUAT: I AM UNABLE TO DO THE ACTIVITY
RISE FROM A CHAIR: ACTIVITY IS VERY DIFFICULT
HOW_WOULD_YOU_RATE_THE_OVERALL_FUNCTION_OF_YOUR_KNEE_DURING_YOUR_USUAL_DAILY_ACTIVITIES?: ABNORMAL
HOW_WOULD_YOU_RATE_THE_OVERALL_FUNCTION_OF_YOUR_KNEE_DURING_YOUR_USUAL_DAILY_ACTIVITIES?: ABNORMAL
SWELLING: I DO NOT HAVE THE SYMPTOM
LIMPING: I DO NOT HAVE THE SYMPTOM
WALK: ACTIVITY IS VERY DIFFICULT

## 2024-11-26 NOTE — PROGRESS NOTES
PHYSICAL THERAPY EVALUATION  Type of Visit: Evaluation     Fall Risk Screen:  Fall screen completed by: PT  Have you fallen 2 or more times in the past year?: No  Have you fallen and had an injury in the past year?: No  Is patient a fall risk?: No    Subjective         Presenting condition or subjective complaint: Pt reports that he is having leg weakness and difficulty walking.  He does not report knee pain.  He is concerned for his recent DEXA scan results.  He moved to a one level place and lives his wife and daughter (who works outside of the home).  He reports difficulty with walking (uses a walker), transfers, and ADL's with standing.  His walker is with a seat and brakes.  He does some leg movements during the day.  He wears a soft brace on his R knee.  Date of onset: 10/18/24    Relevant medical history: Arthritis; Cancer; High blood pressure; Implanted device; Osteoporosis; Radiation treatment; Rheumatoid arthritis (no active cancer/treatment for Prostate cancer)   Dates & types of surgery:      Prior diagnostic imaging/testing results: MRI     Prior therapy history for the same diagnosis, illness or injury: Yes      Prior Level of Function  Transfers: Assistive equipment  Ambulation: Assistive equipment  ADL: Assistive equipment    Living Environment  Social support: With a significant other or spouse   Type of home: Apartment/condo; 1 level   Stairs to enter the home:         Ramp: No   Stairs inside the home: No       Help at home: Assist for driving and community activities  Equipment owned: Straight Cane; Walker; Walker with wheels; Standard wheelchair; Raised toilet seat; Bath bench     Employment: Not Applicable    Hobbies/Interests: sports    Patient goals for therapy: walk without a walker    Pain assessment: Pain denied; mild L knee      Objective      Cognitive Status Examination  Orientation: Oriented to person, place and time   Level of Consciousness: Alert  Follows Commands and Answers  Questions:    OBSERVATION: Pt arrives in a wheelchair     POSTURE:  Fair   PALPATION: NA    RANGE OF MOTION: LE ROM WFL  Not tested extensively     STRENGTH:   Pain: - none + mild ++ moderate +++ severe  Strength Scale: 0-5/5 Left Right   Hip Flexion 4- 3+   Hip Abduction 4 4   Hip Adduction 4 4   Knee Flexion 4- 3+   Knee Extension 4- 3+   Ankle DF 3 3   Ankle PF  3+ 3+     BED MOBILITY:  NA today     TRANSFERS: Mod Assist    GAIT:   Level of [place]: Contact Guard, Min Assist  Assistive Device(s): Walker (standard)  Gait Deviations: Antalgic  Forward posture on walker     BALANCE:   Tinnetti = 4/28   STS = 0x and use of hands and mod A       Assessment & Plan   CLINICAL IMPRESSIONS  Medical Diagnosis: Knee pain, unspecified chronicity, unspecified laterality    Treatment Diagnosis: B LE weakness with impaired gait and balance   Impression/Assessment:  Pt presents to PT with concerns for LE weakness and impaired ability to walk and stand due to weakness and significant imbalance.   Pt has considerable LE weakness in all muscles.  He is unable to perform a sit to stand from regular chair height and without assistance.  At this time, walking is limited and requires a walker as well as assistance for safety.  His Tinnetti balance score is low at 4/28 indicating a high risk for falling.  He will benefit from PT to address these impairments and improve overall function and safety.      Clinical Decision Making (Complexity):  Clinical Presentation: Stable/Uncomplicated  Clinical Presentation Rationale: based on medical and personal factors listed in PT evaluation  Clinical Decision Making (Complexity): Moderate complexity    PLAN OF CARE  Treatment Interventions:  Interventions: Gait Training, Neuromuscular Re-education, Therapeutic Activity, Therapeutic Exercise, Self-Care/Home Management    Long Term Goals     PT Goal 1  Goal Identifier: [place]  Goal Description: Pt will demonstrate readiness for independent  sx management and HEP  Rationale: to maximize safety and independence within the home  Target Date: 02/23/25  PT Goal 2  Goal Identifier: Sit to stand  Goal Description: Pt will be able to go from sit to stand using his arms, but without the assitance of another person for improved mobility and safety  Rationale: to maximize safety and independence with performance of ADLs and functional tasks  Target Date: 12/26/24  PT Goal 3  Goal Identifier: Walking  Goal Description: Pt will demonstrate improved walking quality using a walker and only SBA for >50 feet for improved mobility  Rationale: to maximize safety and independence within the community  Target Date: 02/23/25  PT Goal 4  Goal Identifier: Tinnetti  Goal Description: Pt will improve his Tinetti goal to >/=10/28 to demonstrate improved balance and safety  Rationale: to maximize safety and independence with performance of ADLs and functional tasks  Target Date: 02/23/25      Frequency of Treatment: 1x/week  Duration of Treatment: 90 days    Recommended Referrals to Other Professionals:  None at this time     Education Assessment:   Learner/Method: Patient;Demonstration;Pictures/Video  Education Comments: Pt educated on POC, pathology, etc    Risks and benefits of evaluation/treatment have been explained.   Patient/Family/caregiver agrees with Plan of Care.     Evaluation Time:        Signing Clinician: Gloria Barber PT        [facility]                                                                                   OUTPATIENT PHYSICAL THERAPY      PLAN OF TREATMENT FOR OUTPATIENT REHABILITATION   Patient's Last Name, First Name, Cyril Morales YOB: 1940   Provider's Name   [facility]   Medical Record No.  [id number]     Onset Date: 10/18/24  Start of Care Date: 11/26/24     Medical Diagnosis:  Knee pain, unspecified chronicity, unspecified laterality      PT Treatment  Diagnosis:  B LE weakness with impaired gait and balance Plan of Treatment  Frequency/Duration: 1x/week/ 90 days    Certification date from 11/26/24 to 02/23/25         See note for plan of treatment details and functional goals     Gloria Barber, PT                         I CERTIFY THE NEED FOR THESE SERVICES FURNISHED UNDER        THIS PLAN OF TREATMENT AND WHILE UNDER MY CARE     (Physician attestation of this document indicates review and certification of the therapy plan).              Referring Provider:  Mat Beltran    Initial Assessment  See Epic Evaluation- Start of Care Date: 11/26/24

## 2024-12-05 ENCOUNTER — THERAPY VISIT (OUTPATIENT)
Dept: PHYSICAL THERAPY | Facility: REHABILITATION | Age: 84
End: 2024-12-05
Payer: COMMERCIAL

## 2024-12-05 DIAGNOSIS — R29.898 LEG WEAKNESS, BILATERAL: Primary | ICD-10-CM

## 2024-12-05 DIAGNOSIS — Z74.09 IMPAIRED FUNCTIONAL MOBILITY, BALANCE, GAIT, AND ENDURANCE: ICD-10-CM

## 2024-12-26 ENCOUNTER — TELEPHONE (OUTPATIENT)
Dept: RADIATION ONCOLOGY | Facility: CLINIC | Age: 84
End: 2024-12-26

## 2024-12-30 ENCOUNTER — LAB (OUTPATIENT)
Dept: INFUSION THERAPY | Facility: HOSPITAL | Age: 84
End: 2024-12-30
Attending: FAMILY MEDICINE
Payer: COMMERCIAL

## 2024-12-30 DIAGNOSIS — C61 MALIGNANT NEOPLASM OF PROSTATE (H): ICD-10-CM

## 2024-12-30 LAB — PSA SERPL DL<=0.01 NG/ML-MCNC: 0.04 NG/ML

## 2024-12-30 PROCEDURE — 36415 COLL VENOUS BLD VENIPUNCTURE: CPT

## 2024-12-30 PROCEDURE — 84153 ASSAY OF PSA TOTAL: CPT

## 2025-01-02 ENCOUNTER — TELEPHONE (OUTPATIENT)
Dept: RADIATION ONCOLOGY | Facility: CLINIC | Age: 85
End: 2025-01-02
Payer: COMMERCIAL

## 2025-01-02 NOTE — TELEPHONE ENCOUNTER
Received voicemail message from patient asking for a call back with his PSA results.    Called patient and he states he is not able to have a ride to his appointment for tomorrow.  Informed patient of his PSA results and let him know that it is improvement from his last PSA.    Per Dr. Lam told patient she would like to see him in person to discuss other things like his bone density and ongoing Lupron injections.  Patient willing to reschedule his follow-up appointment for in person visit with Dr. Lam on the same day that he is due for his Lupron injection.  Patient appreciative of PSA results and call.    Call transferred to the scheduling team to help assist with rescheduling in person visits and infusion injection for February 2025.

## 2025-01-10 PROBLEM — C18.9 MALIGNANT NEOPLASM OF COLON, UNSPECIFIED PART OF COLON (H): Status: ACTIVE | Noted: 2019-07-17

## 2025-01-13 ENCOUNTER — TELEPHONE (OUTPATIENT)
Dept: FAMILY MEDICINE | Facility: CLINIC | Age: 85
End: 2025-01-13
Payer: COMMERCIAL

## 2025-01-13 DIAGNOSIS — I48.21 PERMANENT ATRIAL FIBRILLATION (H): ICD-10-CM

## 2025-01-13 DIAGNOSIS — M48.061 SPINAL STENOSIS OF LUMBAR REGION, UNSPECIFIED WHETHER NEUROGENIC CLAUDICATION PRESENT: ICD-10-CM

## 2025-01-13 NOTE — TELEPHONE ENCOUNTER
FYI - Status Update    Who is Calling: patient    Update: Patient states he is taking elequis blood thinner, and will not need to see anyone at [facility]. He will check back with Dr. Beltran for further updates    Does caller want a call/response back: No

## 2025-01-16 ENCOUNTER — THERAPY VISIT (OUTPATIENT)
Dept: PHYSICAL THERAPY | Facility: REHABILITATION | Age: 85
End: 2025-01-16
Payer: COMMERCIAL

## 2025-01-16 DIAGNOSIS — Z74.09 IMPAIRED FUNCTIONAL MOBILITY, BALANCE, GAIT, AND ENDURANCE: ICD-10-CM

## 2025-01-16 DIAGNOSIS — R29.898 LEG WEAKNESS, BILATERAL: Primary | ICD-10-CM

## 2025-01-27 ENCOUNTER — PATIENT OUTREACH (OUTPATIENT)
Dept: CARE COORDINATION | Facility: CLINIC | Age: 85
End: 2025-01-27
Payer: COMMERCIAL

## 2025-02-06 ENCOUNTER — INFUSION THERAPY VISIT (OUTPATIENT)
Dept: INFUSION THERAPY | Facility: HOSPITAL | Age: 85
End: 2025-02-06
Attending: STUDENT IN AN ORGANIZED HEALTH CARE EDUCATION/TRAINING PROGRAM
Payer: COMMERCIAL

## 2025-02-06 ENCOUNTER — OFFICE VISIT (OUTPATIENT)
Dept: RADIATION ONCOLOGY | Facility: CLINIC | Age: 85
End: 2025-02-06
Attending: STUDENT IN AN ORGANIZED HEALTH CARE EDUCATION/TRAINING PROGRAM
Payer: COMMERCIAL

## 2025-02-06 VITALS
OXYGEN SATURATION: 99 % | HEART RATE: 84 BPM | BODY MASS INDEX: 25.1 KG/M2 | RESPIRATION RATE: 17 BRPM | DIASTOLIC BLOOD PRESSURE: 65 MMHG | HEIGHT: 71 IN | SYSTOLIC BLOOD PRESSURE: 147 MMHG

## 2025-02-06 VITALS
OXYGEN SATURATION: 99 % | RESPIRATION RATE: 18 BRPM | DIASTOLIC BLOOD PRESSURE: 65 MMHG | SYSTOLIC BLOOD PRESSURE: 147 MMHG | HEART RATE: 84 BPM

## 2025-02-06 DIAGNOSIS — C61 PROSTATE CA (H): Primary | ICD-10-CM

## 2025-02-06 DIAGNOSIS — C61 MALIGNANT NEOPLASM OF PROSTATE (H): Primary | ICD-10-CM

## 2025-02-06 PROCEDURE — G0463 HOSPITAL OUTPT CLINIC VISIT: HCPCS | Performed by: STUDENT IN AN ORGANIZED HEALTH CARE EDUCATION/TRAINING PROGRAM

## 2025-02-06 ASSESSMENT — PAIN SCALES - GENERAL: PAINLEVEL_OUTOF10: NO PAIN (0)

## 2025-02-06 NOTE — PROGRESS NOTES
"Oncology Rooming Note    February 6, 2025 1:58 PM   Cyril Galdamez is a 84 year old male who presents for:    Chief Complaint   Patient presents with    Oncology Clinic Visit     Follow up with Dr. Lam    Prostate Cancer     Initial Vitals: BP (!) 147/65   Pulse 84   Resp 18   SpO2 99%  Estimated body mass index is 25.1 kg/m  as calculated from the following:    Height as of an earlier encounter on 2/6/25: 1.803 m (5' 11\").    Weight as of 1/10/25: 81.6 kg (180 lb). There is no height or weight on file to calculate BSA.  No Pain (0) Comment: Data Unavailable   No LMP for male patient.  Allergies reviewed: Yes  Medications reviewed: Yes    Medications: Medication refills not needed today.  Pharmacy [facility] entered into [facility]: [facility] [address] [place] [address] [id number] [facility]    Frailty Screening:   Is the patient here for a new oncology consult visit in cancer care? 2. No      Clinical concerns: Patient here per wheelchair accompanied by son for follow-up status post radiation for his prostate cancer.  Most recent PSA done at the end of December 2024.  AUA completed.  Patient received his Lupron injection in the infusion center today.  Patient having decreased performance status since last visit and is in a wheelchair today.  Plan return to clinic for follow-up as directed by provider.   Dr. Lam was notified.      Echo Ramirez RN              "

## 2025-02-06 NOTE — LETTER
2/6/2025      Cyril Galdamez  [address] [facility] [address]      Dear Colleague,    Thank you for referring your patient, Cyril Galdamez, to the [facility] RADIATION ONCOLOGY [place]. Please see a copy of my visit note below.    [facility] Radiation Oncology Follow Up     Patient: Cyril Galdamez  MRN: [id number]  Date of Service: 02/06/2025       DISEASE TREATED:  Prostate Adnocarcinoma  Stage:  T2 N0 M0  Emiliano: 4+4 = 8 (grade group 4)  Pre-treatment PSA: 12.10  TRUS/MRI volume: 40 cc       TYPE OF RADIATION THERAPY ADMINISTERED:    SITE TREATED: Prostate  TOTAL DOSE: 7020 cGy  NUMBER OF FRACTIONS: 26  DATES COMPLETED: 3/14/2024 - 4/19/2024  CONCURRENT CHEMOTHERAPY: No; concurrent ADT  ADJUVANT THERAPY:Yes: ADT- Eligard    12/26/2023 Eligard 45 mg #1      INTERVAL SINCE COMPLETION OF RADIATION THERAPY: 10 mo      SUBJECTIVE:  Mr. Galdamez is a 84 year old male who  was noted to have elevated PSA     8/29/2022 PSA: 11.6  7/3/2023 PSA: 12.10     Per notes he had palpable prostate nodule.     10/31/2023 MR prostate: 40 cc prostate gland.    Transitional central zones: Well encapsulated BPH nodules.  Peripheral zones:   Lesion 1 left mid gland to apical at 1 to 7 o'clock position with secondary involvement of the transition zone and right apex.  T2 hypointensity with diffusion restriction measuring 3.1 x 1.1 x 1.7 cm.  Broad capsular contact, perineural invasion may be present.  PI-RADS 5.  Lesion to right mid gland at 8:00 posterior lateral T2 hypointensity with marked diffusion restriction measuring 1.4 x 0.7 x 1.0 cm.  PI-RADS 4.  No identified transscapular disease or involvement neurovascular bundles or seminal vesicles.  No bony involvement or pathologically enlarged lymph nodes.  Multiple small hyperemic nodes in the lower retroperitoneal and pelvic inlet.     11/13/2023:  Prostate biopsy, pathology: Prostate adenocarcinoma,   RMA: [place] 4+3=7, 40%  RLA: [place] 4+3=7,  25%  RMM: [place] 3+4=7, 50%  RLM: PIN  RMB: Benign  RLB: [place] 4+3=7, 40%  LMA: [place] 4+3=7, 50%  L LA: [place] 4+3=7, 100%  LMM: [place] 4+3=7, 16%  LLM:  [place] 4+3=7, 60%  L MB: [place] 4+3=7, 40% 1%  LLB:   [place] 3+4=7, 83%  Lesion 1 left peripheral zone: 4+3=7, 80%  Lesion 2: Right peripheral zone: 4+4 = 8, 70%     12/20/2023 PSMA PET/CT: Intense uptake present within the left aspect of the prostate gland (max SUV 18.2), intense uptake in the right posterior prostate (max SUV 46).  No lymph node involvement or evidence of metastatic disease.   Diffuse bilateral calcified pleural plaques within the chest recommend follow-up CT chest 4 to 6 months.     12/26/2023 Eligard 45 mg #1  1/18/2024 PSA: 8.39  3/14/2024 - 4/19/2024 definitive prostate radiation therapy 7020 cGy in 26 fractions  7/17/2024 PSA: 0.08  He never scheduled his DEXA scan which was ordered, and second Eligard was delayed  8/1/2024 Eligard 45 mg  11/12/2024 DEXA: Osteopenia.  12/30/2024 PSA: 0.04  2/6/2025 Eligard 45 mg    He is tolerating ADT.  No hot flashes, no mood swings.  Has generalized weakness and has been referred to physical therapy with additional weekly therapy to build strength recommended.  Discussed calcium and vitamin D given long-term ADT.     Has had hemorrhoidal irritation and has been referred to GI to discuss management options, met with colorectal with first plan to try Metamucil.    Has not had any follow-up regarding pleural plaques seen on PSMA PET/CT.  Was in the [employer] but no known history of agent orange exposure.  Reports known asbestos exposure.  CT chest to follow-up on pleural plaques seen on PSMA PET/CT, known asbestos exposure was ordered but he never scheduled.     Family history positive for prostate cancer in his father.     Medications were reviewed and are up to date on EPIC.    The following portions of the patient's history were reviewed and updated as appropriate: allergies, current medications,  past family history, past medical history, past social history, past surgical history and problem list.    Review of Systems:      General  Constitutional  Constitutional (WDL): Exceptions to WDL  Fatigue: Fatigue not relieved by rest OR limiting instrumental ADL  EENT  Eye Disorders  Eye Disorder (WDL): All eye disorder elements are within defined limits  Ear Disorders  Ear Disorder (WDL): All ear disorder elements are within defined limits  Respiratory  Respiratory  Respiratory (WDL): All respiratory elements are within defined limits  Cardiovascular  Cardiovascular  Cardiovascular (WDL): All cardiovascular elements are within defined limits (no pacemaker)  Gastrointestinal  Gastrointestinal  Gastrointestinal (WDL): All gastrointestinal elements are within defined limits  Musculoskeletal  Musculoskeletal and Connective Tissue Disorders  Musculoskeletal & Connective (WDL): Exceptions to WDL  Generalized Muscle Weakness: Symptomatic OR evident on physical exam OR limiting instrumental ADL (in wheelchair today)  Integumentary  Integumentary  Integumentary (WDL): All integumentary elements are within defined limits (derm every 6 mos)  Neurological  Neurosensory  Neurosensory (WDL): Exceptions to WDL  Peripheral Motor Neuropathy: Moderate symptoms OR limiting instrumental ADL  Ataxia: Moderate symptoms OR limiting instrumental ADL (pt brought via wheelchair)  Genitourinary/Reproductive  Genitourinary  Genitourinary (WDL): Exceptions to WDL  Urinary Frequency: Present (nocturia x2)  Lymphatic  Lymph System Disorders  Lymph (WDL): Assessment not pertinent to visit  Pain  Pain Score: No Pain (0)  AUA Assessment  Over the Past Month  How often have you had a sensation of not emptying your bladder completely after you have finished urinating: Less than 1 time in 5: Score:1  How often have you had to urinate again less than 2 hours after you finshed urinating: About half the time: Score: 3  How often have you found you  stopped and started again several times when you urinated: About half the time: Score: 3  How often have you had a weak urinary stream: About half the time: Score: 3  How often have you had to push or starin to begin uriation: Not at all: Score: 0  How many times do you most typically get up to urinate from the time you went to bed at night until the time you got up in the morning?: 2 times, Score: 2  Total AUA Score: Degree of Severity: 8-19 is Moderate (12)  If you had to spend the rest of your life with your urinary condition just the way it is now, how would you feel: Mostly Satisfied                                                           Accompanied by  Accompanied By: family (in lobby)    Objective:          PHYSICAL EXAMINATION:    BP (!) 147/65   Pulse 84   Resp 18   SpO2 99%     Gen: Alert, in NAD pleasant interactive, well nourished  Eyes: EOMI, sclera anicteric  HENT     Head: NC/AT-  Musculoskeletal: Normal muscle bulk and tone  Skin: Normal tone and turgor, warm, dry, intact  Neurologic: A/Ox3,  face symmetric, speech fluent, no focal motor deficits-gait not assessed in wheelchair.  Psychiatric: Appropriate mood and affect      Imaging: Imaging results 6 weeks:No results found.     Impression   83-year-old gentleman with high risk prostate cancer status post initiation of ADT and completion of definitive radiation therapy with good biochemical response.     Received third Lupron injection.  Tolerating well but noticing overall decreased in strength as well as some osteopenia.  Visit Dx:    (C61) Prostate CA (H)  (primary encounter diagnosis)  Plan: PSA tumor marker       Cancer Staging   Malignant neoplasm of prostate (H)  Staging form: Prostate, AJCC 8th Edition  - Clinical stage from 12/20/2023: Stage IIC (cT2c, cN0, cM0, PSA: 12.1, Grade Group: 4) - Signed by Ina Lam MD on 1/25/2024      Assessment & Plan:   1.  Return to clinic in 6 months with PSA  2.  Given osteopenia and general  deconditioning with loss of muscle strength may consider discontinuation of ADT early and not received fourth and final Eligard for completion of his long-term ADT  3.  Follow-up with colorectal regarding hemorrhoids  4.  CT chest previously ordered for follow-up of pleural plaques, has asbestos exposure-order still good and can be scheduled at patient convenience.    Pain Management Plan: N/A    Total time of this visit, including time spent face-to-face with patient and or via video/audio, and also in preparing for today's visit for MDM and documentation. Medical decision-making included consideration and possible diagnoses, management options, complex record review, review of diagnostic tests and information, consideration and discussion of significant complications based on comorbidities, discussion with providers involved in the care of the patient.     30 Minutes spent.     This note has been dictated using voice recognition software and as a result may contain minor grammatical errors and unintended word substitutions.      Ina Lam MD  Department of Radiation Oncology   [facility] Radiation Oncology  Tel: [phone]  Page: [phone]    [place] [id number] [place] [address]    CC:  Patient Care Team:  Angel Sung MD as PCP - General (Internal Medicine - Pediatrics)  Ina Lam MD as MD (Radiation Oncology)  Kavitha Jacinto CNP as Assigned Neuroscience Provider  Ina Lam MD as Assigned Cancer Care Provider  Angel Sung MD as Assigned PCP  Rahul Staples MD as MD (Cardiovascular Disease)  Rahul Staples MD as Assigned Heart and Vascular Provider      Oncology Rooming Note    February 6, 2025 1:58 PM   Cyril Galdamez is a 84 year old male who presents for:    Chief Complaint   Patient presents with     Oncology Clinic Visit     Follow up with Dr. Lam      "Prostate Cancer     Initial Vitals: BP (!) 147/65   Pulse 84   Resp 18   SpO2 99%  Estimated body mass index is 25.1 kg/m  as calculated from the following:    Height as of an earlier encounter on 2/6/25: 1.803 m (5' 11\").    Weight as of 1/10/25: 81.6 kg (180 lb). There is no height or weight on file to calculate BSA.  No Pain (0) Comment: Data Unavailable   No LMP for male patient.  Allergies reviewed: Yes  Medications reviewed: Yes    Medications: Medication refills not needed today.  Pharmacy name entered into [facility]: [facility] [address] [place] [address] [address] [facility]    Frailty Screening:   Is the patient here for a new oncology consult visit in cancer care? 2. No      Clinical concerns: Patient here per wheelchair accompanied by son for follow-up status post radiation for his prostate cancer.  Most recent PSA done at the end of December 2024.  AUA completed.  Patient received his Lupron injection in the infusion center today.  Patient having decreased performance status since last visit and is in a wheelchair today.  Plan return to clinic for follow-up as directed by provider.   Dr. Lam was notified.      Echo Ramirez RN                Again, thank you for allowing me to participate in the care of your patient.        Sincerely,        Ina Lam MD    Electronically signed"

## 2025-02-06 NOTE — PROGRESS NOTES
Infusion Nursing Note:  Cyril Galdamez presents today for lupron.    Patient seen by provider today: Yes: Dr Lam in radiation   present during visit today: Not Applicable.    Note: Injection given in right gluteal.  Pt thought he needed an iv or labs but 6 month labs were done on 12/30    Intravenous Access:  No Intravenous access/labs at this visit.    Treatment Conditions:  Not Applicable.      Post Infusion Assessment:  Patient tolerated injection without incident.  Site patent and intact, free from redness, edema or discomfort.       Discharge Plan:   Patient and/or family verbalized understanding of discharge instructions and all questions answered.      Nely Galarza RN

## 2025-02-06 NOTE — PROGRESS NOTES
[facility] Radiation Oncology Follow Up     Patient: Cyril Galdamez  MRN: [id number]  Date of Service: 02/06/2025       DISEASE TREATED:  Prostate Adnocarcinoma  Stage:  T2 N0 M0  Emiliano: 4+4 = 8 (grade group 4)  Pre-treatment PSA: 12.10  TRUS/MRI volume: 40 cc       TYPE OF RADIATION THERAPY ADMINISTERED:    SITE TREATED: Prostate  TOTAL DOSE: 7020 cGy  NUMBER OF FRACTIONS: 26  DATES COMPLETED: 3/14/2024 - 4/19/2024  CONCURRENT CHEMOTHERAPY: No; concurrent ADT  ADJUVANT THERAPY:Yes: ADT- Eligard    12/26/2023 Eligard 45 mg #1      INTERVAL SINCE COMPLETION OF RADIATION THERAPY: 10 mo      SUBJECTIVE:  Mr. Galdamez is a 84 year old male who  was noted to have elevated PSA     8/29/2022 PSA: 11.6  7/3/2023 PSA: 12.10     Per notes he had palpable prostate nodule.     10/31/2023 MR prostate: 40 cc prostate gland.    Transitional central zones: Well encapsulated BPH nodules.  Peripheral zones:   Lesion 1 left mid gland to apical at 1 to 7 o'clock position with secondary involvement of the transition zone and right apex.  T2 hypointensity with diffusion restriction measuring 3.1 x 1.1 x 1.7 cm.  Broad capsular contact, perineural invasion may be present.  PI-RADS 5.  Lesion to right mid gland at 8:00 posterior lateral T2 hypointensity with marked diffusion restriction measuring 1.4 x 0.7 x 1.0 cm.  PI-RADS 4.  No identified transscapular disease or involvement neurovascular bundles or seminal vesicles.  No bony involvement or pathologically enlarged lymph nodes.  Multiple small hyperemic nodes in the lower retroperitoneal and pelvic inlet.     11/13/2023:  Prostate biopsy, pathology: Prostate adenocarcinoma,   RMA: [place] 4+3=7, 40%  RLA: [place] 4+3=7, 25%  RMM: [place] 3+4=7, 50%  RLM: PIN  RMB: Benign  RLB: [place] 4+3=7, 40%  LMA: [place] 4+3=7, 50%  L LA: [place] 4+3=7, 100%  LMM: [place] 4+3=7, 16%  LLM:  [place] 4+3=7, 60%  L MB: [place] 4+3=7, 40% 1%  LLB:   [place] 3+4=7, 83%  Lesion 1 left  peripheral zone: 4+3=7, 80%  Lesion 2: Right peripheral zone: 4+4 = 8, 70%     12/20/2023 PSMA PET/CT: Intense uptake present within the left aspect of the prostate gland (max SUV 18.2), intense uptake in the right posterior prostate (max SUV 46).  No lymph node involvement or evidence of metastatic disease.   Diffuse bilateral calcified pleural plaques within the chest recommend follow-up CT chest 4 to 6 months.     12/26/2023 Eligard 45 mg #1  1/18/2024 PSA: 8.39  3/14/2024 - 4/19/2024 definitive prostate radiation therapy 7020 cGy in 26 fractions  7/17/2024 PSA: 0.08  He never scheduled his DEXA scan which was ordered, and second Eligard was delayed  8/1/2024 Eligard 45 mg  11/12/2024 DEXA: Osteopenia.  12/30/2024 PSA: 0.04  2/6/2025 Eligard 45 mg    He is tolerating ADT.  No hot flashes, no mood swings.  Has generalized weakness and has been referred to physical therapy with additional weekly therapy to build strength recommended.  Discussed calcium and vitamin D given long-term ADT.     Has had hemorrhoidal irritation and has been referred to GI to discuss management options, met with colorectal with first plan to try Metamucil.    Has not had any follow-up regarding pleural plaques seen on PSMA PET/CT.  Was in the [employer] but no known history of agent orange exposure.  Reports known asbestos exposure.  CT chest to follow-up on pleural plaques seen on PSMA PET/CT, known asbestos exposure was ordered but he never scheduled.     Family history positive for prostate cancer in his father.     Medications were reviewed and are up to date on EPIC.    The following portions of the patient's history were reviewed and updated as appropriate: allergies, current medications, past family history, past medical history, past social history, past surgical history and problem list.    Review of Systems:      General  Constitutional  Constitutional (WDL): Exceptions to WDL  Fatigue: Fatigue not relieved by rest OR limiting  instrumental ADL  EENT  Eye Disorders  Eye Disorder (WDL): All eye disorder elements are within defined limits  Ear Disorders  Ear Disorder (WDL): All ear disorder elements are within defined limits  Respiratory  Respiratory  Respiratory (WDL): All respiratory elements are within defined limits  Cardiovascular  Cardiovascular  Cardiovascular (WDL): All cardiovascular elements are within defined limits (no pacemaker)  Gastrointestinal  Gastrointestinal  Gastrointestinal (WDL): All gastrointestinal elements are within defined limits  Musculoskeletal  Musculoskeletal and Connective Tissue Disorders  Musculoskeletal & Connective (WDL): Exceptions to WDL  Generalized Muscle Weakness: Symptomatic OR evident on physical exam OR limiting instrumental ADL (in wheelchair today)  Integumentary  Integumentary  Integumentary (WDL): All integumentary elements are within defined limits (derm every 6 mos)  Neurological  Neurosensory  Neurosensory (WDL): Exceptions to WDL  Peripheral Motor Neuropathy: Moderate symptoms OR limiting instrumental ADL  Ataxia: Moderate symptoms OR limiting instrumental ADL (pt brought via wheelchair)  Genitourinary/Reproductive  Genitourinary  Genitourinary (WDL): Exceptions to WDL  Urinary Frequency: Present (nocturia x2)  Lymphatic  Lymph System Disorders  Lymph (WDL): Assessment not pertinent to visit  Pain  Pain Score: No Pain (0)  AUA Assessment  Over the Past Month  How often have you had a sensation of not emptying your bladder completely after you have finished urinating: Less than 1 time in 5: Score:1  How often have you had to urinate again less than 2 hours after you finshed urinating: About half the time: Score: 3  How often have you found you stopped and started again several times when you urinated: About half the time: Score: 3  How often have you had a weak urinary stream: About half the time: Score: 3  How often have you had to push or starin to begin uriation: Not at all: Score: 0  How  many times do you most typically get up to urinate from the time you went to bed at night until the time you got up in the morning?: 2 times, Score: 2  Total AUA Score: Degree of Severity: 8-19 is Moderate (12)  If you had to spend the rest of your life with your urinary condition just the way it is now, how would you feel: Mostly Satisfied                                                           Accompanied by  Accompanied By: family (in lobby)    Objective:          PHYSICAL EXAMINATION:    BP (!) 147/65   Pulse 84   Resp 18   SpO2 99%     Gen: Alert, in NAD pleasant interactive, well nourished  Eyes: EOMI, sclera anicteric  HENT     Head: NC/AT-  Musculoskeletal: Normal muscle bulk and tone  Skin: Normal tone and turgor, warm, dry, intact  Neurologic: A/Ox3,  face symmetric, speech fluent, no focal motor deficits-gait not assessed in wheelchair.  Psychiatric: Appropriate mood and affect      Imaging: Imaging results 6 weeks:No results found.     Impression   83-year-old gentleman with high risk prostate cancer status post initiation of ADT and completion of definitive radiation therapy with good biochemical response.     Received third Lupron injection.  Tolerating well but noticing overall decreased in strength as well as some osteopenia.  Visit Dx:    (C61) Prostate CA (H)  (primary encounter diagnosis)  Plan: PSA tumor marker       Cancer Staging   Malignant neoplasm of prostate (H)  Staging form: Prostate, AJCC 8th Edition  - Clinical stage from 12/20/2023: Stage IIC (cT2c, cN0, cM0, PSA: 12.1, Grade Group: 4) - Signed by Ina Lam MD on 1/25/2024      Assessment & Plan:   1.  Return to clinic in 6 months with PSA  2.  Given osteopenia and general deconditioning with loss of muscle strength may consider discontinuation of ADT early and not received fourth and final Eligard for completion of his long-term ADT  3.  Follow-up with colorectal regarding hemorrhoids  4.  CT chest previously ordered  for follow-up of pleural plaques, has asbestos exposure-order still good and can be scheduled at patient convenience.    Pain Management Plan: N/A    Total time of this visit, including time spent face-to-face with patient and or via video/audio, and also in preparing for today's visit for MDM and documentation. Medical decision-making included consideration and possible diagnoses, management options, complex record review, review of diagnostic tests and information, consideration and discussion of significant complications based on comorbidities, discussion with providers involved in the care of the patient.     30 Minutes spent.     This note has been dictated using voice recognition software and as a result may contain minor grammatical errors and unintended word substitutions.      Ina Lam MD  Department of Radiation Oncology   [facility] Radiation Oncology  Tel: [phone]  Page: [phone]    [facility]  [address] [place] [address]     [address] [place] [address]    CC:  Patient Care Team:  Angel Sung MD as PCP - General (Internal Medicine - Pediatrics)  Ina Lam MD as MD (Radiation Oncology)  Kavitha Jacinto CNP as Assigned Neuroscience Provider  Ina Lam MD as Assigned Cancer Care Provider  Angel Sung MD as Assigned PCP  Rahul Staples MD as MD (Cardiovascular Disease)  Rahul Staples MD as Assigned Heart and Vascular Provider

## 2025-02-08 ENCOUNTER — NURSE TRIAGE (OUTPATIENT)
Dept: NURSING | Facility: CLINIC | Age: 85
End: 2025-02-08
Payer: COMMERCIAL

## 2025-02-08 NOTE — TELEPHONE ENCOUNTER
Pt calls reporting he ran out of his first Rx of Eliquis. Pt has 11 refills remaining, but is calling to day wondering if it's ok to miss his weekend doses because it is difficult to get to the pharmacy.     Advised Pt this is an important medication to continue taking and that he should  his refill even if it is difficult. Pt verbalized understanding and will try to set up a ride. No further questions   Reason for Disposition    Caller has medicine question only, adult not sick, AND triager answers question    Protocols used: Medication Question Call-A-

## 2025-02-10 ENCOUNTER — PATIENT OUTREACH (OUTPATIENT)
Dept: CARE COORDINATION | Facility: CLINIC | Age: 85
End: 2025-02-10
Payer: COMMERCIAL

## 2025-02-18 ENCOUNTER — TELEPHONE (OUTPATIENT)
Dept: FAMILY MEDICINE | Facility: CLINIC | Age: 85
End: 2025-02-18
Payer: COMMERCIAL

## 2025-02-18 DIAGNOSIS — E78.5 DYSLIPIDEMIA: ICD-10-CM

## 2025-02-18 RX ORDER — ATORVASTATIN CALCIUM 40 MG/1
40 TABLET, FILM COATED ORAL DAILY
Qty: 90 TABLET | Refills: 0 | Status: SHIPPED | OUTPATIENT
Start: 2025-02-18

## 2025-02-18 NOTE — TELEPHONE ENCOUNTER
Patient calling for updated prescription of Atorvastatin 40mg.    States that he was seen in clinic in January but no updated prescription sent in. Only has a couple of the 20mg tablets left.    RN sent updated prescription to pharmacy as requested.    Pt also notes he has 2 skin tag like spots in groin area - he is unable to visualize to give more detail for RN assessment. He would like to have Dr. Beltran look at this.     Scheduled for appointment with PCP on 2/21/25.

## 2025-02-19 DIAGNOSIS — M48.061 SPINAL STENOSIS OF LUMBAR REGION, UNSPECIFIED WHETHER NEUROGENIC CLAUDICATION PRESENT: ICD-10-CM

## 2025-02-20 RX ORDER — AMITRIPTYLINE HYDROCHLORIDE 10 MG/1
10 TABLET ORAL AT BEDTIME
Qty: 90 TABLET | Refills: 3 | Status: SHIPPED | OUTPATIENT
Start: 2025-02-20

## 2025-02-24 ENCOUNTER — PATIENT OUTREACH (OUTPATIENT)
Dept: CARE COORDINATION | Facility: CLINIC | Age: 85
End: 2025-02-24
Payer: COMMERCIAL

## 2025-03-06 DIAGNOSIS — R60.0 BILATERAL LEG EDEMA: ICD-10-CM

## 2025-03-06 RX ORDER — FUROSEMIDE 40 MG/1
40 TABLET ORAL DAILY
Qty: 90 TABLET | Refills: 2 | Status: SHIPPED | OUTPATIENT
Start: 2025-03-06

## 2025-05-17 DIAGNOSIS — E78.5 DYSLIPIDEMIA: ICD-10-CM

## 2025-05-19 RX ORDER — ATORVASTATIN CALCIUM 40 MG/1
40 TABLET, FILM COATED ORAL DAILY
Qty: 90 TABLET | Refills: 0 | Status: SHIPPED | OUTPATIENT
Start: 2025-05-19

## 2025-05-20 DIAGNOSIS — E78.5 DYSLIPIDEMIA: ICD-10-CM

## 2025-05-20 RX ORDER — ATORVASTATIN CALCIUM 40 MG/1
40 TABLET, FILM COATED ORAL DAILY
Qty: 90 TABLET | Refills: 0 | OUTPATIENT
Start: 2025-05-20

## 2025-06-24 ENCOUNTER — OFFICE VISIT (OUTPATIENT)
Dept: FAMILY MEDICINE | Facility: CLINIC | Age: 85
End: 2025-06-24
Payer: COMMERCIAL

## 2025-06-24 VITALS
HEIGHT: 71 IN | BODY MASS INDEX: 25.2 KG/M2 | DIASTOLIC BLOOD PRESSURE: 70 MMHG | TEMPERATURE: 97.3 F | RESPIRATION RATE: 16 BRPM | OXYGEN SATURATION: 97 % | HEART RATE: 74 BPM | WEIGHT: 180 LBS | SYSTOLIC BLOOD PRESSURE: 129 MMHG

## 2025-06-24 DIAGNOSIS — N39.41 URGE INCONTINENCE OF URINE: ICD-10-CM

## 2025-06-24 DIAGNOSIS — B35.6 TINEA CRURIS: Primary | ICD-10-CM

## 2025-06-24 PROCEDURE — 3074F SYST BP LT 130 MM HG: CPT | Performed by: FAMILY MEDICINE

## 2025-06-24 PROCEDURE — G2211 COMPLEX E/M VISIT ADD ON: HCPCS | Performed by: FAMILY MEDICINE

## 2025-06-24 PROCEDURE — 3078F DIAST BP <80 MM HG: CPT | Performed by: FAMILY MEDICINE

## 2025-06-24 PROCEDURE — 99214 OFFICE O/P EST MOD 30 MIN: CPT | Performed by: FAMILY MEDICINE

## 2025-06-24 RX ORDER — TERBINAFINE HYDROCHLORIDE 250 MG/1
250 TABLET ORAL DAILY
Qty: 14 TABLET | Refills: 0 | Status: SHIPPED | OUTPATIENT
Start: 2025-06-24 | End: 2025-07-08

## 2025-06-24 RX ORDER — KETOCONAZOLE 20 MG/G
CREAM TOPICAL DAILY
Qty: 60 G | Refills: 5 | Status: SHIPPED | OUTPATIENT
Start: 2025-06-24

## 2025-06-24 NOTE — PROGRESS NOTES
Assessment & Plan     Tinea cruris:  - Fungal infection with extensive rash, likely exacerbated by incontinence and moisture. Infection causing swelling around the head of penis, potentially contributing to urinary blockage.  - Prescribe a combination of cream and tablets for the fungal infection. Use cream daily and take tablets once daily for two weeks. Follow-up in two weeks to assess treatment progress.    Urge incontinence of urine:  - New onset of urge incontinence, possibly related to previous prostate radiation treatment. Infection may be contributing to urinary symptoms.  - Treat fungal infection first. If urinary symptoms persist after infection treatment, consider referral to urologist for further evaluation, including potential ultrasound and bladder assessment.    Consent was obtained from the patient to use an AI documentation tool in the creation of this note.      The longitudinal plan of care for the diagnosis(es)/condition(s) as documented were addressed during this visit. Due to the added complexity in care, I will continue to support Cyril in the subsequent management and with ongoing continuity of care.          Subjective   Cyril is a 84 year old, presenting for the following health issues:  Derm Problem (Patient is here for a rash in his groin. ) and Incontinence (Patient is having incontinence that started last week. Fasting. )        6/24/2025     2:53 PM   Additional Questions   Roomed by adelina alfaro cma   Accompanied by daughter     History of Present Illness       Reason for visit:  Sudden incontinence rashes         History of Present Illness-  Cyril Galdamez, age: 84 years    Urinary Incontinence  - Sudden onset of urinary incontinence  - Episodes occur while sitting, with leakage happening unexpectedly  - No continuous leaking because patient reports he is holding back urine  - History of radiation treatment for prostate  - Decreased urine output during bathroom visits    Rash  -  "Rash present, worsened by moisture from incontinence  - Itching and swelling around the urethra  - Rash exacerbated by difficulty in maintaining hygiene due to mobility issues              Review of Systems  No fever      Objective    /70 (BP Location: Right arm, Patient Position: Sitting, Cuff Size: Adult Regular)   Pulse 74   Temp 97.3  F (36.3  C) (Temporal)   Resp 16   Ht 1.803 m (5' 11\")   Wt 81.6 kg (180 lb)   SpO2 97%   BMI 25.10 kg/m    Body mass index is 25.1 kg/m .  Physical Exam   GENERAL: alert and no distress   (male): Significant erythema with white flaky discharge throughout groin, scrotum and head of penis.  PSYCH: mentation appears normal, affect normal/bright            Signed Electronically by: Mat Beltran MD    "

## 2025-06-29 ENCOUNTER — HEALTH MAINTENANCE LETTER (OUTPATIENT)
Age: 85
End: 2025-06-29

## 2025-07-08 ENCOUNTER — OFFICE VISIT (OUTPATIENT)
Dept: FAMILY MEDICINE | Facility: CLINIC | Age: 85
End: 2025-07-08
Payer: COMMERCIAL

## 2025-07-08 VITALS
DIASTOLIC BLOOD PRESSURE: 75 MMHG | BODY MASS INDEX: 25.2 KG/M2 | SYSTOLIC BLOOD PRESSURE: 130 MMHG | WEIGHT: 180 LBS | HEART RATE: 65 BPM | TEMPERATURE: 97.6 F | RESPIRATION RATE: 14 BRPM | OXYGEN SATURATION: 97 % | HEIGHT: 71 IN

## 2025-07-08 DIAGNOSIS — I48.21 PERMANENT ATRIAL FIBRILLATION (H): ICD-10-CM

## 2025-07-08 DIAGNOSIS — I10 PRIMARY HYPERTENSION: ICD-10-CM

## 2025-07-08 DIAGNOSIS — R73.03 PREDIABETES: ICD-10-CM

## 2025-07-08 DIAGNOSIS — B35.6 TINEA CRURIS: ICD-10-CM

## 2025-07-08 DIAGNOSIS — Z23 IMMUNIZATION DUE: ICD-10-CM

## 2025-07-08 DIAGNOSIS — R73.01 ELEVATED FASTING BLOOD SUGAR: ICD-10-CM

## 2025-07-08 DIAGNOSIS — R60.0 BILATERAL LEG EDEMA: ICD-10-CM

## 2025-07-08 DIAGNOSIS — Z00.00 MEDICARE ANNUAL WELLNESS VISIT, SUBSEQUENT: Primary | ICD-10-CM

## 2025-07-08 DIAGNOSIS — E78.5 DYSLIPIDEMIA: ICD-10-CM

## 2025-07-08 LAB
BASOPHILS # BLD AUTO: 0 10E3/UL (ref 0–0.2)
BASOPHILS NFR BLD AUTO: 1 %
EOSINOPHIL # BLD AUTO: 0.1 10E3/UL (ref 0–0.7)
EOSINOPHIL NFR BLD AUTO: 2 %
ERYTHROCYTE [DISTWIDTH] IN BLOOD BY AUTOMATED COUNT: 13.7 % (ref 10–15)
EST. AVERAGE GLUCOSE BLD GHB EST-MCNC: 126 MG/DL
HBA1C MFR BLD: 6 % (ref 0–5.6)
HCT VFR BLD AUTO: 34.1 % (ref 40–53)
HGB BLD-MCNC: 11.4 G/DL (ref 13.3–17.7)
IMM GRANULOCYTES # BLD: 0 10E3/UL
IMM GRANULOCYTES NFR BLD: 0 %
LYMPHOCYTES # BLD AUTO: 1.3 10E3/UL (ref 0.8–5.3)
LYMPHOCYTES NFR BLD AUTO: 17 %
MCH RBC QN AUTO: 32.4 PG (ref 26.5–33)
MCHC RBC AUTO-ENTMCNC: 33.4 G/DL (ref 31.5–36.5)
MCV RBC AUTO: 97 FL (ref 78–100)
MONOCYTES # BLD AUTO: 0.6 10E3/UL (ref 0–1.3)
MONOCYTES NFR BLD AUTO: 8 %
NEUTROPHILS # BLD AUTO: 5.7 10E3/UL (ref 1.6–8.3)
NEUTROPHILS NFR BLD AUTO: 72 %
PLATELET # BLD AUTO: 273 10E3/UL (ref 150–450)
RBC # BLD AUTO: 3.52 10E6/UL (ref 4.4–5.9)
WBC # BLD AUTO: 7.8 10E3/UL (ref 4–11)

## 2025-07-08 PROCEDURE — 3078F DIAST BP <80 MM HG: CPT | Performed by: FAMILY MEDICINE

## 2025-07-08 PROCEDURE — 3075F SYST BP GE 130 - 139MM HG: CPT | Performed by: FAMILY MEDICINE

## 2025-07-08 PROCEDURE — 80053 COMPREHEN METABOLIC PANEL: CPT | Performed by: FAMILY MEDICINE

## 2025-07-08 PROCEDURE — 91320 SARSCV2 VAC 30MCG TRS-SUC IM: CPT | Performed by: FAMILY MEDICINE

## 2025-07-08 PROCEDURE — G2211 COMPLEX E/M VISIT ADD ON: HCPCS | Performed by: FAMILY MEDICINE

## 2025-07-08 PROCEDURE — 80061 LIPID PANEL: CPT | Performed by: FAMILY MEDICINE

## 2025-07-08 PROCEDURE — G0439 PPPS, SUBSEQ VISIT: HCPCS | Performed by: FAMILY MEDICINE

## 2025-07-08 PROCEDURE — 36415 COLL VENOUS BLD VENIPUNCTURE: CPT | Performed by: FAMILY MEDICINE

## 2025-07-08 PROCEDURE — 85025 COMPLETE CBC W/AUTO DIFF WBC: CPT | Performed by: FAMILY MEDICINE

## 2025-07-08 PROCEDURE — 83036 HEMOGLOBIN GLYCOSYLATED A1C: CPT | Performed by: FAMILY MEDICINE

## 2025-07-08 PROCEDURE — 90480 ADMN SARSCOV2 VAC 1/ONLY CMP: CPT | Performed by: FAMILY MEDICINE

## 2025-07-08 PROCEDURE — 99214 OFFICE O/P EST MOD 30 MIN: CPT | Mod: 25 | Performed by: FAMILY MEDICINE

## 2025-07-08 RX ORDER — ATORVASTATIN CALCIUM 40 MG/1
40 TABLET, FILM COATED ORAL DAILY
Qty: 90 TABLET | Refills: 3 | Status: SHIPPED | OUTPATIENT
Start: 2025-07-08

## 2025-07-08 RX ORDER — LISINOPRIL 40 MG/1
40 TABLET ORAL DAILY
Qty: 90 TABLET | Refills: 3 | Status: SHIPPED | OUTPATIENT
Start: 2025-07-08

## 2025-07-08 RX ORDER — FUROSEMIDE 40 MG/1
40 TABLET ORAL DAILY
Qty: 90 TABLET | Refills: 3 | Status: SHIPPED | OUTPATIENT
Start: 2025-07-08

## 2025-07-08 SDOH — HEALTH STABILITY: PHYSICAL HEALTH: ON AVERAGE, HOW MANY DAYS PER WEEK DO YOU ENGAGE IN MODERATE TO STRENUOUS EXERCISE (LIKE A BRISK WALK)?: 0 DAYS

## 2025-07-08 SDOH — HEALTH STABILITY: PHYSICAL HEALTH: ON AVERAGE, HOW MANY MINUTES DO YOU ENGAGE IN EXERCISE AT THIS LEVEL?: 0 MIN

## 2025-07-08 ASSESSMENT — ASTHMA QUESTIONNAIRES
QUESTION_3 LAST FOUR WEEKS HOW OFTEN DID YOUR ASTHMA SYMPTOMS (WHEEZING, COUGHING, SHORTNESS OF BREATH, CHEST TIGHTNESS OR PAIN) WAKE YOU UP AT NIGHT OR EARLIER THAN USUAL IN THE MORNING: ONCE OR TWICE
QUESTION_2 LAST FOUR WEEKS HOW OFTEN HAVE YOU HAD SHORTNESS OF BREATH: ONCE OR TWICE A WEEK
ACT_TOTALSCORE: 20
QUESTION_5 LAST FOUR WEEKS HOW WOULD YOU RATE YOUR ASTHMA CONTROL: WELL CONTROLLED
QUESTION_1 LAST FOUR WEEKS HOW MUCH OF THE TIME DID YOUR ASTHMA KEEP YOU FROM GETTING AS MUCH DONE AT WORK, SCHOOL OR AT HOME: A LITTLE OF THE TIME
QUESTION_4 LAST FOUR WEEKS HOW OFTEN HAVE YOU USED YOUR RESCUE INHALER OR NEBULIZER MEDICATION (SUCH AS ALBUTEROL): ONCE A WEEK OR LESS

## 2025-07-08 ASSESSMENT — SOCIAL DETERMINANTS OF HEALTH (SDOH): HOW OFTEN DO YOU GET TOGETHER WITH FRIENDS OR RELATIVES?: ONCE A WEEK

## 2025-07-08 NOTE — PROGRESS NOTES
"Preventive Care Visit  [facility] ALEX Beltran MD, Family Medicine  Jul 8, 2025      Assessment & Plan     Medicare annual wellness visit, subsequent  - Doing fair for his age.  - Encouraged regular exercise.    Tinea cruris:  - Rash has improved significantly, but still present in the creases. Previous swelling and urinary obstruction due to rash have resolved.  - Continue topical treatment with cream after drying the area thoroughly. Sponge baths recommended if full showers are not possible. Maintain good hygiene to prevent recurrence. Antifungal pills completed; no further need for them.    Primary hypertension:  - Controlled.  Continue lisinopril 40 mg daily and furosemide 40 mg daily.  - Monitor blood pressure. Check electrolytes and kidney function with blood tests.    Prediabetes:  - Check fasting blood sugar levels with blood tests.    Elevated fasting blood sugar:  - Check fasting blood sugar levels with blood tests.    Immunization due:  - Administer COVID shot.    Permanent atrial fibrillation:  - Controlled.  Continue Eliquis.    Bilateral leg edema:  - Last ECHO was March 2024, LVEF 60-65%.  - Consistent with venous insufficiency.  Advised patient to elevate legs, use compression stockings and we will continue furosemide 40 mg daily.  - If symptoms worsen, we can consider vascular referral.    Consent was obtained from the patient to use an AI documentation tool in the creation of this note.      The longitudinal plan of care for the diagnosis(es)/condition(s) as documented were addressed during this visit. Due to the added complexity in care, I will continue to support Cyril in the subsequent management and with ongoing continuity of care.    BMI  Estimated body mass index is 25.1 kg/m  as calculated from the following:    Height as of this encounter: 1.803 m (5' 11\").    Weight as of this encounter: 81.6 kg (180 lb).       Counseling  Appropriate preventive services were " addressed with this patient via screening, questionnaire, or discussion as appropriate for fall prevention, nutrition, physical activity, and cognition.  Checklist reviewing preventive services available has been given to the patient.  Reviewed patient's diet, addressing concerns and/or questions.   The patient was instructed to see the dentist every 6 months.   Discussed possible causes of fatigue. Updated plan of care.  Patient reported difficulty with activities of daily living were addressed today.Information on urinary incontinence and treatment options given to patient.   Reviewed preventive health counseling, as reflected in patient instructions       Regular exercise        Subjective   Cyril is a 84 year old, presenting for the following:  Rash (Patient is here for a follow up. Not fasting/), Incontinence, and Leg Swelling (Patient is having some leg swelling in both legs that has been on going for awhile. )        7/8/2025     2:35 PM   Additional Questions   Roomed by adelina alfaro cma   Accompanied by daughter          Rash  Associated symptoms include a rash.   History of Present Illness       Reason for visit:  Follow up from last visit    He eats 2-3 servings of fruits and vegetables daily.He consumes 0 sweetened beverage(s) daily.He exercises with enough effort to increase his heart rate 9 or less minutes per day.  He exercises with enough effort to increase his heart rate 3 or less days per week.   He is taking medications regularly.    History of Present Illness-  Cyril Galdamez, 84 years    Genital Rash and Urinary Symptoms  - Rash previously present all over the head of the penis and in the creases, with associated swelling and poor urine flow  - Rash has improved; currently persists only in the deep creases  - Previously experienced urinary obstruction and poor urine output, now reports much better urination  - No longer experiencing continuous urinary leakage  - Last dose of antifungal pills  taken today  - Rash and urinary symptoms previously caused discomfort and swelling    Leg Swelling  - Ongoing swelling in the legs  - Uses furosemide (diuretic) for swelling  - Swelling may be less in the morning, but not consistently checked  - Has tried compression stockings, but finds them difficult to use for extended periods due to discomfort    Asthma  - Has exercise-induced asthma  - Uses albuterol inhaler as needed, but reports current inhaler is not functioning properly    Misc  - Uses a wheelchair and requires assistance for bathing and transfers  - Difficulty accessing a handicap shower; uses a bath chair for bathing          Advance Care Planning    Document on file is a Health Care Directive or POLST.        7/8/2025   General Health   How would you rate your overall physical health? (!) FAIR   Feel stress (tense, anxious, or unable to sleep) Only a little   (!) STRESS CONCERN      7/8/2025   Nutrition   Diet: Low salt    Low fat/cholesterol       Multiple values from one day are sorted in reverse-chronological order         7/8/2025   Exercise   Days per week of moderate/strenous exercise 0 days   Average minutes spent exercising at this level 0 min   (!) EXERCISE CONCERN      7/8/2025   Social Factors   Frequency of gathering with friends or relatives Once a week   Worry food won't last until get money to buy more No   Food not last or not have enough money for food? No   Do you have housing? (Housing is defined as stable permanent housing and does not include staying outside in a car, in a tent, in an abandoned building, in an overnight shelter, or couch-surfing.) Yes   Are you worried about losing your housing? No   Lack of transportation? No   Unable to get utilities (heat,electricity)? No         7/8/2025   Fall Risk   Fallen 2 or more times in the past year? Yes   Trouble with walking or balance? Yes   Reason Gait Speed Test Not Completed Patient does not tolerate an upright or standing position  (e.g. wheelchair)          7/8/2025   Activities of Daily Living- Home Safety   Needs help with the following daily activites Transportation    Preparing meals    Housework    Bathing    Laundry    Money management   Do you have the help that you need? Yes for Some   Safety concerns in the home None of the above       Multiple values from one day are sorted in reverse-chronological order         7/8/2025   Dental   Dentist two times every year? (!) NO         7/8/2025   Hearing Screening   Hearing concerns? None of the above         7/8/2025   Driving Risk Screening   Patient/family members have concerns about driving (!) DECLINE         7/8/2025   General Alertness/Fatigue Screening   Have you been more tired than usual lately? (!) YES         7/8/2025   Urinary Incontinence Screening   Bothered by leaking urine in past 6 months Yes         Today's PHQ-2 Score:       7/8/2025     2:53 PM   PHQ-2 ( 1999 Pfizer)   Q1: Little interest or pleasure in doing things 1   Q2: Feeling down, depressed or hopeless 0   PHQ-2 Score 1    Q1: Little interest or pleasure in doing things Several days   Q2: Feeling down, depressed or hopeless Not at all   PHQ-2 Score 1       Patient-reported           7/8/2025   Substance Use   Alcohol more than 3/day or more than 7/wk No   Do you have a current opioid prescription? No   How severe/bad is pain from 1 to 10? 3/10   Do you use any other substances recreationally? (!) OTHER     Social History     Tobacco Use    Smoking status: Never     Passive exposure: Never    Smokeless tobacco: Never   Vaping Use    Vaping status: Never Used             Reviewed and updated as needed this visit by Provider                      Current providers sharing in care for this patient include:  Patient Care Team:  Mat Beltran MD as PCP - General (Family Medicine)  Ina Lam MD as MD (Radiation Oncology)  Ina Lam MD as Assigned Cancer Care Provider  Angel Sung MD as Assigned  "PCP  Rahul Staples MD as MD (Cardiovascular Disease)  Rahul Staples MD as Assigned Heart and Vascular Provider    The following health maintenance items are reviewed in Epic and correct as of today:  Health Maintenance   Topic Date Due    URINE DRUG SCREEN  Never done    ASTHMA ACTION PLAN  Never done    MEDICARE ANNUAL WELLNESS VISIT  02/26/2025    COVID-19 VACCINE (8 - Pfizer risk 2024-25 season) 03/05/2025    BMP  08/12/2025    LIPID  08/12/2025    INFLUENZA VACCINE (1) 09/01/2025    ASTHMA CONTROL TEST  01/08/2026    ANNUAL REVIEW OF HM ORDERS  02/21/2026    FALL RISK ASSESSMENT  07/08/2026    ADVANCE CARE PLANNING  02/26/2029    DTAP/TDAP/TD VACCINE (4 - Td or Tdap) 03/11/2032    PHQ-2 (once per calendar year)  Completed    PNEUMOCOCCAL VACCINE 50+ YEARS  Completed    ZOSTER VACCINE  Completed    RSV VACCINE  Completed    HPV VACCINE  Aged Out    MENINGITIS VACCINE  Aged Out         Review of Systems  No fever.     Objective    Exam  BP (!) 148/74 (BP Location: Right arm, Patient Position: Sitting, Cuff Size: Adult Regular)   Pulse 65   Temp 97.6  F (36.4  C) (Temporal)   Resp 14   Ht 1.803 m (5' 11\")   Wt 81.6 kg (180 lb)   SpO2 97%   BMI 25.10 kg/m     Estimated body mass index is 25.1 kg/m  as calculated from the following:    Height as of this encounter: 1.803 m (5' 11\").    Weight as of this encounter: 81.6 kg (180 lb).    Physical Exam  GENERAL: alert and no distress  EYES: Eyes grossly normal to inspection, PERRL and conjunctivae and sclerae normal   (male): testicles normal without atrophy or masses, penis normal without urethral discharge, and white discharge in inguinal region.  PSYCH: mentation appears normal, affect normal/bright  Gait and balance assessed per Gait Speed Test.  Result as above.        7/8/2025   Mini Cog   Clock Draw Score 0 Abnormal   3 Item Recall 3 objects recalled   Mini Cog Total Score 3              Signed Electronically by: Mat Beltran MD    "

## 2025-07-09 LAB
ALBUMIN SERPL BCG-MCNC: 3.9 G/DL (ref 3.5–5.2)
ALP SERPL-CCNC: 86 U/L (ref 40–150)
ALT SERPL W P-5'-P-CCNC: 17 U/L (ref 0–70)
ANION GAP SERPL CALCULATED.3IONS-SCNC: 12 MMOL/L (ref 7–15)
AST SERPL W P-5'-P-CCNC: 28 U/L (ref 0–45)
BILIRUB SERPL-MCNC: 0.4 MG/DL
BUN SERPL-MCNC: 31.3 MG/DL (ref 8–23)
CALCIUM SERPL-MCNC: 9.8 MG/DL (ref 8.8–10.4)
CHLORIDE SERPL-SCNC: 100 MMOL/L (ref 98–107)
CHOLEST SERPL-MCNC: 123 MG/DL
CREAT SERPL-MCNC: 1.07 MG/DL (ref 0.67–1.17)
EGFRCR SERPLBLD CKD-EPI 2021: 68 ML/MIN/1.73M2
FASTING STATUS PATIENT QL REPORTED: NO
FASTING STATUS PATIENT QL REPORTED: NO
GLUCOSE SERPL-MCNC: 139 MG/DL (ref 70–99)
HCO3 SERPL-SCNC: 26 MMOL/L (ref 22–29)
HDLC SERPL-MCNC: 50 MG/DL
LDLC SERPL CALC-MCNC: 44 MG/DL
NONHDLC SERPL-MCNC: 73 MG/DL
POTASSIUM SERPL-SCNC: 4 MMOL/L (ref 3.4–5.3)
PROT SERPL-MCNC: 7.1 G/DL (ref 6.4–8.3)
SODIUM SERPL-SCNC: 138 MMOL/L (ref 135–145)
TRIGL SERPL-MCNC: 147 MG/DL

## 2025-07-10 ENCOUNTER — PATIENT OUTREACH (OUTPATIENT)
Dept: CARE COORDINATION | Facility: CLINIC | Age: 85
End: 2025-07-10
Payer: COMMERCIAL

## 2025-07-10 NOTE — PROGRESS NOTES
Clinic Care Coordination Contact  Community Health Worker Initial Outreach    CHW Initial Information Gathering:  Referral Source: PCP  Current living arrangement:: I live in a private home with family  Type of residence:: Private home - stairs  Community Resources: None  Informal Support system:: Family  No PCP office visit in Past Year: No (7/8/25 with Dr. Beltran)  Transportation means:: Family  CHW Additional Questions  MyChart active?: Yes  Patient sent Social Drivers of Health questionnaire?: Yes    Patient accepts CC: Yes. Patient scheduled for assessment with GIOVANA Abebe on 7/22/25 at 2:00. Patient noted desire to discuss:     - PCA services-changing and bathing Personal cares    - Medical Equipment-To help with getting in and out of the bathtub and walking in his home.    - Home Care Safety Assessment-Patient is primarily homebound with needing assistance to get in and out of the home.    ** For SW Assessment call patients daughter Meera @ [phone] on file to speak with Meera dated 1/10/25.    ** Sent Care Coordination Questionnaires through [employer].    Jyoti Turner  Community Health Worker  [facility]  Clinic Care Coordination   [facility], [facility]  Office: [phone]

## 2025-07-22 ENCOUNTER — PATIENT OUTREACH (OUTPATIENT)
Dept: NURSING | Facility: CLINIC | Age: 85
End: 2025-07-22
Payer: COMMERCIAL

## 2025-07-22 NOTE — Clinical Note
FYI - enrollment complete, resources provided. I will complete next outreach in 2 weeks - have adjusted your 1st outreach to reflect this.

## 2025-07-22 NOTE — LETTER
[facility] CARE COORDINATION  [address] [address]   July 22, 2025    Cyril Galdamez  [address] [address] [address]      Dear Cyril,    I am a clinic care coordinator who works with Mat Beltran MD with the [facility]. I wanted to thank you for spending the time to talk with me.  Below is a description of clinic care coordination and how I can further assist you.       The clinic care coordination team is made up of a registered nurse, , financial resource worker and community health worker who understand the health care system. The goal of clinic care coordination is to help you manage your health and improve access to the health care system. Our team works alongside your provider to assist you in determining your health and social needs. We can help you obtain health care and community resources, providing you with necessary information and education. We can work with you through any barriers and develop a care plan that helps coordinate and strengthen the communication between you and your care team.  Our services are voluntary and are offered without charge to you personally.    Please feel free to contact me with any questions or concerns regarding care coordination and what we can offer.      We are focused on providing you with the highest-quality healthcare experience possible.    Sincerely,     Mis Salazar, [facility] Clinical Care Coordinator  [facility], and [facility]   [phone]     Additional Information: I have enclosed a copy of the Patient Centered Plan of Care. This has helpful information and goals that we have talked about. Please keep this in an easy to access place to use as needed.

## 2025-07-22 NOTE — LETTER
[facility]  Patient Centered Plan of Care  About Me:        Patient Name:  Cyril Galdamez    YOB: 1940  Age:         84 year old   Deon MRN:    [id number] Telephone Information:  Home Phone [phone]   Mobile [phone]       Address:  [address] [address] [address] Email address:  [email or web address]      Emergency Contact(s)    Name Relationship Lgl Grd Work Phone Home Phone Mobile Phone   1. JAKE GALDAMEZ Spouse    [phone]   2. CARSON GALDAMEZ Daughter    [phone]           Primary language:  English     needed? No   [facility]:  [phone] op. 1  Other communication barriers:No data recorded  Preferred Method of Communication:     Current living arrangement: I live in a private home with family    Mobility Status/ Medical Equipment: Independent w/Device        Health Maintenance  Health Maintenance Reviewed: Due/Overdue   Health Maintenance Due   Topic Date Due    URINE DRUG SCREEN  Never done    ASTHMA ACTION PLAN  Never done          My Access Plan  Medical Emergency 911   Primary Clinic Line [facility]* - [phone]   24 Hour Appointment Line [phone] or  [phone]) (toll-free)   24 Hour Nurse Line [phone] (toll-free)   Preferred Urgent Care [facility], [phone]     Preferred Hospital [facility]  [phone]     Preferred Pharmacy [facility] [address] [place] [address] [address] [facility]     Behavioral Health Crisis Line The National Suicide Prevention Lifeline at 1-967.376.9526 or Text/Call 148           My Care Team Members  Patient Care Team         Relationship Specialty Notifications Start End    Mat Beltran MD PCP - General Family Medicine Admissions 2/18/25     Phone: [phone] Fax: [phone] [address] [facility] [address]    Ina Lam MD MD Radiation Oncology  Admissions 1/12/24     Phone: [phone] Fax: [phone]         [address] [place] [address]    Ina Lam MD Assigned Cancer Care Provider   2/2/24     Phone: [phone] Fax: [phone]         [address] [place] [address]    Angel Sung MD Assigned PCP   2/23/24     Phone: [phone] Fax: [phone]         [address] [place] [address]    Rahul Staples MD MD Cardiovascular Disease  5/10/24     Phone: [phone] Fax: [phone]         [address] [facility] [address] [facility] [address]    Rahul Staples MD Assigned Heart and Vascular Provider   6/23/24     Phone: [phone] Fax: [phone]         [address] [facility] [address] [facility] [address]    Mis Salazar LICSW Lead Care Coordinator Primary Care - CC Admissions 7/10/25     Phone: [phone]         Jyoti Turner, [facility] Community Health Worker  Admissions 7/22/25     Phone: [phone]                     My Care Plans  Self Management and Treatment Plan    Care Plan  Care Plan: Help At Home       Problem: Insufficient In-home support       Goal: Establish adequate home support       Start Date: 7/22/2025    Note:     Goal Statement: I will continue to take steps over the next three month(s) to identify and establish with in home/community supports which will allow me to maintain my current level of independence.  Barriers: financial   Strengths: family support   Patient expressed understanding of goal: yes    Action steps to achieve this goal:  My family will review resources and supports sent to me via [employer] : veterans benefits, county support, home care, home delivered meals, medical equipment   My family will reach out to [facility] to check on my deductible to decide if they would like to move forward with home care  My family will reach out to the  Service Office to see about getting connected with benefits.   My family will double check my income to see if they would like to move  forward with services through [facility].   My family will review information from [employer] to consider setting up home delivered meals.   My family will continue to outreach to care coordination as needed for additional resources or supports.                                     My Medical and Care Information  Problem List   Patient Active Problem List   Diagnosis    Anemia    Benign paroxysmal positional vertigo    Dizziness    Hypertension    Spinal stenosis of lumbar region, unspecified whether neurogenic claudication present    Asbestosis (H)    Mild asthma    Dyslipidemia    First degree atrioventricular block    History of malignant neoplasm of colon    Malignant neoplasm of skin    Malignant neoplasm of colon, unspecified part of colon (H)    Microalbuminuria    Prediabetes    Sinus bradycardia    Delirium    Hip pain, right    Right leg weakness    Malignant neoplasm of prostate (H)    Permanent atrial fibrillation (H)    Bilateral leg edema    Leg weakness, bilateral    Impaired functional mobility, balance, gait, and endurance          Care Coordination Start Date: 7/10/2025   Frequency of Care Coordination: monthly, more frequently as needed     Form Last Updated: 07/22/2025

## 2025-07-22 NOTE — PROGRESS NOTES
Clinic Care Coordination Contact  Clinic Care Coordination Contact  OUTREACH    Referral Information:  Referral Source: PCP    Primary Diagnosis: Oncology    Chief Complaint   Patient presents with    Clinic Care Coordination - Initial        Universal Utilization:   Clinic Utilization  Difficulty keeping appointments:: No  Compliance Concerns: No  No-Show Concerns: No  No PCP office visit in Past Year: No  Utilization      No Show Count (past year)  0             ED Visits  0             Hospital Admissions  0                    Current as of: 7/22/2025  2:43 PM                Clinical Concerns:  Current Medical Concerns:    Patient Active Problem List   Diagnosis    Anemia    Benign paroxysmal positional vertigo    Dizziness    Hypertension    Spinal stenosis of lumbar region, unspecified whether neurogenic claudication present    Asbestosis (H)    Mild asthma    Dyslipidemia    First degree atrioventricular block    History of malignant neoplasm of colon    Malignant neoplasm of skin    Malignant neoplasm of colon, unspecified part of colon (H)    Microalbuminuria    Prediabetes    Sinus bradycardia    Delirium    Hip pain, right    Right leg weakness    Malignant neoplasm of prostate (H)    Permanent atrial fibrillation (H)    Bilateral leg edema    Leg weakness, bilateral    Impaired functional mobility, balance, gait, and endurance        Current Behavioral Concerns: no current concerns      Education Provided to patient:     Help at home through insurance (Physical Therapy, Bathing) - if you would like to reach out to your insurance first to confirm coverage/co-payment; please do so. If you decide you would like to move forward with this service please let me know or call the clinic directly to request an order for home care.     Help at home through 's Benefits - you can reach out to the [facility] of [facility]. They will be able to walk through services and options that are  "available to you : [phone].    Help at home through the county - the monthly income limit for an individual applicant is $2,901. If you would like to request services through the [facility] you can let me know and I will put in a request for services. There is typically about a 10-12 week wait for services once the request is made. If you would like to reach out to the [facility] directly to request services you can call the Access, Aging & Disability Division Intake Line : [phone]  [email or web address]    Home Delivered Meals - [facility]  [email or web address]  \"Our registered dietitians guide our trained  in developing delicious, made-from-scratch meals tailored to specific illnesses. With the help of our volunteer drivers, we deliver a week s worth of meals at a time, allowing clients to focus on healing and spending more time with loved ones.\"  Meals are free for those with a diagnosis of cancer, MS, ALS, ESRD, CHF, and/or COPD, 60 years of age or older, and live within the [facility] ([facility], and [place])  If you have any questions regarding eligibility or the application process, please reach out to our Client Services team at [phone] or [email or web address].     Medical Equipment - something else to consider would be a Safety Pole: [email or web address]  If interested, you will need to speak with Dr. Beltran in order to get an order. Let me know if you would like to start this process.     Advanced Care Planning - I see that you do not have a health care directive on file. A Health Care Directive is a legal document where you describe your health care treatment goals, your values, and name the people (Health Care Agents) you trust to make decisions for you when you are unable. You can complete and return a Health Care Directive by dropping " off at any clinic location, emailing to [email or web address], or uploading to My Chart. If you need assistance with your Health Care Directive, [facility] offers free classes (in-person and virtual). Find the Health Care Directive, classes, and more here:  [email or web address]     Pain  Pain (GOAL):: Yes  Type: Other  Location of chronic pain:: Lower back  Radiating: No  Progression: Unchanged  Description of pain: Aching  Chronic pain severity:: 4  Limitation of routine activities due to chronic pain:: Yes  Description: Unable to perform most daily activities (chores, hobbies, social activities, driving)  Alleviating Factors: Other  Aggravating Factors: Activity, Inactivity  Health Maintenance Reviewed: Due/Overdue   Health Maintenance Due   Topic Date Due    URINE DRUG SCREEN  Never done    ASTHMA ACTION PLAN  Never done      Clinical Pathway: None    Medication Management:  Medication review status: Medications reviewed and no changes reported per patient.             Functional Status:  Dependent ADLs:: Bathing, Dressing, Grooming, Wheelchair-with assist  Dependent IADLs:: Cleaning, Laundry, Cooking, Shopping, Meal Preparation, Transportation, Incontinence  Bed or wheelchair confined:: No  Mobility Status: Independent w/Device  Fallen 2 or more times in the past year?: (!) Yes  Any fall with injury in the past year?: No    Living Situation:  Current living arrangement:: I live in a private home with family  Type of residence:: [facility]    Lifestyle & Psychosocial Needs:    Social Drivers of Health     Food Insecurity: Low Risk  (7/22/2025)    Food Insecurity     Within the past 12 months, did you worry that your food would run out before you got money to buy more?: No     Within the past 12 months, did the food you bought just not last and you didn t have money to get more?: No   Depression: Not at risk (7/8/2025)    PHQ-2     PHQ-2 Score: 1    Housing Stability: Low Risk  (7/22/2025)    Housing Stability     Do you have housing? : Yes     Are you worried about losing your housing?: No   Tobacco Use: Low Risk  (7/8/2025)    Patient History     Smoking Tobacco Use: Never     Smokeless Tobacco Use: Never     Passive Exposure: Never   Financial Resource Strain: Low Risk  (7/22/2025)    Financial Resource Strain     Within the past 12 months, have you or your family members you live with been unable to get utilities (heat, electricity) when it was really needed?: No   Alcohol Use: Not At Risk (7/18/2025)    AUDIT-C     Frequency of Alcohol Consumption: 2-3 times a week     Average Number of Drinks: 1 or 2     Frequency of Binge Drinking: Never   Transportation Needs: Low Risk  (7/22/2025)    Transportation Needs     Within the past 12 months, has lack of transportation kept you from medical appointments, getting your medicines, non-medical meetings or appointments, work, or from getting things that you need?: No   Physical Activity: Inactive (7/18/2025)    Exercise Vital Sign     Days of Exercise per Week: 0 days     Minutes of Exercise per Session: 0 min   Interpersonal Safety: Low Risk  (2/21/2025)    Interpersonal Safety     Do you feel physically and emotionally safe where you currently live?: Yes     Within the past 12 months, have you been hit, slapped, kicked or otherwise physically hurt by someone?: No     Within the past 12 months, have you been humiliated or emotionally abused in other ways by your partner or ex-partner?: No   Stress: No Stress Concern Present (7/18/2025)    [employer] - Occupational Stress Questionnaire     Feeling of Stress : Only a little   Social Connections: Moderately Isolated (7/18/2025)    Social Connection and Isolation Panel [NHANES]     Frequency of Communication with Friends and Family: Twice a week     Frequency of Social Gatherings with Friends and Family: Once a week     Attends  [facility] Services: Never     Active Member of Clubs or Organizations: No     Attends Club or Organization Meetings: Never     Marital Status:    Health Literacy: Not on file     Diet:: Low cholesterol, Low saturated fat, No added salt  Inadequate nutrition (GOAL):: No  Tube Feeding: No  Inadequate activity/exercise (GOAL):: Yes  Significant changes in sleep pattern (GOAL): Yes  Transportation means:: Family, Regular car     Advent or spiritual beliefs that impact treatment:: No  Mental health DX:: No  Mental health management concern (GOAL):: No  Chemical Dependency Status: No Current Concerns  Informal Support system:: Children, Family, Spouse             Resources and Interventions:  Current Resources:      Community Resources: None  Supplies Currently Used at Home: Incontinence Supplies, Nutritional Supplements, Wipes  Equipment Currently Used at Home: grab bar, toilet, raised toilet seat, tub bench, walker, rolling, wheelchair, manual  Employment Status: retired, , previous service         Advance Care Plan/Directive  Advanced Care Plans/Directives on file:: No    Referrals Placed: Home Care, Meals on Wheels, Veterans Linkage Line         Care Plan:  Care Plan: Help At Home       Problem: Insufficient In-home support       Goal: Establish adequate home support       Start Date: 7/22/2025    Note:     Goal Statement: I will continue to take steps over the next three month(s) to identify and establish with in home/community supports which will allow me to maintain my current level of independence.  Barriers: financial   Strengths: family support   Patient expressed understanding of goal: yes    Action steps to achieve this goal:  My family will review resources and supports sent to me via [facility] : veterans benefits, county support, home care, home delivered meals, medical equipment   My family will reach out to [facility] to check on my deductible to decide if they would like to move forward with  home care  My family will reach out to the  Service Office to see about getting connected with benefits.   My family will double check my income to see if they would like to move forward with services through [facility].   My family will review information from [facility] to consider setting up home delivered meals.   My family will continue to outreach to care coordination as needed for additional resources or supports.                                Patient/Caregiver understanding: Pt reports understanding and denies any additional questions or concerns at this times. SW CC engaged in [employer] communication during encounter.     Outreach Frequency: monthly, more frequently as needed      Plan: [facility] completed enrollment to [facility]. [facility] completed handoff to [facility]. [facility] provided resources via phone and my chart. [facility] will complete outreach in about 4 week. SWCC will complete chart review in about 6 weeks. SWCC reviewed care coordination role and availability with pt's daughter and wife. SWCC discussed resources and supports available. Resources discussed: help at home, home delivered meals, medical equipment, HCD.      SWCC and pt's daughter and wife spoke about needs and concerns. Family shared that they have several things in place such as toilet safety frame, grab bars, shower transfer bench - however they are unsure if there are other needs. SWCC and family talked over what the pt is needing - family would like to see PT for stretching and mobility as well as home health aid for bathing assistance.     Home Care - family reported that pt had this long ago but there was an out of pocket cost per visit (about $100). Family would like to speak with [employer] insurance before moving forward with an order for home care so they can consider the financial impact. Family understands that they can reach out to this [facility] or clinic in order to request home care should they decide to move forward with this. Family  understands other options are private pay, [facility] services, long term care insurance, or veterans benefits. Family is not able to private pay, pt does not have long term care insurance. Family will reach out to veterans service office to check on benefits that might be available to them. Family will check monthly income to see if pt might be eligible for services through the [facility].     Home delivered meals - family has information on meals through [facility] which would be free to family. Pt's wife loves to cook so she is unsure about moving forward with a service like this.     Medical equipment - family has things in place already (toilet safety frame, grab bars, shower transfer bench). CC reviewed safety pole which family will look into. If they want to see about an order for this, they understand they will need to speak with clinic or let CCC team know to request an order.     Advanced Care Planning - CC noted after call that pt does not have a health care directive. CC provided information on HCD via my chart. No goal created for this at this time. CCC team will follow-up with family to see if they would like to move forward with this.

## 2025-08-04 ENCOUNTER — PATIENT OUTREACH (OUTPATIENT)
Dept: CARE COORDINATION | Facility: CLINIC | Age: 85
End: 2025-08-04
Payer: COMMERCIAL

## 2025-09-03 ENCOUNTER — MYC MEDICAL ADVICE (OUTPATIENT)
Dept: FAMILY MEDICINE | Facility: CLINIC | Age: 85
End: 2025-09-03
Payer: COMMERCIAL

## 2025-09-03 DIAGNOSIS — R41.3 MEMORY LOSS: Primary | ICD-10-CM
